# Patient Record
Sex: MALE | Race: AMERICAN INDIAN OR ALASKA NATIVE | Employment: OTHER | URBAN - METROPOLITAN AREA
[De-identification: names, ages, dates, MRNs, and addresses within clinical notes are randomized per-mention and may not be internally consistent; named-entity substitution may affect disease eponyms.]

---

## 2017-02-07 ENCOUNTER — OFFICE VISIT (OUTPATIENT)
Dept: FAMILY MEDICINE CLINIC | Age: 57
End: 2017-02-07

## 2017-02-07 VITALS
SYSTOLIC BLOOD PRESSURE: 126 MMHG | DIASTOLIC BLOOD PRESSURE: 68 MMHG | RESPIRATION RATE: 20 BRPM | WEIGHT: 226.6 LBS | BODY MASS INDEX: 30.69 KG/M2 | HEART RATE: 74 BPM | OXYGEN SATURATION: 93 % | HEIGHT: 72 IN

## 2017-02-07 DIAGNOSIS — H60.02 ABSCESS OF LEFT EXTERNAL EAR: Primary | ICD-10-CM

## 2017-02-07 PROCEDURE — 99214 OFFICE O/P EST MOD 30 MIN: CPT | Performed by: NURSE PRACTITIONER

## 2017-02-07 PROCEDURE — 10060 I&D ABSCESS SIMPLE/SINGLE: CPT | Performed by: NURSE PRACTITIONER

## 2017-02-07 RX ORDER — SULFAMETHOXAZOLE AND TRIMETHOPRIM 800; 160 MG/1; MG/1
1 TABLET ORAL 2 TIMES DAILY
Qty: 20 TABLET | Refills: 0 | Status: SHIPPED | OUTPATIENT
Start: 2017-02-07 | End: 2017-02-17

## 2017-02-07 ASSESSMENT — PATIENT HEALTH QUESTIONNAIRE - PHQ9
SUM OF ALL RESPONSES TO PHQ QUESTIONS 1-9: 0
SUM OF ALL RESPONSES TO PHQ9 QUESTIONS 1 & 2: 0
1. LITTLE INTEREST OR PLEASURE IN DOING THINGS: 0
2. FEELING DOWN, DEPRESSED OR HOPELESS: 0

## 2017-02-07 ASSESSMENT — ENCOUNTER SYMPTOMS
NAUSEA: 0
SHORTNESS OF BREATH: 0

## 2017-06-05 DIAGNOSIS — J30.2 SEASONAL AND PERENNIAL ALLERGIC RHINITIS: ICD-10-CM

## 2017-06-05 DIAGNOSIS — R07.81 RIB PAIN: Primary | ICD-10-CM

## 2017-06-05 DIAGNOSIS — J30.89 SEASONAL AND PERENNIAL ALLERGIC RHINITIS: ICD-10-CM

## 2017-06-05 RX ORDER — LORATADINE 10 MG/1
TABLET ORAL
Qty: 90 TABLET | Refills: 1 | Status: SHIPPED | OUTPATIENT
Start: 2017-06-05 | End: 2017-10-16 | Stop reason: SDUPTHER

## 2017-06-05 RX ORDER — IBUPROFEN 600 MG/1
600 TABLET ORAL EVERY 8 HOURS PRN
Qty: 90 TABLET | Refills: 1 | Status: SHIPPED | OUTPATIENT
Start: 2017-06-05 | End: 2019-02-14

## 2017-06-05 RX ORDER — METHYLPREDNISOLONE 4 MG/1
TABLET ORAL
Qty: 1 KIT | Refills: 0 | Status: SHIPPED | OUTPATIENT
Start: 2017-06-05 | End: 2017-06-13

## 2017-06-08 ENCOUNTER — TELEPHONE (OUTPATIENT)
Dept: FAMILY MEDICINE CLINIC | Age: 57
End: 2017-06-08

## 2017-06-13 ENCOUNTER — PROCEDURE VISIT (OUTPATIENT)
Dept: FAMILY MEDICINE CLINIC | Age: 57
End: 2017-06-13

## 2017-06-13 VITALS
BODY MASS INDEX: 29.96 KG/M2 | DIASTOLIC BLOOD PRESSURE: 80 MMHG | HEART RATE: 70 BPM | RESPIRATION RATE: 16 BRPM | SYSTOLIC BLOOD PRESSURE: 130 MMHG | HEIGHT: 71 IN | OXYGEN SATURATION: 92 % | WEIGHT: 214 LBS

## 2017-06-13 DIAGNOSIS — S81.811D LEG LACERATION, RIGHT, SUBSEQUENT ENCOUNTER: ICD-10-CM

## 2017-06-13 DIAGNOSIS — Z48.02 VISIT FOR SUTURE REMOVAL: Primary | ICD-10-CM

## 2017-06-13 DIAGNOSIS — S81.812D LEG LACERATION, LEFT, SUBSEQUENT ENCOUNTER: ICD-10-CM

## 2017-06-13 DIAGNOSIS — L03.115 CELLULITIS OF RIGHT LEG: ICD-10-CM

## 2017-06-13 PROCEDURE — 99213 OFFICE O/P EST LOW 20 MIN: CPT | Performed by: FAMILY MEDICINE

## 2017-06-13 RX ORDER — CEPHALEXIN 500 MG/1
500 CAPSULE ORAL 3 TIMES DAILY
Qty: 7 CAPSULE | Refills: 0 | Status: SHIPPED | OUTPATIENT
Start: 2017-06-13 | End: 2017-06-20

## 2017-06-27 ENCOUNTER — OFFICE VISIT (OUTPATIENT)
Dept: FAMILY MEDICINE CLINIC | Age: 57
End: 2017-06-27

## 2017-06-27 VITALS
WEIGHT: 213.6 LBS | HEIGHT: 71 IN | OXYGEN SATURATION: 93 % | HEART RATE: 64 BPM | DIASTOLIC BLOOD PRESSURE: 90 MMHG | SYSTOLIC BLOOD PRESSURE: 188 MMHG | BODY MASS INDEX: 29.9 KG/M2

## 2017-06-27 DIAGNOSIS — S81.812D LEG LACERATION, LEFT, SUBSEQUENT ENCOUNTER: ICD-10-CM

## 2017-06-27 DIAGNOSIS — I10 ESSENTIAL HYPERTENSION: Primary | ICD-10-CM

## 2017-06-27 DIAGNOSIS — L03.116 CELLULITIS OF LEFT ANTERIOR LOWER LEG: ICD-10-CM

## 2017-06-27 DIAGNOSIS — S81.811D LEG LACERATION, RIGHT, SUBSEQUENT ENCOUNTER: ICD-10-CM

## 2017-06-27 PROCEDURE — 99213 OFFICE O/P EST LOW 20 MIN: CPT | Performed by: FAMILY MEDICINE

## 2017-06-27 RX ORDER — LISINOPRIL AND HYDROCHLOROTHIAZIDE 12.5; 1 MG/1; MG/1
2 TABLET ORAL EVERY MORNING
Qty: 180 TABLET | Refills: 1 | Status: SHIPPED | OUTPATIENT
Start: 2017-06-27 | End: 2017-10-16 | Stop reason: SDUPTHER

## 2017-10-10 DIAGNOSIS — J44.9 CHRONIC OBSTRUCTIVE PULMONARY DISEASE, UNSPECIFIED COPD TYPE (HCC): ICD-10-CM

## 2017-10-10 NOTE — TELEPHONE ENCOUNTER
From: Ji Fletcher  Sent: 10/10/2017 1:47 PM EDT  Subject: Medication Renewal Request    Ji Fletcher would like a refill of the following medications:  albuterol sulfate HFA (PROVENTIL HFA) 108 (90 BASE) MCG/ACT inhaler Roseline Iraheta MD]    [phone] - F [phone]    Comment:

## 2017-10-11 RX ORDER — ALBUTEROL SULFATE 90 UG/1
2 AEROSOL, METERED RESPIRATORY (INHALATION) EVERY 4 HOURS PRN
Qty: 1 INHALER | Refills: 0 | Status: SHIPPED | OUTPATIENT
Start: 2017-10-11 | End: 2018-01-08 | Stop reason: SDUPTHER

## 2017-10-16 ENCOUNTER — OFFICE VISIT (OUTPATIENT)
Dept: FAMILY MEDICINE CLINIC | Age: 57
End: 2017-10-16

## 2017-10-16 VITALS
HEIGHT: 71 IN | WEIGHT: 202.8 LBS | HEART RATE: 58 BPM | BODY MASS INDEX: 28.39 KG/M2 | RESPIRATION RATE: 18 BRPM | OXYGEN SATURATION: 98 % | DIASTOLIC BLOOD PRESSURE: 72 MMHG | SYSTOLIC BLOOD PRESSURE: 130 MMHG

## 2017-10-16 DIAGNOSIS — Z23 NEEDS FLU SHOT: ICD-10-CM

## 2017-10-16 DIAGNOSIS — J44.9 COPD, MILD (HCC): Primary | ICD-10-CM

## 2017-10-16 DIAGNOSIS — E78.00 PURE HYPERCHOLESTEROLEMIA: ICD-10-CM

## 2017-10-16 DIAGNOSIS — J30.2 SEASONAL AND PERENNIAL ALLERGIC RHINITIS: ICD-10-CM

## 2017-10-16 DIAGNOSIS — J30.89 SEASONAL AND PERENNIAL ALLERGIC RHINITIS: ICD-10-CM

## 2017-10-16 DIAGNOSIS — I10 ESSENTIAL HYPERTENSION: ICD-10-CM

## 2017-10-16 PROCEDURE — 90686 IIV4 VACC NO PRSV 0.5 ML IM: CPT | Performed by: FAMILY MEDICINE

## 2017-10-16 PROCEDURE — 90471 IMMUNIZATION ADMIN: CPT | Performed by: FAMILY MEDICINE

## 2017-10-16 PROCEDURE — 99214 OFFICE O/P EST MOD 30 MIN: CPT | Performed by: FAMILY MEDICINE

## 2017-10-16 RX ORDER — LORATADINE 10 MG/1
TABLET ORAL
Qty: 90 TABLET | Refills: 2 | Status: SHIPPED | OUTPATIENT
Start: 2017-10-16 | End: 2019-02-14

## 2017-10-16 RX ORDER — LISINOPRIL AND HYDROCHLOROTHIAZIDE 12.5; 1 MG/1; MG/1
2 TABLET ORAL EVERY MORNING
Qty: 180 TABLET | Refills: 2 | Status: SHIPPED | OUTPATIENT
Start: 2017-10-16 | End: 2019-02-14

## 2017-10-16 RX ORDER — MONTELUKAST SODIUM 10 MG/1
TABLET ORAL
Qty: 90 TABLET | Refills: 2 | Status: SHIPPED | OUTPATIENT
Start: 2017-10-16 | End: 2019-02-14

## 2017-10-16 RX ORDER — FLUTICASONE PROPIONATE 44 UG/1
2 AEROSOL, METERED RESPIRATORY (INHALATION) 2 TIMES DAILY
Qty: 1 INHALER | Refills: 2 | Status: SHIPPED | OUTPATIENT
Start: 2017-10-16 | End: 2019-02-14

## 2017-10-16 RX ORDER — SIMVASTATIN 20 MG
TABLET ORAL
Qty: 90 TABLET | Refills: 2 | Status: SHIPPED | OUTPATIENT
Start: 2017-10-16 | End: 2019-02-14

## 2017-10-16 NOTE — PROGRESS NOTES
Vaccine Information Sheet, \"Influenza - Inactivated\"  given to Nidia Shea, or parent/legal guardian of  Nidia Shea and verbalized understanding. Patient responses:    Have you ever had a reaction to a flu vaccine? No  Are you able to eat eggs without adverse effects? Yes  Do you have any current illness? No  Have you ever had Guillian [place] Syndrome? No    Flu vaccine given per order. Please see immunization tab.

## 2017-10-16 NOTE — PROGRESS NOTES
Chief Complaint   Patient presents with    3 Month Follow-Up    Hypertension     COPD- doing well on albuterol-using the inhaler a few times a day. He has not been using his Flovent regularly. Due to production, wheezing, fevers, chills. HTN. Pt reports symptoms are stable. Request refills for medications . Denies any side effects on current regimen. Patient denies any exertional chest pain, dyspnea, palpitations, syncope, orthopnea, edema or paroxysmal nocturnal dyspnea. Denies any fever, chills, unintentional weight loss or night sweats. The patient denies abdominal or flank pain, anorexia, nausea or vomiting, dysphagia, change in bowel habits or black or bloody stools or weight loss. Otherwise. Feeling very well overall.y  Moods are good and stress level is better since he has taken some time off work for the last few months. ROS: per HPI. All other ROS negative. reports that he has never smoked. He has never used smokeless tobacco.   reports that he drinks about 3.6 oz of alcohol per week . Treatment Adherence:   Medication compliance:  compliant all of the time  Diet compliance:  compliant most of the time    Tobacco history: He  reports that he has never smoked. He has never used smokeless tobacco.   Daily Aspirin? Yes    Hypertension:  He is adherent to a low sodium diet. Patient denies chest pain, shortness of breath, headache, lightheadedness, blurred vision, peripheral edema, palpitations, dry cough and fatigue. Antihypertensive medication side effects: no medication side effects noted. Use of agents associated with hypertension: none. Hyperlipidemia:  No new myalgias or GI upset on simvastatin (Zocor). The patient does not use medications that may worsen dyslipidemias (corticosteroids, progestins, anabolic steroids, diuretics, beta-blockers, amiodarone, cyclosporine, olanzapine). The patient is not known to have coexisting coronary artery disease.     The patient is Last 3 Encounters:   10/16/17 202 lb 12.8 oz (92 kg)   06/27/17 213 lb 9.6 oz (96.9 kg)   06/13/17 214 lb (97.1 kg)       Constitutional: He is oriented to person, place, and time. He appears well-developed and well-nourished. No distress. HENT: Head: Normocephalic and atraumatic. Right Ear: External ear normal. Left Ear: External ear normal.   + clear effusion behind both TM Nose: Nose mild turbinate swelling./ . Mouth/Throat: Oropharynx is clear and moist.   Eyes: Conjunctivae, EOM and lids are normal. Pupils are equal, round, and reactive to light. Neck: Trachea normal. Neck supple. Carotid bruit is not present. No mass and no thyromegaly present. Cardiovascular: Normal rate, regular rhythm, S1 normal, S2 normal and normal heart sounds. No murmur heard. Pulmonary/Chest: Breath sounds slightly hyper resonnant at bases but. No accessory muscle usage. No respiratory distress. He  has no decreased breath sounds. He  has no wheezes. Abdominal: Soft. Normal appearance and bowel sounds are normal. . No tenderness. He has no CVA tenderness. Neurological: He  is alert and oriented to person, place, and time. Skin: He  is not diaphoretic.  well healed large ant shin bilateral scars. Psychiatric: He  has a normal mood and affect. Musculoskeletal:  Walks without antalgic gait. Diagnosis Assessment and Associated Orders:    1. COPD, mild ([address] - encouraged regular use of flovent as maintenance and albuterol as rescue fluticasone (FLOVENT HFA) 44 MCG/ACT inhaler    loratadine (CLARITIN) 10 MG tablet   2. Essential hypertension -Stable on current regimen. Refill given on current medication   Basic Metabolic Panel    lisinopril-hydrochlorothiazide (PRINZIDE;ZESTORETIC) 10-12.5 MG per tablet    metoprolol tartrate (LOPRESSOR) 25 MG tablet   3. Pure hypercholesterolemia -.bdtsa   Lipid Panel    simvastatin (ZOCOR) 20 MG tablet   4. Seasonal and perennial allergic rhinitis -Stable on current regimen.   Refill given on current medication   montelukast (SINGULAIR) 10 MG tablet   5. Needs flu shot  INFLUENZA, QUADV, 3 YRS AND OLDER, IM, PF, PREFILL SYR OR SDV, 0.5ML (FLUZONE QUADV, PF)         All patient questions answered. Pt voiced understanding. Return in about 6 months (around 4/16/2018) for HTN, HLD,.

## 2017-10-17 ENCOUNTER — NURSE ONLY (OUTPATIENT)
Dept: FAMILY MEDICINE CLINIC | Age: 57
End: 2017-10-17

## 2017-10-17 DIAGNOSIS — I10 ESSENTIAL HYPERTENSION: ICD-10-CM

## 2017-10-17 DIAGNOSIS — E78.00 PURE HYPERCHOLESTEROLEMIA: ICD-10-CM

## 2017-10-17 LAB
ANION GAP SERPL CALCULATED.3IONS-SCNC: 8 MMOL/L (ref 3–16)
BUN BLDV-MCNC: 15 MG/DL (ref 7–20)
CALCIUM SERPL-MCNC: 9.6 MG/DL (ref 8.3–10.6)
CHLORIDE BLD-SCNC: 100 MMOL/L (ref 99–110)
CHOLESTEROL, TOTAL: 223 MG/DL (ref 0–199)
CO2: 30 MMOL/L (ref 21–32)
CREAT SERPL-MCNC: 0.5 MG/DL (ref 0.9–1.3)
GFR AFRICAN AMERICAN: >60
GFR NON-AFRICAN AMERICAN: >60
GLUCOSE BLD-MCNC: 97 MG/DL (ref 70–99)
HDLC SERPL-MCNC: 32 MG/DL (ref 40–60)
LDL CHOLESTEROL CALCULATED: 163 MG/DL
POTASSIUM SERPL-SCNC: 4.4 MMOL/L (ref 3.5–5.1)
SODIUM BLD-SCNC: 138 MMOL/L (ref 136–145)
TRIGL SERPL-MCNC: 141 MG/DL (ref 0–150)
VLDLC SERPL CALC-MCNC: 28 MG/DL

## 2017-10-17 PROCEDURE — 36415 COLL VENOUS BLD VENIPUNCTURE: CPT | Performed by: FAMILY MEDICINE

## 2018-01-08 DIAGNOSIS — J44.9 CHRONIC OBSTRUCTIVE PULMONARY DISEASE, UNSPECIFIED COPD TYPE (HCC): ICD-10-CM

## 2018-01-08 RX ORDER — ALBUTEROL SULFATE 90 UG/1
2 AEROSOL, METERED RESPIRATORY (INHALATION) EVERY 4 HOURS PRN
Qty: 1 INHALER | Refills: 0 | Status: SHIPPED | OUTPATIENT
Start: 2018-01-08 | End: 2018-03-24 | Stop reason: SDUPTHER

## 2018-03-24 DIAGNOSIS — J44.9 CHRONIC OBSTRUCTIVE PULMONARY DISEASE, UNSPECIFIED COPD TYPE (HCC): ICD-10-CM

## 2018-04-30 ENCOUNTER — OFFICE VISIT (OUTPATIENT)
Dept: FAMILY MEDICINE CLINIC | Age: 58
End: 2018-04-30

## 2018-04-30 VITALS
DIASTOLIC BLOOD PRESSURE: 82 MMHG | RESPIRATION RATE: 16 BRPM | BODY MASS INDEX: 28.25 KG/M2 | HEART RATE: 71 BPM | WEIGHT: 201.8 LBS | SYSTOLIC BLOOD PRESSURE: 160 MMHG | HEIGHT: 71 IN | OXYGEN SATURATION: 94 %

## 2018-04-30 DIAGNOSIS — E78.00 PURE HYPERCHOLESTEROLEMIA: ICD-10-CM

## 2018-04-30 DIAGNOSIS — I10 ESSENTIAL HYPERTENSION: Primary | ICD-10-CM

## 2018-04-30 DIAGNOSIS — J44.9 COPD, MILD (HCC): ICD-10-CM

## 2018-04-30 PROCEDURE — 99213 OFFICE O/P EST LOW 20 MIN: CPT | Performed by: FAMILY MEDICINE

## 2018-04-30 RX ORDER — ASPIRIN 81 MG/1
81 TABLET ORAL DAILY
COMMUNITY
Start: 2018-04-30 | End: 2020-06-09 | Stop reason: SDUPTHER

## 2018-04-30 RX ORDER — AMLODIPINE BESYLATE 2.5 MG/1
2.5 TABLET ORAL DAILY
Qty: 90 TABLET | Refills: 3 | Status: SHIPPED | OUTPATIENT
Start: 2018-04-30 | End: 2019-02-14

## 2018-04-30 ASSESSMENT — PATIENT HEALTH QUESTIONNAIRE - PHQ9
1. LITTLE INTEREST OR PLEASURE IN DOING THINGS: 0
2. FEELING DOWN, DEPRESSED OR HOPELESS: 0
SUM OF ALL RESPONSES TO PHQ9 QUESTIONS 1 & 2: 0
SUM OF ALL RESPONSES TO PHQ QUESTIONS 1-9: 0

## 2019-02-14 ENCOUNTER — OFFICE VISIT (OUTPATIENT)
Dept: FAMILY MEDICINE CLINIC | Age: 59
End: 2019-02-14
Payer: COMMERCIAL

## 2019-02-14 VITALS
OXYGEN SATURATION: 98 % | WEIGHT: 219.4 LBS | HEART RATE: 81 BPM | BODY MASS INDEX: 30.72 KG/M2 | DIASTOLIC BLOOD PRESSURE: 86 MMHG | HEIGHT: 71 IN | RESPIRATION RATE: 16 BRPM | SYSTOLIC BLOOD PRESSURE: 138 MMHG

## 2019-02-14 DIAGNOSIS — I10 ESSENTIAL HYPERTENSION: ICD-10-CM

## 2019-02-14 DIAGNOSIS — Z23 NEED FOR INFLUENZA VACCINATION: ICD-10-CM

## 2019-02-14 DIAGNOSIS — E78.00 PURE HYPERCHOLESTEROLEMIA: ICD-10-CM

## 2019-02-14 DIAGNOSIS — J44.9 COPD, MILD (HCC): Primary | ICD-10-CM

## 2019-02-14 PROCEDURE — 90471 IMMUNIZATION ADMIN: CPT | Performed by: FAMILY MEDICINE

## 2019-02-14 PROCEDURE — 90686 IIV4 VACC NO PRSV 0.5 ML IM: CPT | Performed by: FAMILY MEDICINE

## 2019-02-14 PROCEDURE — 99214 OFFICE O/P EST MOD 30 MIN: CPT | Performed by: FAMILY MEDICINE

## 2019-02-14 RX ORDER — FLUTICASONE PROPIONATE 44 UG/1
2 AEROSOL, METERED RESPIRATORY (INHALATION) 2 TIMES DAILY
Qty: 1 INHALER | Refills: 2 | Status: SHIPPED | OUTPATIENT
Start: 2019-02-14 | End: 2019-12-30

## 2019-02-14 RX ORDER — ALBUTEROL SULFATE 90 UG/1
2 AEROSOL, METERED RESPIRATORY (INHALATION) EVERY 4 HOURS PRN
Qty: 18 G | Refills: 5 | Status: SHIPPED | OUTPATIENT
Start: 2019-02-14 | End: 2019-04-23 | Stop reason: SDUPTHER

## 2019-02-14 RX ORDER — LISINOPRIL 10 MG/1
10 TABLET ORAL DAILY
Qty: 90 TABLET | Refills: 1 | Status: SHIPPED | OUTPATIENT
Start: 2019-02-14 | End: 2019-09-10 | Stop reason: SDUPTHER

## 2019-02-14 ASSESSMENT — PATIENT HEALTH QUESTIONNAIRE - PHQ9
2. FEELING DOWN, DEPRESSED OR HOPELESS: 0
SUM OF ALL RESPONSES TO PHQ QUESTIONS 1-9: 0
1. LITTLE INTEREST OR PLEASURE IN DOING THINGS: 0
SUM OF ALL RESPONSES TO PHQ QUESTIONS 1-9: 0
SUM OF ALL RESPONSES TO PHQ9 QUESTIONS 1 & 2: 0

## 2019-02-25 ENCOUNTER — NURSE ONLY (OUTPATIENT)
Dept: FAMILY MEDICINE CLINIC | Age: 59
End: 2019-02-25
Payer: COMMERCIAL

## 2019-02-25 DIAGNOSIS — I10 ESSENTIAL HYPERTENSION: ICD-10-CM

## 2019-02-25 DIAGNOSIS — E78.00 PURE HYPERCHOLESTEROLEMIA: ICD-10-CM

## 2019-02-25 LAB
ANION GAP SERPL CALCULATED.3IONS-SCNC: 13 MMOL/L (ref 3–16)
BUN BLDV-MCNC: 12 MG/DL (ref 7–20)
CALCIUM SERPL-MCNC: 9.5 MG/DL (ref 8.3–10.6)
CHLORIDE BLD-SCNC: 105 MMOL/L (ref 99–110)
CHOLESTEROL, TOTAL: 200 MG/DL (ref 0–199)
CO2: 28 MMOL/L (ref 21–32)
CREAT SERPL-MCNC: 0.6 MG/DL (ref 0.9–1.3)
GFR AFRICAN AMERICAN: >60
GFR NON-AFRICAN AMERICAN: >60
GLUCOSE BLD-MCNC: 102 MG/DL (ref 70–99)
HDLC SERPL-MCNC: 39 MG/DL (ref 40–60)
LDL CHOLESTEROL CALCULATED: 144 MG/DL
POTASSIUM SERPL-SCNC: 4.9 MMOL/L (ref 3.5–5.1)
SODIUM BLD-SCNC: 146 MMOL/L (ref 136–145)
TRIGL SERPL-MCNC: 87 MG/DL (ref 0–150)
VLDLC SERPL CALC-MCNC: 17 MG/DL

## 2019-02-25 PROCEDURE — 36415 COLL VENOUS BLD VENIPUNCTURE: CPT | Performed by: FAMILY MEDICINE

## 2019-04-23 DIAGNOSIS — J44.9 COPD, MILD (HCC): ICD-10-CM

## 2019-04-24 RX ORDER — ALBUTEROL SULFATE 90 UG/1
2 AEROSOL, METERED RESPIRATORY (INHALATION) EVERY 4 HOURS PRN
Qty: 18 G | Refills: 5 | Status: SHIPPED | OUTPATIENT
Start: 2019-04-24 | End: 2020-05-08

## 2019-09-09 DIAGNOSIS — I10 ESSENTIAL HYPERTENSION: ICD-10-CM

## 2019-09-09 RX ORDER — LISINOPRIL 10 MG/1
TABLET ORAL
Qty: 90 TABLET | Refills: 1 | OUTPATIENT
Start: 2019-09-09

## 2019-09-11 RX ORDER — LISINOPRIL 10 MG/1
10 TABLET ORAL DAILY
Qty: 90 TABLET | Refills: 0 | Status: SHIPPED | OUTPATIENT
Start: 2019-09-11 | End: 2020-06-09

## 2019-10-07 ENCOUNTER — OFFICE VISIT (OUTPATIENT)
Dept: FAMILY MEDICINE CLINIC | Age: 59
End: 2019-10-07
Payer: COMMERCIAL

## 2019-10-07 VITALS
WEIGHT: 212 LBS | HEIGHT: 71 IN | DIASTOLIC BLOOD PRESSURE: 76 MMHG | SYSTOLIC BLOOD PRESSURE: 136 MMHG | BODY MASS INDEX: 29.68 KG/M2 | HEART RATE: 73 BPM | OXYGEN SATURATION: 87 %

## 2019-10-07 DIAGNOSIS — J44.9 COPD, MILD (HCC): ICD-10-CM

## 2019-10-07 DIAGNOSIS — E78.00 PURE HYPERCHOLESTEROLEMIA: ICD-10-CM

## 2019-10-07 DIAGNOSIS — Z23 NEED FOR INFLUENZA VACCINATION: ICD-10-CM

## 2019-10-07 DIAGNOSIS — I10 ESSENTIAL HYPERTENSION: Primary | ICD-10-CM

## 2019-10-07 PROCEDURE — 90471 IMMUNIZATION ADMIN: CPT | Performed by: FAMILY MEDICINE

## 2019-10-07 PROCEDURE — 90686 IIV4 VACC NO PRSV 0.5 ML IM: CPT | Performed by: FAMILY MEDICINE

## 2019-10-07 PROCEDURE — 99214 OFFICE O/P EST MOD 30 MIN: CPT | Performed by: FAMILY MEDICINE

## 2019-10-07 RX ORDER — LOSARTAN POTASSIUM 50 MG/1
50 TABLET ORAL DAILY
Qty: 90 TABLET | Refills: 3 | Status: SHIPPED | OUTPATIENT
Start: 2019-10-07 | End: 2020-06-09 | Stop reason: SDUPTHER

## 2019-10-07 RX ORDER — LISINOPRIL 10 MG/1
10 TABLET ORAL DAILY
Qty: 90 TABLET | Refills: 3 | Status: CANCELLED | OUTPATIENT
Start: 2019-10-07

## 2019-10-07 RX ORDER — LORATADINE 10 MG/1
10 TABLET ORAL DAILY
COMMUNITY
End: 2020-06-09 | Stop reason: SDUPTHER

## 2019-10-07 ASSESSMENT — COPD QUESTIONNAIRES
QUESTION_9 IN PAST WEEK AVERAGE AMOUNT YOUR SOCIAL ACTIVITIES (SUCH AS TALKING, BEING WITH CHILDREN, VISITING FRIENDS/RELATIVES) WERE LIMITED DUE TO BREATHING PROBLEMS: 0
QUESTION_10 IN PAST WEEK AVERAGE AMOUNT YOUR ABILITY TO DO SOCIAL ACTIVITIES (SUCH AS TALKING, BEING WITH CHILDREN, VISITING FRIENDS/RELATIVES) WERE LIMITED BECAUSE OF BREATHING PROBLEMS: 0
QUESTION_1 IN PAST WEEK HOW OFTEN DID YOU FEEL SHORT OF BREATH WHILE AT REST: 2
QUESTION_8 IN PAST WEEK AVERAGE AMOUNT YOUR ABILITY TO DO MODERATE PHYSICAL ACTIVITIES (SUCH AS WALKING, HOUSEWORK, CARRYING THINGS) WERE LIMITED BECAUSE OF BREATHING PROBLEMS: 2
QUESTION_6 IN PAST WEEK HOW MUCH OF THE TIME DID YOU PRODUCT SPUTUM OR PHLEGM: 1
QUESTION_4 IN PAST WEEK HOW OFTEN DID YOU FEEL DEPRESSED (DOWN) BECAUSE OF YOUR BREATHING PROBLEMS: 2
QUESTION_3 IN PAST WEEK HOW OFTEN DID YOU FEEL CONCERNED ABOUT GETTING A COLD OR YOUR BREATHING GETTING WORSE: 3
QUESTION_7 IN PAST WEEK AVERAGE AMOUNT YOUR ABILITY TO DO STRENUOUS PHYSICAL ACTIVITIES (SUCH AS CLIMBING STAIRS, HURRYING, PARTICIPATING IN SPORTS) WERE LIMITED BECAUSE OF BREATHING PROBLEMS: 3
QUESTION_5 IN PAST WEEK HOW MUCH OF THE TIME DID YOU COUGH: 3
QUESTION_2 IN PAST WEEK HOW OFTEN DID YOU FEEL SHORT OF BREATH WHILE DOING PHYSICAL ACTIVITIES: 2

## 2019-12-30 DIAGNOSIS — J44.9 COPD, MILD (HCC): ICD-10-CM

## 2019-12-30 RX ORDER — FLUTICASONE PROPIONATE 44 MCG
AEROSOL WITH ADAPTER (GRAM) INHALATION
Qty: 10.6 INHALER | Refills: 2 | Status: SHIPPED | OUTPATIENT
Start: 2019-12-30 | End: 2020-09-22

## 2020-05-08 RX ORDER — ALBUTEROL SULFATE 90 UG/1
AEROSOL, METERED RESPIRATORY (INHALATION)
Qty: 18 INHALER | Refills: 5 | Status: SHIPPED | OUTPATIENT
Start: 2020-05-08 | End: 2020-06-09 | Stop reason: SDUPTHER

## 2020-06-09 ENCOUNTER — OFFICE VISIT (OUTPATIENT)
Dept: FAMILY MEDICINE CLINIC | Age: 60
End: 2020-06-09
Payer: COMMERCIAL

## 2020-06-09 VITALS
BODY MASS INDEX: 30.04 KG/M2 | OXYGEN SATURATION: 83 % | SYSTOLIC BLOOD PRESSURE: 138 MMHG | WEIGHT: 214.6 LBS | HEIGHT: 71 IN | TEMPERATURE: 96.7 F | DIASTOLIC BLOOD PRESSURE: 80 MMHG | HEART RATE: 80 BPM

## 2020-06-09 LAB
ANION GAP SERPL CALCULATED.3IONS-SCNC: 8 MMOL/L (ref 3–16)
BUN BLDV-MCNC: 16 MG/DL (ref 7–20)
CALCIUM SERPL-MCNC: 9.6 MG/DL (ref 8.3–10.6)
CHLORIDE BLD-SCNC: 101 MMOL/L (ref 99–110)
CHOLESTEROL, FASTING: 212 MG/DL (ref 0–199)
CO2: 30 MMOL/L (ref 21–32)
CREAT SERPL-MCNC: 0.6 MG/DL (ref 0.8–1.3)
GFR AFRICAN AMERICAN: >60
GFR NON-AFRICAN AMERICAN: >60
GLUCOSE BLD-MCNC: 102 MG/DL (ref 70–99)
HDLC SERPL-MCNC: 40 MG/DL (ref 40–60)
LDL CHOLESTEROL CALCULATED: 155 MG/DL
POTASSIUM SERPL-SCNC: 5.2 MMOL/L (ref 3.5–5.1)
SODIUM BLD-SCNC: 139 MMOL/L (ref 136–145)
TRIGLYCERIDE, FASTING: 83 MG/DL (ref 0–150)
VLDLC SERPL CALC-MCNC: 17 MG/DL

## 2020-06-09 PROCEDURE — 99213 OFFICE O/P EST LOW 20 MIN: CPT | Performed by: NURSE PRACTITIONER

## 2020-06-09 RX ORDER — ALBUTEROL SULFATE 90 UG/1
AEROSOL, METERED RESPIRATORY (INHALATION)
Qty: 18 INHALER | Refills: 5 | Status: SHIPPED | OUTPATIENT
Start: 2020-06-09 | End: 2021-11-18 | Stop reason: SDUPTHER

## 2020-06-09 RX ORDER — LISINOPRIL 10 MG/1
10 TABLET ORAL DAILY
Qty: 90 TABLET | Refills: 0 | Status: CANCELLED | OUTPATIENT
Start: 2020-06-09

## 2020-06-09 RX ORDER — LORATADINE 10 MG/1
10 TABLET ORAL DAILY
Qty: 30 TABLET | Refills: 2 | Status: SHIPPED | OUTPATIENT
Start: 2020-06-09 | End: 2020-10-23

## 2020-06-09 RX ORDER — ASPIRIN 81 MG/1
81 TABLET ORAL DAILY
Qty: 90 TABLET | Refills: 0 | Status: SHIPPED | OUTPATIENT
Start: 2020-06-09 | End: 2020-10-27

## 2020-06-09 RX ORDER — LOSARTAN POTASSIUM 50 MG/1
50 TABLET ORAL DAILY
Qty: 90 TABLET | Refills: 3 | Status: SHIPPED | OUTPATIENT
Start: 2020-06-09 | End: 2021-03-21 | Stop reason: SDUPTHER

## 2020-06-09 SDOH — HEALTH STABILITY: MENTAL HEALTH: HOW OFTEN DO YOU HAVE A DRINK CONTAINING ALCOHOL?: 2-4 TIMES A MONTH

## 2020-06-09 ASSESSMENT — ENCOUNTER SYMPTOMS
CONSTIPATION: 0
ABDOMINAL PAIN: 0
DIARRHEA: 0
BACK PAIN: 0
SINUS PRESSURE: 0
NAUSEA: 0
TROUBLE SWALLOWING: 0
SINUS PAIN: 0
SHORTNESS OF BREATH: 1
WHEEZING: 0
COUGH: 0
CHEST TIGHTNESS: 0
VOMITING: 0
BLOOD IN STOOL: 0
SORE THROAT: 0

## 2020-06-09 ASSESSMENT — PATIENT HEALTH QUESTIONNAIRE - PHQ9
1. LITTLE INTEREST OR PLEASURE IN DOING THINGS: 0
SUM OF ALL RESPONSES TO PHQ QUESTIONS 1-9: 0
SUM OF ALL RESPONSES TO PHQ9 QUESTIONS 1 & 2: 0
2. FEELING DOWN, DEPRESSED OR HOPELESS: 0
SUM OF ALL RESPONSES TO PHQ QUESTIONS 1-9: 0

## 2020-06-09 NOTE — PROGRESS NOTES
Psychiatric/Behavioral: Negative for dysphoric mood, sleep disturbance and suicidal ideas. The patient is not nervous/anxious. Prior to Visit Medications    Medication Sig Taking? Authorizing Provider   losartan (COZAAR) 50 MG tablet Take 1 tablet by mouth daily To replace lisinopril Yes Adrianna Duty, APRN - NP   loratadine (CLARITIN) 10 MG tablet Take 1 tablet by mouth daily Yes Adrianna Duty APRN - NP   albuterol sulfate  (90 Base) MCG/ACT inhaler INHALE 2 PUFFS BY MOUTH EVERY 4 HOURS AS NEEDED FOR WHEEZE OR FOR SHORTNESS OF BREATH Yes Adrianna Duty, APRN - NP   aspirin EC 81 MG EC tablet Take 1 tablet by mouth daily Yes Adrianna Duty APRN - NP   FLOVENT HFA 44 MCG/ACT inhaler TAKE 2 PUFFS BY MOUTH TWICE A DAY Yes Christelle Bourne MD   Multiple Vitamin (DAILY VITAMINS PO) Take by mouth Yes Historical Provider, MD        Social History     Tobacco Use    Smoking status: Never Smoker    Smokeless tobacco: Never Used   Substance Use Topics    Alcohol use: Yes     Alcohol/week: 2.0 standard drinks     Types: 2 Cans of beer per week     Frequency: 2-4 times a month        Vitals:    06/09/20 0828   BP: 138/80   Site: Left Upper Arm   Position: Sitting   Pulse: 80   Temp: 96.7 °F (35.9 °C)   SpO2: (!) 83%   Weight: 214 lb 9.6 oz (97.3 kg)   Height: 5' 11\" (1.803 m)     Estimated body mass index is 29.93 kg/m² as calculated from the following:    Height as of this encounter: 5' 11\" (1.803 m). Weight as of this encounter: 214 lb 9.6 oz (97.3 kg). Physical Exam  Vitals signs reviewed. Constitutional:       General: He is not in acute distress. Appearance: Normal appearance. He is normal weight. He is not ill-appearing, toxic-appearing or diaphoretic. HENT:      Head: Normocephalic and atraumatic. Nose: Nose normal.   Eyes:      Extraocular Movements: Extraocular movements intact. Pupils: Pupils are equal, round, and reactive to light.    Neck: Musculoskeletal: Normal range of motion and neck supple. Cardiovascular:      Rate and Rhythm: Normal rate and regular rhythm. Heart sounds: Murmur present. Pulmonary:      Effort: Pulmonary effort is normal. No respiratory distress. Breath sounds: Normal breath sounds. Abdominal:      General: Bowel sounds are normal. There is no distension. Palpations: Abdomen is soft. There is no mass. Tenderness: There is no abdominal tenderness. There is no guarding or rebound. Hernia: No hernia is present. Musculoskeletal: Normal range of motion. Skin:     General: Skin is warm and dry. Neurological:      General: No focal deficit present. Mental Status: He is alert and oriented to person, place, and time. Mental status is at baseline. Motor: No weakness. Gait: Gait normal.   Psychiatric:         Mood and Affect: Mood normal.         Behavior: Behavior normal.         Thought Content: Thought content normal.         Judgment: Judgment normal.         ASSESSMENT/PLAN:  1. COPD, mild (HCC)  Continue Flovent twice daily and albuterol as needed. Follow-up with pulmonology for PFT. Eval and treat. - albuterol sulfate  (90 Base) MCG/ACT inhaler; INHALE 2 PUFFS BY MOUTH EVERY 4 HOURS AS NEEDED FOR WHEEZE OR FOR SHORTNESS OF BREATH  Dispense: 18 Inhaler; Refill: 5  - [facility]    2. Essential hypertension  Continue losartan. - aspirin EC 81 MG EC tablet; Take 1 tablet by mouth daily  Dispense: 90 tablet; Refill: 0  - Basic Metabolic Panel    3. Seasonal and perennial allergic rhinitis  Claritin as needed    4. Pure hypercholesterolemia  - Lipid, Fasting    5. Abnormal heart sounds  I did auscultate a murmur. Denies any history of this. I would like him to follow-up with cardiology. - Ailyn Carmona MD, Cardiology, [place]    6. Chest pain, unspecified type  This is stable and intermittent.   Follow-up with cardiology  - [facility],

## 2020-06-15 ENCOUNTER — OFFICE VISIT (OUTPATIENT)
Dept: CARDIOLOGY CLINIC | Age: 60
End: 2020-06-15
Payer: COMMERCIAL

## 2020-06-15 VITALS
DIASTOLIC BLOOD PRESSURE: 78 MMHG | WEIGHT: 215.4 LBS | HEIGHT: 71 IN | BODY MASS INDEX: 30.15 KG/M2 | SYSTOLIC BLOOD PRESSURE: 136 MMHG

## 2020-06-15 PROCEDURE — 99204 OFFICE O/P NEW MOD 45 MIN: CPT | Performed by: INTERNAL MEDICINE

## 2020-06-15 PROCEDURE — 93000 ELECTROCARDIOGRAM COMPLETE: CPT | Performed by: INTERNAL MEDICINE

## 2020-06-24 ENCOUNTER — PROCEDURE VISIT (OUTPATIENT)
Dept: CARDIOLOGY CLINIC | Age: 60
End: 2020-06-24
Payer: COMMERCIAL

## 2020-06-24 LAB
LV EF: 36 %
LVEF MODALITY: NORMAL

## 2020-06-24 PROCEDURE — 93016 CV STRESS TEST SUPVJ ONLY: CPT | Performed by: INTERNAL MEDICINE

## 2020-06-24 PROCEDURE — 93018 CV STRESS TEST I&R ONLY: CPT | Performed by: INTERNAL MEDICINE

## 2020-06-24 PROCEDURE — 93017 CV STRESS TEST TRACING ONLY: CPT | Performed by: INTERNAL MEDICINE

## 2020-06-24 PROCEDURE — 78452 HT MUSCLE IMAGE SPECT MULT: CPT | Performed by: INTERNAL MEDICINE

## 2020-06-24 PROCEDURE — A9500 TC99M SESTAMIBI: HCPCS | Performed by: INTERNAL MEDICINE

## 2020-06-25 ENCOUNTER — INITIAL CONSULT (OUTPATIENT)
Dept: PULMONOLOGY | Age: 60
End: 2020-06-25
Payer: COMMERCIAL

## 2020-06-25 ENCOUNTER — TELEPHONE (OUTPATIENT)
Dept: CARDIOLOGY CLINIC | Age: 60
End: 2020-06-25

## 2020-06-25 VITALS
SYSTOLIC BLOOD PRESSURE: 126 MMHG | HEART RATE: 81 BPM | HEIGHT: 71 IN | OXYGEN SATURATION: 86 % | DIASTOLIC BLOOD PRESSURE: 84 MMHG | WEIGHT: 215 LBS | BODY MASS INDEX: 30.1 KG/M2

## 2020-06-25 LAB
BASOPHILS ABSOLUTE: 0.1 K/UL (ref 0–0.2)
BASOPHILS RELATIVE PERCENT: 0.7 %
EOSINOPHILS ABSOLUTE: 0.1 K/UL (ref 0–0.6)
EOSINOPHILS RELATIVE PERCENT: 1.3 %
HCT VFR BLD CALC: 60.2 % (ref 40.5–52.5)
HEMOGLOBIN: 19.9 G/DL (ref 13.5–17.5)
LYMPHOCYTES ABSOLUTE: 3.1 K/UL (ref 1–5.1)
LYMPHOCYTES RELATIVE PERCENT: 36.5 %
MCH RBC QN AUTO: 29.2 PG (ref 26–34)
MCHC RBC AUTO-ENTMCNC: 33.1 G/DL (ref 31–36)
MCV RBC AUTO: 88.3 FL (ref 80–100)
MONOCYTES ABSOLUTE: 0.8 K/UL (ref 0–1.3)
MONOCYTES RELATIVE PERCENT: 9.1 %
NEUTROPHILS ABSOLUTE: 4.5 K/UL (ref 1.7–7.7)
NEUTROPHILS RELATIVE PERCENT: 52.4 %
PDW BLD-RTO: 14.2 % (ref 12.4–15.4)
PLATELET # BLD: 184 K/UL (ref 135–450)
PMV BLD AUTO: 8.8 FL (ref 5–10.5)
RBC # BLD: 6.82 M/UL (ref 4.2–5.9)
WBC # BLD: 8.6 K/UL (ref 4–11)

## 2020-06-25 PROCEDURE — 99243 OFF/OP CNSLTJ NEW/EST LOW 30: CPT | Performed by: NURSE PRACTITIONER

## 2020-06-25 RX ORDER — BUDESONIDE AND FORMOTEROL FUMARATE DIHYDRATE 160; 4.5 UG/1; UG/1
2 AEROSOL RESPIRATORY (INHALATION) 2 TIMES DAILY
Qty: 1 INHALER | Refills: 5 | Status: SHIPPED | OUTPATIENT
Start: 2020-06-25 | End: 2020-09-22

## 2020-06-25 ASSESSMENT — SLEEP AND FATIGUE QUESTIONNAIRES
HOW LIKELY ARE YOU TO NOD OFF OR FALL ASLEEP IN A CAR, WHILE STOPPED FOR A FEW MINUTES IN TRAFFIC: 0
HOW LIKELY ARE YOU TO NOD OFF OR FALL ASLEEP WHILE SITTING AND READING: 0
HOW LIKELY ARE YOU TO NOD OFF OR FALL ASLEEP WHILE LYING DOWN TO REST IN THE AFTERNOON WHEN CIRCUMSTANCES PERMIT: 1
HOW LIKELY ARE YOU TO NOD OFF OR FALL ASLEEP WHILE WATCHING TV: 0
NECK CIRCUMFERENCE (INCHES): 15.5
ESS TOTAL SCORE: 3
HOW LIKELY ARE YOU TO NOD OFF OR FALL ASLEEP WHILE SITTING AND TALKING TO SOMEONE: 0
HOW LIKELY ARE YOU TO NOD OFF OR FALL ASLEEP WHILE SITTING QUIETLY AFTER LUNCH WITHOUT ALCOHOL: 1
HOW LIKELY ARE YOU TO NOD OFF OR FALL ASLEEP WHILE SITTING INACTIVE IN A PUBLIC PLACE: 0
HOW LIKELY ARE YOU TO NOD OFF OR FALL ASLEEP WHEN YOU ARE A PASSENGER IN A CAR FOR AN HOUR WITHOUT A BREAK: 1

## 2020-06-25 NOTE — PROGRESS NOTES
History:    Family History   Problem Relation Age of Onset    Cancer Mother         colon    Diabetes Mother     High Blood Pressure Mother     Migraines Mother     High Blood Pressure Father     COPD Father     Cancer Father         lung    Heart Disease Maternal Grandmother     Heart Disease Maternal Grandfather          REVIEW OF SYSTEMS:    CONSTITUTIONAL:  negative for fevers, chills, diaphoresis, activity change, appetite change, night sweats and unexpected weight change. HEENT:  negative for hearing loss,  sinus pressure, nasal congestion, epistaxis and snoring  RESPIRATORY: Positive SOB, negative cough, negative wheeze  CARDIOVASCULAR:  Negative for chest pain, palpitations, exertional chest pressure/discomfort, edema, syncope  GASTROINTESTINAL: negative for nausea, vomiting, diarrhea, constipation, blood in stool and abdominal pain  GENITOURINARY:  negative for frequency, dysuria and hematuria  HEMATOLOGIC/LYMPHATIC:  negative for easy bruising, bleeding and lymphadenopathy  ALLERGIC/IMMUNOLOGIC:  negative for recurrent infections, angioedema, anaphylaxis and drug reaction  MUSCULOSKELETAL:  negative for  pain, joint swelling, decreased range of motion and muscle weakness  NEURO: negative for headache, AMS, decrease sensations  SKIN: Negative for rashes or lesions      Objective:   VITALS:   Vitals:    06/25/20 1538   BP: 126/84   Pulse: 81   SpO2: (!) 86%   Weight: 215 lb (97.5 kg)   Height: 5' 11\" (1.803 m)     Neck circumference: 15.5  Inches  [place] - Total score: 3  MALLAMPATI: 2  Body mass index is 29.99 kg/m².     PHYSICAL EXAM:    CONSTITUTIONAL:  awake, alert, cooperative, no apparent distress, and appears stated age  HEENT:  Supple and nontender,  trachea midline, no adenopathy, thyroid normal, no JVD, no wheezing or stridor over neck  CHEST: Chest expansion equal and symmetrical, no intercostal retraction, no increased work of breathing  LUNGS: Bilateral breath sounds clear and equal medication. We have talked about action plan for COPD exacerbations, should he develop a upper respiratory bronchial type infection he is to call the office for further evaluation for the need of antibiotics and steroids to prevent COPD exacerbation and hospitalization. He is also been instructed in use of albuterol rescue inhaler, I have given him a spacer and instruction on how to use a spacer with the Symbicort and albuterol rescue inhaler. 2. Hypoxia  O2 sat 84% at rest in room air, placed on 2 L O2 nasal cannula O2 sat 92% at rest, ambulated in gomez and O2 sat 94% with 2LPM. I will order home oxygen at 2 LPM to be worn 24/7.    3. SOB (shortness of breath)  We will check a CBC, for anemia as a possible cause of his shortness of breath. We will get a CT of his chest with high resolution to assess for nodules and other possible pulmonary causes of his shortness of breath. Tightness of breath is probably multifactorial given his recent nuclear med stress test, history of exposure to silicone dust and exposure background 0 following 9/11.    4. Snoring  5. Fatigue, unspecified type  He is reluctant to do a sleep study at this time. So we will get an overnight oximetry apnea link he is aware should his apnea link come back positive AHI greater than 5 we will need to do a sleep study at that time. He is aware of the consequences of untreated sleep apnea leading to worsening of cardiovascular disease, pulmonary disease, kidney disease, overall wellbeing    6. Preop testing  We will get a COVID-19 prior to PFT testing    7. Healthcare maintenance  We have discussed the need to maintain yearly flu immunization, pneumococcal vaccination. We have discussed Coronavirus precaution including: social distancing when needing to be in public, handwashing practice, wiping items touched in public such as gas pumps, door handles, shopping carts, etc. Self monitoring for infection - fever, chills, cough, SOB.  Should they

## 2020-06-29 ENCOUNTER — TELEPHONE (OUTPATIENT)
Dept: PULMONOLOGY | Age: 60
End: 2020-06-29

## 2020-06-29 NOTE — TELEPHONE ENCOUNTER
Dionna from [facility] states that Hypoxia cannot be a dx code for patient to get home Oxygen. She noticed in your note that patient also has COPD. Could you please change DX code on order? Thank you.

## 2020-07-02 ENCOUNTER — HOSPITAL ENCOUNTER (OUTPATIENT)
Dept: CT IMAGING | Age: 60
Discharge: HOME | End: 2020-07-02
Payer: COMMERCIAL

## 2020-07-02 PROCEDURE — 71250 CT THORAX DX C-: CPT

## 2020-07-06 ENCOUNTER — TELEPHONE (OUTPATIENT)
Dept: PULMONOLOGY | Age: 60
End: 2020-07-06

## 2020-07-06 NOTE — TELEPHONE ENCOUNTER
Attempted to called patient at home and cell phone to inform need for echocardiogram to check for pulmonary hypertension and will need cardiac cath. No answer.

## 2020-07-07 ENCOUNTER — TELEPHONE (OUTPATIENT)
Dept: PULMONOLOGY | Age: 60
End: 2020-07-07

## 2020-07-07 ENCOUNTER — HOSPITAL ENCOUNTER (OUTPATIENT)
Age: 60
Discharge: HOME | End: 2020-07-07
Payer: COMMERCIAL

## 2020-07-07 ENCOUNTER — HOSPITAL ENCOUNTER (OUTPATIENT)
Dept: GENERAL RADIOLOGY | Age: 60
Discharge: HOME | End: 2020-07-07
Payer: COMMERCIAL

## 2020-07-07 PROCEDURE — 71046 X-RAY EXAM CHEST 2 VIEWS: CPT

## 2020-07-07 NOTE — TELEPHONE ENCOUNTER
Attempted to call Velia Echevarria at all 3 numbers in his chart. Voice messages have been left for him to contact our office in regards to his recent CT findings.

## 2020-07-07 NOTE — TELEPHONE ENCOUNTER
Spoke with [facility] states over the weekend he tripped over the cat fell against a coffee table and landed full weight on his chest with his fist against his sternum. States he has pain with deep inspiration and is unable to lift even a gallon of milk without having severe chest wall pain. Have recommended that he get a chest x-ray and may need a CT of his chest.  He states he is scheduled to have an echocardiogram on Saturday and follow-up with cardiologist the following Monday. I did discuss with him the findings of his CT that was recently completed for lung screening indicating possible pulmonary hypertension and that it is my recommendation that he have a left heart cath.

## 2020-07-09 RX ORDER — BUDESONIDE AND FORMOTEROL FUMARATE DIHYDRATE 160; 4.5 UG/1; UG/1
2 AEROSOL RESPIRATORY (INHALATION) 2 TIMES DAILY
Qty: 1 INHALER | Refills: 0 | COMMUNITY
Start: 2020-07-09 | End: 2020-09-23

## 2020-07-11 ENCOUNTER — PROCEDURE VISIT (OUTPATIENT)
Dept: CARDIOLOGY CLINIC | Age: 60
End: 2020-07-11
Payer: COMMERCIAL

## 2020-07-11 LAB
LV EF: 53 %
LVEF MODALITY: NORMAL

## 2020-07-11 PROCEDURE — 93306 TTE W/DOPPLER COMPLETE: CPT | Performed by: INTERNAL MEDICINE

## 2020-07-13 ENCOUNTER — OFFICE VISIT (OUTPATIENT)
Dept: CARDIOLOGY CLINIC | Age: 60
End: 2020-07-13
Payer: COMMERCIAL

## 2020-07-13 VITALS
HEART RATE: 64 BPM | SYSTOLIC BLOOD PRESSURE: 120 MMHG | WEIGHT: 216.6 LBS | DIASTOLIC BLOOD PRESSURE: 78 MMHG | BODY MASS INDEX: 30.32 KG/M2 | HEIGHT: 71 IN

## 2020-07-13 PROCEDURE — 99213 OFFICE O/P EST LOW 20 MIN: CPT | Performed by: INTERNAL MEDICINE

## 2020-07-13 NOTE — LETTER
Romaine Hawley  1960  [id number]    Have you had any Chest Pain - No     Have you had any Shortness of Breath - Yes  If Yes - When on exertion    Have you had any dizziness - Yes, ongoing, history of Vertigo  If Yes DO ORTHOSTATIC BP - when do you feel dizzy with position change   How long does it last seconds     Have you had any palpitations - Yes  If Yes DO EKG - Do you feel your heart skipping  How long does it last - seconds     Is the patient on any of the following medications - NONE  If Yes DO EKG    Do you have any edema - swelling in NONE    If Yes - CHECK TO SEE IF THE EDEMA IS PITTING    Check Venous \"LEG PROBLEM Questionnaire\"    Do you have a surgery or procedure scheduled in the near future - No  If Yes- DO EKG    Ask patient if they want to [facility] if they are not already signed up    Check to see if we have an E-MAIL on file for the patient    Check medication list thoroughly!!!  BE SURE TO ASK PATIENT IF THEY NEED MEDICATION REFILLS

## 2020-07-13 NOTE — PROGRESS NOTES
CARDIOLOGY NOTE      7/13/2020    RE: Bella Estrada  (1960)                               TO:  Dr. Chip Orellana MD            Preet Hagan is a 60 y.o. male who was seen today for management of  HTN                                  HERE FOR ABN STRESS  HPI:   Patient is here for    - Hypertension,is  well controlled, pt is  compliant with medicines  - Hyperlipidimea, lipids are in acceptable range. Pt  is  compliant with medicines                  The patient does not have cardiac complaints    Bella Estrada has the following history recorded in care path:  Patient Active Problem List    Diagnosis Date Noted    COPD, mild ([facility] [address] 09/04/2012     Priority: High    Essential hypertension 09/04/2012     Priority: High    Pure hypercholesterolemia      Priority: Medium    Chronic neck pain 09/04/2012     Priority: Low    Seasonal and perennial allergic rhinitis 09/04/2012     Priority: Low     Current Outpatient Medications   Medication Sig Dispense Refill    budesonide-formoterol (SYMBICORT) 160-4.5 MCG/ACT AERO Inhale 2 puffs into the lungs 2 times daily Lot: [id number]  Exp: 04/2021  x1 1 Inhaler 0    SYMBICORT 160-4.5 MCG/ACT AERO Inhale 2 puffs into the lungs 2 times daily 1 Inhaler 5    losartan (COZAAR) 50 MG tablet Take 1 tablet by mouth daily To replace lisinopril 90 tablet 3    loratadine (CLARITIN) 10 MG tablet Take 1 tablet by mouth daily 30 tablet 2    albuterol sulfate  (90 Base) MCG/ACT inhaler INHALE 2 PUFFS BY MOUTH EVERY 4 HOURS AS NEEDED FOR WHEEZE OR FOR SHORTNESS OF BREATH 18 Inhaler 5    aspirin EC 81 MG EC tablet Take 1 tablet by mouth daily 90 tablet 0    Multiple Vitamin (DAILY VITAMINS PO) Take by mouth      FLOVENT HFA 44 MCG/ACT inhaler TAKE 2 PUFFS BY MOUTH TWICE A DAY (Patient not taking: Reported on 7/13/2020) 10.6 Inhaler 2     No current facility-administered medications for this visit.       Allergies: Patient has no known allergies. Past Medical History:   Diagnosis Date    Abnormal echocardiogram 3/29/13    EF=45-50%;mod. AR,mild aortic valve calcif.  Anesthesia complication 4/0/6727    Ankle fracture, left 1985    Arm fracture, right 1966    AVM (arteriovenous malformation) 6-    transverse colon    COPD (chronic obstructive pulmonary disease) ([facility] [address]     Has worked around [address] [facility] and worked at [employer] x 6 weeks    Epididymal cyst Oct 2011    U/S    H/O cardiac catheterization 4/12/2013 4/12 Dr Real Ham signif CAD. EF 55%. Renals patent.     H/O cardiovascular stress test 04/02/2013    EF 49%, there is no scintigraphic evidence of inducible myocardial ischemia, the observed defect is consistent with diaphragmatic attenuation, clinical correlation recommenced, no previous study to compare with, no wall motionn abnormality seen, normal perfusion in all myocardial regions    H/O chest pain     Hemorrhoids 6-    HTN (hypertension)     Hydrocele Oct 2011    U/S    Hyperlipemia     Hypertension     Irregular heart rhythm     after induction for surgery 9/5/2013 - pt developed irreg rhythm and surgery cancelled\"had another echo and stress test and everything checked out ok, they do not know the reason it happened\"- cardiac clearance obtained for surgery 9/19/2013    Palpitations     Pneumonia 2004    not since    Right inguinal hernia 8/22/2013    SOB (shortness of breath) 3/27/2013    Nuclear Stress Normal, EF 49% (Dr. Maxime Castillo- 4/2/2013)     SOB (shortness of breath) on exertion      Past Surgical History:   Procedure Laterality Date   [facility]    COLONOSCOPY  6-    INGUINAL HERNIA REPAIR Bilateral 1992    [address] Right 09/19/13    with mesh    OTHER SURGICAL HISTORY  09/05/13    hernia repair aborted    TYMPANOSTOMY TUBE PLACEMENT  2009    right ear   [address]      As reviewed   Family History   Problem Relation Age of Onset    Cancer Mother         colon    Diabetes Mother     High Blood Pressure Mother     Migraines Mother     High Blood Pressure Father     COPD Father     Cancer Father         lung    Heart Disease Maternal Grandmother     Heart Disease Maternal Grandfather      Social History     Tobacco Use    Smoking status: Never Smoker    Smokeless tobacco: Never Used   Substance Use Topics    Alcohol use: Yes     Alcohol/week: 2.0 standard drinks     Types: 2 Cans of beer per week     Frequency: 2-4 times a month     Comment: 6 beers a month/crystal light powder enegy drink      Review of Systems:    Constitutional: Negative for diaphoresis and fatigue  Psychological:Negative for anxiety or depression  HENT: Negative for headaches, nasal congestion, sinus pain or vertigo  Eyes: Negative for visual disturbance. Endocrine: Negative for polydipsia/polyuria  Respiratory: Negative for shortness of breath  Cardiovascular: Negative for chest pain, dyspnea on exertion, claudication, edema, irregular heartbeat, murmur, palpitations or shortness of breath  Gastrointestinal: Negative for abdominal pain or heartburn  Genito-Urinary: Negative for urinary frequency/urgency  Musculoskeletal: Negative for muscle pain, muscular weakness, negative for pain in arm and leg or swelling in foot and leg  Neurological: Negative for dizziness, headaches, memory loss, numbness/tingling, visual changes, syncope  Dermatological: Negative for rash    Objective:    Vitals:    07/13/20 1302   BP: 120/78   Site: Left Upper Arm   Position: Sitting   Cuff Size: Medium Adult   Pulse: 64   Weight: 216 lb 9.6 oz (98.2 kg)   Height: 5' 11\" (1.803 m)     /78 (Site: Left Upper Arm, Position: Sitting, Cuff Size: Medium Adult)   Pulse 64   Ht 5' 11\" (1.803 m)   Wt 216 lb 9.6 oz (98.2 kg)   BMI 30.21 kg/m²     No flowsheet data found.      Wt Readings from Last 3 Encounters:   07/13/20 216 lb 9.6 oz (98.2 kg)   06/25/20 215 lb (97.5 kg)   06/15/20 215 lb 6.4 oz (97.7 kg)     Body mass index is 30.21 kg/m². GENERAL - Alert, oriented, pleasant, in no apparent distress. EYES: No jaundice, no conjunctival pallor. SKIN: It is warm & dry. No rashes. No Echhymosis    HEENT - No clinically significant abnormalities seen. Neck - Supple. No jugular venous distention noted. No carotid bruits. Cardiovascular - Normal S1 and S2 without obvious murmur or gallop. Extremities - No cyanosis, clubbing, or significant edema. Pulmonary - No respiratory distress. No wheezes or rales. Abdomen - No masses, tenderness, or organomegaly. Musculoskeletal - No significant edema. No joint deformities. No muscle wasting. Neurologic - Cranial nerves II through XII are grossly intact. There were no gross focal neurologic abnormalities. Lab Review   Lab Results   Component Value Date    CKTOTAL 67 09/05/2013     BNP:  No results found for: BNP  PT/INR:    Lab Results   Component Value Date    INR 1.01 09/05/2013     No results found for: LABA1C  Lab Results   Component Value Date    WBC 8.6 06/25/2020    HCT 60.2 (H) 06/25/2020    MCV 88.3 06/25/2020     06/25/2020     Lab Results   Component Value Date    CHOL 200 (H) 02/25/2019    TRIG 87 02/25/2019    HDL 40 06/09/2020    LDLCALC 155 (H) 06/09/2020     Lab Results   Component Value Date    ALT 18 02/26/2014    AST 20 02/26/2014     BMP:    Lab Results   Component Value Date     06/09/2020    K 5.2 06/09/2020     06/09/2020    CO2 30 06/09/2020    BUN 16 06/09/2020    CREATININE 0.6 06/09/2020     CMP:   Lab Results   Component Value Date     06/09/2020    K 5.2 06/09/2020     06/09/2020    CO2 30 06/09/2020    BUN 16 06/09/2020    PROT 7.1 02/26/2014    PROT 7.0 03/01/2011     TSH:    Lab Results   Component Value Date    TSHHS 1.511 04/01/2013         Impression:    No diagnosis found.    Patient Active Problem List   Diagnosis Code    Chronic neck pain M54.2, G89.29    Seasonal and perennial allergic rhinitis J30.89, J30.2    COPD, mild (HCC) J44.9    Essential hypertension I10    Pure hypercholesterolemia E78.00       Assessment & Plan:  - cardiolite with low EF but echo normal    - normal EF on echo    -  Hypertension: Patients blood pressure is normal. Patient is advised about low sodium diet. Present medical regimen will not be changed. -  LIPID MANAGEMENT:  Available lipid  lab data reviewed  and patient was given dietary advice. NCEP- ATP III guidelines reviewed with patient. -   Changes  in medicines made:  No                                        Day Tomas MD    [facility]

## 2020-07-28 ENCOUNTER — TELEPHONE (OUTPATIENT)
Dept: PULMONOLOGY | Age: 60
End: 2020-07-28

## 2020-08-04 ENCOUNTER — APPOINTMENT (RX ONLY)
Dept: URBAN - METROPOLITAN AREA CLINIC 377 | Facility: CLINIC | Age: 60
Setting detail: DERMATOLOGY
End: 2020-08-04

## 2020-08-04 DIAGNOSIS — L73.8 OTHER SPECIFIED FOLLICULAR DISORDERS: ICD-10-CM

## 2020-08-04 DIAGNOSIS — L57.0 ACTINIC KERATOSIS: ICD-10-CM

## 2020-08-04 PROCEDURE — ? COUNSELING

## 2020-08-04 PROCEDURE — 17000 DESTRUCT PREMALG LESION: CPT

## 2020-08-04 PROCEDURE — ? LIQUID NITROGEN

## 2020-08-04 PROCEDURE — 99201: CPT | Mod: 25

## 2020-08-04 ASSESSMENT — LOCATION DETAILED DESCRIPTION DERM
LOCATION DETAILED: NASAL ROOT
LOCATION DETAILED: RIGHT CENTRAL MALAR CHEEK

## 2020-08-04 ASSESSMENT — LOCATION ZONE DERM
LOCATION ZONE: NOSE
LOCATION ZONE: FACE

## 2020-08-04 ASSESSMENT — LOCATION SIMPLE DESCRIPTION DERM
LOCATION SIMPLE: NOSE
LOCATION SIMPLE: RIGHT CHEEK

## 2020-08-04 NOTE — PROCEDURE: LIQUID NITROGEN
Consent: The patient's consent was obtained including but not limited to risks of crusting, scabbing, blistering, scarring, darker or lighter pigmentary change, recurrence, incomplete removal and infection.
Render Post-Care Instructions In Note?: yes
Duration Of Freeze Thaw-Cycle (Seconds): 5
Detail Level: Simple
Number Of Freeze-Thaw Cycles: 1 freeze-thaw cycle
Post-Care Instructions: I reviewed with the patient in detail post-care instructions. Patient is to wear sunprotection, and avoid picking at any of the treated lesions. Pt may apply Vaseline to crusted or scabbing areas.
Render Note In Bullet Format When Appropriate: No

## 2020-08-17 ENCOUNTER — HOSPITAL ENCOUNTER (OUTPATIENT)
Dept: LAB | Age: 60
Setting detail: SPECIMEN
Discharge: HOME | End: 2020-08-17
Payer: COMMERCIAL

## 2020-08-17 PROCEDURE — U0002 COVID-19 LAB TEST NON-CDC: HCPCS

## 2020-08-17 PROCEDURE — C9803 HOPD COVID-19 SPEC COLLECT: HCPCS

## 2020-08-18 LAB
SARS-COV-2: NOT DETECTED
SOURCE: NORMAL

## 2020-08-20 ENCOUNTER — HOSPITAL ENCOUNTER (OUTPATIENT)
Dept: PULMONOLOGY | Age: 60
Discharge: HOME | End: 2020-08-20
Payer: COMMERCIAL

## 2020-08-20 LAB
DLCO %PRED: 70 %
DLCO PRED: NORMAL
DLCO/VA %PRED: NORMAL
DLCO/VA PRED: NORMAL
DLCO/VA: NORMAL
DLCO: NORMAL
EXPIRATORY TIME-POST: NORMAL
EXPIRATORY TIME: NORMAL
FEF 25-75% %CHNG: NORMAL
FEF 25-75% %PRED-POST: NORMAL
FEF 25-75% %PRED-PRE: NORMAL
FEF 25-75% PRED: NORMAL
FEF 25-75%-POST: NORMAL
FEF 25-75%-PRE: NORMAL
FEV1 %PRED-POST: 49 %
FEV1 %PRED-PRE: 41 %
FEV1 PRED: NORMAL
FEV1-POST: NORMAL
FEV1-PRE: NORMAL
FEV1/FVC %PRED-POST: NORMAL
FEV1/FVC %PRED-PRE: NORMAL
FEV1/FVC PRED: NORMAL
FEV1/FVC-POST: 60 %
FEV1/FVC-PRE: 59 %
FVC %PRED-POST: NORMAL
FVC %PRED-PRE: NORMAL
FVC PRED: NORMAL
FVC-POST: NORMAL
FVC-PRE: NORMAL
GAW %PRED: NORMAL
GAW PRED: NORMAL
GAW: NORMAL
IC %PRED: NORMAL
IC PRED: NORMAL
IC: NORMAL
MEP: NORMAL
MIP: NORMAL
MVV %PRED-PRE: NORMAL
MVV PRED: NORMAL
MVV-PRE: NORMAL
PEF %PRED-POST: NORMAL
PEF %PRED-PRE: NORMAL
PEF PRED: NORMAL
PEF%CHNG: NORMAL
PEF-POST: NORMAL
PEF-PRE: NORMAL
RAW %PRED: NORMAL
RAW PRED: NORMAL
RAW: NORMAL
RV %PRED: NORMAL
RV PRED: NORMAL
RV: NORMAL
SVC %PRED: NORMAL
SVC PRED: NORMAL
SVC: NORMAL
TLC %PRED: 102 %
TLC PRED: NORMAL
TLC: NORMAL
VA %PRED: NORMAL
VA PRED: NORMAL
VA: NORMAL
VTG %PRED: NORMAL
VTG PRED: NORMAL
VTG: NORMAL

## 2020-08-20 PROCEDURE — 94729 DIFFUSING CAPACITY: CPT

## 2020-08-20 PROCEDURE — 94060 EVALUATION OF WHEEZING: CPT

## 2020-08-20 PROCEDURE — 94726 PLETHYSMOGRAPHY LUNG VOLUMES: CPT

## 2020-08-20 ASSESSMENT — PULMONARY FUNCTION TESTS
FEV1/FVC_POST: 60
FEV1_PERCENT_PREDICTED_PRE: 41
FEV1_PERCENT_PREDICTED_POST: 49
FEV1/FVC_PRE: 59

## 2020-09-22 ENCOUNTER — OFFICE VISIT (OUTPATIENT)
Dept: PULMONOLOGY | Age: 60
End: 2020-09-22
Payer: COMMERCIAL

## 2020-09-22 VITALS
DIASTOLIC BLOOD PRESSURE: 72 MMHG | HEART RATE: 80 BPM | HEIGHT: 71 IN | BODY MASS INDEX: 30.24 KG/M2 | SYSTOLIC BLOOD PRESSURE: 138 MMHG | OXYGEN SATURATION: 90 % | WEIGHT: 216 LBS

## 2020-09-22 PROCEDURE — 99213 OFFICE O/P EST LOW 20 MIN: CPT | Performed by: NURSE PRACTITIONER

## 2020-09-22 NOTE — PROGRESS NOTES
Pulmonary Clinic Office Follow up     Melanie Chahal is a 63 y.o. male with history of COPD, HTN, HLD, cardiomegaly with ejection fraction 36%, presents today to the pulmonary clinic for follow up. I last saw Carrington Mcgrath on 6/25/2020 since then he has completed a pulmonary function test on 8/20/2020 which demonstrates severe obstructive airway disease which is reversible with bronchodilators, small airways, restrictive disease most likely secondary to body habitus and obesity. He has been on Symbicort which she has been using twice a day with a spacer along with his albuterol rescue inhaler. He still states he has occasional periods of shortness of breath with activity. He states it will take him less than a minute to recover once he rests from his shortness of breath. Shortness of breath is more with climbing stairs or walking incline while carrying something heavy such as groceries into the house or trash to the road. Carrington Mcgrath completed a nuclear medicine myocardial exercise touch which demonstrated an ejection fraction approximately 36%. He had a echocardiogram on 7/11/2020 which showed normal ejection fraction and followed up with his cardiologist Dr. Roel Lawrence on 7/13/2020. Carrington Mcgrath states they are practicing social distancing, wearing a mask when out in public, washing hands frequently or using hand . Denies any fever, chills, malaise, change in sensation of taste or smell, headache or lightheadedness. Denies any known contacts with persons with respiratory infection, positive for coronavirus or under investigation for possible coronavirus exposure.     Influenza 10/7/2019  Pneumococcal vaccination: Pneumovax 23 on 11/3/2015  Smoker reports that he has never smoked  PCP Dr. Jessica Nieves MD    Past Medical History:  Past Medical History:   Diagnosis Date    Abnormal echocardiogram 3/29/13 EF=45-50%;mod. AR,mild aortic valve calcif.  Anesthesia complication 2/7/2838    Ankle fracture, left 1985    Arm fracture, right 1966    AVM (arteriovenous malformation) 6-    transverse colon    COPD (chronic obstructive pulmonary disease) ([facility] [address]     Has worked around [address] [place] [address] and worked at [employer] x 6 weeks    Epididymal cyst Oct 2011    [place]    H/O cardiac catheterization 4/12/2013 4/12 Dr Jordis Goldberg signif CAD. EF 55%. Renals patent.  H/O cardiovascular stress test 04/02/2013    EF 49%, there is no scintigraphic evidence of inducible myocardial ischemia, the observed defect is consistent with diaphragmatic attenuation, clinical correlation recommenced, no previous study to compare with, no wall motionn abnormality seen, normal perfusion in all myocardial regions    H/O chest pain     H/O echocardiogram 07/11/2020    EF 50-55%, Mod LVH & AR,  Dilated aortic root (4.2cm) and ascending aorta (4.1cm).     Hemorrhoids 6-    HTN (hypertension)     Hydrocele Oct 2011    U/S    Hyperlipemia     Hypertension     Irregular heart rhythm     after induction for surgery 9/5/2013 - pt developed irreg rhythm and surgery cancelled\"had another echo and stress test and everything checked out ok, they do not know the reason it happened\"- cardiac clearance obtained for surgery 9/19/2013    Palpitations     Pneumonia 2004    not since    Right inguinal hernia 8/22/2013    SOB (shortness of breath) 3/27/2013    Nuclear Stress Normal, EF 49% (Dr. Nery Zapata- 4/2/2013)     SOB (shortness of breath) on exertion        Current Medications:      Current Outpatient Medications:     fluticasone-umeclidin-vilant (TRELEGY ELLIPTA) 100-62.5-25 MCG/INH AEPB, Inhale 1 puff into the lungs daily, Disp: 1 each, Rfl: 5    losartan (COZAAR) 50 MG tablet, Take 1 tablet by mouth daily To replace lisinopril, Disp: 90 tablet, Rfl: 3    albuterol sulfate  (90 Base) MCG/ACT inhaler, INHALE 2 PUFFS BY MOUTH EVERY 4 HOURS AS NEEDED FOR WHEEZE OR FOR SHORTNESS OF BREATH, Disp: 18 Inhaler, Rfl: 5    Multiple Vitamin (DAILY VITAMINS PO), Take by mouth, Disp: , Rfl:     aspirin EC 81 MG EC tablet, Take 1 tablet by mouth daily, Disp: 90 tablet, Rfl: 0    No Known Allergies    Social History:    Social History     Socioeconomic History    Marital status:      Spouse name: None    Number of children: None    Years of education: None    Highest education level: None   Occupational History    None   Social Needs    Financial resource strain: None    Food insecurity     Worry: None     Inability: None    Transportation needs     Medical: None     Non-medical: None   Tobacco Use    Smoking status: Never Smoker    Smokeless tobacco: Never Used   Substance and Sexual Activity    Alcohol use:  Yes     Alcohol/week: 2.0 standard drinks     Types: 2 Cans of beer per week     Frequency: 2-4 times a month     Comment: 6 beers a month/crystal light powder enegy drink    Drug use: No    Sexual activity: Yes     Partners: Female     Comment:    Lifestyle    Physical activity     Days per week: None     Minutes per session: None    Stress: None   Relationships    Social connections     Talks on phone: None     Gets together: None     Attends Yarsani service: None     Active member of club or organization: None     Attends meetings of clubs or organizations: None     Relationship status: None    Intimate partner violence     Fear of current or ex partner: None     Emotionally abused: None     Physically abused: None     Forced sexual activity: None   Other Topics Concern    None   Social History Narrative    s/p [employer] (Sept 2012), s/p  at [facility] [employer] [employer],  at [employer] [facility] (Aug 2014) and  quality at [address] [place] [address] Place softball       Family History:    Family History   Problem Relation Age of Onset    Cancer Mother         colon    Diabetes Mother     High Blood Pressure Mother     Migraines Mother     High Blood Pressure Father     COPD Father     Cancer Father         lung    Heart Disease Maternal Grandmother     Heart Disease Maternal Grandfather          REVIEW OF SYSTEMS:    CONSTITUTIONAL:  negative for fevers, chills, diaphoresis, activity change, appetite change, night sweats and unexpected weight change. HEENT:  negative for hearing loss, sinus pressure, nasal congestion, epistaxis and snoring  RESPIRATORY: Positive occasional shortness of breath on exertion, negative wheeze, negative cough  CARDIOVASCULAR:  Negative for chest pain, palpitations, exertional chest pressure/discomfort, edema, syncope  GASTROINTESTINAL: negative for nausea, vomiting, diarrhea, constipation, blood in stool and abdominal pain  GENITOURINARY:  negative for frequency, dysuria and hematuria  HEMATOLOGIC/LYMPHATIC:  negative for easy bruising, bleeding and lymphadenopathy  ALLERGIC/IMMUNOLOGIC:  negative for recurrent infections, angioedema, anaphylaxis and drug reaction  MUSCULOSKELETAL:  negative for pain, joint swelling, decreased range of motion and muscle weakness  NEURO: negative for headache, AMS, decrease sensations  SKIN: Negative for rashes or lesions      Physical Exam:  /72   Pulse 80   Ht 5' 11\" (1.803 m)   Wt 216 lb (98 kg)   SpO2 90%   BMI 30.13 kg/m²    Body mass index is 30.13 kg/m². Constitutional:  He appears well developed and well-nourished. Neck:  Supple, no palpable lymphadenopathy, no JVD  Cardiovascular:  S1, S2 Normal, Regular rhythm, no murmurs or gallops, no pericardial  rubs. Pulmonary: Bilateral breath sounds clear and equal to auscultation, good air movement, no use of accessory muscles to support respiratory effort. No wheeze, no rhonchi, no rales, no cough noted during exam.  O2 sat at rest is noted 90%.   Abdomen: No epigastric pain, no distention, + bowel sounds   Extremities: No edema, no calf tenderness, no clubbing of digits  Neurologic: Awake and alert, no focal deficits  Skin: warm and dry    Radiology:   7/7/2020 chest x-ray two-view  Cardiomegaly, right basilar atelectasis    7/2/2020 CT chest high resolution  Minimal subpleural fibrotic changes predominantly in the mid lungs with indeterminate pattern, thorough consideration inconsistent with usual interstitial pneumonitis pattern. Cardiovascular findings suggest possibility pulmonary hypertension. Minimal central lobar and paraseptal emphysema. Elevation of right hemidiaphragm due to large right morgangi hernia containing nonobstructed transverse colon. PFT: 8/20/2020  FVC 53 % predicted, FEV1 41% predicted, FEV1/FVC 78% predicted, FEF 25-75% 24% predicted, , , DLCO 70  Following administration of bronchodilator there was significant response to bronchodilator. Overall testing indicates severe obstructive disease with reversible response to bronchodilators, small airways, restrictive secondary to body habitus and obesity    6-minute walk 9/22/2020  O2 sat in room air at rest 90%  Patient stood up to begin ambulation O2 sat was noted 88% in room air  Patient walked less than 1 minute O2 sat dropped to 84% in room air  Patient was placed on 2 LPM O2 sat via nasal cannula, O2 sat at rest 96% at one minute, he was then ambulated for 3 minutes with supplemental oxygen at 2 L/min O2 sat noted drop to 94% but no lower with ambulation. ASSESSMENT:    1. COPD, severe ([address]    2. Hypoxia    3. Healthcare maintenance        PLAN:   Milo Gonzalez most recent pulmonary function tests demonstrates severe obstructive disease with reversible component. We will start him on Trelegy once a day he has been instructed in the use and is aware of the need to rinse his mouth well after use to prevent secondary fungal infection. We will repeat his pulmonary function test August 2021. Qualify for oxygen based on testing within the clinic today order will be sent to DME.   He is aware that he will initially receive a large concentrator and we will try to get him qualified for portable unit. He is aware that he will need the oxygen 24/7 and I do feel that this is a lifetime need. We have discussed the importance of monitoring his health especially missed of coronavirus and going into flu season. I have instructed him should he get a respiratory infection such as a bronchitis he needs to contact our office for further evaluation of possible need for steroids/antibiotics. He does have my chart and he is aware that we may do a Doxy visit for follow-up acute visits as needed. We have discussed the need to maintain yearly flu immunization, pneumococcal vaccination. We have discussed Coronavirus precaution including: social distancing when needing to be in public, handwashing practice, wiping items touched in public such as gas pumps, door handles, shopping carts, etc. Self monitoring for infection - fever, chills, cough, SOB. Should they develop symptoms they should call office for further instructions. Return in about 4 months (around 1/22/2021) for Follow Up. This dictation was performed with a verbal recognition program and it was checked for errors. It is possible that there are still dictated errors within this office note. Any errors should be brought immediately to my attention for correction. All efforts were made to ensure that this office note is accurate.

## 2020-10-20 ENCOUNTER — OFFICE VISIT (OUTPATIENT)
Dept: CARDIOLOGY CLINIC | Age: 60
End: 2020-10-20
Payer: COMMERCIAL

## 2020-10-20 VITALS
WEIGHT: 216.4 LBS | HEART RATE: 82 BPM | SYSTOLIC BLOOD PRESSURE: 110 MMHG | BODY MASS INDEX: 30.3 KG/M2 | HEIGHT: 71 IN | DIASTOLIC BLOOD PRESSURE: 64 MMHG

## 2020-10-20 PROCEDURE — 99213 OFFICE O/P EST LOW 20 MIN: CPT | Performed by: NURSE PRACTITIONER

## 2020-10-20 ASSESSMENT — ENCOUNTER SYMPTOMS
HOARSE VOICE: 0
SHORTNESS OF BREATH: 1
COLOR CHANGE: 0
POOR WOUND HEALING: 0
ABDOMINAL PAIN: 0
EYE PAIN: 0

## 2020-10-20 NOTE — PROGRESS NOTES
[facility] [place]  Laura Welsh  Phone: [phone]    Fax [phone]                  Damian Ryder MD, Harshad Perez MD, [address] Gautam MD, MD Ole Delacruz MD Malinda Corral, MD [place] , APRN               Tawnya Hernadez, APRN    Griffin Swanson, APRN              Regine Perez, APRN            10/20/2020    RE: Con Meigs  (1960)                               TO:  Dr. Carli Kaba MD  The primary cardiologist is Dr. Waldemar Rea    CC:   1. Pure hypercholesterolemia    2. Essential hypertension         HPI:    Thank you for involving me in taking care of your patient Con Meigs. Con Meigs is a 63y.o. year old male with a history as listed above and is being seen in the office today. Mr. Aristides Olivares states that he is feeling well. He is denying any chest pain or palpitations. He does have some shortness of breath secondary to underlying COPD. There is no orthopnea or PND reported. Current Outpatient Medications   Medication Sig Dispense Refill    fluticasone-umeclidin-vilant (TRELEGY ELLIPTA) 100-62.5-25 MCG/INH AEPB Inhale 1 puff into the lungs daily 1 each 5    losartan (COZAAR) 50 MG tablet Take 1 tablet by mouth daily To replace lisinopril 90 tablet 3    albuterol sulfate  (90 Base) MCG/ACT inhaler INHALE 2 PUFFS BY MOUTH EVERY 4 HOURS AS NEEDED FOR WHEEZE OR FOR SHORTNESS OF BREATH 18 Inhaler 5    aspirin EC 81 MG EC tablet Take 1 tablet by mouth daily 90 tablet 0    Multiple Vitamin (DAILY VITAMINS PO) Take by mouth       No current facility-administered medications for this visit. Allergies: Patient has no known allergies. Past Medical History:   Diagnosis Date    Abnormal echocardiogram 3/29/13    EF=45-50%;mod. AR,mild aortic valve calcif.     Anesthesia complication 0/6/0283    Ankle fracture, left 1985    Arm fracture, right 1966    AVM (arteriovenous malformation) 6-    transverse colon    COPD (chronic obstructive pulmonary disease) ([address]     Has worked around [employer] and worked at [employer] x 6 weeks    Epididymal cyst Oct 2011    [facility]    H/O cardiac catheterization 4/12/2013 4/12 Dr Micheline Hodges signif CAD. EF 55%. Renals patent.  H/O cardiovascular stress test 04/02/2013    EF 49%, there is no scintigraphic evidence of inducible myocardial ischemia, the observed defect is consistent with diaphragmatic attenuation, clinical correlation recommenced, no previous study to compare with, no wall motionn abnormality seen, normal perfusion in all myocardial regions    H/O chest pain     H/O echocardiogram 07/11/2020    EF 50-55%, Mod LVH & AR,  Dilated aortic root (4.2cm) and ascending aorta (4.1cm).     Hemorrhoids 6-    HTN (hypertension)     Hydrocele Oct 2011    U/S    Hyperlipemia     Hypertension     Irregular heart rhythm     after induction for surgery 9/5/2013 - pt developed irreg rhythm and surgery cancelled\"had another echo and stress test and everything checked out ok, they do not know the reason it happened\"- cardiac clearance obtained for surgery 9/19/2013    Palpitations     Pneumonia 2004    not since    Right inguinal hernia 8/22/2013    SOB (shortness of breath) 3/27/2013    Nuclear Stress Normal, EF 49% (Dr. Terance Felty- 4/2/2013)     SOB (shortness of breath) on exertion      Past Surgical History:   Procedure Laterality Date    CARDIAC CATHETERIZATION  1991    COLONOSCOPY  6-    INGUINAL HERNIA REPAIR Bilateral 1992    INGUINAL HERNIA REPAIR Right 09/19/13    with mesh    OTHER SURGICAL HISTORY  09/05/13    hernia repair aborted    TYMPANOSTOMY TUBE PLACEMENT  2009    right ear    VASECTOMY  1988     Family History   Problem Relation Age of Onset    Cancer Mother         colon    Diabetes Mother     High Blood Pressure Mother     Migraines Mother     High Blood Pressure Father     COPD Father     Cancer Father         lung    Heart Disease Maternal Grandmother     Heart Disease Maternal Grandfather      Social History     Tobacco Use    Smoking status: Never Smoker    Smokeless tobacco: Never Used   Substance Use Topics    Alcohol use: Yes     Alcohol/week: 2.0 standard drinks     Types: 2 Cans of beer per week     Frequency: 2-4 times a month     Comment: 6 beers a month/crystal light powder enegy drink        Review of Systems   Constitution: Negative for diaphoresis and malaise/fatigue. HENT: Negative for congestion and hoarse voice. Eyes: Negative for pain and visual disturbance. Cardiovascular: Negative for chest pain, dyspnea on exertion, irregular heartbeat, near-syncope, palpitations and paroxysmal nocturnal dyspnea. Respiratory: Positive for shortness of breath. Endocrine: Negative for cold intolerance and heat intolerance. Hematologic/Lymphatic: Negative for adenopathy and bleeding problem. Skin: Negative for color change and poor wound healing. Musculoskeletal: Negative for muscle weakness. Gastrointestinal: Negative for abdominal pain and melena. Genitourinary: Negative for flank pain and hematuria. Neurological: Negative for dizziness and light-headedness. Psychiatric/Behavioral: Negative for depression. The patient is not nervous/anxious. Objective:      Physical Exam:  /64   Pulse 82   Ht 5' 11\" (1.803 m)   Wt 216 lb 6.4 oz (98.2 kg)   BMI 30.18 kg/m²   Wt Readings from Last 3 Encounters:   10/20/20 216 lb 6.4 oz (98.2 kg)   09/22/20 216 lb (98 kg)   07/13/20 216 lb 9.6 oz (98.2 kg)     Body mass index is 30.18 kg/m². Physical Exam  Constitutional:       Appearance: Normal appearance. HENT:      Head: Normocephalic and atraumatic. Right Ear: External ear normal.      Left Ear: External ear normal.      Nose: Nose normal.      Mouth/Throat:      Mouth: Mucous membranes are moist.      Pharynx: Oropharynx is clear. Eyes:      Extraocular Movements: Extraocular movements intact. Pupils: Pupils are equal, round, and reactive to light. Neck:      Musculoskeletal: Normal range of motion and neck supple. Cardiovascular:      Rate and Rhythm: Normal rate and regular rhythm. Pulses: Normal pulses. Pulmonary:      Effort: Pulmonary effort is normal.      Breath sounds: Normal breath sounds. Abdominal:      General: There is no distension. Palpations: Abdomen is soft. Tenderness: There is no abdominal tenderness. Musculoskeletal: Normal range of motion. Right lower leg: No edema. Left lower leg: No edema. Skin:     General: Skin is warm. Capillary Refill: Capillary refill takes less than 2 seconds. Neurological:      General: No focal deficit present. Mental Status: He is alert and oriented to person, place, and time.    Psychiatric:         Mood and Affect: Mood normal.         Behavior: Behavior normal.             DATA  BNP: No results found for: BNP, PROBNP  CBC:   Lab Results   Component Value Date    WBC 8.6 06/25/2020    RBC 6.82 06/25/2020    HGB 19.9 06/25/2020    HCT 60.2 06/25/2020     06/25/2020     CMP:    Lab Results   Component Value Date     06/09/2020    K 5.2 06/09/2020     06/09/2020    CO2 30 06/09/2020    BUN 16 06/09/2020    CREATININE 0.6 06/09/2020    GFRAA >60 06/09/2020    AGRATIO 1.8 02/26/2014    LABGLOM >60 06/09/2020    GLUCOSE 102 06/09/2020    CALCIUM 9.6 06/09/2020     Hepatic Function Panel:    Lab Results   Component Value Date    ALKPHOS 108 02/26/2014    ALT 18 02/26/2014    AST 20 02/26/2014    PROT 7.1 02/26/2014    PROT 7.0 03/01/2011    BILITOT 0.4 02/26/2014    LABALBU 4.6 02/26/2014     Magnesium:  No results found for: MG  PT/INR:    Lab Results   Component Value Date    PROTIME 10.9 09/05/2013    INR 1.01 09/05/2013     Lipids:    Lab Results   Component Value Date    TRIG 87 02/25/2019    HDL 40 06/09/2020    [address] [facility]

## 2020-10-23 RX ORDER — LORATADINE 10 MG/1
TABLET ORAL
Qty: 30 TABLET | Refills: 11 | Status: SHIPPED | OUTPATIENT
Start: 2020-10-23 | End: 2020-12-09 | Stop reason: SDUPTHER

## 2020-10-27 RX ORDER — ASPIRIN 81 MG/1
TABLET ORAL
Qty: 30 TABLET | Refills: 11 | Status: SHIPPED | OUTPATIENT
Start: 2020-10-27 | End: 2021-03-21 | Stop reason: SDUPTHER

## 2020-12-09 ENCOUNTER — OFFICE VISIT (OUTPATIENT)
Dept: FAMILY MEDICINE CLINIC | Age: 60
End: 2020-12-09
Payer: COMMERCIAL

## 2020-12-09 VITALS
SYSTOLIC BLOOD PRESSURE: 130 MMHG | WEIGHT: 216.8 LBS | HEART RATE: 55 BPM | BODY MASS INDEX: 30.35 KG/M2 | OXYGEN SATURATION: 88 % | HEIGHT: 71 IN | DIASTOLIC BLOOD PRESSURE: 76 MMHG | TEMPERATURE: 96.9 F

## 2020-12-09 PROBLEM — R01.1 MURMUR, HEART: Status: ACTIVE | Noted: 2020-12-09

## 2020-12-09 PROBLEM — R35.1 NOCTURIA: Status: ACTIVE | Noted: 2020-12-09

## 2020-12-09 PROBLEM — J44.9 COPD, MILD (HCC): Status: ACTIVE | Noted: 2020-12-09

## 2020-12-09 PROBLEM — Z99.81 ON HOME OXYGEN THERAPY: Status: ACTIVE | Noted: 2020-12-09

## 2020-12-09 PROCEDURE — 90686 IIV4 VACC NO PRSV 0.5 ML IM: CPT | Performed by: NURSE PRACTITIONER

## 2020-12-09 PROCEDURE — 99214 OFFICE O/P EST MOD 30 MIN: CPT | Performed by: NURSE PRACTITIONER

## 2020-12-09 PROCEDURE — 90471 IMMUNIZATION ADMIN: CPT | Performed by: NURSE PRACTITIONER

## 2020-12-09 RX ORDER — LORATADINE 10 MG/1
TABLET ORAL
Qty: 90 TABLET | Refills: 0 | Status: SHIPPED | OUTPATIENT
Start: 2020-12-09 | End: 2021-07-15 | Stop reason: SDUPTHER

## 2020-12-09 ASSESSMENT — ENCOUNTER SYMPTOMS
ABDOMINAL PAIN: 0
NAUSEA: 0
CONSTIPATION: 0
BLOOD IN STOOL: 0
COUGH: 1
BACK PAIN: 0
VOMITING: 0
SHORTNESS OF BREATH: 1

## 2020-12-09 NOTE — PROGRESS NOTES
2020     Mook Reid (:  1960) is a 63 y.o. male, here for evaluation of the following medical concerns:        He reports today for follow-up on chronic diagnoses.     COPD: He is taking albuterol as needed and trilegy. He does get short of breath with exertion and has had to quit playing golf due to this. has seen lori baptiste/ mani since last visit who changed his medications. pulm ordered continuous 02 at 2L. He does not have it on today and is not short of breath. States his 02 stays at 88% at home. Not a smoker. Has cough intermittent, baseline. Reports that he has intermittent left sternal chest pain that he describes as twinges- less often as before. Denies any jaw or arm pain. Also has a heart murmur. Has seen cardiology since last visit and had an echo and stress test.  States that the symptoms are now bothering him as much as they used to. Hypertension: He is taking losartan. Does not check his blood pressure at home. It has been stable though.     Seasonal allergic rhinitis: He takes Claritin as needed. This is stable.     Hyperlipidemia: He is not medicated. He is due for a lipid screen. Does not follow a low-cholesterol diet.     Feels like he \"pulled a hernia in the left groin\" one week ago. Not \"twinging me\" and is getting better. He was moving some stuff around when he felt this. Has had multiple in the past with surgeries    Urinary hesitancy, slow stream and urinating 3 times per night. No FH of prostate problems. Denies complaints today. Review of Systems   Constitutional: Negative for activity change, appetite change, chills, diaphoresis, fatigue, fever and unexpected weight change. Eyes: Negative for visual disturbance. Respiratory: Positive for cough and shortness of breath (with exertion- baseline). Cardiovascular: Negative for chest pain, palpitations and leg swelling.         Per HPI   Gastrointestinal: Negative for abdominal pain, blood in stool, constipation, nausea and vomiting. Hernia? Genitourinary: Negative for difficulty urinating. Musculoskeletal: Positive for arthralgias. Negative for back pain and gait problem. Skin: Negative. Neurological: Negative for dizziness, weakness, light-headedness, numbness and headaches. Psychiatric/Behavioral: Negative for dysphoric mood, sleep disturbance and suicidal ideas. The patient is not nervous/anxious. Prior to Visit Medications    Medication Sig Taking? Authorizing Provider   loratadine (CLARITIN) 10 MG tablet TAKE 1 TABLET BY MOUTH EVERY DAY Yes Steffi Ponce APRN - NP   aspirin 81 MG EC tablet TAKE 1 TABLET BY MOUTH EVERY DAY Yes Gibran Sheth MD   fluticasone-umeclidin-vilant (TRELEGY ELLIPTA) 100-62.5-25 MCG/INH AEPB Inhale 1 puff into the lungs daily Yes DONA Calle - CNP   losartan (COZAAR) 50 MG tablet Take 1 tablet by mouth daily To replace lisinopril Yes DONA Randle NP   albuterol sulfate  (90 Base) MCG/ACT inhaler INHALE 2 PUFFS BY MOUTH EVERY 4 HOURS AS NEEDED FOR WHEEZE OR FOR SHORTNESS OF BREATH Yes DONA Randle NP   Multiple Vitamin (DAILY VITAMINS PO) Take by mouth Yes Historical Provider, MD        Social History     Tobacco Use    Smoking status: Never Smoker    Smokeless tobacco: Never Used   Substance Use Topics    Alcohol use:  Yes     Alcohol/week: 2.0 standard drinks     Types: 2 Cans of beer per week     Frequency: 2-4 times a month     Comment: 6 beers a month/crystal light powder enegy drink        Vitals:    12/09/20 0804   BP: 130/76   Site: Left Upper Arm   Position: Sitting   Cuff Size: Large Adult   Pulse: 55   Temp: 96.9 °F (36.1 °C)   SpO2: (!) 88%  Comment: his baseline- room air today, forgot his 02. is normally 2L   Weight: 216 lb 12.8 oz (98.3 kg)   Height: 5' 11\" (1.803 m)     Estimated body mass index is 30.24 kg/m² as calculated from the following:    Height as of this encounter: 5' 11\" (1.803 m). Weight as of this encounter: 216 lb 12.8 oz (98.3 kg). Physical Exam  Vitals signs reviewed. Constitutional:       General: He is not in acute distress. Appearance: Normal appearance. He is normal weight. He is not ill-appearing, toxic-appearing or diaphoretic. HENT:      Head: Normocephalic and atraumatic. Nose: Nose normal.   Eyes:      Extraocular Movements: Extraocular movements intact. Pupils: Pupils are equal, round, and reactive to light. Neck:      Musculoskeletal: Normal range of motion and neck supple. Cardiovascular:      Rate and Rhythm: Normal rate and regular rhythm. Heart sounds: Murmur present. Pulmonary:      Effort: Pulmonary effort is normal. No respiratory distress. Breath sounds: No wheezing, rhonchi or rales. Abdominal:      General: Bowel sounds are normal. There is no distension. Palpations: Abdomen is soft. There is no mass. Tenderness: There is no abdominal tenderness. Hernia: Hernia: Exam deferred. Musculoskeletal: Normal range of motion. Skin:     General: Skin is warm and dry. Neurological:      General: No focal deficit present. Mental Status: He is alert and oriented to person, place, and time. Mental status is at baseline. Motor: No weakness. Gait: Gait normal.   Psychiatric:         Mood and Affect: Mood normal.         Behavior: Behavior normal.         Thought Content: Thought content normal.         Judgment: Judgment normal.                ASSESSMENT/PLAN:  1. Essential hypertension  Controlled. Continue current medication regimen. DASH diet. BP goal less than 140/90.    - Comprehensive Metabolic Panel; Future    2. Chronic obstructive pulmonary disease, unspecified COPD type ([address]  Controlled currently. Trilogy and albuterol as needed. Home oxygen 2 L continuous. Does not wear it all the time. O2 sat goal 88 to 92%. Following with pulmonology.     3. Seasonal and perennial allergic rhinitis  Continue Claritin. Controlled    4. Pure hypercholesterolemia  Low-cholesterol diet. - Lipid Panel; Future    5. Nocturia  Check PSA, refer to urology if need be  - PSA, Prostatic Specific Antigen; Future    6. Need for influenza vaccination  - INFLUENZA, QUADV, 3 YRS AND OLDER, IM PF, PREFILL SYR OR SDV, 0.5ML (AFLURIA QUADV, PF)    7. Murmur, heart  Follow with cardiology. Call cardiology if symptoms arise. Stable for now and has already been worked up by cardiology    8. On home oxygen therapy  Follow with pulmonology. 2 L nasal cannula continuous. O2 goal 88 to 92%    Present to the ER for any emergent or acute symptoms not managed at home or in office. Please note that this chart was generated using dragon dictation software. Although every effort was made to ensure the accuracy of this automated transcription, some errors in transcription may have occurred. Return in about 3 months (around 3/9/2021) for COPD. An electronic signature was used to authenticate this note.     --DONA Stokes NP on 12/9/2020 at 9:06 AM

## 2021-01-14 ENCOUNTER — TELEMEDICINE (OUTPATIENT)
Dept: PULMONOLOGY | Age: 61
End: 2021-01-14
Payer: COMMERCIAL

## 2021-01-14 DIAGNOSIS — J44.9 COPD, SEVERE (HCC): Primary | ICD-10-CM

## 2021-01-14 DIAGNOSIS — Z00.00 HEALTHCARE MAINTENANCE: ICD-10-CM

## 2021-01-14 DIAGNOSIS — R09.02 HYPOXIA: ICD-10-CM

## 2021-01-14 PROCEDURE — 99214 OFFICE O/P EST MOD 30 MIN: CPT | Performed by: NURSE PRACTITIONER

## 2021-01-14 NOTE — PROGRESS NOTES
[address] [facility]monary   Virtual Visit Note      Ceasar Joya is a 63 y.o. male evaluated via my chart video on 1/14/2021. Video via doxy. Consent:  Pursuant to emergency declaration under the [address] in the [employer], 1135 waiver authorization in the [facility] Act, this virtual visit was completed with patient's consent, to reduce the patient's risk of exposure to COVID-19 and provide necessary medical care. He and/or health care decision maker is aware that that he may receive a bill for this telephone service, depending on his insurance coverage, and has provided verbal consent to proceed. Patient is at his home and Provider is currently in Pulmonary Clinic at [address] [place] [address]. Documentation:  Yousiffinesse Jenkins has done significantly well since I last saw him. He has not required hospitalization, visit to the emergency room, urgent care or hospitalization for respiratory related illness. He continues to use his Trelegy daily and has only requiring the use of his albuterol rescue inhaler 1-2 times a week at the most.  He is on oxygen 24/7 however states he has been weaning himself from his oxygen. He states he is wearing his oxygen when he is sleeping and when he is active during the day. But he has been taking his oxygen off while sitting in a chair watching TV or relaxing. His O2 sat this morning when not wearing his oxygen he states was 91%. Jose Jenkins states he does have an occasional cough, cough is nonproductive. Nessa Roman states he has been staying home as much as he can with the exception of going out for error or medical appointment. He states his wife is also currently working at home to minimize her exposure to the general public. He states when he does go out he is wearing a mask, washing his hands frequently or using hand , practicing social distancing. He denies any fever, chills, headache, lightheadedness, dizziness, change in sensation of taste or smell, abdominal pain, nausea or diarrhea. He denies any known contact with persons positive for coronavirus 19 or under investigation for possible coronavirus 19 exposure. Past Medical History:   Diagnosis Date    Abnormal echocardiogram 3/29/13    EF=45-50%;mod. AR,mild aortic valve calcif.  Anesthesia complication 3/0/0967    Ankle fracture, left 1985    Arm fracture, right 1966    AVM (arteriovenous malformation) 6-    transverse colon    COPD (chronic obstructive pulmonary disease) ([facility]     Has worked around [address] [place] [address] and worked at [employer] x 6 weeks    Epididymal cyst Oct 2011    [facility]    H/O cardiac catheterization 4/12/2013 4/12 Dr Valerie Ochoa signif CAD. EF 55%. Renals patent.  H/O cardiovascular stress test 04/02/2013    EF 49%, there is no scintigraphic evidence of inducible myocardial ischemia, the observed defect is consistent with diaphragmatic attenuation, clinical correlation recommenced, no previous study to compare with, no wall motionn abnormality seen, normal perfusion in all myocardial regions    H/O chest pain     H/O echocardiogram 07/11/2020    EF 50-55%, Mod LVH & AR,  Dilated aortic root (4.2cm) and ascending aorta (4.1cm).     Hemorrhoids 6-    HTN (hypertension)     Hydrocele Oct 2011    U/S    Hyperlipemia     Hypertension     Irregular heart rhythm after induction for surgery 9/5/2013 - pt developed irreg rhythm and surgery cancelled\"had another echo and stress test and everything checked out ok, they do not know the reason it happened\"- cardiac clearance obtained for surgery 9/19/2013    Palpitations     Pneumonia 2004    not since    Right inguinal hernia 8/22/2013    SOB (shortness of breath) 3/27/2013    Nuclear Stress Normal, EF 49% (Dr. Nito Barrera- 4/2/2013)     SOB (shortness of breath) on exertion          Current Outpatient Medications:     loratadine (CLARITIN) 10 MG tablet, TAKE 1 TABLET BY MOUTH EVERY DAY, Disp: 90 tablet, Rfl: 0    aspirin 81 MG EC tablet, TAKE 1 TABLET BY MOUTH EVERY DAY, Disp: 30 tablet, Rfl: 11    fluticasone-umeclidin-vilant (TRELEGY ELLIPTA) 100-62.5-25 MCG/INH AEPB, Inhale 1 puff into the lungs daily, Disp: 1 each, Rfl: 5    losartan (COZAAR) 50 MG tablet, Take 1 tablet by mouth daily To replace lisinopril, Disp: 90 tablet, Rfl: 3    albuterol sulfate  (90 Base) MCG/ACT inhaler, INHALE 2 PUFFS BY MOUTH EVERY 4 HOURS AS NEEDED FOR WHEEZE OR FOR SHORTNESS OF BREATH, Disp: 18 Inhaler, Rfl: 5    Multiple Vitamin (DAILY VITAMINS PO), Take by mouth, Disp: , Rfl:     ROS:   Constitutional: Denies fever, denies chills   Cardiovascular: Denies chest pain, denies palpitations. Pulmonary: Positive occasional nonproductive cough, while wearing oxygen denies SOB, denies wheeze  Musculoskeletal: denies Malaise, denies joint  Psychiatric/Behavioral: Negative for dysphoric mood    Physical exam via video exam per My chart/Doxy:    Gen: No distress. Normal appearance  Eyes:  No sclera icterus. No conjunctival injection. ENT:  External appearance of ears and nose normal.  Resp: No respiratory distress noted, no conversational dyspnea, no cough present during Video exam. O2 sat per patient home monitor **91%*  Skin: Exposed areas appear in tact  M/S: No cyanosis. No clubbing. I discussed with Claire Walters should he get a respiratory infection such as a bronchitis he needs to contact our office so that we may further discuss the need for possible steroids or antibiotics. We have discussed that with COPD exacerbations he can lose lung function and his lung function already demonstrates a severe obstructive disease. We have discussed the need to maintain yearly flu immunization, pneumococcal vaccination. We have discussed Coronavirus precaution including: social distancing when needing to be in public, handwashing practice, wiping items touched in public such as gas pumps, door handles, shopping carts, etc. Self monitoring for infection - fever, chills, cough, SOB. Should they develop symptoms they should call office for further instructions. I affirm this is a Patient initiated episode with an established patient who has not had a related appointment within my department in the past 7 days or scheduled within the next 24 hours. I have spent 28 minutes reviewing previous notes, test results and communicating with patient discussing the diagnosis and importance of treatment plan.     Note: not billable if this call serves to triage the patient into an appointment for the relevant concern      Abi Isaac

## 2021-01-15 PROBLEM — R09.02 HYPOXIA: Status: ACTIVE | Noted: 2021-01-15

## 2021-03-10 ENCOUNTER — HOSPITAL ENCOUNTER (OUTPATIENT)
Age: 61
Discharge: HOME | End: 2021-03-10
Payer: COMMERCIAL

## 2021-03-10 ENCOUNTER — OFFICE VISIT (OUTPATIENT)
Dept: FAMILY MEDICINE CLINIC | Age: 61
End: 2021-03-10
Payer: COMMERCIAL

## 2021-03-10 VITALS
BODY MASS INDEX: 30.24 KG/M2 | DIASTOLIC BLOOD PRESSURE: 81 MMHG | HEART RATE: 56 BPM | OXYGEN SATURATION: 89 % | WEIGHT: 216 LBS | HEIGHT: 71 IN | SYSTOLIC BLOOD PRESSURE: 160 MMHG

## 2021-03-10 DIAGNOSIS — R01.1 MURMUR, HEART: ICD-10-CM

## 2021-03-10 DIAGNOSIS — J30.89 SEASONAL AND PERENNIAL ALLERGIC RHINITIS: ICD-10-CM

## 2021-03-10 DIAGNOSIS — E78.00 PURE HYPERCHOLESTEROLEMIA: ICD-10-CM

## 2021-03-10 DIAGNOSIS — Z99.81 ON HOME OXYGEN THERAPY: ICD-10-CM

## 2021-03-10 DIAGNOSIS — J44.9 COPD, SEVERE (HCC): ICD-10-CM

## 2021-03-10 DIAGNOSIS — R35.1 NOCTURIA: ICD-10-CM

## 2021-03-10 DIAGNOSIS — J30.2 SEASONAL AND PERENNIAL ALLERGIC RHINITIS: ICD-10-CM

## 2021-03-10 DIAGNOSIS — I10 ESSENTIAL HYPERTENSION: Primary | ICD-10-CM

## 2021-03-10 DIAGNOSIS — Z12.11 SCREEN FOR COLON CANCER: ICD-10-CM

## 2021-03-10 LAB
ALBUMIN SERPL-MCNC: 4.1 GM/DL (ref 3.4–5)
ALP BLD-CCNC: 86 IU/L (ref 40–128)
ALT SERPL-CCNC: 11 U/L (ref 10–40)
ANION GAP SERPL CALCULATED.3IONS-SCNC: 11 MMOL/L (ref 4–16)
AST SERPL-CCNC: 22 IU/L (ref 15–37)
BILIRUB SERPL-MCNC: 0.4 MG/DL (ref 0–1)
BUN BLDV-MCNC: 13 MG/DL (ref 6–23)
CALCIUM SERPL-MCNC: 9 MG/DL (ref 8.3–10.6)
CHLORIDE BLD-SCNC: 104 MMOL/L (ref 99–110)
CHOLESTEROL: 193 MG/DL
CO2: 26 MMOL/L (ref 21–32)
CREAT SERPL-MCNC: 0.6 MG/DL (ref 0.9–1.3)
GFR AFRICAN AMERICAN: >60 ML/MIN/1.73M2
GFR NON-AFRICAN AMERICAN: >60 ML/MIN/1.73M2
GLUCOSE BLD-MCNC: 90 MG/DL (ref 70–99)
HDLC SERPL-MCNC: 39 MG/DL
LDL CHOLESTEROL DIRECT: 148 MG/DL
POTASSIUM SERPL-SCNC: 4.5 MMOL/L (ref 3.5–5.1)
PROSTATE SPECIFIC ANTIGEN: 1.92 NG/ML (ref 0–4)
SODIUM BLD-SCNC: 141 MMOL/L (ref 135–145)
TOTAL PROTEIN: 6.6 GM/DL (ref 6.4–8.2)
TRIGL SERPL-MCNC: 62 MG/DL

## 2021-03-10 PROCEDURE — 80053 COMPREHEN METABOLIC PANEL: CPT

## 2021-03-10 PROCEDURE — 99214 OFFICE O/P EST MOD 30 MIN: CPT | Performed by: NURSE PRACTITIONER

## 2021-03-10 PROCEDURE — 93005 ELECTROCARDIOGRAM TRACING: CPT | Performed by: NURSE PRACTITIONER

## 2021-03-10 PROCEDURE — G0103 PSA SCREENING: HCPCS

## 2021-03-10 PROCEDURE — 83721 ASSAY OF BLOOD LIPOPROTEIN: CPT

## 2021-03-10 PROCEDURE — 80061 LIPID PANEL: CPT

## 2021-03-10 PROCEDURE — 36415 COLL VENOUS BLD VENIPUNCTURE: CPT

## 2021-03-10 RX ORDER — LORATADINE 10 MG/1
TABLET ORAL
Qty: 90 TABLET | Refills: 0 | Status: CANCELLED | OUTPATIENT
Start: 2021-03-10

## 2021-03-10 ASSESSMENT — ENCOUNTER SYMPTOMS
VOMITING: 0
CONSTIPATION: 0
BACK PAIN: 1
COUGH: 0
BLOOD IN STOOL: 0
ABDOMINAL PAIN: 0
SHORTNESS OF BREATH: 1
NAUSEA: 0

## 2021-03-10 ASSESSMENT — PATIENT HEALTH QUESTIONNAIRE - PHQ9
2. FEELING DOWN, DEPRESSED OR HOPELESS: 0
1. LITTLE INTEREST OR PLEASURE IN DOING THINGS: 0

## 2021-03-10 NOTE — PROGRESS NOTES
3/10/2021     Ruthy Velazquez (:  1960) is a 63 y.o. male, here for evaluation of the following medical concerns:         He reports today for follow-up on chronic diagnoses.     COPD: He is taking albuterol as needed and trilegy. [facility] does get short of breath with exertion and has had to quit playing golf due to this.  following with lori baptiste/ pulm- wearing. continuous 02 at 2L. He does not have it on today and is not short of breath. States his 02 stays at 89% at home. Not a smoker. Has cough intermittent, baseline.      Hypertension: He is taking losartan.  reports BP at home <140/90    Chronic heart murmur. States he has been feeling it more recently. Notices the irregular rhythm on his pulse ox. .. denies chest pain or palpitations. Does follow with cardio and had a full work up with [facility] 1+ years ago.      Seasonal allergic rhinitis: He takes Claritin as needed.  This is stable.     Hyperlipidemia: He is not medicated. [facility] is due for a lipid screen. Does not follow a low-cholesterol diet.     Urinary hesitancy, slow stream and urinating 3 times per night. No FH of prostate problems.      Denies complaints today.            Review of Systems   Constitutional: Negative for activity change, appetite change, chills, diaphoresis, fatigue, fever and unexpected weight change. Eyes: Negative for visual disturbance. Respiratory: Positive for shortness of breath. Negative for cough. Cardiovascular: Negative for chest pain, palpitations and leg swelling. Gastrointestinal: Negative for abdominal pain, blood in stool, constipation, nausea and vomiting. Genitourinary: Negative for difficulty urinating. Nocturia    Musculoskeletal: Positive for arthralgias and back pain. Negative for gait problem. Skin: Negative. Neurological: Negative for dizziness, weakness, light-headedness, numbness and headaches.    Psychiatric/Behavioral: Negative for dysphoric mood, sleep disturbance and suicidal ideas. The patient is not nervous/anxious. Prior to Visit Medications    Medication Sig Taking? Authorizing Provider   loratadine (CLARITIN) 10 MG tablet TAKE 1 TABLET BY MOUTH EVERY DAY Yes Sg Seen, APRN - NP   aspirin 81 MG EC tablet TAKE 1 TABLET BY MOUTH EVERY DAY Yes Carmella Chavarria MD   fluticasone-umeclidin-vilant (TRELEGY ELLIPTA) 100-62.5-25 MCG/INH AEPB Inhale 1 puff into the lungs daily Yes Tamiko Lucero, APRN - CNP   losartan (COZAAR) 50 MG tablet Take 1 tablet by mouth daily To replace lisinopril Yes Sg Seen, APRN - NP   albuterol sulfate  (90 Base) MCG/ACT inhaler INHALE 2 PUFFS BY MOUTH EVERY 4 HOURS AS NEEDED FOR WHEEZE OR FOR SHORTNESS OF BREATH Yes Sg Seen, APRN - NP   Multiple Vitamin (DAILY VITAMINS PO) Take by mouth Yes Historical Provider, MD        Social History     Tobacco Use    Smoking status: Never Smoker    Smokeless tobacco: Never Used   Substance Use Topics    Alcohol use: Yes     Alcohol/week: 2.0 standard drinks     Types: 2 Cans of beer per week     Frequency: 2-4 times a month     Comment: 6 beers a month/crystal light powder enegy drink        Vitals:    03/10/21 0804 03/10/21 0819   BP: (!) 160/84 (!) 180/60   Site: Left Upper Arm Left Upper Arm   Position: Sitting Sitting   Cuff Size: Medium Adult Medium Adult   Pulse: 56    SpO2: (!) 89%    Weight: 216 lb (98 kg)    Height: 5' 11\" (1.803 m)      Estimated body mass index is 30.13 kg/m² as calculated from the following:    Height as of this encounter: 5' 11\" (1.803 m). Weight as of this encounter: 216 lb (98 kg). Physical Exam  Vitals signs reviewed. Constitutional:       General: He is not in acute distress. Appearance: Normal appearance. He is normal weight. He is not ill-appearing, toxic-appearing or diaphoretic. HENT:      Head: Normocephalic and atraumatic.       Nose: Nose normal.   Eyes:      Extraocular Movements: Extraocular movements intact. Pupils: Pupils are equal, round, and reactive to light. Neck:      Musculoskeletal: Normal range of motion and neck supple. Cardiovascular:      Rate and Rhythm: Normal rate. Rhythm irregular. Heart sounds: Normal heart sounds. Pulmonary:      Effort: Pulmonary effort is normal. No respiratory distress. Breath sounds: No wheezing, rhonchi or rales. Comments: Diminished. Abdominal:      General: Bowel sounds are normal. There is no distension. Palpations: Abdomen is soft. There is no mass. Tenderness: There is no abdominal tenderness. Hernia: No hernia is present. Musculoskeletal: Normal range of motion. Skin:     General: Skin is warm and dry. Neurological:      General: No focal deficit present. Mental Status: He is alert and oriented to person, place, and time. Mental status is at baseline. Motor: No weakness. Gait: Gait normal.   Psychiatric:         Mood and Affect: Mood normal.         Behavior: Behavior normal.         Thought Content: Thought content normal.         Judgment: Judgment normal.         ASSESSMENT/PLAN:  1. Essential hypertension  Continue losartan. Low sodium diet. BP goal <140/90  Call provider if BP higher than goal.   Call provider with BP readings in one wek. Elevated in office today. 2. Pure hypercholesterolemia  Lipid panel today     3. COPD, severe ([address]  Follow with pulm- lori baptiste. Cont. 02 2L NC - not wearing today. Doesn't seem to wear out of the house. Stable on trilegy and albuterol PRN. 4. On home oxygen therapy  2L NC cont. 5. Nocturia  Check PSA     6. Seasonal and perennial allergic rhinitis  Claritin daily, controlled    7. Screen for colon cancer  GI referral. Last in 2015 and he is every 5 yr follow up   - [facility] [facility]) - Molly Deleon MD, Gastroenterology, [place]    8. Murmur, heart  Follows with cardio. EKG today. Send results to cardio with message.    F/u with cardio  - EKG 12 Lead      Return in about 3 months (around 6/10/2021) for htn, COPD. An electronic signature was used to authenticate this note.     --Sg Abdalla, APRN - NP on 3/10/2021 at 8:36 AM

## 2021-03-11 ENCOUNTER — TELEPHONE (OUTPATIENT)
Dept: CARDIOLOGY CLINIC | Age: 61
End: 2021-03-11

## 2021-03-11 DIAGNOSIS — R93.1 DECREASED CARDIAC EJECTION FRACTION: Primary | ICD-10-CM

## 2021-03-11 NOTE — TELEPHONE ENCOUNTER
Per Dr Aneesh Zuñiga, abn  ekg at PCP office, will do 72 hr holter. Also need muga scan to reevaluate EF. Patient informed and scheduled.  PCP udpated

## 2021-03-12 ENCOUNTER — NURSE ONLY (OUTPATIENT)
Dept: CARDIOLOGY CLINIC | Age: 61
End: 2021-03-12
Payer: COMMERCIAL

## 2021-03-12 DIAGNOSIS — R93.1 DECREASED CARDIAC EJECTION FRACTION: Primary | ICD-10-CM

## 2021-03-12 PROCEDURE — 93010 ELECTROCARDIOGRAM REPORT: CPT | Performed by: INTERNAL MEDICINE

## 2021-03-12 PROCEDURE — 93242 EXT ECG>48HR<7D RECORDING: CPT | Performed by: INTERNAL MEDICINE

## 2021-03-12 NOTE — PROGRESS NOTES
Applied @ at office, 72hr holter w/monitor# [id number]. Educated pt on proper holter usage; how to keep sx diary; & when to mail in monitor back to [employer]. Pt voiced understanding.

## 2021-03-16 ENCOUNTER — TELEPHONE (OUTPATIENT)
Dept: FAMILY MEDICINE CLINIC | Age: 61
End: 2021-03-16

## 2021-03-18 LAB
EKG ATRIAL RATE: 65 BPM
EKG DIAGNOSIS: NORMAL
EKG P AXIS: 54 DEGREES
EKG P-R INTERVAL: 240 MS
EKG Q-T INTERVAL: 428 MS
EKG QRS DURATION: 104 MS
EKG QTC CALCULATION (BAZETT): 445 MS
EKG R AXIS: -29 DEGREES
EKG T AXIS: 69 DEGREES
EKG VENTRICULAR RATE: 65 BPM

## 2021-03-21 DIAGNOSIS — I10 ESSENTIAL HYPERTENSION: ICD-10-CM

## 2021-03-22 RX ORDER — ASPIRIN 81 MG/1
81 TABLET ORAL DAILY
Qty: 90 TABLET | Refills: 3 | Status: SHIPPED | OUTPATIENT
Start: 2021-03-22 | End: 2022-03-08

## 2021-03-22 RX ORDER — LOSARTAN POTASSIUM 50 MG/1
50 TABLET ORAL DAILY
Qty: 90 TABLET | Refills: 3 | Status: SHIPPED | OUTPATIENT
Start: 2021-03-22 | End: 2022-02-23

## 2021-03-23 ENCOUNTER — PROCEDURE VISIT (OUTPATIENT)
Dept: CARDIOLOGY CLINIC | Age: 61
End: 2021-03-23
Payer: COMMERCIAL

## 2021-03-23 DIAGNOSIS — R06.02 SOB (SHORTNESS OF BREATH): Primary | ICD-10-CM

## 2021-03-23 DIAGNOSIS — R93.1 DECREASED CARDIAC EJECTION FRACTION: ICD-10-CM

## 2021-03-23 LAB
LV EF: 46 %
LVEF MODALITY: NORMAL

## 2021-03-23 PROCEDURE — A9560 TC99M LABELED RBC: HCPCS | Performed by: INTERNAL MEDICINE

## 2021-03-23 PROCEDURE — 78472 GATED HEART PLANAR SINGLE: CPT | Performed by: INTERNAL MEDICINE

## 2021-03-24 ENCOUNTER — TELEPHONE (OUTPATIENT)
Dept: CARDIOLOGY CLINIC | Age: 61
End: 2021-03-24

## 2021-03-24 ENCOUNTER — OFFICE VISIT (OUTPATIENT)
Dept: CARDIOLOGY CLINIC | Age: 61
End: 2021-03-24
Payer: COMMERCIAL

## 2021-03-24 VITALS
HEART RATE: 62 BPM | WEIGHT: 218.8 LBS | DIASTOLIC BLOOD PRESSURE: 68 MMHG | HEIGHT: 71 IN | BODY MASS INDEX: 30.63 KG/M2 | SYSTOLIC BLOOD PRESSURE: 122 MMHG

## 2021-03-24 DIAGNOSIS — I10 ESSENTIAL HYPERTENSION: ICD-10-CM

## 2021-03-24 DIAGNOSIS — Z01.810 PRE-OPERATIVE CARDIOVASCULAR EXAMINATION: Primary | ICD-10-CM

## 2021-03-24 DIAGNOSIS — R93.1 DECREASED CARDIAC EJECTION FRACTION: ICD-10-CM

## 2021-03-24 PROCEDURE — 99214 OFFICE O/P EST MOD 30 MIN: CPT | Performed by: INTERNAL MEDICINE

## 2021-03-24 NOTE — PROGRESS NOTES
Coty Elam  is a  Established patient  ,63 y.o.   male here for evaluation of the following chief complaint(s):    Here for abn MUGA        SUBJECTIVE/OBJECTIVE:  HPI : h/o  Htn, hyperlipidimea now here  Has been having palpitations, has mild mid sternal cp as well, has low ef    Review of Systems    cp    Vitals:    03/24/21 1557   BP: 122/68   Pulse: 62   Weight: 218 lb 12.8 oz (99.2 kg)   Height: 5' 11\" (1.803 m)     /68   Pulse 62   Ht 5' 11\" (1.803 m)   Wt 218 lb 12.8 oz (99.2 kg)   BMI 30.52 kg/m²   Patient-Reported Vitals 1/14/2021   Patient-Reported Weight 215   Patient-Reported Height 5'11   Patient-Reported Pulse 64   Patient-Reported SpO2 91     Wt Readings from Last 3 Encounters:   03/24/21 218 lb 12.8 oz (99.2 kg)   03/10/21 216 lb (98 kg)   12/09/20 216 lb 12.8 oz (98.3 kg)     Body mass index is 30.52 kg/m². Physical Exam     Neck: JVD      Lungs : clear    Cardio : Si and S2 audilble      Ext: edema      All pertinent data reviewed      Meds : reviewed         Tests ordered        ASSESSMENT/PLAN:    -  Hypertension: Patients blood pressure is normal. Patient is advised about low sodium diet. Present medical regimen will not be changed. - Low EF    LHC if neg then for PVC ablation                -  LIPID MANAGEMENT:  Importance of lipid levels discussed with patient   and patient was given dietary advice. NCEP- ATP III guidelines reviewed with patient. -   Changes  in medicines made: No                                    An electronic signature was used to authenticate this note.     --Jennifer Olivas MD

## 2021-03-24 NOTE — TELEPHONE ENCOUNTER
Advised patient Hernan Jeffries would like him to come in and discuss test results pt voiced understanding.        Left ventricular ejection fraction of 46 %.      PVC's during imaging.     LVEF IS MILDLY REDUCED

## 2021-03-24 NOTE — PROGRESS NOTES
GAVE PT DATE AND TIMES FOR [facility] HE WILL COME IN 3/29/21 @ 1 PM FOR COVID TEST AND SIGN PAPERS

## 2021-03-24 NOTE — LETTER
[facility]      [address] [facility] WITH POSSIBLE PERCUTANEOUS CORONARY INTERVENTION     Patient Name: Amor Dorsey   : 1960   MRN# [id number]    Date of Procedure: 21  Time: 8:00 Arrival Time: 6:00    The catheterization and angiogram are usually outpatient procedures, however if stenting is needed you will stay overnight. You will need to be at the hospital two hours before the procedure. You will need to arrange for someone to drive you home. You will go to registration in the main lobby. HOSPITAL:  [facility])  Call to Pre-[place] at: [phone] 1-2 days before your procedure. Please have blood work and chest-x-ray done 1 to 2 days before procedure at    [facility]. X Please do not have anything by mouth after midnight prior to or 8 hours before the procedure. X You may take your medications with a sip of water in the morning before your procedure or  take them with you. X Please have COVID-19 Test done on 3/29/21. After labs and Xray are completed you will need to DUVED yourself until procedure. Patient Signature:  _________________________ Staff Signature: Jason Zambrano       Staff Given Instructions:_______________________________                                                          [facility]    \"Dr. Renata Olmos     Patient Name: Amor Dorsey   : 1960   MRN# [id number]    Date of Procedure: 21  Time: 8:00     DIAGNOSIS:   Z01.810    LEFT HEART CATHETERIZATION WITH POSSIBLE PERCUTANEOUS CORONARY INTERVENTION       X Chest x-Ray PA & Lateral View        X Type & Screen     X CBC  X BMP              ? PLEASE CALL ABNORMAL RESULTS TO THE  PHYSICIAN? ATTENTION PATIENT: Pretesting is to be done before the cath. You do not have to fast for the lab work.  You must go to the [facility] behind [facility] at [address]. to have this lab work done. Phone: [phone] Hours: 7:00 am to 5:00 pm       PHYSICIAN SIGNATURE:      DATE:_________________Time:______________                                                              Caterina Isbell  Pre Cath Lab Orders   Patient Name: Trisha Ram   : 1960   MRN# [id number]  Pre Cath Lab orders   1. IV of 0.9 NS@ 75ml/hr started 2 hours prior to procedure. (#20 Gauge Insyte or larger)  2. Diazepam (Valium) 5 mg po ONCE in Cath Lab. 3. Diphenhydramine (Benadryl) 25 mg ONCE. PHYSICIAN SIGNATURE:      DATE:   TIME:______________                                                                                                           [facility] ([facility] Informed Consent for Anesthesia/Sedation, Surgery, Invasive Procedures, and other High-risk Interventions and Medication use      *This consent is applicable for 30 days following patient signature*    Procedure(s)   I, Trisha Ram authorize, Dr. Adriana Alvarez  and the associate(s) or assistant(s) of his/her choice, to perform the following procedure(s): [address]       I know that unexpected conditions may require additional or different procedures than those above. I authorize the above named practitioner(s) perform these as necessary and desirable. This is based on the practitioners professional judgment. The above named practitioner has discussed the above procedure(s) with me, including:   Potential benefits, including likelihood of success of the procedure(s) goals   Risks   Side effects, risk of death, and risk of infection   Any potential problems that might occur during recuperation or healing post-procedure   Reasonable alternatives   Risks of NOT performing the procedure(s)    I acknowledge that no warranty or guarantee has been made to the results the procedure(s).      I consent to the above named practitioner(s) providing additional services to me as deemed reasonable and necessary, including but not limited to:     Use of medications for anesthesia or sedation.  All anesthesia and sedation carry risks. My practitioner has discussed my anticipated anesthesia and/or sedation and the risks of using, risk of not using, benefits, side effects, and alternatives.  Use of pathology  - I authorize [facility] to dispose of tissues, specimens or organs when pathology is complete.  Use of radiology  - A contrast agent may be required for radiology procedures. My practitioner has advised me of the risks of using, risks of not using, benefits, side effects, and alternatives.  Observers or use of photography, video/audio recording, or televising of the procedure(s). This is for medical, scientific, or educational purposes. This includes appropriate portions of my body. My identity will not be revealed.  I consent to release of my social security number and other identifying information to [place] (FDA), and the supplier/, if I receive tissue, a device, or implant. This is to track the tissue, device, or implant for defect, recall, infection, etc.      Use of blood and/or blood products, if needed, through my hospital stay. My practitioner has advised me of the risks of using, risks of not using, benefits, side effects, and alternatives. ___ I do NOT want Blood or Blood products given. (Complete separate  refusal form)                Code Status (vaibhav one):  ___ I do NOT HAVE a DNR order. I am a Full-code.   I will receive CPR, intubation,  chest compressions, medications, and/or other life saving measures if I have a  cardiac or respiratory arrest.    ___ I have a Do Not Resuscitate (DNR)order.   (vaibhav one below)  ___  I rescind my DNR for surgery and immediate post-operative period through Phase 2 recovery.    This means, for that time period, I will be a Full-code and receive CPR, intubation, chest compressions, medications, and/or other life saving measures, if I have a cardiac or respiratory arrest.    ___ I WANT to keep my DNR in effect during my procedure(s) and immediate post-operative recovery period through Phase 2 recovery. (Complete separate refusal form)     This form has been fully explained to me. I understand its contents. Patients Signature: ___________________________Date: ________  Time: ________    If patient unable to sign, has engaged the [address] [place] [address], is a minor, or has a court-appointed Guardian:  [address] [place] [address] Representative Name (Print):  ____________________________________      Relationship ([place] one):    Guardian   Parent    Spouse    HCPOA   Child   Sibling  Next-of-Kin Friend    Patients Representative Signature: _______________________________________              Date: ______________  Time: __________    An  was used.    name/ID: _________________________________      [facility] ([facility] Witness________________________  Date: ________   Time: _________    Physician/Practitioner _______________________  Date: ________   Time: _________           Revision 2017                                                    Shaylee Liu    PROCEDURE TO SCHEDULE:    LEFT HEART CATHETERIZATION WITH POSSIBLE PERCUTANEOUS CORONARY INTERVENTION     Patient Name: Katina Jo   : 1960   MRN# [id number]    Home Phone Number: [phone]   Weight:    Wt Readings from Last 3 Encounters:   21 218 lb 12.8 oz (99.2 kg)   03/10/21 216 lb (98 kg)   20 216 lb 12.8 oz (98.3 kg)        Insurance: Payor: Boone Chappell / Plan: Boone Chappell - [place] PPO / Product Type: *No Product type* /     Date of Procedure: 21  Time: 8:00 Arrival Time: 6:00    Diagnosis:  Abnormal NM  Allergies: No Known Allergies     1) Call [address] [place] [address] scheduling ([phone]) or Instant Message CONFIRMED WITH     PHONE OR   INSTANT MESSAGE  2) PREAUTHORIZATION NUMBER:    Spoke to:      From date:     expiration date:        Jr Cervantes: Prior Authorization  We will contact your insurance for prior authorization for the CPT code related to the procedure it is the patient's responsibility to contact their insurance to ensure they are following their medical plans provisions or requirements!

## 2021-03-29 ENCOUNTER — HOSPITAL ENCOUNTER (OUTPATIENT)
Dept: GENERAL RADIOLOGY | Age: 61
Discharge: HOME | End: 2021-03-29
Payer: COMMERCIAL

## 2021-03-29 ENCOUNTER — HOSPITAL ENCOUNTER (OUTPATIENT)
Age: 61
Discharge: HOME | End: 2021-03-29
Payer: COMMERCIAL

## 2021-03-29 ENCOUNTER — HOSPITAL ENCOUNTER (OUTPATIENT)
Age: 61
Setting detail: SPECIMEN
Discharge: HOME | End: 2021-03-29
Payer: COMMERCIAL

## 2021-03-29 ENCOUNTER — NURSE ONLY (OUTPATIENT)
Dept: CARDIOLOGY CLINIC | Age: 61
End: 2021-03-29
Payer: COMMERCIAL

## 2021-03-29 VITALS — DIASTOLIC BLOOD PRESSURE: 60 MMHG | HEART RATE: 64 BPM | SYSTOLIC BLOOD PRESSURE: 136 MMHG | TEMPERATURE: 96.8 F

## 2021-03-29 DIAGNOSIS — Z01.810 PRE-OPERATIVE CARDIOVASCULAR EXAMINATION: ICD-10-CM

## 2021-03-29 DIAGNOSIS — Z01.818 PRE-PROCEDURAL EXAMINATION: ICD-10-CM

## 2021-03-29 LAB
ABO/RH: NORMAL
ANION GAP SERPL CALCULATED.3IONS-SCNC: 10 MMOL/L (ref 4–16)
ANTIBODY SCREEN: NEGATIVE
BASOPHILS ABSOLUTE: 0.1 K/CU MM
BASOPHILS RELATIVE PERCENT: 0.6 % (ref 0–1)
BUN BLDV-MCNC: 13 MG/DL (ref 6–23)
CALCIUM SERPL-MCNC: 9.4 MG/DL (ref 8.3–10.6)
CHLORIDE BLD-SCNC: 100 MMOL/L (ref 99–110)
CO2: 30 MMOL/L (ref 21–32)
COMMENT: NORMAL
CREAT SERPL-MCNC: 0.6 MG/DL (ref 0.9–1.3)
DIFFERENTIAL TYPE: ABNORMAL
EOSINOPHILS ABSOLUTE: 0.2 K/CU MM
EOSINOPHILS RELATIVE PERCENT: 1.8 % (ref 0–3)
GFR AFRICAN AMERICAN: >60 ML/MIN/1.73M2
GFR NON-AFRICAN AMERICAN: >60 ML/MIN/1.73M2
GLUCOSE BLD-MCNC: 82 MG/DL (ref 70–99)
HCT VFR BLD CALC: 51.4 % (ref 42–52)
HEMOGLOBIN: 16.5 GM/DL (ref 13.5–18)
IMMATURE NEUTROPHIL %: 0.6 % (ref 0–0.43)
LYMPHOCYTES ABSOLUTE: 2.8 K/CU MM
LYMPHOCYTES RELATIVE PERCENT: 32.4 % (ref 24–44)
MCH RBC QN AUTO: 29.7 PG (ref 27–31)
MCHC RBC AUTO-ENTMCNC: 32.1 % (ref 32–36)
MCV RBC AUTO: 92.6 FL (ref 78–100)
MONOCYTES ABSOLUTE: 0.7 K/CU MM
MONOCYTES RELATIVE PERCENT: 7.6 % (ref 0–4)
NUCLEATED RBC %: 0 %
PDW BLD-RTO: 12.2 % (ref 11.7–14.9)
PLATELET # BLD: 188 K/CU MM (ref 140–440)
PMV BLD AUTO: 11.1 FL (ref 7.5–11.1)
POTASSIUM SERPL-SCNC: 4.5 MMOL/L (ref 3.5–5.1)
RBC # BLD: 5.55 M/CU MM (ref 4.6–6.2)
SEGMENTED NEUTROPHILS ABSOLUTE COUNT: 4.9 K/CU MM
SEGMENTED NEUTROPHILS RELATIVE PERCENT: 57 % (ref 36–66)
SODIUM BLD-SCNC: 140 MMOL/L (ref 135–145)
TOTAL IMMATURE NEUTOROPHIL: 0.05 K/CU MM
TOTAL NUCLEATED RBC: 0 K/CU MM
WBC # BLD: 8.6 K/CU MM (ref 4–10.5)

## 2021-03-29 PROCEDURE — 86900 BLOOD TYPING SEROLOGIC ABO: CPT

## 2021-03-29 PROCEDURE — 85025 COMPLETE CBC W/AUTO DIFF WBC: CPT

## 2021-03-29 PROCEDURE — U0003 INFECTIOUS AGENT DETECTION BY NUCLEIC ACID (DNA OR RNA); SEVERE ACUTE RESPIRATORY SYNDROME CORONAVIRUS 2 (SARS-COV-2) (CORONAVIRUS DISEASE [COVID-19]), AMPLIFIED PROBE TECHNIQUE, MAKING USE OF HIGH THROUGHPUT TECHNOLOGIES AS DESCRIBED BY CMS-2020-01-R: HCPCS

## 2021-03-29 PROCEDURE — 71046 X-RAY EXAM CHEST 2 VIEWS: CPT

## 2021-03-29 PROCEDURE — U0005 INFEC AGEN DETEC AMPLI PROBE: HCPCS

## 2021-03-29 PROCEDURE — 80048 BASIC METABOLIC PNL TOTAL CA: CPT

## 2021-03-29 PROCEDURE — 99211 OFF/OP EST MAY X REQ PHY/QHP: CPT | Performed by: INTERNAL MEDICINE

## 2021-03-29 PROCEDURE — 86901 BLOOD TYPING SEROLOGIC RH(D): CPT

## 2021-03-29 PROCEDURE — 86850 RBC ANTIBODY SCREEN: CPT

## 2021-03-29 PROCEDURE — 36415 COLL VENOUS BLD VENIPUNCTURE: CPT

## 2021-03-29 NOTE — PROGRESS NOTES
Patient performed COVID nasal swab for 10 seconds in each nostril. Patient was wearing a mask and provided gloves and tissues. This CMA, LPN, RN present in the room, 6 feet away. Patient dropped swab in  labeled collection tube. Lab order, facesheet and copy of insurance card placed in collection bag. Bag placed in biohazard bag and placed in fridge.  called for . Patient here in office & educated on [facility] for Dx: Cici Livingston, scheduled for 4-1-21 @ 8, w/arrival @ 6, @ [facility]. Risks explained; & consents signed. Pre-admission orders given to pt for labs & CXR, which are due TODAY @ [facility]. Instructions given to pt to: remian NPO after midnight the night before procedure. Patient to call hospital @ [phone] to pre-register. May take morning meds the morning of procedure. Patient was notified that procedure could be delayed due to an emergency. Patient voiced understanding. Copies of consent, pre-testing orders, & info. sheet scanned into media.

## 2021-03-30 LAB
SARS-COV-2: NOT DETECTED
SOURCE: NORMAL

## 2021-03-31 ENCOUNTER — TELEPHONE (OUTPATIENT)
Dept: CARDIOLOGY CLINIC | Age: 61
End: 2021-03-31

## 2021-04-01 ENCOUNTER — HOSPITAL ENCOUNTER (OUTPATIENT)
Dept: CARDIAC CATH/INVASIVE PROCEDURES | Age: 61
Discharge: HOME | End: 2021-04-01
Attending: INTERNAL MEDICINE | Admitting: INTERNAL MEDICINE
Payer: COMMERCIAL

## 2021-04-01 VITALS
TEMPERATURE: 96.8 F | RESPIRATION RATE: 20 BRPM | HEIGHT: 71 IN | HEART RATE: 69 BPM | WEIGHT: 218 LBS | OXYGEN SATURATION: 92 % | SYSTOLIC BLOOD PRESSURE: 170 MMHG | BODY MASS INDEX: 30.52 KG/M2 | DIASTOLIC BLOOD PRESSURE: 71 MMHG

## 2021-04-01 PROCEDURE — C1892 INTRO/SHEATH,FIXED,PEEL-AWAY: HCPCS

## 2021-04-01 PROCEDURE — 93458 L HRT ARTERY/VENTRICLE ANGIO: CPT

## 2021-04-01 PROCEDURE — 2500000003 HC RX 250 WO HCPCS

## 2021-04-01 PROCEDURE — 2709999900 HC NON-CHARGEABLE SUPPLY

## 2021-04-01 PROCEDURE — C1769 GUIDE WIRE: HCPCS

## 2021-04-01 PROCEDURE — C1894 INTRO/SHEATH, NON-LASER: HCPCS

## 2021-04-01 PROCEDURE — 6370000000 HC RX 637 (ALT 250 FOR IP): Performed by: INTERNAL MEDICINE

## 2021-04-01 PROCEDURE — 93458 L HRT ARTERY/VENTRICLE ANGIO: CPT | Performed by: INTERNAL MEDICINE

## 2021-04-01 PROCEDURE — 6360000002 HC RX W HCPCS

## 2021-04-01 PROCEDURE — 2580000003 HC RX 258: Performed by: INTERNAL MEDICINE

## 2021-04-01 PROCEDURE — 6360000004 HC RX CONTRAST MEDICATION

## 2021-04-01 PROCEDURE — C1887 CATHETER, GUIDING: HCPCS

## 2021-04-01 RX ORDER — SODIUM CHLORIDE 0.9 % (FLUSH) 0.9 %
10 SYRINGE (ML) INJECTION EVERY 12 HOURS SCHEDULED
Status: DISCONTINUED | OUTPATIENT
Start: 2021-04-01 | End: 2021-04-01 | Stop reason: HOSPADM

## 2021-04-01 RX ORDER — SODIUM CHLORIDE 0.9 % (FLUSH) 0.9 %
10 SYRINGE (ML) INJECTION PRN
Status: DISCONTINUED | OUTPATIENT
Start: 2021-04-01 | End: 2021-04-01 | Stop reason: HOSPADM

## 2021-04-01 RX ORDER — SODIUM CHLORIDE 9 MG/ML
INJECTION, SOLUTION INTRAVENOUS CONTINUOUS
Status: DISCONTINUED | OUTPATIENT
Start: 2021-04-01 | End: 2021-04-01 | Stop reason: HOSPADM

## 2021-04-01 RX ORDER — DIPHENHYDRAMINE HCL 25 MG
25 TABLET ORAL ONCE
Status: COMPLETED | OUTPATIENT
Start: 2021-04-01 | End: 2021-04-01

## 2021-04-01 RX ORDER — ACETAMINOPHEN 325 MG/1
650 TABLET ORAL EVERY 4 HOURS PRN
Status: DISCONTINUED | OUTPATIENT
Start: 2021-04-01 | End: 2021-04-01 | Stop reason: HOSPADM

## 2021-04-01 RX ORDER — DIAZEPAM 5 MG/1
5 TABLET ORAL ONCE
Status: COMPLETED | OUTPATIENT
Start: 2021-04-01 | End: 2021-04-01

## 2021-04-01 RX ADMIN — SODIUM CHLORIDE: 9 INJECTION, SOLUTION INTRAVENOUS at 06:37

## 2021-04-01 RX ADMIN — DIAZEPAM 5 MG: 5 TABLET ORAL at 06:41

## 2021-04-01 RX ADMIN — DIPHENHYDRAMINE HYDROCHLORIDE 25 MG: 25 TABLET ORAL at 06:40

## 2021-04-01 NOTE — PROGRESS NOTES
To car per wheelchair and home with wife,  Procedure site remains free from hematoma or active bleeding.

## 2021-04-01 NOTE — H&P
Wood Yuan  is a  Established patient  ,63 y.o.   male here for evaluation of the following chief complaint(s):    Here for abn MUGA           SUBJECTIVE/OBJECTIVE:  HPI : h/o  Htn, hyperlipidimea now here  Has been having palpitations, has mild mid sternal cp as well, has low ef     Review of Systems    cp     Vitals       Vitals:     03/24/21 1557   BP: 122/68   Pulse: 62   Weight: 218 lb 12.8 oz (99.2 kg)   Height: 5' 11\" (1.803 m)         /68   Pulse 62   Ht 5' 11\" (1.803 m)   Wt 218 lb 12.8 oz (99.2 kg)   BMI 30.52 kg/m²   Patient-Reported Vitals 1/14/2021   Patient-Reported Weight 215   Patient-Reported Height 5'11   Patient-Reported Pulse 64   Patient-Reported SpO2 91          Wt Readings from Last 3 Encounters:   03/24/21 218 lb 12.8 oz (99.2 kg)   03/10/21 216 lb (98 kg)   12/09/20 216 lb 12.8 oz (98.3 kg)      Body mass index is 30.52 kg/m².     Physical Exam     Neck: JVD      Lungs : clear    Cardio : Si and S2 audilble      Ext: edema       All pertinent data reviewed       Meds : reviewed           Tests ordered         ASSESSMENT/PLAN:     -  Hypertension: Patients blood pressure is normal. Patient is advised about low sodium diet. Present medical regimen will not be changed.          - Low EF    LHC if neg then for PVC ablation                    -  LIPID MANAGEMENT:  Importance of lipid levels discussed with patient   and patient was given dietary advice. NCEP- ATP III guidelines reviewed with patient. -   Changes  in medicines made:  No

## 2021-04-05 ENCOUNTER — TELEPHONE (OUTPATIENT)
Dept: CARDIOLOGY CLINIC | Age: 61
End: 2021-04-05

## 2021-04-05 NOTE — TELEPHONE ENCOUNTER
Received message from Payam Both to arrange for consult to discuss PVCs. Attempted to reach patient to schedule consult. LM asking patient to call back to schedule.

## 2021-04-05 NOTE — TELEPHONE ENCOUNTER
Left VM that I was checking on him after [facility]. He does have F/U 4/21/21 @ 3:20 with Nicole.

## 2021-04-16 ENCOUNTER — INITIAL CONSULT (OUTPATIENT)
Dept: CARDIOLOGY CLINIC | Age: 61
End: 2021-04-16
Payer: COMMERCIAL

## 2021-04-16 VITALS
DIASTOLIC BLOOD PRESSURE: 82 MMHG | BODY MASS INDEX: 30.94 KG/M2 | HEART RATE: 64 BPM | HEIGHT: 71 IN | WEIGHT: 221 LBS | RESPIRATION RATE: 12 BRPM | SYSTOLIC BLOOD PRESSURE: 124 MMHG | OXYGEN SATURATION: 91 %

## 2021-04-16 DIAGNOSIS — I49.3 PVC (PREMATURE VENTRICULAR CONTRACTION): Primary | ICD-10-CM

## 2021-04-16 DIAGNOSIS — R07.9 CHEST PAIN, UNSPECIFIED TYPE: ICD-10-CM

## 2021-04-16 PROCEDURE — 93000 ELECTROCARDIOGRAM COMPLETE: CPT | Performed by: INTERNAL MEDICINE

## 2021-04-16 PROCEDURE — 99203 OFFICE O/P NEW LOW 30 MIN: CPT | Performed by: INTERNAL MEDICINE

## 2021-04-16 RX ORDER — MULTIVITAMIN/IRON/FOLIC ACID 18MG-0.4MG
250 TABLET ORAL DAILY
Qty: 30 TABLET | Refills: 2 | Status: SHIPPED | OUTPATIENT
Start: 2021-04-16 | End: 2021-06-10

## 2021-04-16 NOTE — PROGRESS NOTES
Electrophysiology Consult Note      Reason for consultation:   PVC    Chief complaint : Shortness of breath, Chest pressure    Referring physician:       Primary care physician: Luis Ambrocio MD      History of Present Illness:     She is a 57-year-old male with history of hypertension, COPD on home oxygen referred by Dr. Gordillo For PVCs. Patient reports he has chest pressure lasting about 10 seconds and relieves immediately. Patient does have shortness of breath but he also reports that he has chronic COPD and home oxygen dependent so that could be the reason to. Patient denies any dizziness. Patient has palpitations that last few seconds up to a couple of minutes multiple times in a day. No edema. Never been a smoker. Denies alcohol use in a year    Patient reports tiredness and weakness    Pastmedical history:   Past Medical History:   Diagnosis Date    Abnormal echocardiogram 03/29/2013    EF=45-50%;mod. AR,mild aortic valve calcif. 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 EF 55%. Renals patent.     H/O cardiac catheterization 04/01/2021    NO SIG CAD    H/O cardiovascular stress test 04/02/2013    EF 49%, there is no scintigraphic evidence of inducible myocardial ischemia, the observed defect is consistent with diaphragmatic attenuation, clinical correlation recommenced, no previous study to compare with, no wall motionn abnormality seen, normal perfusion in all myocardial regions    H/O chest pain     H/O echocardiogram 07/11/2020    EF 50-55%, Mod LVH & AR,  Dilated aortic root (4.2cm) and ascending aorta (4.1cm).  Hemorrhoids 06/15/2015    HTN (hypertension)     Hydrocele 10/2011    U/S    Hyperlipemia     Irregular heart rhythm     after induction for surgery 9/5/2013 - pt developed irreg rhythm and surgery cancelled\"had another echo and stress test and everything checked out ok, they do not know the reason it happened\"- cardiac clearance obtained for surgery 9/19/2013    Palpitations     Pneumonia 2004    not since    Right inguinal hernia 08/22/2013    SOB (shortness of breath) 03/27/2013    Nuclear Stress Normal, EF 49% (Dr. Karen Millard- 4/2/2013)     SOB (shortness of breath) on exertion        Surgical history :   Past Surgical History:   Pro[facility] Laterality Date   [facility]    COLONOSCOPY  6-    INGUINAL HERNIA REPAIR Bilateral 1992    INGUINAL HERNIA REPAIR Right 09/19/13    with mesh    OTHER SURGICAL HISTORY  09/05/13    hernia repair aborted    TYMPANOSTOMY TUBE PLACEMENT  2009    right ear    VASECTOMY  1988       Family history:   Family History   Problem Relation Age of Onset    Cancer Mother         colon    Diabetes Mother     High Blood Pressure Mother     Migraines Mother     High Blood Pressure Father     COPD Father     Cancer Father         lung    Heart Disease Maternal Grandmother     Heart Disease Maternal Grandfather        Social history :  reports that he has never smoked. He has never used smokeless tobacco. He reports current alcohol use of about 2.0 standard drinks of alcohol per week. He reports that he does not use drugs.     No Known Allergies    Current Outpatient Medications on File Prior to Visit   Medication Sig Dispense Refill    losartan (COZAAR) 50 MG tablet Take 1 tablet by mouth daily To replace lisinopril 90 tablet 3    aspirin 81 MG EC tablet Take 1 tablet by mouth daily 90 tablet 3    loratadine (CLARITIN) 10 MG tablet TAKE 1 TABLET BY MOUTH EVERY DAY 90 tablet 0    fluticasone-umeclidin-vilant (TRELEGY ELLIPTA) 100-62.5-25 MCG/INH AEPB Inhale 1 puff into the lungs daily 1 each 5    albuterol sulfate  (90 Base) MCG/ACT inhaler INHALE 2 PUFFS BY MOUTH EVERY 4 HOURS AS NEEDED FOR WHEEZE OR FOR SHORTNESS OF BREATH 18 Inhaler 5    Multiple Vitamin (DAILY VITAMINS PO) Take by mouth       No current facility-administered medications on file prior to visit. Review of Systems:   Review of Systems   Constitutional: Positive for fatigue. Negative for activity change, chills and fever. HENT: Negative for congestion, ear pain and tinnitus. Eyes: Negative for photophobia, pain and visual disturbance. Respiratory: Positive for shortness of breath. Negative for cough, chest tightness and wheezing. Cardiovascular: Positive for chest pain and palpitations. Negative for leg swelling. Gastrointestinal: Negative for abdominal pain, blood in stool, constipation, diarrhea, nausea and vomiting. Endocrine: Negative for cold intolerance and heat intolerance. Genitourinary: Negative for dysuria, flank pain and hematuria. Musculoskeletal: Positive for arthralgias. Negative for back pain, myalgias and neck stiffness. Skin: Negative for color change and rash. Allergic/Immunologic: Negative for food allergies. Neurological: Negative for dizziness, light-headedness, numbness and headaches. Hematological: Does not bruise/bleed easily. Psychiatric/Behavioral: Negative for agitation, behavioral problems and confusion. Examination:      Vitals:    04/16/21 1001   BP: 124/82   Pulse: 64   Resp: 12   SpO2: 91%   Weight: 221 lb (100.2 kg)   Height: 5' 11\" (1.803 m)        Body mass index is 30.82 kg/m². Physical Exam  Constitutional:       Appearance: Normal appearance. He is not ill-appearing. HENT:      Head: Normocephalic and atraumatic.       Mouth/Throat:      Mouth: Mucous membranes are moist.   Eyes:      Conjunctiva/sclera: Conjunctivae normal.   Neck:      Musculoskeletal: Normal range of motion. Cardiovascular:      Rate and Rhythm: Normal rate. Heart sounds: Murmur (grade 2/6 diastolic murmur) present. Comments: Frequent ectopic beats  Pulmonary:      Effort: Pulmonary effort is normal.      Breath sounds: No rales. Abdominal:      General: Abdomen is flat. Palpations: Abdomen is soft. Musculoskeletal: Normal range of motion. General: No tenderness. Right lower leg: No edema. Left lower leg: No edema. Skin:     General: Skin is warm and dry. Neurological:      General: No focal deficit present. Mental Status: He is alert and oriented to person, place, and time. CBC:   Lab Results   Component Value Date    WBC 8.6 03/29/2021    HGB 16.5 03/29/2021    HCT 51.4 03/29/2021     03/29/2021     Lipids:  Lab Results   Component Value Date    CHOL 193 03/10/2021    TRIG 62 03/10/2021    HDL 39 (L) 03/10/2021    LDLCALC 155 (H) 06/09/2020    LDLDIRECT 148 (H) 03/10/2021     PT/INR:   Lab Results   Component Value Date    INR 1.01 09/05/2013        BMP:    Lab Results   Component Value Date     03/29/2021    K 4.5 03/29/2021     03/29/2021    CO2 30 03/29/2021    BUN 13 03/29/2021     CMP:   Lab Results   Component Value Date    AST 22 03/10/2021    PROT 6.6 03/10/2021    BILITOT 0.4 03/10/2021    ALKPHOS 86 03/10/2021     TSH:  No results found for: TSH    EKGINTERPRETATION - EKG Interpretation:  Sinus rhythm, first degree AV block, PVC      IMPRESSION / RECOMMENDATIONS:     1. PVC  2. COPD  3. HLD  4. Obesity BMI 30  5. Moderate aortic regurgitation    Patient is having PVCs close to 10%. Patient does have symptoms appears more LV endocardial PVCs with negative cath EF is normal patient has moderate eccentric aortic regurgitation so I do not know if that is causing his PVCs.   Will start him on metoprolol and magnesium for now if patient continues to have PVCs and symptoms we will consider ablation    Patient agreeable with plan    Thanks again for allowing me to participate in care of this patient. Please call me if you have any questions. With best regards. Jackie Henson MD, 4/16/2021 10:16 AM     Please note this report has been partially produced using speech recognition software and may contain errors related to that system including errors in grammar, punctuation, and spelling, as well as words and phrases that may be inappropriate. If there are any questions or concerns please feel free to contact the dictating provider for clarification.

## 2021-04-16 NOTE — PATIENT INSTRUCTIONS
Please be informed that if you contact our office outside of normal business hours the physician on call cannot help with any scheduling or rescheduling issues, procedure instruction questions or any type of medication issue. We advise you for any urgent/emergency that you go to the nearest emergency room!     PLEASE CALL OUR OFFICE DURING NORMAL BUSINESS HOURS    Monday - Friday   8 am to 5 pm    [address] [address]: [phone]    [place]:  [phone]

## 2021-04-21 ENCOUNTER — OFFICE VISIT (OUTPATIENT)
Dept: CARDIOLOGY CLINIC | Age: 61
End: 2021-04-21
Payer: COMMERCIAL

## 2021-04-21 VITALS
BODY MASS INDEX: 30.52 KG/M2 | SYSTOLIC BLOOD PRESSURE: 114 MMHG | DIASTOLIC BLOOD PRESSURE: 60 MMHG | HEIGHT: 71 IN | WEIGHT: 218 LBS | HEART RATE: 51 BPM | OXYGEN SATURATION: 93 %

## 2021-04-21 DIAGNOSIS — I49.3 PVC (PREMATURE VENTRICULAR CONTRACTION): ICD-10-CM

## 2021-04-21 DIAGNOSIS — I10 ESSENTIAL HYPERTENSION: Primary | ICD-10-CM

## 2021-04-21 PROCEDURE — 99214 OFFICE O/P EST MOD 30 MIN: CPT | Performed by: NURSE PRACTITIONER

## 2021-04-21 ASSESSMENT — ENCOUNTER SYMPTOMS
SHORTNESS OF BREATH: 0
ORTHOPNEA: 0

## 2021-04-21 NOTE — PROGRESS NOTES
4/21/2021  Primary cardiologist: Dr. Mary Chappell  is an established 63 y.o.  male here for follow-up from [address]:  HPI :   Peyton Oliver reports he is feeling well. The right radial cath site is free of hematoma or ecchymosis. He denies any chest pain or shortness of breath. He denies any palpitations. He did follow with Dr. Bob Guevara for evaluation of PVCs    Review of Systems   Constitution: Negative for diaphoresis and malaise/fatigue. Cardiovascular: Negative for chest pain, claudication, dyspnea on exertion, irregular heartbeat, leg swelling, near-syncope, orthopnea, palpitations and paroxysmal nocturnal dyspnea. Respiratory: Negative for shortness of breath. Neurological: Negative for dizziness and light-headedness. Vitals:    04/21/21 1518   BP: 114/60   Pulse: 51   SpO2: 93%   Weight: 218 lb (98.9 kg)   Height: 5' 11\" (1.803 m)     /60   Pulse 51   Ht 5' 11\" (1.803 m)   Wt 218 lb (98.9 kg)   SpO2 93%   BMI 30.40 kg/m²   Patient-Reported Vitals 1/14/2021   Patient-Reported Weight 215   Patient-Reported Height 5'11   Patient-Reported Pulse 64   Patient-Reported SpO2 91     Wt Readings from Last 3 Encounters:   04/21/21 218 lb (98.9 kg)   04/16/21 221 lb (100.2 kg)   04/01/21 218 lb (98.9 kg)     Body mass index is 30.4 kg/m². Physical Exam :     Neck: supple,  no carotid bruit appreciated, JVD not appreciated    Respiratory:  Normal breath sounds, No respiratory distress, No wheezing    Cardiovascular:  S1 S2 appreciated, Regular rate, regular rhythm,  + diastolic murmur appreciated, No rubs appreciated, No gallops appreciated, No chest tenderness. Extremities:  no edema to the lower extremities    All pertinent data reviewed and discussed with patient including:    Transthoracic echocardiogram : 07/11/2020  Left ventricular systolic function is normal.   Ejection fraction is visually estimated at 50-55%. Moderate left ventricular hypertrophy.    Moderate eccentric aortic regurgitation; PHT: 312msec. No evidence of any pericardial effusion. Dilated aortic root (4.2cm) and ascending aorta (4.1cm). LHC: 04/21/2021   NO SIG CAD   NORMAL EF      Angiographic Findings      Diagnostic Findings      Cardiac Arteries and Lesion Findings     LMCA: Normal.     LAD: Mild Lumen Irregularity.     LCx: Mild Lumen Irregularity.     RCA: Mild Lumen Irregularity.        ASSESSMENT/PLAN:    PVC  Abnormal holter monitor noted to have 10% PVC burden  Referred to electrophysiology for evaluation-reviewed note from Dr. Elizabeth Nagy from 04/16/2021-patient metoprolol and magnesium -has not stared magnesium yet  recommend to continue- metoprolol and start magnesium     VHD  Moderate eccentric AR  Stable  Will follow       Hypertension   Yrn's  blood pressure is Controlled  He is to continue with losartan  Encouraged a low sodium diet      Medications reviewed and confirmed with patient     Tests ordered:  none    OV in 6 months     Signed:  Anamaria Singh, 4/21/2021, 3:42 PM    An electronic signature was used to authenticate this note.

## 2021-04-23 PROCEDURE — 93244 EXT ECG>48HR<7D REV&INTERPJ: CPT | Performed by: INTERNAL MEDICINE

## 2021-05-03 ASSESSMENT — ENCOUNTER SYMPTOMS
NAUSEA: 0
BLOOD IN STOOL: 0
WHEEZING: 0
CONSTIPATION: 0
SHORTNESS OF BREATH: 1
CHEST TIGHTNESS: 0
PHOTOPHOBIA: 0
COUGH: 0
VOMITING: 0
BACK PAIN: 0
EYE PAIN: 0
COLOR CHANGE: 0
DIARRHEA: 0
ABDOMINAL PAIN: 0

## 2021-05-16 DIAGNOSIS — J44.9 CHRONIC OBSTRUCTIVE PULMONARY DISEASE, UNSPECIFIED (HCC): ICD-10-CM

## 2021-05-18 RX ORDER — FLUTICASONE FUROATE, UMECLIDINIUM BROMIDE AND VILANTEROL TRIFENATATE 100; 62.5; 25 UG/1; UG/1; UG/1
POWDER RESPIRATORY (INHALATION)
Qty: 1 EACH | Refills: 5 | Status: SHIPPED | OUTPATIENT
Start: 2021-05-18 | End: 2021-07-15 | Stop reason: SDUPTHER

## 2021-05-21 ENCOUNTER — OFFICE VISIT (OUTPATIENT)
Dept: CARDIOLOGY CLINIC | Age: 61
End: 2021-05-21
Payer: COMMERCIAL

## 2021-05-21 VITALS
OXYGEN SATURATION: 94 % | BODY MASS INDEX: 30.38 KG/M2 | HEART RATE: 51 BPM | HEIGHT: 71 IN | WEIGHT: 217 LBS | SYSTOLIC BLOOD PRESSURE: 128 MMHG | DIASTOLIC BLOOD PRESSURE: 60 MMHG

## 2021-05-21 DIAGNOSIS — I49.3 PVC (PREMATURE VENTRICULAR CONTRACTION): Primary | ICD-10-CM

## 2021-05-21 PROCEDURE — 93000 ELECTROCARDIOGRAM COMPLETE: CPT | Performed by: INTERNAL MEDICINE

## 2021-05-21 PROCEDURE — 99213 OFFICE O/P EST LOW 20 MIN: CPT | Performed by: INTERNAL MEDICINE

## 2021-05-21 ASSESSMENT — ENCOUNTER SYMPTOMS
NAUSEA: 0
SHORTNESS OF BREATH: 1
ABDOMINAL PAIN: 0
PHOTOPHOBIA: 0
DIARRHEA: 0
COLOR CHANGE: 0
VOMITING: 0
CHEST TIGHTNESS: 0
COUGH: 0
EYE PAIN: 0
CONSTIPATION: 0
BACK PAIN: 0
WHEEZING: 0
BLOOD IN STOOL: 0

## 2021-05-21 NOTE — PROGRESS NOTES
Electrophysiology fu Note      Reason for consultation:   PVC    Chief complaint : Shortness of breath    Referring physician: Lacey Chan      Primary care physician: Renu Johnson MD      History of Present Illness: This visit (5/21/2021)      Chief Complaint   Patient presents with    1 Month Follow-Up     to discuss PVC ablation Tania gavin. patient feels lot better on the medication. Patient does not feel palpitation as much occasionally is feeling palpitations no he does have shortness of breath but that is also improved from before. He has occasional chest pain which lasts only seconds. Patient overall is feeling better and wants to continue with medical treatment          Previous visit:    She is a 44-year-old male with history of hypertension, COPD on home oxygen referred by Dr. Gordillo For PVCs. Patient reports he has chest pressure lasting about 10 seconds and relieves immediately. Patient does have shortness of breath but he also reports that he has chronic COPD and home oxygen dependent so that could be the reason to. Patient denies any dizziness. Patient has palpitations that last few seconds up to a couple of minutes multiple times in a day. No edema. Never been a smoker. Denies alcohol use in a year    Patient reports tiredness and weakness    Pastmedical history:   Past Medical History:   Diagnosis Date    Abnormal echocardiogram 03/29/2013    EF=45-50%;mod. AR,mild aortic valve calcif. 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 EF 55%. Renals patent.     H/O cardiac catheterization 04/01/2021    NO SIG CAD    H/O cardiovascular stress test 04/02/2013    EF 49%, there is no scintigraphic evidence of inducible myocardial ischemia, the observed defect is consistent with diaphragmatic attenuation, clinical correlation recommenced, no previous study to compare with, no wall motionn abnormality seen, normal perfusion in all myocardial regions    H/O chest pain     H/O echocardiogram 07/11/2020    EF 50-55%, Mod LVH & AR,  Dilated aortic root (4.2cm) and ascending aorta (4.1cm).  Hemorrhoids 06/15/2015    HTN (hypertension)     Hydrocele 10/2011    U/S    Hyperlipemia     Irregular heart rhythm     after induction for surgery 9/5/2013 - pt developed irreg rhythm and surgery cancelled\"had another echo and stress test and everything checked out ok, they do not know the reason it happened\"- cardiac clearance obtained for surgery 9/19/2013    Palpitations     Pneumonia 2004    not since    Right inguinal hernia 08/22/2013    SOB (shortness of breath) 03/27/2013    Nuclear Stress Normal, EF 49% (Dr. Taya Coughlin- 4/2/2013)     SOB (shortness of breath) on exertion        Surgical history :   Past Surgical History:   Procedure Laterality Date   [facility]    COLONOSCOPY  6-    INGUINAL HERNIA REPAIR Bilateral 1992    INGUINAL HERNIA REPAIR Right 09/19/13    with mesh    OTHER SURGICAL HISTORY  09/05/13    hernia repair aborted    TYMPANOSTOMY TUBE PLACEMENT  2009    right ear    VASECTOMY  1988       Family history:   Family History   Problem Relation Age of Onset    Cancer Mother         colon    Diabetes Mother     High Blood Pressure Mother     Migraines Mother     High Blood Pressure Father     COPD Father     Cancer Father         lung    Heart Disease Maternal Grandmother     Heart Disease Maternal Grandfather        Social history :  reports that he has never smoked.  He has never used smokeless tobacco. He reports current alcohol use of about 2.0 standard drinks of alcohol per week. He reports that he does not use drugs. No Known Allergies    Current Outpatient Medications on File Prior to Visit   Medication Sig Dispense Refill    TRELEGY ELLIPTA 100-62.5-25 MCG/INH AEPB TAKE 1 PUFF BY MOUTH EVERY DAY 1 each 5    metoprolol tartrate (LOPRESSOR) 25 MG tablet TAKE 1 TABLET BY MOUTH 2 TIMES DAILY 60 tablet 5    Magnesium Oxide (MAGNESIUM-OXIDE) 250 MG TABS tablet Take 1 tablet by mouth daily 30 tablet 2    losartan (COZAAR) 50 MG tablet Take 1 tablet by mouth daily To replace lisinopril 90 tablet 3    aspirin 81 MG EC tablet Take 1 tablet by mouth daily 90 tablet 3    loratadine (CLARITIN) 10 MG tablet TAKE 1 TABLET BY MOUTH EVERY DAY 90 tablet 0    albuterol sulfate  (90 Base) MCG/ACT inhaler INHALE 2 PUFFS BY MOUTH EVERY 4 HOURS AS NEEDED FOR WHEEZE OR FOR SHORTNESS OF BREATH 18 Inhaler 5    Multiple Vitamin (DAILY VITAMINS PO) Take by mouth       No current facility-administered medications on file prior to visit. Review of Systems:   Review of Systems   Constitutional: Positive for fatigue. Negative for activity change, chills and fever. HENT: Negative for congestion, ear pain and tinnitus. Eyes: Negative for photophobia, pain and visual disturbance. Respiratory: Positive for shortness of breath. Negative for cough, chest tightness and wheezing. Cardiovascular: Positive for chest pain and palpitations. Negative for leg swelling. Gastrointestinal: Negative for abdominal pain, blood in stool, constipation, diarrhea, nausea and vomiting. Endocrine: Negative for cold intolerance and heat intolerance. Genitourinary: Negative for dysuria, flank pain and hematuria. Musculoskeletal: Positive for arthralgias. Negative for back pain, myalgias and neck stiffness. Skin: Negative for color change and rash. Allergic/Immunologic: Negative for food allergies.    Neurological: Negative for dizziness, light-headedness, numbness and headaches. Hematological: Does not bruise/bleed easily. Psychiatric/Behavioral: Negative for agitation, behavioral problems and confusion. Examination:      Vitals:    05/21/21 0838   BP: 128/60   Pulse: 51   SpO2: 94%   Weight: 217 lb (98.4 kg)   Height: 5' 11\" (1.803 m)        Body mass index is 30.27 kg/m². Physical Exam  Constitutional:       Appearance: Normal appearance. He is not ill-appearing. HENT:      Head: Normocephalic and atraumatic. Mouth/Throat:      Mouth: Mucous membranes are moist.   Eyes:      Conjunctiva/sclera: Conjunctivae normal.   Cardiovascular:      Rate and Rhythm: Normal rate. Heart sounds: Murmur (grade 2/6 diastolic murmur) heard. Pulmonary:      Effort: Pulmonary effort is normal.      Breath sounds: No rales. Abdominal:      General: Abdomen is flat. Palpations: Abdomen is soft. Musculoskeletal:         General: No tenderness. Normal range of motion. Cervical back: Normal range of motion. Right lower leg: No edema. Left lower leg: No edema. Skin:     General: Skin is warm and dry. Neurological:      General: No focal deficit present. Mental Status: He is alert and oriented to person, place, and time.            CBC:   Lab Results   Component Value Date    WBC 8.6 03/29/2021    HGB 16.5 03/29/2021    HCT 51.4 03/29/2021     03/29/2021     Lipids:  Lab Results   Component Value Date    CHOL 193 03/10/2021    TRIG 62 03/10/2021    HDL 39 (L) 03/10/2021    LDLCALC 155 (H) 06/09/2020    LDLDIRECT 148 (H) 03/10/2021     PT/INR:   Lab Results   Component Value Date    INR 1.01 09/05/2013        BMP:    Lab Results   Component Value Date     03/29/2021    K 4.5 03/29/2021     03/29/2021    CO2 30 03/29/2021    BUN 13 03/29/2021     CMP:   Lab Results   Component Value Date    AST 22 03/10/2021    PROT 6.6 03/10/2021    BILITOT 0.4 03/10/2021    ALKPHOS 86 03/10/2021     TSH:  No results found for: TSH    EKGINTERPRETATION - EKG Interpretation:  Sinus bradycardia, First degree AV block      IMPRESSION / RECOMMENDATIONS:     1. PVC  2. COPD  3. HLD  4. Obesity BMI 30  5. Moderate aortic regurgitation    Patient has no PVCs today and is in sinus bradycardia. Patient heart rate is 51. He has does not have any dizziness and overall is feeling better since the medications have started. Discussed with the patient that ablation is possible only if he is having PVCs and patient feels that he is not having many and he is happy usually happy with the progress. He wants to wait on any procedures which is reasonable. Patient will let us know if he continues to have symptoms we will follow up in 2 to 3 months and probably perform an Holter monitor at that time to assess the PVC burden. For now continue the medication. No ablation is needed at this time as PVCs appeared to be improved and symptoms have appeared to be improved    Discussed with the patient to watch out for bradycardia with dizziness or syncope. He is on low-dose metoprolol but we cannot uptitrate anymore given his bradycardia    Patient agreeable with plan     Thanks again for allowing me to participate in care of this patient. Please call me if you have any questions. With best regards. Fe Molina MD, 5/21/2021 8:47 AM     Please note this report has been partially produced using speech recognition software and may contain errors related to that system including errors in grammar, punctuation, and spelling, as well as words and phrases that may be inappropriate. If there are any questions or concerns please feel free to contact the dictating provider for clarification.

## 2021-06-10 RX ORDER — CARBOXYMETHYLCELLULOSE SODIUM 0.5 %
DROPPERETTE, SINGLE-USE DROP DISPENSER OPHTHALMIC (EYE)
Qty: 30 TABLET | Refills: 6 | Status: SHIPPED | OUTPATIENT
Start: 2021-06-10

## 2021-06-11 ENCOUNTER — OFFICE VISIT (OUTPATIENT)
Dept: FAMILY MEDICINE CLINIC | Age: 61
End: 2021-06-11
Payer: COMMERCIAL

## 2021-06-11 VITALS
WEIGHT: 214 LBS | HEART RATE: 57 BPM | BODY MASS INDEX: 29.96 KG/M2 | SYSTOLIC BLOOD PRESSURE: 132 MMHG | HEIGHT: 71 IN | OXYGEN SATURATION: 91 % | DIASTOLIC BLOOD PRESSURE: 72 MMHG

## 2021-06-11 DIAGNOSIS — I25.10 ASCVD (ARTERIOSCLEROTIC CARDIOVASCULAR DISEASE): ICD-10-CM

## 2021-06-11 DIAGNOSIS — E78.00 PURE HYPERCHOLESTEROLEMIA: ICD-10-CM

## 2021-06-11 DIAGNOSIS — J30.2 SEASONAL AND PERENNIAL ALLERGIC RHINITIS: ICD-10-CM

## 2021-06-11 DIAGNOSIS — Z99.81 ON HOME OXYGEN THERAPY: ICD-10-CM

## 2021-06-11 DIAGNOSIS — I10 ESSENTIAL HYPERTENSION: ICD-10-CM

## 2021-06-11 DIAGNOSIS — J30.89 SEASONAL AND PERENNIAL ALLERGIC RHINITIS: ICD-10-CM

## 2021-06-11 DIAGNOSIS — J44.9 COPD, SEVERE (HCC): Primary | ICD-10-CM

## 2021-06-11 PROCEDURE — 99214 OFFICE O/P EST MOD 30 MIN: CPT | Performed by: NURSE PRACTITIONER

## 2021-06-11 ASSESSMENT — ENCOUNTER SYMPTOMS
BACK PAIN: 0
COUGH: 1
NAUSEA: 0
SHORTNESS OF BREATH: 1
VOMITING: 0
CONSTIPATION: 0
ABDOMINAL PAIN: 0
BLOOD IN STOOL: 0

## 2021-06-11 NOTE — PROGRESS NOTES
2021     Lauren Chen (:  1960) is a 63 y.o. male, here for evaluation of the following medical concerns:       He reports today for follow-up on chronic diagnoses.     COPD:   He is taking albuterol as needed and trilegy.    He does get short of breath with exertion   following with lori oliva/ pulm- wearing. continuous 02 at 2L. States his 02 stays at 89% at home. Does not have is 02 on today. States he usually does wear it. States breathing and 02 is doing better, he has slowed down activoty. Not a smoker. Has cough intermittent, baseline.      Hypertension: He is taking losartan.  reports BP at home <140/90     Chronic heart murmur. Notices the irregular rhythm on his pulse ox. .. Recent work-up for palpitations and PVCs by cardiology and electrophysiology. Shows medication management. Heart cath in April     Seasonal allergic rhinitis: He takes Claritin as needed.  This is stable.     Hyperlipidemia: , otherwise normal.  Cardiology does not have him on cholesterol medication.     Urinary hesitancy, slow stream and urinating 3 times per night. No FH of prostate problems.      Denies complaints today. No more alcohol and is not a smoker.           Review of Systems   Constitutional: Negative for activity change, appetite change, chills, diaphoresis, fatigue, fever and unexpected weight change. Eyes: Negative for visual disturbance. Respiratory: Positive for cough (chronic baseline ) and shortness of breath (baseline ). Cardiovascular: Positive for palpitations (improved). Negative for chest pain and leg swelling. Gastrointestinal: Negative for abdominal pain, blood in stool, constipation, nausea and vomiting. Genitourinary: Negative for difficulty urinating. Musculoskeletal: Negative for arthralgias, back pain and gait problem. Skin: Negative. Neurological: Negative for dizziness, weakness, light-headedness, numbness and headaches. diaphoretic. HENT:      Head: Normocephalic and atraumatic. Nose: Nose normal.   Eyes:      Extraocular Movements: Extraocular movements intact. Pupils: Pupils are equal, round, and reactive to light. Cardiovascular:      Rate and Rhythm: Normal rate and regular rhythm. Heart sounds: Normal heart sounds. Pulmonary:      Effort: Pulmonary effort is normal. No respiratory distress. Breath sounds: No wheezing, rhonchi or rales. Abdominal:      General: Bowel sounds are normal. There is no distension. Palpations: Abdomen is soft. There is no mass. Tenderness: There is no abdominal tenderness. Hernia: No hernia is present. Musculoskeletal:         General: Normal range of motion. Cervical back: Normal range of motion and neck supple. Right lower leg: No edema. Left lower leg: No edema. Skin:     General: Skin is warm and dry. Neurological:      General: No focal deficit present. Mental Status: He is alert and oriented to person, place, and time. Mental status is at baseline. Motor: No weakness. Gait: Gait normal.   Psychiatric:         Mood and Affect: Mood normal.         Behavior: Behavior normal.         Thought Content: Thought content normal.         Judgment: Judgment normal.         ASSESSMENT/PLAN:  1. COPD, severe ([address]  Continue to follow with pulmonology. Continuous O2 oxygendoes not wear all the time. 2. Essential hypertension  Controlled. Continue current medication regimen. DASH diet. BP goal less than 140/90.      3. Pure hypercholesterolemia  Low cholesterol diet. Avoid fast food, fried food, processed foods. Exercise as tolerated. Start with walking 20 minutes three times per week. Increase fiber and omega 3 fatty acids in diet. - elevated cholesterol increases risk for heart attack and stroke   Not on cholesterol medicineshould be.   Will recheck lipid and if it is not within normal limitswe will be starting Lipitor. Discussed with patient and he agrees to plan        5. Seasonal and perennial allergic rhinitis  Controlled    6. ASCVD (arteriosclerotic cardiovascular disease)  Following with cardiology    7. On home oxygen therapy      No concerns or complaints from patient today. All care gaps addressed     All questions answered    Discussed use, benefit, and side effects of prescribed medications. Barriers to compliance discussed. All patient questions answered. Pt voiced understanding. Present to the ER for any emergent or acute symptoms not managed at home or in office. Please note that this chart was generated using dragon dictation software. Although every effort was made to ensure the accuracy of this automated transcription, some errors in transcription may have occurred. No follow-ups on file. An electronic signature was used to authenticate this note.     --DONA Singer NP on 6/11/2021 at 12:20 PM

## 2021-07-15 ENCOUNTER — OFFICE VISIT (OUTPATIENT)
Dept: PULMONOLOGY | Age: 61
End: 2021-07-15
Payer: COMMERCIAL

## 2021-07-15 VITALS
DIASTOLIC BLOOD PRESSURE: 60 MMHG | SYSTOLIC BLOOD PRESSURE: 122 MMHG | HEART RATE: 66 BPM | OXYGEN SATURATION: 89 % | HEIGHT: 71 IN | BODY MASS INDEX: 30.24 KG/M2 | WEIGHT: 216 LBS

## 2021-07-15 DIAGNOSIS — T78.40XD ALLERGY, SUBSEQUENT ENCOUNTER: ICD-10-CM

## 2021-07-15 DIAGNOSIS — Z00.00 HEALTHCARE MAINTENANCE: ICD-10-CM

## 2021-07-15 DIAGNOSIS — Z01.818 PREOP TESTING: ICD-10-CM

## 2021-07-15 DIAGNOSIS — J96.11 CHRONIC RESPIRATORY FAILURE WITH HYPOXIA (HCC): ICD-10-CM

## 2021-07-15 DIAGNOSIS — J44.9 COPD, SEVERE (HCC): Primary | ICD-10-CM

## 2021-07-15 PROCEDURE — 99214 OFFICE O/P EST MOD 30 MIN: CPT | Performed by: NURSE PRACTITIONER

## 2021-07-15 RX ORDER — LORATADINE 10 MG/1
TABLET ORAL
Qty: 90 TABLET | Refills: 0 | Status: SHIPPED | OUTPATIENT
Start: 2021-07-15 | End: 2021-11-17

## 2021-07-15 RX ORDER — FLUTICASONE FUROATE, UMECLIDINIUM BROMIDE AND VILANTEROL TRIFENATATE 100; 62.5; 25 UG/1; UG/1; UG/1
POWDER RESPIRATORY (INHALATION)
Qty: 1 EACH | Refills: 5 | Status: SHIPPED | OUTPATIENT
Start: 2021-07-15

## 2021-07-15 NOTE — PROGRESS NOTES
Pulmonary Clinic Office Follow up     River Galeano is a 57 y.o. male with history of severe COPD, chronic hypoxic respiratory failure, PVCs, NSVT, HTN, ASCVD, presents today to the pulmonary clinic for 6-month follow up. Pulmonary function test completed in 8/2020 demonstrated severe obstructive disease. He was started on Trelegy inhaler daily at that time. He has continued to use his Trelegy daily and is rinsing his mouth out well after use. He denies any signs or complaints of oral candidal infections. He does have an albuterol rescue inhaler that he states he uses approximately 1 to 2 times a day. Has been prescribed supplemental oxygen of 2 L. He states he is wearing 24/7. His O2 sat is noted at 89% on intake at rest in room air as he did not wear his oxygen into his appointment today. Pr[place] [place] states they are practicing social distancing, wearing a mask when out in public, washing hands frequently or using hand . Denies any fever, chills, malaise, change in sensation of taste or smell, headache or lightheadedness. Denies any known contacts with persons with respiratory infection, positive for coronavirus or under investigation for possible coronavirus exposure. COVID-19 immunization:  3/13/2021, 2/13/2021 - Moderna  Influenza 10/7/2019  Pneumococcal vaccination: Pneumovax 23 on 11/3/2015  Smoker reports that he has never smoked  PCP Dr. Guerrero Palma MD    Past Medical History:  Past Medical History:   Diagnosis Date    Abnormal echocardiogram 03/29/2013    EF=45-50%;mod. AR,mild aortic valve calcif.    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 EF 55%. Renals patent.  H/O cardiac catheterization 04/01/2021    NO SIG CAD    H/O cardiovascular stress test 04/02/2013    EF 49%, there is no scintigraphic evidence of inducible myocardial ischemia, the observed defect is consistent with diaphragmatic attenuation, clinical correlation recommenced, no previous study to compare with, no wall motionn abnormality seen, normal perfusion in all myocardial regions    H/O chest pain     H/O echocardiogram 07/11/2020    EF 50-55%, Mod LVH & AR,  Dilated aortic root (4.2cm) and ascending aorta (4.1cm).  Hemorrhoids 06/15/2015    HTN (hypertension)     Hx of echocardiogram 12/16/2021    Moderate left ventricular hypertrophy regurgitation noted The anterior leaflet of the mitral valve is prolapsed with mild Right side of the heart is enlarged Moderately dilated left atrium. Grade III diastolic dysfunction Left ventricular function is severely abnormal , EF is estimated at 20-25% Global hypokinesis noted.  Left ventricle size is normal.    Hydrocele 10/2011    U/S    Hyperlipemia     Irregular heart rhythm     after induction for surgery 9/5/2013 - pt developed irreg rhythm and surgery cancelled\"had another echo and stress test and everything checked out ok, they do not know the reason it happened\"- cardiac clearance obtained for surgery 9/19/2013    Palpitations     Pneumonia 2004    not since    Right inguinal hernia 08/22/2013    SOB (shortness of breath) 03/27/2013    Nuclear Stress Normal, EF 49% (Dr. Mallory Amado- 4/2/2013)     SOB (shortness of breath) on exertion        Current medications and allergies have been reviewed    Social and family history unchanged from initial consult      REVIEW OF SYSTEMS:    CONSTITUTIONAL:  negative for fevers, chills, diaphoresis, activity change, appetite change, night sweats and unexpected weight change. HEENT:  Negative for hearing loss, sinus pressure, nasal congestion, epistaxis, snoring  RESPIRATORY: Positive shortness of breath on exertion, negative wheeze, positive intermittent productive cough  CARDIOVASCULAR:  Negative for chest pain, palpitations, exertional chest pressure/discomfort, edema, syncope  GASTROINTESTINAL: Negative for nausea, vomiting, diarrhea, constipation, blood in stool and abdominal pain  GENITOURINARY:  Negative for frequency, dysuria and hematuria  HEMATOLOGIC/LYMPHATIC:  Negative for easy bruising, bleeding and lymphadenopathy  ALLERGIC/IMMUNOLOGIC:  Negative for recurrent infections, angioedema, anaphylaxis and drug reaction  MUSCULOSKELETAL:  Negative for pain, joint swelling, decreased range of motion and muscle weakness  NEURO: Negative for headache, AMS, decrease sensations  SKIN: Negative for rashes or lesions      Physical Exam:  /60   Pulse 66   Ht 5' 11\" (1.803 m)   Wt 216 lb (98 kg)   SpO2 (!) 89%   BMI 30.13 kg/m²    Body mass index is 30.13 kg/m². Constitutional:  He appears well developed and well-nourished. Neck:  Supple, no palpable lymphadenopathy, no JVD  Cardiovascular:  S1, S2 Normal, Regular rhythm, no murmurs or gallops, no pericardial  rubs. Pulmonary:  Bilateral breath sounds clear and equal to auscultation, good air movement, no use of accessory muscles to support respiratory effort. No forced expiratory wheeze, no rales, no rhonchi, no cough noted during assessment. O2 sat at rest on room air is 89%, placed on supplemental oxygen in office at 2 LPM, O2 sat 94%  Abdomen: No epigastric pain, no distention, + bowel sounds   Extremities: No edema, no calf tenderness, no clubbing of digits  Neurologic:  Awake and alert, no focal deficits  Skin: warm and dry    Radiology:   7/2/2020 CT chest high-resolution  1.  Minimal subpleural fibrotic changes predominantly in the mid lungs with   indeterminate pattern, though considered inconsistent with usual interstitial   pneumonitis pattern. 2. Cardiovascular findings suggesting possible pulmonary hypertension. 3. Minimal centrilobular and paraseptal emphysema. 4. Elevation of the right hemidiaphragm due to a large right Morgagni hernia   containing nonobstructed transverse colon. PFT:   08/20/2020  FVC 53%/54% predicted, FEV1 41%/49% predicted, FEF 25-75% 24%/27% predicted , % predicted, % predicted, DLCO 70% predicted  Following administration of bronchodilator there was no significant response to bronchodilator. Overall testing indicates obstructive disease severe obstructive disease with reversible response to bronchodilators, small airways, restrictive possible second to body habitus and obesity    ASSESSMENT/PLAN:  1. COPD, severe ([address]    2. Chronic respiratory failure with hypoxia (HCC)    3. Allergy, subsequent encounter    4. Preop testing    5. Healthcare maintenance      --continue Trelegy, refill sent to pharmacs  --continue albuterol rescue inhaler as needed, denier need for refill at this time  --discussed need to wear oxygen 24/7, monitor O2 sats at home  --to contact DME concerning POC  --PFT 8/2021  --Covid pre-testing prior to PFT  --pulmonary rehab referral  --While he has been vaccinated at this time for COVID-19, I strongly recommend that he continue Coronavirus precaution including: Wearing mask when in public and when in contact with persons who have not been immunized, social distancing when needing to be in public, handwashing practice, wiping items touched in public such as gas pumps, door handles, shopping carts, etc.  --Recommend yearly flu vaccination  --Recommend Covid 19 booster when available  --Recommend Pneumovax vaccination  --Have discussed signs and symptoms of COPD exacerbation and why it is important to monitor for bronchial infections.   Instructed to call office should he develop signs of COPD exacerbation/bronchial infection  --I will continue to follow in pulmonary clinic  Return in about 4 months (around 11/15/2021) for PFT. This dictation was performed with a verbal recognition program and it was checked for errors. It is possible that there are still dictated errors within this office note. Any errors should be brought immediately to my attention for correction. All efforts were made to ensure that this office note is accurate.

## 2021-07-23 ENCOUNTER — OFFICE VISIT (OUTPATIENT)
Dept: CARDIOLOGY CLINIC | Age: 61
End: 2021-07-23
Payer: COMMERCIAL

## 2021-07-23 ENCOUNTER — TELEPHONE (OUTPATIENT)
Dept: PULMONOLOGY | Age: 61
End: 2021-07-23

## 2021-07-23 VITALS
DIASTOLIC BLOOD PRESSURE: 78 MMHG | RESPIRATION RATE: 14 BRPM | OXYGEN SATURATION: 92 % | HEIGHT: 71 IN | WEIGHT: 213.8 LBS | BODY MASS INDEX: 29.93 KG/M2 | SYSTOLIC BLOOD PRESSURE: 138 MMHG | HEART RATE: 54 BPM

## 2021-07-23 DIAGNOSIS — I49.3 PVC (PREMATURE VENTRICULAR CONTRACTION): Primary | ICD-10-CM

## 2021-07-23 PROCEDURE — 93000 ELECTROCARDIOGRAM COMPLETE: CPT | Performed by: INTERNAL MEDICINE

## 2021-07-23 PROCEDURE — 99213 OFFICE O/P EST LOW 20 MIN: CPT | Performed by: INTERNAL MEDICINE

## 2021-07-23 ASSESSMENT — ENCOUNTER SYMPTOMS
BACK PAIN: 0
ABDOMINAL PAIN: 0
CONSTIPATION: 0
CHEST TIGHTNESS: 0
COLOR CHANGE: 0
NAUSEA: 0
COUGH: 0
VOMITING: 0
DIARRHEA: 0
BLOOD IN STOOL: 0
WHEEZING: 0
PHOTOPHOBIA: 0
SHORTNESS OF BREATH: 1
EYE PAIN: 0

## 2021-07-23 NOTE — PROGRESS NOTES
Electrophysiology fu Note      Reason for consultation:   PVC    Chief complaint : 2 month follow up    Referring physician: Yudelka Alonzo      Primary care physician: Mehdi Michelle MD      History of Present Illness: This visit (7/23/2021)      Chief Complaint   Patient presents with    Hypertension     Patient here for 2 month follow up. Patient states his palpitations are better due to being on metoprolo. Patient has SOB due to COPD nothing new. Patient denies Chest Pain, Dizziness, Edema. Patient does not drink alcohol or smoke. Patient drinks caffiene. Patient does not exercise. Previous visit: (5/21/2021)      Chief Complaint   Patient presents with    1 Month Follow-Up     to discuss PVC ablation Tania pt. patient feels lot better on the medication. Patient does not feel palpitation as much occasionally is feeling palpitations no he does have shortness of breath but that is also improved from before. He has occasional chest pain which lasts only seconds. Patient overall is feeling better and wants to continue with medical treatment          Previous visit:    She is a 70-year-old male with history of hypertension, COPD on home oxygen referred by Dr. Gordillo For PVCs. Patient reports he has chest pressure lasting about 10 seconds and relieves immediately. Patient does have shortness of breath but he also reports that he has chronic COPD and home oxygen dependent so that could be the reason to. Patient denies any dizziness. Patient has palpitations that last few seconds up to a couple of minutes multiple times in a day. No edema. Never been a smoker. Denies alcohol use in a year    Patient reports tiredness and weakness    Pastmedical history:   Past Medical History:   Diagnosis Date    Abnormal echocardiogram 03/29/2013    EF=45-50%;mod. AR,mild aortic valve calcif.    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 EF 55%. Renals patent.  H/O cardiac catheterization 04/01/2021    NO SIG CAD    H/O cardiovascular stress test 04/02/2013    EF 49%, there is no scintigraphic evidence of inducible myocardial ischemia, the observed defect is consistent with diaphragmatic attenuation, clinical correlation recommenced, no previous study to compare with, no wall motionn abnormality seen, normal perfusion in all myocardial regions    H/O chest pain     H/O echocardiogram 07/11/2020    EF 50-55%, Mod LVH & AR,  Dilated aortic root (4.2cm) and ascending aorta (4.1cm).     Hemorrhoids 06/15/2015    HTN (hypertension)     Hydrocele 10/2011    [facility]/S    Hyperlipemia     Irregular heart rhythm     after induction for surgery 9/5/2013 - pt developed irreg rhythm and surgery cancelled\"had another echo and stress test and everything checked out ok, they do not know the reason it happened\"- cardiac clearance obtained for surgery 9/19/2013    Palpitations     Pneumonia 2004    not since    Right inguinal hernia 08/22/2013    SOB (shortness of breath) 03/27/2013    Nuclear Stress Normal, EF 49% (Dr. Velazquez Im- 4/2/2013)     SOB (shortness of breath) on exertion        Surgical history :   Past Surgical History:   Procedure Laterality Date   [facility]    COLONOSCOPY  6-    INGUINAL HERNIA REPAIR Bilateral 1992    INGUINAL HERNIA REPAIR Right 09/19/13    with mesh    OTHER SURGICAL HISTORY  09/05/13    hernia repair aborted    TYMPANOSTOMY TUBE PLACEMENT  2009    right ear    VASECTOMY  1988       Family history:   Family History   Problem Relation Age of Onset    Cancer Mother colon    Diabetes Mother     High Blood Pressure Mother     Migraines Mother     High Blood Pressure Father     COPD Father     Cancer Father         lung    Heart Disease Maternal Grandmother     Heart Disease Maternal Grandfather        Social history :  reports that he has never smoked. He has never used smokeless tobacco. He reports current alcohol use of about 2.0 standard drinks of alcohol per week. He reports that he does not use drugs. No Known Allergies    Current Outpatient Medications on File Prior to Visit   Medication Sig Dispense Refill    fluticasone-umeclidin-vilant (TRELEGY ELLIPTA) 100-62.5-25 MCG/INH AEPB TAKE 1 PUFF BY MOUTH EVERY DAY 1 each 5    loratadine (CLARITIN) 10 MG tablet TAKE 1 TABLET BY MOUTH EVERY DAY 90 tablet 0    [employer] MAGNESIUM OXIDE 250 MG TABS tablet TAKE 1 TABLET BY MOUTH EVERY DAY 30 tablet 6    metoprolol tartrate (LOPRESSOR) 25 MG tablet TAKE 1 TABLET BY MOUTH 2 TIMES DAILY 60 tablet 5    losartan (COZAAR) 50 MG tablet Take 1 tablet by mouth daily To replace lisinopril 90 tablet 3    aspirin 81 MG EC tablet Take 1 tablet by mouth daily 90 tablet 3    albuterol sulfate  (90 Base) MCG/ACT inhaler INHALE 2 PUFFS BY MOUTH EVERY 4 HOURS AS NEEDED FOR WHEEZE OR FOR SHORTNESS OF BREATH 18 Inhaler 5    Multiple Vitamin (DAILY VITAMINS PO) Take by mouth       No current facility-administered medications on file prior to visit. Review of Systems:   Review of Systems   Constitutional: Negative for activity change, chills, fatigue and fever. HENT: Negative for congestion, ear pain and tinnitus. Eyes: Negative for photophobia, pain and visual disturbance. Respiratory: Positive for shortness of breath. Negative for cough, chest tightness and wheezing. Cardiovascular: Negative for chest pain, palpitations and leg swelling. Gastrointestinal: Negative for abdominal pain, blood in stool, constipation, diarrhea, nausea and vomiting.    Endocrine: PT/INR:   Lab Results   Component Value Date    INR 1.01 09/05/2013        BMP:    Lab Results   Component Value Date     03/29/2021    K 4.5 03/29/2021     03/29/2021    CO2 30 03/29/2021    BUN 13 03/29/2021     CMP:   Lab Results   Component Value Date    AST 22 03/10/2021    PROT 6.6 03/10/2021    BILITOT 0.4 03/10/2021    ALKPHOS 86 03/10/2021     TSH:  No results found for: TSH    EKGINTERPRETATION - EKG Interpretation:  Sinus bradycardia, First degree AV block, occasional PVC      IMPRESSION / RECOMMENDATIONS:     1. PVC  2. COPD  3. HLD  4. Obesity BMI 30  5. Moderate aortic regurgitation    Patient feels much better and does not have any complaints currently. Patient is not having any PVCs he reports. On EKG occasional PVC was noted. Discussed with the patient about the Holter monitor to assess the PVC burden but patient reported he was feeling good and wants to continue the medication we will hold off on the Holter monitor at this time. We will follow the patient in 6 months and perform a Holter monitor if patient has symptoms or if he has any worsening PVCs on the EKG. Patient agrees with the plan. Patient reports that he has been doing the best he has done in a long time. So no further investigation is recommended at this time    Discussed with the patient to watch out for bradycardia with dizziness or syncope. He is on low-dose metoprolol but we cannot uptitrate anymore given his bradycardia    Patient agreeable with plan     Thanks again for allowing me to participate in care of this patient. Please call me if you have any questions. With best regards. Jordon Vora MD, 7/23/2021 10:12 AM     Please note this report has been partially produced using speech recognition software and may contain errors related to that system including errors in grammar, punctuation, and spelling, as well as words and phrases that may be inappropriate.  If there are any questions or concerns please feel free to contact the dictating provider for clarification.

## 2021-08-03 ENCOUNTER — TELEPHONE (OUTPATIENT)
Dept: PULMONOLOGY | Age: 61
End: 2021-08-03

## 2021-08-05 ENCOUNTER — OFFICE VISIT (OUTPATIENT)
Dept: FAMILY MEDICINE CLINIC | Age: 61
End: 2021-08-05
Payer: COMMERCIAL

## 2021-08-05 VITALS
HEART RATE: 77 BPM | OXYGEN SATURATION: 90 % | BODY MASS INDEX: 30.07 KG/M2 | WEIGHT: 214.8 LBS | HEIGHT: 71 IN | DIASTOLIC BLOOD PRESSURE: 78 MMHG | SYSTOLIC BLOOD PRESSURE: 132 MMHG

## 2021-08-05 DIAGNOSIS — I49.9 IRREGULAR HEART RATE: ICD-10-CM

## 2021-08-05 DIAGNOSIS — I49.3 PVC (PREMATURE VENTRICULAR CONTRACTION): ICD-10-CM

## 2021-08-05 DIAGNOSIS — L30.9 DERMATITIS: Primary | ICD-10-CM

## 2021-08-05 PROCEDURE — 93000 ELECTROCARDIOGRAM COMPLETE: CPT | Performed by: NURSE PRACTITIONER

## 2021-08-05 PROCEDURE — 99214 OFFICE O/P EST MOD 30 MIN: CPT | Performed by: NURSE PRACTITIONER

## 2021-08-05 NOTE — PROGRESS NOTES
2021     Sharee Mcadams (:  1960) is a 63 y.o. male, here for evaluation of the following medical concerns:    Complaint of  Rash bilateral upper eyelids. Present for \"a few days\". Resolving after hydrating with Vaseline and \"rubbing a potato\" on it. Itchy. \"doing better and is almost gone\"  No swelling of the face or mouth or tongue. Reports \"sandpaper grittiness\" to eyes that has resolved. No change in vision  Does take antihistamine for seasonal allergies   No new medications. Review of Systems    Prior to Visit Medications    Medication Sig Taking? Authorizing Provider   fluticasone-umeclidin-vilant (TRELEGY ELLIPTA) 100-62.5-25 MCG/INH AEPB TAKE 1 PUFF BY MOUTH EVERY DAY Yes DONA Oquendo - CNP   loratadine (CLARITIN) 10 MG tablet TAKE 1 TABLET BY MOUTH EVERY DAY Yes DONA Oquendo - CNP   CVS MAGNESIUM OXIDE 250 MG TABS tablet TAKE 1 TABLET BY MOUTH EVERY DAY Yes Bevin Councilman, MD   metoprolol tartrate (LOPRESSOR) 25 MG tablet TAKE 1 TABLET BY MOUTH 2 TIMES DAILY Yes DONA You CNP   losartan (COZAAR) 50 MG tablet Take 1 tablet by mouth daily To replace lisinopril Yes Prasanna Prasad MD   aspirin 81 MG EC tablet Take 1 tablet by mouth daily Yes Prasanna Prasad MD   albuterol sulfate  (90 Base) MCG/ACT inhaler INHALE 2 PUFFS BY MOUTH EVERY 4 HOURS AS NEEDED FOR WHEEZE OR FOR SHORTNESS OF BREATH Yes DONA Thomas NP   Multiple Vitamin (DAILY VITAMINS PO) Take by mouth Yes Historical Provider, MD   Handicap Placard MISC by Does not apply route  DONA Hernandez - KEY        Social History     Tobacco Use    Smoking status: Never Smoker    Smokeless tobacco: Never Used   Substance Use Topics    Alcohol use:  Yes     Alcohol/week: 2.0 standard drinks     Types: 2 Cans of beer per week     Comment: 6 beers a month/crystal light powder enegy drink        Vitals:    21 1305 21 1342 BP: 132/78    Site: Left Upper Arm    Position: Sitting    Cuff Size: Medium Adult    Pulse: (!) 43 77   SpO2: 90%    Weight: 214 lb 12.8 oz (97.4 kg)    Height: 5' 11\" (1.803 m)      Estimated body mass index is 29.96 kg/m² as calculated from the following:    Height as of this encounter: 5' 11\" (1.803 m). Weight as of this encounter: 214 lb 12.8 oz (97.4 kg). Physical Exam  Vitals reviewed. Constitutional:       General: He is not in acute distress. Appearance: Normal appearance. He is obese. He is not ill-appearing, toxic-appearing or diaphoretic. HENT:      Head: Normocephalic and atraumatic. Nose: Nose normal.   Eyes:      Extraocular Movements: Extraocular movements intact. Pupils: Pupils are equal, round, and reactive to light. Cardiovascular:      Rate and Rhythm: Bradycardia present. Rhythm irregular. Pulmonary:      Effort: Pulmonary effort is normal. No respiratory distress. Musculoskeletal:         General: Normal range of motion. Cervical back: Normal range of motion. Right lower leg: No edema. Left lower leg: No edema. Skin:     General: Skin is warm and dry. Coloration: Skin is not pale. Comments: Dryness to bilateral upper eyelidsno erythema, wound, or obvious rash, swelling. Sclera and conjunctive are within normal limits   Neurological:      General: No focal deficit present. Mental Status: He is alert and oriented to person, place, and time. Mental status is at baseline. Psychiatric:         Mood and Affect: Mood normal.         Behavior: Behavior normal.         Thought Content: Thought content normal.         Judgment: Judgment normal.         ASSESSMENT/PLAN:  1. Dermatitis  Nearly resolved  Advised lubricating eyedrops, continue antihistamine daily, Vaseline and/or cold cucumber or cool compress to eyelids      2. Irregular heart rate  - EKG 12 lead    3.  PVC (premature ventricular contraction)        Patient bradycardic on initial exam and for the first 20 minutes he was in the office. Heart rate ranging from 33-50 on pulse ox  Heart rhythm irregular on exam by auscultation  EKG machine3 EKGs printed  Sinus rhythm with first-degree AV block and PVCs  Similar EKGs to recent with Dr. Tresa Danielson    His PCP spoke with Jennyfer Gamboa NP with electrophysiology  She advised to have patient call the office to follow-up if symptomatic to do Holter as previously discussed in their office    PCP advised patient of electrophysiology advice. He states his understanding and will call. Patient completely asymptomatic in office today. Heart rate came up once hooked up to EKG machine and blood pressure continuously within normal limits during visit. All care gaps addressed     All questions answered    Discussed use, benefit, and side effects of prescribed medications. Barriers to compliance discussed. All patient questions answered. Pt voiced understanding. Present to the ER for any emergent or acute symptoms not managed at home or in office. Please note that this chart was generated using dragon dictation software. Although every effort was made to ensure the accuracy of this automated transcription, some errors in transcription may have occurred. No follow-ups on file. An electronic signature was used to authenticate this note.     --DONA Armendariz NP on 8/5/2021 at 5:16 PM

## 2021-08-13 ENCOUNTER — HOSPITAL ENCOUNTER (OUTPATIENT)
Dept: PULMONOLOGY | Age: 61
Discharge: HOME | End: 2021-08-13
Payer: COMMERCIAL

## 2021-08-13 DIAGNOSIS — J96.11 CHRONIC RESPIRATORY FAILURE WITH HYPOXIA (HCC): ICD-10-CM

## 2021-08-13 DIAGNOSIS — J44.9 COPD, SEVERE (HCC): ICD-10-CM

## 2021-08-13 LAB
DLCO %PRED: 56 %
DLCO PRED: NORMAL
DLCO/VA %PRED: NORMAL
DLCO/VA PRED: NORMAL
DLCO/VA: NORMAL
DLCO: NORMAL
EXPIRATORY TIME-POST: NORMAL
EXPIRATORY TIME: NORMAL
FEF 25-75% %CHNG: NORMAL
FEF 25-75% %PRED-POST: NORMAL
FEF 25-75% %PRED-PRE: NORMAL
FEF 25-75% PRED: NORMAL
FEF 25-75%-POST: NORMAL
FEF 25-75%-PRE: NORMAL
FEV1 %PRED-POST: 44 %
FEV1 %PRED-PRE: 40 %
FEV1 PRED: NORMAL
FEV1-POST: NORMAL
FEV1-PRE: NORMAL
FEV1/FVC %PRED-POST: NORMAL
FEV1/FVC %PRED-PRE: NORMAL
FEV1/FVC PRED: NORMAL
FEV1/FVC-POST: 61 %
FEV1/FVC-PRE: 62 %
FVC %PRED-POST: NORMAL
FVC %PRED-PRE: NORMAL
FVC PRED: NORMAL
FVC-POST: NORMAL
FVC-PRE: NORMAL
GAW %PRED: NORMAL
GAW PRED: NORMAL
GAW: NORMAL
IC %PRED: NORMAL
IC PRED: NORMAL
IC: NORMAL
MEP: NORMAL
MIP: NORMAL
MVV %PRED-PRE: NORMAL
MVV PRED: NORMAL
MVV-PRE: NORMAL
PEF %PRED-POST: NORMAL
PEF %PRED-PRE: NORMAL
PEF PRED: NORMAL
PEF%CHNG: NORMAL
PEF-POST: NORMAL
PEF-PRE: NORMAL
RAW %PRED: NORMAL
RAW PRED: NORMAL
RAW: NORMAL
RV %PRED: NORMAL
RV PRED: NORMAL
RV: NORMAL
SVC %PRED: NORMAL
SVC PRED: NORMAL
SVC: NORMAL
TLC %PRED: 93 %
TLC PRED: NORMAL
TLC: NORMAL
VA %PRED: NORMAL
VA PRED: NORMAL
VA: NORMAL
VTG %PRED: NORMAL
VTG PRED: NORMAL
VTG: NORMAL

## 2021-08-13 PROCEDURE — 94060 EVALUATION OF WHEEZING: CPT

## 2021-08-13 PROCEDURE — 94729 DIFFUSING CAPACITY: CPT

## 2021-08-13 PROCEDURE — 94726 PLETHYSMOGRAPHY LUNG VOLUMES: CPT

## 2021-08-13 ASSESSMENT — PULMONARY FUNCTION TESTS
FEV1_PERCENT_PREDICTED_POST: 44
FEV1/FVC_PRE: 62
FEV1_PERCENT_PREDICTED_PRE: 40
FEV1/FVC_POST: 61

## 2021-08-26 ENCOUNTER — TELEPHONE (OUTPATIENT)
Dept: CARDIOLOGY CLINIC | Age: 61
End: 2021-08-26

## 2021-08-26 DIAGNOSIS — I49.3 PVC (PREMATURE VENTRICULAR CONTRACTION): Primary | ICD-10-CM

## 2021-08-26 NOTE — TELEPHONE ENCOUNTER
Pt called and states Dr Rachna Zurita wants pt to have a 2 wk event monitor pt wanted to think about it. Calling back and now wants to go ahead and do this. Need order placed and call pt to schedule.

## 2021-08-26 NOTE — TELEPHONE ENCOUNTER
Per progress note from 7/23/21 from Avis Watson, patient can have event monitor if he started having more symptoms. Patient states he is having more and more palpitations. Order placed for 2 week event monitor.  Scheduled for monitor application tomorrow morning at patients request.

## 2021-08-27 ENCOUNTER — NURSE ONLY (OUTPATIENT)
Dept: CARDIOLOGY CLINIC | Age: 61
End: 2021-08-27

## 2021-08-27 DIAGNOSIS — I49.3 PVC (PREMATURE VENTRICULAR CONTRACTION): ICD-10-CM

## 2021-08-27 DIAGNOSIS — I49.3 VENTRICULAR PREMATURE CONTRACTIONS: Primary | ICD-10-CM

## 2021-08-27 NOTE — PROGRESS NOTES
14 days e-cardio monitor placed.  # [id number]. Instructed patient on how to record the event and to call monitoring center at [phone] if any problems arise. Instructed patient to disconnect the lead wires from the electrodes before bathing or showering and reattach them afterwards. Instructed patient that the electrodes should be changed every 3 days or if they no longer adhere to the skin. Patient to mail package after the monitor has ended. Patient verbalized understanding.

## 2021-10-01 ENCOUNTER — TELEPHONE (OUTPATIENT)
Dept: CARDIOLOGY CLINIC | Age: 61
End: 2021-10-01

## 2021-10-08 ENCOUNTER — OFFICE VISIT (OUTPATIENT)
Dept: CARDIOLOGY CLINIC | Age: 61
End: 2021-10-08
Payer: COMMERCIAL

## 2021-10-08 VITALS
BODY MASS INDEX: 31.27 KG/M2 | SYSTOLIC BLOOD PRESSURE: 132 MMHG | HEIGHT: 71 IN | DIASTOLIC BLOOD PRESSURE: 64 MMHG | HEART RATE: 64 BPM | WEIGHT: 223.4 LBS

## 2021-10-08 DIAGNOSIS — I49.3 PVC (PREMATURE VENTRICULAR CONTRACTION): Primary | ICD-10-CM

## 2021-10-08 DIAGNOSIS — I10 ESSENTIAL HYPERTENSION: ICD-10-CM

## 2021-10-08 PROCEDURE — 99214 OFFICE O/P EST MOD 30 MIN: CPT | Performed by: NURSE PRACTITIONER

## 2021-10-08 RX ORDER — METOPROLOL TARTRATE 50 MG/1
50 TABLET, FILM COATED ORAL 2 TIMES DAILY
Qty: 60 TABLET | Refills: 3 | Status: SHIPPED | OUTPATIENT
Start: 2021-10-08 | End: 2022-02-11

## 2021-10-08 ASSESSMENT — ENCOUNTER SYMPTOMS
ORTHOPNEA: 0
SHORTNESS OF BREATH: 1

## 2021-10-08 NOTE — LETTER
Ramonita Mi  1960  [id number]    Have you had any Chest Pain that is not new? - Yes  If Yes DO EKG - How does it feel - Heaviness, sharpness   How long does the pain last - a few seconds to 1 minute   How long have you been having the pain - Years   Does not take any medications to alleviate discomfort. Patient's chest is sore due to palpitations and SOB. Have you had any Shortness of Breath - Yes  If Yes - When at rest    Have you had any dizziness - Sometimes  When do you feel dizzy with position change   How long does it last - a few seconds (5-10 at most)     Have you had any palpitations that are not new? - Yes  If Yes DO EKG - Do you feel your heart racing, skipping  How long does it last - a few seconds to about 1 minute    Patient says palpitations have increased over the past month, particularly at night.      Do you have any edema - swelling in No      Do you have a surgery or procedure scheduled in the near future - No

## 2021-10-08 NOTE — PROGRESS NOTES
10/8/2021  Primary cardiologist: Dr. Yecenia Miller  is an established 57 y.o.  male here for follow-up on HTN- HLD- PVC      SUBJECTIVE/OBJECTIVE:    HPI : Vladimir Antonio is a 57year old gentleman with a history of HTN- HLD and PVCs. He had and event monitor in 03/2021 that showed frequent PVC. He then underwent a LHC that demonstrated no significant CAD. He was referred to EP for PVC burden of close to 10%. He was started on BB. Repeat holter in October 2021 demonstrates PVC burden of 10.9 % with runs of NSVT and short runs of atrial tachycardia     Vladimir Antonio reports he is having episodes of palpitations. Describes it as a racing/ skipping sensation that can last close to a minute. He notes dizziness with position change that can last about 5-10 seconds. He has been referred to cardiopulmomary rehab. Review of Systems   Constitutional: Negative for diaphoresis and malaise/fatigue. Cardiovascular: Positive for palpitations. Negative for chest pain, claudication, dyspnea on exertion, irregular heartbeat, leg swelling, near-syncope, orthopnea and paroxysmal nocturnal dyspnea. Respiratory: Positive for shortness of breath. Neurological: Positive for dizziness. Negative for light-headedness. Vitals:    10/08/21 0844 10/08/21 0849 10/08/21 0850   BP: (!) 154/84 138/70 132/64   Site: Left Upper Arm Left Upper Arm Left Upper Arm   Position: Supine Sitting Standing   Cuff Size: Medium Adult Medium Adult Medium Adult   Pulse: 64 62 64   Weight: 223 lb 6.4 oz (101.3 kg)     Height: 5' 11\" (1.803 m)       Patient-Reported Vitals 1/14/2021   Patient-Reported Weight 215   Patient-Reported Height 5'11   Patient-Reported Pulse 64   Patient-Reported SpO2 91     Wt Readings from Last 3 Encounters:   10/08/21 223 lb 6.4 oz (101.3 kg)   08/05/21 214 lb 12.8 oz (97.4 kg)   07/23/21 213 lb 12.8 oz (97 kg)     Body mass index is 31.16 kg/m². Physical Exam  Constitutional:       Appearance: Normal appearance.    HENT: Head: Normocephalic and atraumatic. Eyes:      Extraocular Movements: Extraocular movements intact. Pupils: Pupils are equal, round, and reactive to light. Cardiovascular:      Rate and Rhythm: Normal rate and regular rhythm. Pulses: Normal pulses. Pulmonary:      Effort: Pulmonary effort is normal.      Breath sounds: Normal breath sounds. Abdominal:      General: There is no distension. Palpations: Abdomen is soft. Tenderness: There is no abdominal tenderness. Musculoskeletal:         General: Normal range of motion. Cervical back: Normal range of motion and neck supple. Right lower leg: No edema. Left lower leg: No edema. Skin:     General: Skin is warm. Capillary Refill: Capillary refill takes less than 2 seconds. Neurological:      Mental Status: He is alert and oriented to person, place, and time. Psychiatric:         Mood and Affect: Mood normal.         Behavior: Behavior normal.                Current Outpatient Medications   Medication Sig Dispense Refill    metoprolol tartrate (LOPRESSOR) 50 MG tablet Take 1 tablet by mouth 2 times daily 60 tablet 3    fluticasone-umeclidin-vilant (TRELEGY ELLIPTA) 100-62.5-25 MCG/INH AEPB TAKE 1 PUFF BY MOUTH EVERY DAY 1 each 5    loratadine (CLARITIN) 10 MG tablet TAKE 1 TABLET BY MOUTH EVERY DAY 90 tablet 0    CVS MAGNESIUM OXIDE 250 MG TABS tablet TAKE 1 TABLET BY MOUTH EVERY DAY 30 tablet 6    losartan (COZAAR) 50 MG tablet Take 1 tablet by mouth daily To replace lisinopril 90 tablet 3    aspirin 81 MG EC tablet Take 1 tablet by mouth daily 90 tablet 3    albuterol sulfate  (90 Base) MCG/ACT inhaler INHALE 2 PUFFS BY MOUTH EVERY 4 HOURS AS NEEDED FOR WHEEZE OR FOR SHORTNESS OF BREATH 18 Inhaler 5    Multiple Vitamin (DAILY VITAMINS PO) Take by mouth       No current facility-administered medications for this visit.           All pertinent data reviewed and discussed with patient    Left heart cath - 04/2021   NO SIG CAD   NORMAL EF     ASSESSMENT/PLAN:    PVCs  Noted to have >10 % PVC burden on event monitor  Increase metoprolol to 50 mg bid   He has follow up apt with EP    HTN  Stable   Continue losartan     Medications reviewed and confirmed with patient     Tests ordered:  none      Follow-up  6 months      Signed:  DONA Flores CNP, 10/8/2021, 9:02 AM    An electronic signature was used to authenticate this note.

## 2021-10-08 NOTE — PATIENT INSTRUCTIONS
**It is YOUR responsibilty to bring medication bottles and/or updated medication list to [address] [place] [address]. This will allow us to better serve you and all your healthcare needs**    Please be informed that if you contact our office outside of normal business hours the physician on call cannot help with any scheduling or rescheduling issues, procedure instruction questions or any type of medication issue. We advise you for any urgent/emergency that you go to the nearest emergency room!     PLEASE CALL OUR OFFICE DURING NORMAL BUSINESS HOURS    Monday - Friday   8 am to 5 pm    [place]: [address]: [phone]    [place]:  [phone]

## 2021-10-15 ENCOUNTER — OFFICE VISIT (OUTPATIENT)
Dept: CARDIOLOGY CLINIC | Age: 61
End: 2021-10-15
Payer: COMMERCIAL

## 2021-10-15 VITALS
BODY MASS INDEX: 31.47 KG/M2 | WEIGHT: 224.8 LBS | OXYGEN SATURATION: 89 % | DIASTOLIC BLOOD PRESSURE: 58 MMHG | HEIGHT: 71 IN | SYSTOLIC BLOOD PRESSURE: 116 MMHG | HEART RATE: 54 BPM

## 2021-10-15 DIAGNOSIS — I10 ESSENTIAL HYPERTENSION: ICD-10-CM

## 2021-10-15 DIAGNOSIS — I47.29 NSVT (NONSUSTAINED VENTRICULAR TACHYCARDIA): ICD-10-CM

## 2021-10-15 DIAGNOSIS — I49.3 PVC (PREMATURE VENTRICULAR CONTRACTION): Primary | ICD-10-CM

## 2021-10-15 PROCEDURE — 99214 OFFICE O/P EST MOD 30 MIN: CPT | Performed by: NURSE PRACTITIONER

## 2021-10-15 ASSESSMENT — ENCOUNTER SYMPTOMS
BLOOD IN STOOL: 0
SINUS PRESSURE: 0
WHEEZING: 0
EYE REDNESS: 0
SINUS PAIN: 0
COLOR CHANGE: 0
DIARRHEA: 0
ABDOMINAL DISTENTION: 0
EYE ITCHING: 0
COUGH: 0
ABDOMINAL PAIN: 0
CHEST TIGHTNESS: 0
CONSTIPATION: 0
BACK PAIN: 0
NAUSEA: 0
SHORTNESS OF BREATH: 1
VOMITING: 0

## 2021-10-15 NOTE — PATIENT INSTRUCTIONS
Please be informed that if you contact our office outside of normal business hours the physician on call cannot help with any scheduling or rescheduling issues, procedure instruction questions or any type of medication issue. We advise you for any urgent/emergency that you go to the nearest emergency room! PLEASE CALL OUR OFFICE DURING NORMAL BUSINESS HOURS    Monday - Friday   8 am to 5 pm    [place]: [address]: [phone]    [place]:  [phone]    **It is YOUR responsibilty to bring medication bottles and/or updated medication list to [address] [place] [address].  This will allow us to better serve you and all your healthcare needs**

## 2021-10-15 NOTE — PROGRESS NOTES
Electrophysiology fu Note      Reason for consultation:   PVC    Chief complaint: follow up on event monitor results    Referring physician: Radha Stern      Primary care physician: Carolyne Ramos MD      History of Present Illness: This visit 10/15/2021  Patient is here today for follow up on event monitor. Patient reports that he is having palpitations with associated chest pressure and shortness of breath. He denies aggravating or alleviating factors. He states that his quality of life is not a maximal level. He drinks very little caffeine. He denies alcohol. He is taking a beta blocker and magnesium. Previous visit (7/23/2021)  Chief Complaint   Patient presents with    Hypertension     Patient here for 2 month follow up. Patient states his palpitations are better due to being on metoprolo. Patient has SOB due to COPD nothing new. Patient denies Chest Pain, Dizziness, Edema. Patient does not drink alcohol or smoke. Patient drinks caffiene. Patient does not exercise. Previous visit: (5/21/2021)      Chief Complaint   Patient presents with    1 Month Follow-Up     to discuss PVC ablation Tania pt. patient feels lot better on the medication. Patient does not feel palpitation as much occasionally is feeling palpitations no he does have shortness of breath but that is also improved from before. He has occasional chest pain which lasts only seconds. Patient overall is feeling better and wants to continue with medical treatment          Previous visit:    She is a 71-year-old male with history of hypertension, COPD on home oxygen referred by Dr. Gordillo For PVCs. Patient reports he has chest pressure lasting about 10 seconds and relieves immediately. Patient does have shortness of breath but he also reports that he has chronic COPD and home oxygen dependent so that could be the reason to. Patient denies any dizziness.   Patient has palpitations that last few seconds up to a couple of minutes multiple times in a day. No edema. Never been a smoker. Denies alcohol use in a year    Patient reports tiredness and weakness    Pastmedical history:   Past Medical History:   Diagnosis Date    Abnormal echocardiogram 03/29/2013    EF=45-50%;mod. AR,mild aortic valve calcif. 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 EF 55%. Renals patent.  H/O cardiac catheterization 04/01/2021    NO SIG CAD    H/O cardiovascular stress test 04/02/2013    EF 49%, there is no scintigraphic evidence of inducible myocardial ischemia, the observed defect is consistent with diaphragmatic attenuation, clinical correlation recommenced, no previous study to compare with, no wall motionn abnormality seen, normal perfusion in all myocardial regions    H/O chest pain     H/O echocardiogram 07/11/2020    EF 50-55%, Mod LVH & AR,  Dilated aortic root (4.2cm) and ascending aorta (4.1cm).     Hemorrhoids 06/15/2015    HTN (hypertension)     Hydrocele 10/2011    U/S    Hyperlipemia     Irregular heart rhythm     after induction for surgery 9/5/2013 - pt developed irreg rhythm and surgery cancelled\"had another echo and stress test and everything checked out ok, they do not know the reason it happened\"- cardiac clearance obtained for surgery 9/19/2013    Palpitations     Pneumonia 2004    not since    Right inguinal hernia 08/22/2013    SOB (shortness of breath) 03/27/2013    Nuclear Stress Normal, EF 49% (Dr. Niesha Singh- 4/2/2013)     SOB (shortness of breath) on exertion        Surgical history :   Past Surgical History: Procedure Laterality Date    CARDIAC CATHETERIZATION  1991    COLONOSCOPY  6-    INGUINAL HERNIA REPAIR Bilateral 1992    INGUINAL HERNIA REPAIR Right 09/19/13    with mesh    OTHER SURGICAL HISTORY  09/05/13    hernia repair aborted    TYMPANOSTOMY TUBE PLACEMENT  2009    right ear    VASECTOMY  1988       Family history:   Family History   Problem Relation Age of Onset    Cancer Mother         colon    Diabetes Mother     High Blood Pressure Mother     Migraines Mother     High Blood Pressure Father     COPD Father     Cancer Father         lung    Heart Disease Maternal Grandmother     Heart Disease Maternal Grandfather        Social history :  reports that he has never smoked. He has never used smokeless tobacco. He reports previous alcohol use. He reports that he does not use drugs. No Known Allergies    Current Outpatient Medications on File Prior to Visit   Medication Sig Dispense Refill    metoprolol tartrate (LOPRESSOR) 50 MG tablet Take 1 tablet by mouth 2 times daily 60 tablet 3    fluticasone-umeclidin-vilant (TRELEGY ELLIPTA) 100-62.5-25 MCG/INH AEPB TAKE 1 PUFF BY MOUTH EVERY DAY 1 each 5    loratadine (CLARITIN) 10 MG tablet TAKE 1 TABLET BY MOUTH EVERY DAY 90 tablet 0    [employer]S MAGNESIUM OXIDE 250 MG TABS tablet TAKE 1 TABLET BY MOUTH EVERY DAY 30 tablet 6    losartan (COZAAR) 50 MG tablet Take 1 tablet by mouth daily To replace lisinopril 90 tablet 3    aspirin 81 MG EC tablet Take 1 tablet by mouth daily 90 tablet 3    albuterol sulfate  (90 Base) MCG/ACT inhaler INHALE 2 PUFFS BY MOUTH EVERY 4 HOURS AS NEEDED FOR WHEEZE OR FOR SHORTNESS OF BREATH 18 Inhaler 5    Multiple Vitamin (DAILY VITAMINS PO) Take by mouth       No current facility-administered medications on file prior to visit. Review of Systems:   Review of Systems   Constitutional: Negative for activity change, chills, fatigue and fever.    HENT: Negative for congestion, ear pain, sinus pressure and sinus pain. Eyes: Negative for redness, itching and visual disturbance. Respiratory: Positive for shortness of breath. Negative for cough, chest tightness and wheezing. Cardiovascular: Positive for chest pain and palpitations. Negative for leg swelling. Gastrointestinal: Negative for abdominal distention, abdominal pain, blood in stool, constipation, diarrhea, nausea and vomiting. Endocrine: Negative for cold intolerance and heat intolerance. Genitourinary: Negative for difficulty urinating, dysuria, flank pain and hematuria. Musculoskeletal: Positive for arthralgias. Negative for back pain, myalgias and neck stiffness. Skin: Negative for color change and rash. Allergic/Immunologic: Negative for food allergies. Neurological: Negative for dizziness, light-headedness, numbness and headaches. Hematological: Does not bruise/bleed easily. Psychiatric/Behavioral: Negative for agitation, behavioral problems and confusion. The patient is not nervous/anxious. Examination:      Vitals:    10/15/21 1212   BP: (!) 116/58   Pulse: 54   SpO2: (!) 89%   Weight: 224 lb 12.8 oz (102 kg)   Height: 5' 11\" (1.803 m)        Body mass index is 31.35 kg/m². Physical Exam  Constitutional:       Appearance: Normal appearance. He is not ill-appearing or diaphoretic. HENT:      Head: Normocephalic and atraumatic. Right Ear: External ear normal.      Left Ear: External ear normal.      Nose: No congestion or rhinorrhea. Mouth/Throat:      Mouth: Mucous membranes are moist.      Pharynx: No oropharyngeal exudate or posterior oropharyngeal erythema. Eyes:      General:         Right eye: No discharge. Left eye: No discharge. Conjunctiva/sclera: Conjunctivae normal.   Cardiovascular:      Rate and Rhythm: Normal rate. Heart sounds: Murmur (grade 2/6 diastolic murmur) heard. Pulmonary:      Effort: Pulmonary effort is normal.      Breath sounds: No rales. Abdominal:      General: Abdomen is flat. Palpations: Abdomen is soft. Musculoskeletal:         General: No tenderness. Normal range of motion. Cervical back: Normal range of motion. Right lower leg: No edema. Left lower leg: No edema. Skin:     General: Skin is warm and dry. Neurological:      General: No focal deficit present. Mental Status: He is alert and oriented to person, place, and time. CBC:   Lab Results   Component Value Date    WBC 8.6 03/29/2021    HGB 16.5 03/29/2021    HCT 51.4 03/29/2021     03/29/2021     Lipids:  Lab Results   Component Value Date    CHOL 193 03/10/2021    TRIG 62 03/10/2021    HDL 39 (L) 03/10/2021    LDLCALC 155 (H) 06/09/2020    LDLDIRECT 148 (H) 03/10/2021     PT/INR:   Lab Results   Component Value Date    INR 1.01 09/05/2013        BMP:    Lab Results   Component Value Date     03/29/2021    K 4.5 03/29/2021     03/29/2021    CO2 30 03/29/2021    BUN 13 03/29/2021     CMP:   Lab Results   Component Value Date    AST 22 03/10/2021    PROT 6.6 03/10/2021    BILITOT 0.4 03/10/2021    ALKPHOS 86 03/10/2021     TSH:       EKG Interpretation:      IMPRESSION / RECOMMENDATIONS:     PVC  HTN  COPD  HLD  Obesity BMI 31.35  Moderate Aortic Regurgitation    Patient recently seen by Dr. Mike Davila with complaints of palpitations with associated chest pressure and shortness of breath  Patient wore a 14-day event monitor and noted to have 10.9% PVC burden and patient had short runs of nonsustained ventricular tachycardia  He was referred back to EP to discuss management of PVC burden  Patient had previously been doing well on metoprolol and magnesium  Patient now very symptomatic and states his quality of life is suffering  Per review of Dr Saint Siemens note from 4/16/2021 patient noted to have LV endocardial PVCs with a negative heart cath.   EF is normal patient does have moderate eccentric aortic regurgitation  Discussed with Dr. Isabella Arteaga and patient about EP study with possible PVC ablation  Patient reports his quality of life is suffering and if these PVCs can be taken care of he would like to proceed with procedure  Dr Jo-Ann Fairchild agrees with plan    The risks including, but not limited to, vascular injury, bleeding, infection, radiation exposure, injury to cardiac and surrounding structures (including esophageal and pulmonary vein injury), injury to the normal conduction system of the heart (possibly requiring a pacemaker), stroke, myocardial infarction and death were all discussed. The patient considered the risks, benefits and alternatives; decided to proceed with the procedure. Continue metoprolol tartrate 50 mg twice daily and magnesium 250 mg daily    Vitals:    10/15/21 1212   BP: (!) 116/58   Pulse: 54   SpO2: (!) 89%     Blood pressure stable continue losartan 50 mg daily    Thanks again for allowing me to participate in care of this patient. Please call me if you have any questions. With best regards. Darius Helton, DONA - CNP, 10/15/2021 12:36 PM     Please note this report has been partially produced using speech recognition software and may contain errors related to that system including errors in grammar, punctuation, and spelling, as well as words and phrases that may be inappropriate. If there are any questions or concerns please feel free to contact the dictating provider for clarification.

## 2021-10-18 ENCOUNTER — TELEPHONE (OUTPATIENT)
Dept: CARDIOLOGY CLINIC | Age: 61
End: 2021-10-18

## 2021-10-18 NOTE — TELEPHONE ENCOUNTER
Spoke with patient. Scheduled for PVC ablation with Brianna Toledo, anil Arenas CNP. Patient scheduled for 11/2/21. Patient to have COVID test, sign procedure paperwork and have pre-testing done on 10/27. Patient voiced understanding.

## 2021-10-26 DIAGNOSIS — I49.3 PVC (PREMATURE VENTRICULAR CONTRACTION): Primary | ICD-10-CM

## 2021-10-27 ENCOUNTER — HOSPITAL ENCOUNTER (OUTPATIENT)
Age: 61
Discharge: HOME | End: 2021-10-27
Payer: COMMERCIAL

## 2021-10-27 ENCOUNTER — NURSE ONLY (OUTPATIENT)
Dept: CARDIOLOGY CLINIC | Age: 61
End: 2021-10-27
Payer: COMMERCIAL

## 2021-10-27 ENCOUNTER — HOSPITAL ENCOUNTER (OUTPATIENT)
Dept: GENERAL RADIOLOGY | Age: 61
Discharge: HOME | End: 2021-10-27
Payer: COMMERCIAL

## 2021-10-27 ENCOUNTER — HOSPITAL ENCOUNTER (OUTPATIENT)
Age: 61
Setting detail: SPECIMEN
Discharge: HOME | End: 2021-10-27
Payer: COMMERCIAL

## 2021-10-27 VITALS — DIASTOLIC BLOOD PRESSURE: 60 MMHG | SYSTOLIC BLOOD PRESSURE: 118 MMHG

## 2021-10-27 DIAGNOSIS — I49.3 PVC (PREMATURE VENTRICULAR CONTRACTION): ICD-10-CM

## 2021-10-27 DIAGNOSIS — Z01.810 PRE-OPERATIVE CARDIOVASCULAR EXAMINATION: ICD-10-CM

## 2021-10-27 LAB
ANION GAP SERPL CALCULATED.3IONS-SCNC: 9 MMOL/L (ref 4–16)
APTT: 29.4 SECONDS (ref 25.1–37.1)
BASOPHILS ABSOLUTE: 0.1 K/CU MM
BASOPHILS RELATIVE PERCENT: 0.6 % (ref 0–1)
BUN BLDV-MCNC: 11 MG/DL (ref 6–23)
CALCIUM SERPL-MCNC: 9.1 MG/DL (ref 8.3–10.6)
CHLORIDE BLD-SCNC: 101 MMOL/L (ref 99–110)
CO2: 29 MMOL/L (ref 21–32)
CREAT SERPL-MCNC: 0.7 MG/DL (ref 0.9–1.3)
DIFFERENTIAL TYPE: ABNORMAL
EOSINOPHILS ABSOLUTE: 0.1 K/CU MM
EOSINOPHILS RELATIVE PERCENT: 1.8 % (ref 0–3)
GFR AFRICAN AMERICAN: >60 ML/MIN/1.73M2
GFR NON-AFRICAN AMERICAN: >60 ML/MIN/1.73M2
GLUCOSE BLD-MCNC: 100 MG/DL (ref 70–99)
HCT VFR BLD CALC: 55.8 % (ref 42–52)
HEMOGLOBIN: 17 GM/DL (ref 13.5–18)
IMMATURE NEUTROPHIL %: 0.3 % (ref 0–0.43)
INR BLD: 1.01 INDEX
LYMPHOCYTES ABSOLUTE: 2.4 K/CU MM
LYMPHOCYTES RELATIVE PERCENT: 31 % (ref 24–44)
MAGNESIUM: 1.9 MG/DL (ref 1.8–2.4)
MCH RBC QN AUTO: 28.4 PG (ref 27–31)
MCHC RBC AUTO-ENTMCNC: 30.5 % (ref 32–36)
MCV RBC AUTO: 93.2 FL (ref 78–100)
MONOCYTES ABSOLUTE: 0.6 K/CU MM
MONOCYTES RELATIVE PERCENT: 7.7 % (ref 0–4)
NUCLEATED RBC %: 0 %
PDW BLD-RTO: 14.4 % (ref 11.7–14.9)
PHOSPHORUS: 3.5 MG/DL (ref 2.5–4.9)
PLATELET # BLD: 193 K/CU MM (ref 140–440)
PMV BLD AUTO: 10.7 FL (ref 7.5–11.1)
POTASSIUM SERPL-SCNC: 5 MMOL/L (ref 3.5–5.1)
PROTHROMBIN TIME: 13 SECONDS (ref 11.7–14.5)
RBC # BLD: 5.99 M/CU MM (ref 4.6–6.2)
SEGMENTED NEUTROPHILS ABSOLUTE COUNT: 4.6 K/CU MM
SEGMENTED NEUTROPHILS RELATIVE PERCENT: 58.6 % (ref 36–66)
SODIUM BLD-SCNC: 139 MMOL/L (ref 135–145)
TOTAL IMMATURE NEUTOROPHIL: 0.02 K/CU MM
TOTAL NUCLEATED RBC: 0 K/CU MM
WBC # BLD: 7.8 K/CU MM (ref 4–10.5)

## 2021-10-27 PROCEDURE — 80048 BASIC METABOLIC PNL TOTAL CA: CPT

## 2021-10-27 PROCEDURE — 99211 OFF/OP EST MAY X REQ PHY/QHP: CPT | Performed by: INTERNAL MEDICINE

## 2021-10-27 PROCEDURE — 83735 ASSAY OF MAGNESIUM: CPT

## 2021-10-27 PROCEDURE — U0005 INFEC AGEN DETEC AMPLI PROBE: HCPCS

## 2021-10-27 PROCEDURE — 85730 THROMBOPLASTIN TIME PARTIAL: CPT

## 2021-10-27 PROCEDURE — 85610 PROTHROMBIN TIME: CPT

## 2021-10-27 PROCEDURE — 36415 COLL VENOUS BLD VENIPUNCTURE: CPT

## 2021-10-27 PROCEDURE — 84100 ASSAY OF PHOSPHORUS: CPT

## 2021-10-27 PROCEDURE — 85025 COMPLETE CBC W/AUTO DIFF WBC: CPT

## 2021-10-27 PROCEDURE — 71046 X-RAY EXAM CHEST 2 VIEWS: CPT

## 2021-10-27 PROCEDURE — U0003 INFECTIOUS AGENT DETECTION BY NUCLEIC ACID (DNA OR RNA); SEVERE ACUTE RESPIRATORY SYNDROME CORONAVIRUS 2 (SARS-COV-2) (CORONAVIRUS DISEASE [COVID-19]), AMPLIFIED PROBE TECHNIQUE, MAKING USE OF HIGH THROUGHPUT TECHNOLOGIES AS DESCRIBED BY CMS-2020-01-R: HCPCS

## 2021-10-27 NOTE — PROGRESS NOTES
Patient informed of instructions and guidance for performing test.  Throat swab performed. Swab placed into labeled collection tube. Collection tube and lab order placed in plastic bag. Bag placed in biohazard bag and placed in fridge.  called for . Patient here in office and educated on PVC ablation, schedule for 11/2/21 @ 1100, with arrival @ 0900, @ [facility]; risk explained; and consents signed. Also copy of orders given for labs and CXR due 10/27 at [facility]. Instruction given to patient to :  NPO after midnight the night before procedure; call hospital at [phone] to pre-register. May take rest of morning meds of procedure except Metoprolol. Hold Metoprolol 2 days prior to procedure. Patient voiced understanding. Copies of consent & info scanned in chart.

## 2021-10-28 LAB
SARS-COV-2: NOT DETECTED
SOURCE: NORMAL

## 2021-11-02 ENCOUNTER — HOSPITAL ENCOUNTER (OUTPATIENT)
Dept: CARDIAC CATH/INVASIVE PROCEDURES | Age: 61
Discharge: HOME | End: 2021-11-02
Attending: INTERNAL MEDICINE | Admitting: INTERNAL MEDICINE
Payer: COMMERCIAL

## 2021-11-02 VITALS
RESPIRATION RATE: 16 BRPM | HEART RATE: 76 BPM | OXYGEN SATURATION: 89 % | HEIGHT: 71 IN | SYSTOLIC BLOOD PRESSURE: 177 MMHG | TEMPERATURE: 97 F | WEIGHT: 224 LBS | DIASTOLIC BLOOD PRESSURE: 74 MMHG | BODY MASS INDEX: 31.36 KG/M2

## 2021-11-02 LAB
ABO/RH: NORMAL
ANTIBODY SCREEN: NEGATIVE

## 2021-11-02 PROCEDURE — 86900 BLOOD TYPING SEROLOGIC ABO: CPT

## 2021-11-02 PROCEDURE — 86850 RBC ANTIBODY SCREEN: CPT

## 2021-11-02 PROCEDURE — 86901 BLOOD TYPING SEROLOGIC RH(D): CPT

## 2021-11-02 ASSESSMENT — ENCOUNTER SYMPTOMS
DIARRHEA: 0
ABDOMINAL DISTENTION: 0
NAUSEA: 0
SHORTNESS OF BREATH: 1
SINUS PRESSURE: 0
COLOR CHANGE: 0
CHEST TIGHTNESS: 0
EYE ITCHING: 0
WHEEZING: 0
BLOOD IN STOOL: 0
EYE REDNESS: 0
CONSTIPATION: 0
ABDOMINAL PAIN: 0
BACK PAIN: 0
SINUS PAIN: 0
COUGH: 0
VOMITING: 0

## 2021-11-02 NOTE — H&P
Electrophysiology fu Note      Reason for consultation:   PVC    Chief complaint: Here for PVC ablation    Referring physician: Riya Moreno      Primary care physician: Remberto Haddad MD      History of Present Illness: Today visit (11/02/21)    Patient here for PVC ablation. Patient reports he stopped caffeine for last 2 weeks and he feels better. Patient has not had any skipped beats he feels off. . No change in H&P noted from previous clinic visit. This visit 10/15/2021  Patient is here today for follow up on event monitor. Patient reports that he is having palpitations with associated chest pressure and shortness of breath. He denies aggravating or alleviating factors. He states that his quality of life is not a maximal level. He drinks very little caffeine. He denies alcohol. He is taking a beta blocker and magnesium. Previous visit (7/23/2021)  Chief Complaint   Patient presents with    Hypertension     Patient here for 2 month follow up. Patient states his palpitations are better due to being on metoprolo. Patient has SOB due to COPD nothing new. Patient denies Chest Pain, Dizziness, Edema. Patient does not drink alcohol or smoke. Patient drinks caffiene. Patient does not exercise. Previous visit: (5/21/2021)      Chief Complaint   Patient presents with    1 Month Follow-Up     to discuss PVC ablation Tania gavin. patient feels lot better on the medication. Patient does not feel palpitation as much occasionally is feeling palpitations no he does have shortness of breath but that is also improved from before. He has occasional chest pain which lasts only seconds. Patient overall is feeling better and wants to continue with medical treatment          Previous visit:    She is a 56-year-old male with history of hypertension, COPD on home oxygen referred by Dr. Gordillo For PVCs.   Patient reports he has chest pressure lasting about 10 seconds and relieves immediately. Patient does have shortness of breath but he also reports that he has chronic COPD and home oxygen dependent so that could be the reason to. Patient denies any dizziness. Patient has palpitations that last few seconds up to a couple of minutes multiple times in a day. No edema. Never been a smoker. Denies alcohol use in a year    Patient reports tiredness and weakness    Pastmedical history:   Past Medical History:   Diagnosis Date    Abnormal echocardiogram 03/29/2013    EF=45-50%;mod. AR,mild aortic valve calcif. 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 EF 55%. Renals patent.  H/O cardiac catheterization 04/01/2021    NO SIG CAD    H/O cardiovascular stress test 04/02/2013    EF 49%, there is no scintigraphic evidence of inducible myocardial ischemia, the observed defect is consistent with diaphragmatic attenuation, clinical correlation recommenced, no previous study to compare with, no wall motionn abnormality seen, normal perfusion in all myocardial regions    H/O chest pain     H/O echocardiogram 07/11/2020    EF 50-55%, Mod LVH & AR,  Dilated aortic root (4.2cm) and ascending aorta (4.1cm).     Hemorrhoids 06/15/2015    HTN (hypertension)     Hydrocele 10/2011    U/S    Hyperlipemia     Irregular heart rhythm     after induction for surgery 9/5/2013 - pt developed irreg rhythm and surgery cancelled\"had another echo and stress test and everything checked out ok, they do not know the reason it happened\"- cardiac clearance obtained for surgery 9/19/2013    Palpitations     Pneumonia 2004    not since    Right inguinal hernia 08/22/2013    SOB (shortness of breath) 03/27/2013    Nuclear Stress Normal, EF 49% (Dr. Malissa Nelson- 4/2/2013)     SOB (shortness of breath) on exertion        Surgical history :   Past Surgical History:   Procedure Laterality Date    CARDIAC CATHETERIZATION  1991    COLONOSCOPY  6-    INGUINAL HERNIA REPAIR Bilateral 1992    INGUINAL HERNIA REPAIR Right 09/19/13    with mesh    OTHER SURGICAL HISTORY  09/05/13    hernia repair aborted    TYMPANOSTOMY TUBE PLACEMENT  2009    right ear    VASECTOMY  1988       Family history:   Family History   Problem Relation Age of Onset    Cancer Mother         colon    Diabetes Mother     High Blood Pressure Mother     Migraines Mother     High Blood Pressure Father     COPD Father     Cancer Father         lung    Heart Disease Maternal Grandmother     Heart Disease Maternal Grandfather        Social history :  reports that he has never smoked. He has never used smokeless tobacco. He reports previous alcohol use. He reports that he does not use drugs. No Known Allergies    No current facility-administered medications on file prior to encounter.      Current Outpatient Medications on File Prior to Encounter   Medication Sig Dispense Refill    fluticasone-umeclidin-vilant (TRELEGY ELLIPTA) 100-62.5-25 MCG/INH AEPB TAKE 1 PUFF BY MOUTH EVERY DAY 1 each 5    loratadine (CLARITIN) 10 MG tablet TAKE 1 TABLET BY MOUTH EVERY DAY 90 tablet 0    CVS MAGNESIUM OXIDE 250 MG TABS tablet TAKE 1 TABLET BY MOUTH EVERY DAY 30 tablet 6    aspirin 81 MG EC tablet Take 1 tablet by mouth daily 90 tablet 3    albuterol sulfate  (90 Base) MCG/ACT inhaler INHALE 2 PUFFS BY MOUTH EVERY 4 HOURS AS NEEDED FOR WHEEZE OR FOR SHORTNESS OF BREATH 18 Inhaler 5    Multiple Vitamin (DAILY VITAMINS PO) Take by mouth      metoprolol tartrate (LOPRESSOR) 50 MG tablet Take 1 tablet by mouth 2 times daily 60 tablet 3    losartan (COZAAR) 50 MG tablet Take 1 tablet by mouth daily To replace lisinopril 90 tablet 3       Review of Systems:   Review of Systems   Constitutional: Negative for activity change, chills, fatigue and fever. HENT: Negative for congestion, ear pain, sinus pressure and sinus pain. Eyes: Negative for redness, itching and visual disturbance. Respiratory: Positive for shortness of breath. Negative for cough, chest tightness and wheezing. Cardiovascular: Negative for chest pain, palpitations and leg swelling. Gastrointestinal: Negative for abdominal distention, abdominal pain, blood in stool, constipation, diarrhea, nausea and vomiting. Endocrine: Negative for cold intolerance and heat intolerance. Genitourinary: Negative for difficulty urinating, dysuria, flank pain and hematuria. Musculoskeletal: Positive for arthralgias. Negative for back pain, myalgias and neck stiffness. Skin: Negative for color change and rash. Allergic/Immunologic: Negative for food allergies. Neurological: Negative for dizziness, light-headedness, numbness and headaches. Hematological: Does not bruise/bleed easily. Psychiatric/Behavioral: Negative for agitation, behavioral problems and confusion. The patient is not nervous/anxious. Examination:      Vitals:    11/02/21 1037   BP: (!) 177/74   Pulse: 76   Resp: 16   Temp: 97 °F (36.1 °C)   TempSrc: Temporal   SpO2: (!) 89%   Weight: 224 lb (101.6 kg)   Height: 5' 11\" (1.803 m)        Body mass index is 31.24 kg/m². Physical Exam  Constitutional:       Appearance: Normal appearance. He is not ill-appearing or diaphoretic. HENT:      Head: Normocephalic and atraumatic. Right Ear: External ear normal.      Left Ear: External ear normal.      Nose: No congestion or rhinorrhea. Mouth/Throat:      Mouth: Mucous membranes are moist.      Pharynx: No oropharyngeal exudate or posterior oropharyngeal erythema. Eyes:      General:         Right eye: No discharge.          Left eye: No discharge. Conjunctiva/sclera: Conjunctivae normal.   Cardiovascular:      Rate and Rhythm: Normal rate. Heart sounds: Murmur (grade 2/6 diastolic murmur) heard. Pulmonary:      Effort: Pulmonary effort is normal.      Breath sounds: No rales. Abdominal:      General: Abdomen is flat. Palpations: Abdomen is soft. Musculoskeletal:         General: No tenderness. Normal range of motion. Cervical back: Normal range of motion. Right lower leg: No edema. Left lower leg: No edema. Skin:     General: Skin is warm and dry. Neurological:      General: No focal deficit present. Mental Status: He is alert and oriented to person, place, and time. CBC:   Lab Results   Component Value Date    WBC 7.8 10/27/2021    HGB 17.0 10/27/2021    HCT 55.8 10/27/2021     10/27/2021     Lipids:  Lab Results   Component Value Date    CHOL 193 03/10/2021    TRIG 62 03/10/2021    HDL 39 (L) 03/10/2021    LDLCALC 155 (H) 06/09/2020    LDLDIRECT 148 (H) 03/10/2021     PT/INR:   Lab Results   Component Value Date    INR 1.01 10/27/2021        BMP:    Lab Results   Component Value Date     10/27/2021    K 5.0 10/27/2021     10/27/2021    CO2 29 10/27/2021    BUN 11 10/27/2021     CMP:   Lab Results   Component Value Date    AST 22 03/10/2021    PROT 6.6 03/10/2021    BILITOT 0.4 03/10/2021    ALKPHOS 86 03/10/2021     TSH:       EKG Interpretation:      IMPRESSION / RECOMMENDATIONS:     PVC  HTN  COPD  HLD  Obesity BMI 31.35  Moderate Aortic Regurgitation    Patient here for PVC ablation but I have not seen a single PVC in 2 hours of monitoring on the telemetry in the holding area    Discussed with the patient that probably has diet habits are the reason contributing for PVC along with probably some AR.     Patient has made lifestyle changes with stopping alcohol and caffeine 2 weeks ago and now he is in sinus rhythm with no PVCs    Given that patient is not having PVCs we had to cancelled the procedure. Thanks again for allowing me to participate in care of this patient. Please call me if you have any questions. With best regards. Jonh Lebron MD, 11/2/2021 11:37 AM     Please note this report has been partially produced using speech recognition software and may contain errors related to that system including errors in grammar, punctuation, and spelling, as well as words and phrases that may be inappropriate. If there are any questions or concerns please feel free to contact the dictating provider for clarification.

## 2021-11-02 NOTE — PROGRESS NOTES
Patient not having PVCs on monitor. Dr. Dora Quinn aware and order received to cancel procedure. Patient given instructions and discharged home.

## 2021-11-17 DIAGNOSIS — T78.40XD ALLERGY, SUBSEQUENT ENCOUNTER: ICD-10-CM

## 2021-11-17 RX ORDER — LORATADINE 10 MG/1
TABLET ORAL
Qty: 30 TABLET | Refills: 5 | Status: SHIPPED | OUTPATIENT
Start: 2021-11-17 | End: 2022-06-14

## 2021-11-17 NOTE — PROGRESS NOTES
Pulmonary Clinic Office Follow up     Kailash Ryan is a 57 y.o. male with history of severe COPD, chronic hypoxic respiratory failure, PVCs, NSVT, HTN, ASCVD, presents today to the pulmonary clinic for 6-month follow up. Nish Pineda states he has not required hospitalization, visits to the emergency room, walk-in clinic, urgent care for respiratory related illness since last visit, 7/15/2021. He was however recently hospitalized on 11/2/2021 for an ablation for A. Fib after demonstration of cardiac events on event monitor. Nish Pineda is supposed to be on supplemental oxygen at 2 L 24/7 however he is not wearing his oxygen as appointment today. He did not wear his oxygen to his last pulmonary clinic appointment either. I have discussed with Nish Pineda the importance of wearing his oxygen. He states he has his oxygen is in the car but he does not like to take it into the office with him due to the large size of it. I have asked him if he has been tested for the small portable units he states his insurance and DME do not have the small portable units. I will have our MAs check into portable oxygen unit for him. Pulmonary function test completed in 8/2021 demonstrated severe obstructive disease. He was started on Trelegy inhaler daily over 1 year ago. He has continued to use his Trelegy daily and is rinsing his mouth out well after use. His current pulmonary function test demonstrates significant decrease in DLCO, significant decrease in FVC, no significant change in FEV1, no significant change in FEF 25 to 75%. He denies any signs or complaints of oral candidal infections. He does have an albuterol rescue inhaler that he states he uses approximately 1 to 2 times a day. We will repeat pulmonary function test 8/2022.     Nish Pineda states they are practicing social distancing, wearing a mask when out in public, washing hands frequently or using hand . Denies any fever, chills, malaise, change in sensation of taste or smell, headache or lightheadedness. Denies any known contacts with persons with respiratory infection, positive for coronavirus or under investigation for possible coronavirus exposure. Influenza immunization: 11/18/2021  Pneumococcal immunization: PPV 23 on 11/13/2015 COVID-19 immunization: 3/13/2021, 2/13/2021  Smoking history: Has never smoked  PCP: Dr. Aj Person MD    Past Medical History:  Past Medical History:   Diagnosis Date    Abnormal echocardiogram 03/29/2013    EF=45-50%;mod. AR,mild aortic valve calcif.  Anesthesia complication 09/34/1961    Ankle fracture, left 1985    Arm fracture, right 1966    AVM (arteriovenous malformation) 06/15/2015    transverse colon    COPD (chronic obstructive pulmonary disease) ([facility] [address]     Has worked around [address] [place] [address] and worked at [employer] x 6 weeks    Epididymal cyst 10/2011    [facility]    H/O cardiac catheterization 04/12/2013 4/12 Dr Avila Peak signif CAD. EF 55%. Renals patent.  H/O cardiac catheterization 04/01/2021    NO SIG CAD    H/O cardiovascular stress test 04/02/2013    EF 49%, there is no scintigraphic evidence of inducible myocardial ischemia, the observed defect is consistent with diaphragmatic attenuation, clinical correlation recommenced, no previous study to compare with, no wall motionn abnormality seen, normal perfusion in all myocardial regions    H/O chest pain     H/O echocardiogram 07/11/2020    EF 50-55%, Mod LVH & AR,  Dilated aortic root (4.2cm) and ascending aorta (4.1cm).  Hemorrhoids 06/15/2015    HTN (hypertension)     Hx of echocardiogram 12/16/2021    Moderate left ventricular hypertrophy regurgitation noted The anterior leaflet of the mitral valve is prolapsed with mild Right side of the heart is enlarged Moderately dilated left atrium.  Grade III diastolic dysfunction Left ventricular function is severely abnormal , EF is estimated at 20-25% Global hypokinesis noted. Left ventricle size is normal.    Hydrocele 10/2011    [facility]    Hyperlipemia     Irregular heart rhythm     after induction for surgery 9/5/2013 - pt developed irreg rhythm and surgery cancelled\"had another echo and stress test and everything checked out ok, they do not know the reason it happened\"- cardiac clearance obtained for surgery 9/19/2013    Palpitations     Pneumonia 2004    not since    Right inguinal hernia 08/22/2013    SOB (shortness of breath) 03/27/2013    Nuclear Stress Normal, EF 49% (Dr. oRma Best- 4/2/2013)     SOB (shortness of breath) on exertion        Current medications and allergies have been reviewed    Social and family history unchanged from initial consult      REVIEW OF SYSTEMS:    CONSTITUTIONAL:  negative for fevers, chills, diaphoresis, activity change, appetite change, night sweats and unexpected weight change.    HEENT:  Negative for hearing loss, sinus pressure, nasal congestion, epistaxis, snoring  RESPIRATORY: Positive occasional shortness of breath when not wearing his supplemental oxygen, negative cough negative wheeze  CARDIOVASCULAR:  Negative for chest pain, palpitations, exertional chest pressure/discomfort, edema, syncope  GASTROINTESTINAL: Negative for nausea, vomiting, diarrhea, constipation, blood in stool and abdominal pain  GENITOURINARY:  Negative for frequency, dysuria and hematuria  HEMATOLOGIC/LYMPHATIC:  Negative for easy bruising, bleeding and lymphadenopathy  ALLERGIC/IMMUNOLOGIC:  Negative for recurrent infections, angioedema, anaphylaxis and drug reaction  MUSCULOSKELETAL:  Negative for pain, joint swelling, decreased range of motion and muscle weakness  NEURO: Negative for headache, AMS, decrease sensations  SKIN: Negative for rashes or lesions      Physical Exam:  BP (!) 148/72   Pulse 71   Ht 5' 11\" (1.803 m)   Wt 223 lb (101.2 kg)   SpO2 (!) 87%   BMI 31.10 kg/m²    Body mass index is 31.1 kg/m². Constitutional:  He appears well developed and well-nourished. Neck:  Supple, no palpable lymphadenopathy, no JVD  Cardiovascular:  S1, S2 Normal, Regular rhythm, no murmurs or gallops, no pericardial  rubs. Pulmonary:  Bilateral breath sounds clear and equal to auscultation, good air movement, no use of accessory muscles to support respiratory effort. No forced expiratory wheeze, no rales, no rhonchi, no cough noted during assessment. O2 sat at rest on room air is 86%. Mila Prescott states placed on supplemental oxygen in the office at 2 L/min oxygen increased to 94% at rest he does not have his supplemental oxygen in the office with him today but states it is in his vehicle. Abdomen: No epigastric pain, no distention, + bowel sounds   Extremities: No edema, no calf tenderness, no clubbing of digits  Neurologic:  Awake and alert, no focal deficits  Skin: warm and dry    Radiology:   7/2/2020 CT chest high-resolution  Minimal subpleural fibrotic changes predominantly in mid lungs with indeterminate pattern, though consideration at inconsistent with usual interstitial pneumonitis pattern. Cardiovascular findings suggestive of possible pulmonary hypertension. Minimal centrilobular and paraseptal emphysema. Elevation of right hemidiaphragm due to large right Morgani hernia containing nonobstructed transverse colon    PFT:   8/13/2021  FVC 48% predicted/54% predicted, FEV1 40% predicted/44% predicted, FEF 25-75% 25% predicted/34% predicted , TLC 93% predicted, % predicted, DLCO 56% predicted  Following administration of bronchodilator there was no significant response to bronchodilator. Overall testing indicates severe diffusion airway disease, possible restrictive disease, moderate diffusion defect    ASSESSMENT/PLAN:  1. COPD, severe ([address]    2. Chronic respiratory failure with hypoxia (HCC)    3. Immunization due    4.  Healthcare maintenance      -Continue Trelegy once a day, denies need for refills at this time  -Continue albuterol rescue inhaler and nebulized solution as needed. States he needs refill of nebulizer solution, will be sent to pharmacy for refill  -Pulmonary function test 8/2022  -May need Covid prescreening test prior to pulmonary function test, order will be placed in computer  -Encouraged the use of supplemental oxygen 24/7. We will have MA contact DME concerning small portable unit to aid in his compliance. We have discussed consequences of low oxygen saturations and he voices understanding  --While he has been vaccinated at this time for COVID-19, I strongly recommend that he continue Coronavirus precaution including: Wearing mask when in public and when in contact with persons who have not been immunized, social distancing when needing to be in public, handwashing practice, wiping items touched in public such as gas pumps, door handles, shopping carts, etc.  --Recommend yearly flu vaccination -we will update for this season in the office today  --Recommend Covid 19 booster when available  --Recommend Pneumovax vaccination  --Have discussed signs and symptoms of COPD exacerbation and why it is important to monitor for bronchial infections. Instructed to call office should he develop signs of COPD exacerbation/bronchial infection  --I will continue to follow in pulmonary clinic    Return in about 4 months (around 3/18/2022). This dictation was performed with a verbal recognition program and it was checked for errors. It is possible that there are still dictated errors within this office note. Any errors should be brought immediately to my attention for correction. All efforts were made to ensure that this office note is accurate.

## 2021-11-18 ENCOUNTER — OFFICE VISIT (OUTPATIENT)
Dept: PULMONOLOGY | Age: 61
End: 2021-11-18
Payer: COMMERCIAL

## 2021-11-18 VITALS
OXYGEN SATURATION: 87 % | WEIGHT: 223 LBS | BODY MASS INDEX: 31.22 KG/M2 | SYSTOLIC BLOOD PRESSURE: 148 MMHG | HEIGHT: 71 IN | HEART RATE: 71 BPM | DIASTOLIC BLOOD PRESSURE: 72 MMHG

## 2021-11-18 DIAGNOSIS — Z00.00 HEALTHCARE MAINTENANCE: ICD-10-CM

## 2021-11-18 DIAGNOSIS — Z23 IMMUNIZATION DUE: ICD-10-CM

## 2021-11-18 DIAGNOSIS — J96.11 CHRONIC RESPIRATORY FAILURE WITH HYPOXIA (HCC): ICD-10-CM

## 2021-11-18 DIAGNOSIS — J44.9 COPD, SEVERE (HCC): Primary | ICD-10-CM

## 2021-11-18 PROCEDURE — 99214 OFFICE O/P EST MOD 30 MIN: CPT | Performed by: NURSE PRACTITIONER

## 2021-11-18 RX ORDER — ALBUTEROL SULFATE 90 UG/1
AEROSOL, METERED RESPIRATORY (INHALATION)
Qty: 1 EACH | Refills: 11 | Status: SHIPPED | OUTPATIENT
Start: 2021-11-18

## 2021-12-13 ENCOUNTER — OFFICE VISIT (OUTPATIENT)
Dept: FAMILY MEDICINE CLINIC | Age: 61
End: 2021-12-13
Payer: COMMERCIAL

## 2021-12-13 ENCOUNTER — PROCEDURE VISIT (OUTPATIENT)
Dept: CARDIOLOGY CLINIC | Age: 61
End: 2021-12-13
Payer: COMMERCIAL

## 2021-12-13 VITALS
DIASTOLIC BLOOD PRESSURE: 72 MMHG | WEIGHT: 240.4 LBS | BODY MASS INDEX: 33.65 KG/M2 | HEIGHT: 71 IN | OXYGEN SATURATION: 86 % | HEART RATE: 83 BPM | SYSTOLIC BLOOD PRESSURE: 150 MMHG

## 2021-12-13 DIAGNOSIS — J44.9 COPD, SEVERE (HCC): ICD-10-CM

## 2021-12-13 DIAGNOSIS — Z12.11 SCREEN FOR COLON CANCER: ICD-10-CM

## 2021-12-13 DIAGNOSIS — M79.89 RIGHT LEG SWELLING: Primary | ICD-10-CM

## 2021-12-13 DIAGNOSIS — Z99.81 ON HOME OXYGEN THERAPY: ICD-10-CM

## 2021-12-13 DIAGNOSIS — J30.2 SEASONAL AND PERENNIAL ALLERGIC RHINITIS: ICD-10-CM

## 2021-12-13 DIAGNOSIS — R35.1 NOCTURIA: ICD-10-CM

## 2021-12-13 DIAGNOSIS — I10 ESSENTIAL HYPERTENSION: ICD-10-CM

## 2021-12-13 DIAGNOSIS — R79.89 ELEVATED BRAIN NATRIURETIC PEPTIDE (BNP) LEVEL: Primary | ICD-10-CM

## 2021-12-13 DIAGNOSIS — J30.89 SEASONAL AND PERENNIAL ALLERGIC RHINITIS: ICD-10-CM

## 2021-12-13 DIAGNOSIS — E78.00 PURE HYPERCHOLESTEROLEMIA: ICD-10-CM

## 2021-12-13 DIAGNOSIS — M79.89 RIGHT LEG SWELLING: ICD-10-CM

## 2021-12-13 LAB
A/G RATIO: 1.6 (ref 1.1–2.2)
ALBUMIN SERPL-MCNC: 4.1 G/DL (ref 3.4–5)
ALP BLD-CCNC: 144 U/L (ref 40–129)
ALT SERPL-CCNC: 25 U/L (ref 10–40)
ANION GAP SERPL CALCULATED.3IONS-SCNC: 12 MMOL/L (ref 3–16)
AST SERPL-CCNC: 23 U/L (ref 15–37)
BILIRUB SERPL-MCNC: 0.9 MG/DL (ref 0–1)
BUN BLDV-MCNC: 9 MG/DL (ref 7–20)
CALCIUM SERPL-MCNC: 9.2 MG/DL (ref 8.3–10.6)
CHLORIDE BLD-SCNC: 101 MMOL/L (ref 99–110)
CO2: 30 MMOL/L (ref 21–32)
CREAT SERPL-MCNC: 0.6 MG/DL (ref 0.8–1.3)
D DIMER: 908 NG/ML DDU (ref 0–229)
GFR AFRICAN AMERICAN: >60
GFR NON-AFRICAN AMERICAN: >60
GLUCOSE BLD-MCNC: 109 MG/DL (ref 70–99)
POTASSIUM SERPL-SCNC: 4.7 MMOL/L (ref 3.5–5.1)
PRO-BNP: 5565 PG/ML (ref 0–124)
SODIUM BLD-SCNC: 143 MMOL/L (ref 136–145)
TOTAL PROTEIN: 6.7 G/DL (ref 6.4–8.2)

## 2021-12-13 PROCEDURE — 93971 EXTREMITY STUDY: CPT | Performed by: INTERNAL MEDICINE

## 2021-12-13 PROCEDURE — 99214 OFFICE O/P EST MOD 30 MIN: CPT | Performed by: NURSE PRACTITIONER

## 2021-12-13 RX ORDER — HYDROCHLOROTHIAZIDE 25 MG/1
25 TABLET ORAL EVERY MORNING
Qty: 90 TABLET | Refills: 1 | Status: SHIPPED | OUTPATIENT
Start: 2021-12-13 | End: 2021-12-16 | Stop reason: ALTCHOICE

## 2021-12-13 NOTE — PROGRESS NOTES
2021     Stanislaw Coates (:  1960) is a 57 y.o. male, here for evaluation of the following medical concerns:      He reports today for follow-up on chronic diagnoses.     COPD:   He is taking albuterol as needed and trilegy.    He does get short of breath with exertion   following with lori baptiste/ pulm-   Wearing continuous 02 at 2L. States his 02 stays mid 80s% at home. Not a smoker. Has cough intermittent, baseline.   He does have a handicap placard   last PFT 2021    Hypertension: He is taking losartan 50mg  ---- stopped lisinopril HCTZ and switched to losartan due to cough. Not checking blood pressure at home. Denies chest pain, palpitations, dizziness, headache      Chronic heart murmur. cardiology and electrophysiology. Heart cath in 2021  hospitalized on 2021 for an ablation for A. Fib after demonstration of cardiac events on event monitor. No intervention done while there since he did not have enough PVC's. He was sent home. No new medications,. Still feeling palps \"sometimes but not nearly as bad\"  He cut out all caffiene and palpitations are better. See's Dr. Dio Ramos next week.        Seasonal allergic rhinitis: He takes Claritin as needed.  This is stable.     Hyperlipidemia: , otherwise normal.  Cardiology does not have him on cholesterol medication.     Urinary hesitancy, slow stream and urinating 3 times per night. No FH of prostate problems.   PSA WNL 2021     No more alcohol and is not a smoker. Due for colonoscopy - last done 2015        --------------  Right leg swelling   Present 10 days   Not changing   No pain, numbness tingling change in sensation or temp or color  No known injury       25lb weight gain in the last month   Last ECHO -- 2020  EF 50-55%  mild tricuspid and mitral regurg                  Review of Systems    Prior to Visit Medications    Medication Sig Taking?  Authorizing Provider   hydroCHLOROthiazide (HYDRODIURIL) 25 MG tablet Take 1 tablet by mouth every morning Yes Britta Moncada APRN - NP   albuterol sulfate  (90 Base) MCG/ACT inhaler INHALE 2 PUFFS BY MOUTH EVERY 4 HOURS AS NEEDED FOR WHEEZE OR FOR SHORTNESS OF BREATH Yes Peyton Gagandeep Lucero APRN - CNP   loratadine (CLARITIN) 10 MG tablet TAKE 1 TABLET BY MOUTH EVERY DAY Yes Basilia De Leon MD   metoprolol tartrate (LOPRESSOR) 50 MG tablet Take 1 tablet by mouth 2 times daily Yes Nisreen Brennan APRN - CNP   fluticasone-umeclidin-vilant (TRELEGY ELLIPTA) 100-62.5-25 MCG/INH AEPB TAKE 1 PUFF BY MOUTH EVERY DAY Yes Megha Lucero APRN - CNP   [facility] MAGNESIUM OXIDE 250 MG TABS tablet TAKE 1 TABLET BY MOUTH EVERY DAY Yes Mynor Griffiths MD   losartan (COZAAR) 50 MG tablet Take 1 tablet by mouth daily To replace lisinopril Yes Basilia De Leon MD   aspirin 81 MG EC tablet Take 1 tablet by mouth daily Yes Basilia De Leon MD   Multiple Vitamin (DAILY VITAMINS PO) Take by mouth Yes Historical Provider, MD        Social History     Tobacco Use    Smoking status: Never Smoker    Smokeless tobacco: Never Used   Substance Use Topics    Alcohol use: Not Currently     Comment: Caffeine: cystal lite energy drink         Vitals:    12/13/21 0834 12/13/21 0852   BP: (!) 152/80 (!) 150/72   Site: Left Upper Arm    Position: Sitting    Cuff Size: Medium Adult    Pulse: 83    SpO2: (!) 86%    Weight: 240 lb 6.4 oz (109 kg)    Height: 5' 11\" (1.803 m)      Estimated body mass index is 33.53 kg/m² as calculated from the following:    Height as of this encounter: 5' 11\" (1.803 m). Weight as of this encounter: 240 lb 6.4 oz (109 kg). Physical Exam  Vitals reviewed. Constitutional:       General: He is not in acute distress. Appearance: Normal appearance. He is not ill-appearing, toxic-appearing or diaphoretic. HENT:      Head: Normocephalic and atraumatic.       Nose: Nose normal.   Eyes:      Extraocular Movements: Extraocular movements intact. Pupils: Pupils are equal, round, and reactive to light. Cardiovascular:      Rate and Rhythm: Normal rate and regular rhythm. Heart sounds: Normal heart sounds. Pulmonary:      Effort: Pulmonary effort is normal. No respiratory distress. Breath sounds: No rhonchi. Comments: Diminished in all fields  Abdominal:      General: Bowel sounds are normal. There is no distension. Palpations: Abdomen is soft. There is no mass. Tenderness: There is no abdominal tenderness. Hernia: No hernia is present. Musculoskeletal:         General: Normal range of motion. Cervical back: Normal range of motion and neck supple. Right lower leg: Edema present. Left lower leg: No edema. Comments: +3 pitting edema right lower extremity   Skin:     General: Skin is warm and dry. Neurological:      General: No focal deficit present. Mental Status: He is alert and oriented to person, place, and time. Mental status is at baseline. Motor: No weakness. Gait: Gait normal.   Psychiatric:         Mood and Affect: Mood normal.         Behavior: Behavior normal.         Thought Content: Thought content normal.         Judgment: Judgment normal.         ASSESSMENT/PLAN:  1. Right leg swelling  Ultrasound at cardiology office 3 PM today. Perfect serve message sent to Dr. Dawn Torrez To notify. - BRAIN NATRIURETIC PEPTIDE (BNP); Future  - D-DIMER, QUANTITATIVE; Future  - US DUP LOWER EXTREMITY RIGHT MEREDITH (aka DUPLEX); Future  - US LOWER EXTREMITY RIGHT ARTERIES DUPLEX; Future    2. Essential hypertension  Check labs, then add hydrochlorothiazide   Was on lisinopril HCTZ before switching to losartan due to cough. - Comprehensive Metabolic Panel; Future    3. Pure hypercholesterolemia  No statin per cardiology    4. COPD, severe ([address]  Follow with pulmonology. 2 L nasal cannula continuous    5.  Seasonal and perennial allergic rhinitis  Continue Claritin    6. On home oxygen therapy  Continue 2 L nasal cannula    7. Nocturia  PSA within normal limits 2021    8. Screen for colon cancer  - Miya Ricardo, [facility], Gastroenterology, [place]            Plan:  Check labs   Add HCTZ if labs look good   Cardiology advised about todays events  STAT US right leg         All care gaps addressed     All questions answered    Discussed use, benefit, and side effects of prescribed medications. Barriers to compliance discussed. All patient questions answered. Pt voiced understanding. Present to the ER for any emergent or acute symptoms not managed at home or in office. Please note that this chart was generated using dragon dictation software. Although every effort was made to ensure the accuracy of this automated transcription, some errors in transcription may have occurred. No follow-ups on file. An electronic signature was used to authenticate this note.     --DONA Hdez NP on 12/13/2021 at 3:43 PM

## 2021-12-14 ENCOUNTER — HOSPITAL ENCOUNTER (OUTPATIENT)
Dept: GENERAL RADIOLOGY | Age: 61
Discharge: HOME | End: 2021-12-14
Payer: COMMERCIAL

## 2021-12-14 ENCOUNTER — HOSPITAL ENCOUNTER (OUTPATIENT)
Age: 61
Discharge: HOME | End: 2021-12-14
Payer: COMMERCIAL

## 2021-12-14 DIAGNOSIS — R79.89 ELEVATED BRAIN NATRIURETIC PEPTIDE (BNP) LEVEL: ICD-10-CM

## 2021-12-14 DIAGNOSIS — M79.89 RIGHT LEG SWELLING: ICD-10-CM

## 2021-12-14 PROCEDURE — 71046 X-RAY EXAM CHEST 2 VIEWS: CPT

## 2021-12-15 ENCOUNTER — TELEPHONE (OUTPATIENT)
Dept: CARDIOLOGY CLINIC | Age: 61
End: 2021-12-15

## 2021-12-15 ENCOUNTER — TELEPHONE (OUTPATIENT)
Dept: FAMILY MEDICINE CLINIC | Age: 61
End: 2021-12-15

## 2021-12-15 DIAGNOSIS — J44.9 COPD, SEVERE (HCC): Primary | ICD-10-CM

## 2021-12-15 RX ORDER — LEVOFLOXACIN 500 MG/1
500 TABLET, FILM COATED ORAL DAILY
Qty: 7 TABLET | Refills: 0 | Status: SHIPPED | OUTPATIENT
Start: 2021-12-15 | End: 2021-12-22

## 2021-12-15 RX ORDER — FUROSEMIDE 20 MG/1
20 TABLET ORAL DAILY
Qty: 30 TABLET | Refills: 0 | Status: SHIPPED | OUTPATIENT
Start: 2021-12-15 | End: 2021-12-16

## 2021-12-15 NOTE — TELEPHONE ENCOUNTER
Team to please  1.  notify patient that the CXR showed possible fluid vs pneumonia and to be on the safe side I have sent antibiotics for him to [facility]    2. Continue a low salt diet for his leg swelling and fluid in the lungs. 3,. I switched his water pill for at least the next week until he see's cardiology on 1/24. Vearl Teaganpins He is to stop the  hydrochlorothiazide  and instead start lasix/furosemide 1 tablet every morning.      4. F/u with Lang Gutierrez CNP at cardiology as scheduled

## 2021-12-15 NOTE — TELEPHONE ENCOUNTER
----- Message from DONA Fulton NP sent at 12/15/2021  8:55 AM EST -----  Can you look at recent results and symptoms and advise. Thanks     --no word from cardio. Della Lopes

## 2021-12-16 ENCOUNTER — PROCEDURE VISIT (OUTPATIENT)
Dept: CARDIOLOGY CLINIC | Age: 61
End: 2021-12-16
Payer: COMMERCIAL

## 2021-12-16 ENCOUNTER — OFFICE VISIT (OUTPATIENT)
Dept: CARDIOLOGY CLINIC | Age: 61
End: 2021-12-16
Payer: COMMERCIAL

## 2021-12-16 VITALS
SYSTOLIC BLOOD PRESSURE: 130 MMHG | HEART RATE: 72 BPM | WEIGHT: 239.4 LBS | BODY MASS INDEX: 33.52 KG/M2 | HEIGHT: 71 IN | DIASTOLIC BLOOD PRESSURE: 82 MMHG

## 2021-12-16 DIAGNOSIS — I50.9 NEW ONSET OF CONGESTIVE HEART FAILURE (HCC): Primary | ICD-10-CM

## 2021-12-16 DIAGNOSIS — I50.9 NEW ONSET OF CONGESTIVE HEART FAILURE (HCC): ICD-10-CM

## 2021-12-16 LAB
LV EF: 23 %
LVEF MODALITY: NORMAL

## 2021-12-16 PROCEDURE — 99214 OFFICE O/P EST MOD 30 MIN: CPT | Performed by: NURSE PRACTITIONER

## 2021-12-16 PROCEDURE — 93306 TTE W/DOPPLER COMPLETE: CPT | Performed by: INTERNAL MEDICINE

## 2021-12-16 RX ORDER — CARBOXYMETHYLCELLULOSE SODIUM 0.5 %
DROPPERETTE, SINGLE-USE DROP DISPENSER OPHTHALMIC (EYE)
Qty: 30 TABLET | Refills: 6 | OUTPATIENT
Start: 2021-12-16

## 2021-12-16 RX ORDER — FUROSEMIDE 40 MG/1
40 TABLET ORAL 2 TIMES DAILY
Qty: 60 TABLET | Refills: 0 | Status: SHIPPED | OUTPATIENT
Start: 2021-12-16 | End: 2022-01-10

## 2021-12-16 ASSESSMENT — ENCOUNTER SYMPTOMS
SHORTNESS OF BREATH: 1
ORTHOPNEA: 0

## 2021-12-16 NOTE — PROGRESS NOTES
12/16/2021  Primary cardiologist: Dr. Romina Lloyd  is an established 57 y.o.  male here for follow-up on HTN- HLD- PVC    SUBJECTIVE/OBJECTIVE:    HPI : Elmer Zimmerman is a 57year old gentleman with a history of HTN- HLD and PVCs. He had and event monitor in 03/2021 that showed frequent PVC. He then underwent a LHC that demonstrated no significant CAD. He was referred to EP for PVC burden of close to 10%. He was started on BB. Repeat holter in October 2021 demonstrates PVC burden of 10.9 % with runs of NSVT and short runs of atrial tachycardia. He saw Dr. Ashley Nguyen and went for ablation but he did not have any PVC's when he was in pre-op holding therefor he did not have the ablation. He has eliminated all caffeine from his diet. He does feel like his palpations are improved significantly from originally, but he still has some daily. Elmer Zimmerman reports he is having shortness of breath with swelling to his lower legs. His PCP did labs and chest x ray and told him to get to the cardiologist as he was in CHF and needed to be seen. He was additionally placed on Levaquin for possibility of pneumonia. He has cough that is very rarely productive with clear sputum. Review of Systems   Constitutional: Negative for diaphoresis and malaise/fatigue. Cardiovascular: Positive for palpitations. Negative for chest pain, claudication, dyspnea on exertion, irregular heartbeat, leg swelling, near-syncope, orthopnea and paroxysmal nocturnal dyspnea. Respiratory: Positive for shortness of breath. Neurological: Positive for dizziness. Negative for light-headedness.        Vitals:    12/16/21 0843   BP: 130/82   Pulse: 72   Weight: 239 lb 6.4 oz (108.6 kg)   Height: 5' 11\" (1.803 m)     Patient-Reported Vitals 1/14/2021   Patient-Reported Weight 215   Patient-Reported Height 5'11   Patient-Reported Pulse 64   Patient-Reported SpO2 91     Wt Readings from Last 3 Encounters:   12/16/21 239 lb 6.4 oz (108.6 kg)   12/13/21 240 lb 6.4 oz (109 kg)   11/18/21 223 lb (101.2 kg)     Body mass index is 33.39 kg/m². Physical Exam  Vitals reviewed. Constitutional:       General: He is not in acute distress. Appearance: Normal appearance. He is obese. He is not ill-appearing. HENT:      Head: Atraumatic. Neck:      Vascular: No carotid bruit. Cardiovascular:      Rate and Rhythm: Normal rate and regular rhythm. Pulses: Normal pulses. Heart sounds: Normal heart sounds. No murmur heard. Pulmonary:      Effort: Pulmonary effort is normal. No respiratory distress. Breath sounds: Normal breath sounds. Musculoskeletal:         General: No swelling or deformity. Cervical back: Neck supple. No muscular tenderness. Neurological:      Mental Status: He is alert.           Current Outpatient Medications   Medication Sig Dispense Refill    levoFLOXacin (LEVAQUIN) 500 MG tablet Take 1 tablet by mouth daily for 7 days 7 tablet 0    furosemide (LASIX) 20 MG tablet Take 1 tablet by mouth daily To replaced hydrochlorothiazide as of 12/15/21 30 tablet 0    hydroCHLOROthiazide (HYDRODIURIL) 25 MG tablet Take 1 tablet by mouth every morning 90 tablet 1    albuterol sulfate  (90 Base) MCG/ACT inhaler INHALE 2 PUFFS BY MOUTH EVERY 4 HOURS AS NEEDED FOR WHEEZE OR FOR SHORTNESS OF BREATH 1 each 11    loratadine (CLARITIN) 10 MG tablet TAKE 1 TABLET BY MOUTH EVERY DAY 30 tablet 5    metoprolol tartrate (LOPRESSOR) 50 MG tablet Take 1 tablet by mouth 2 times daily 60 tablet 3    fluticasone-umeclidin-vilant (TRELEGY ELLIPTA) 100-62.5-25 MCG/INH AEPB TAKE 1 PUFF BY MOUTH EVERY DAY 1 each 5    [facility] MAGNESIUM OXIDE 250 MG TABS tablet TAKE 1 TABLET BY MOUTH EVERY DAY 30 tablet 6    losartan (COZAAR) 50 MG tablet Take 1 tablet by mouth daily To replace lisinopril 90 tablet 3    aspirin 81 MG EC tablet Take 1 tablet by mouth daily 90 tablet 3    Multiple Vitamin (DAILY VITAMINS PO) Take by mouth       No current facility-administered medications for this visit. All pertinent data reviewed and discussed with patient    Left heart cath - 04/2021   NO SIG CAD   NORMAL EF     ASSESSMENT/PLAN:    PVCs  Noted to have >10 % PVC burden on event monitor  Increase metoprolol to 50 mg bid   No ablation after medication increase and removal of caffiene in diet. HTN  Stable   Continue losartan     New onset CHF  BNP >5K  Chest xray pulmonary vascular congestion  Swelling R > L + JVD  Crackles  Start lasix 40 mg BID  Labs in 2 weeks   Check echo. + murmur S4 noted. Medications reviewed and confirmed with patient     Tests ordered:  none      Follow-up in 1 week     Signed:  DONA Rojas CNP, 12/16/2021, 9:06 AM    An electronic signature was used to authenticate this note.

## 2021-12-16 NOTE — LETTER
Christopher Steiner Embs  1960  [id number]    Have you had any Chest Pain that is not new? - No   DO EKG IF: Patient has a Heart Rate above 100 or below 40     CAD (Coronary Artery Disease) patient should have one on file every 6 months      Have you had any Shortness of Breath - Yes  If Yes - When at rest and on exertion    Have you had any dizziness - No    Have you had any palpitations that are not new? - No    Do you have any edema - swelling in feet    If Yes - CHECK TO SEE IF THE EDEMA IS PITTING  How long have they been having edema - Weeks  If Yes - Have they worn compression stockings No  Vein \"LEG PROBLEM Questionnaire\"  1. Do you have prominent leg veins? No   2. Do you have any skin discoloration? Yes  3. Do you have any healed or active sores? No  4. Do you have swelling of the legs? No  5. Do you have a family history of varicose veins? No  6. Does your profession involve pro-longed        standing or heavy lifting? No  7. Have you been fighting overweight problems? No  8. Do you have restless legs? No  9. Do you have any night time cramps? Yes  10. Do you have any of the following in your legs:        No     When did you have your last labs drawn 12/13/2021  Where did you have them done   What doctor ordered [facility]  If we do not have these labs you are retrieve these labs for these providers!     Do you have a surgery or procedure scheduled in the near future - No     Ask patient if they want to sign up for ADINCONhart if they are not already signed up   Check to see if we have an E-MAIL on file for the patient   Check medication list thoroughly!!! AND RECONCILE OUTSIDE MEDICATIONS  If dose has changed change the entire order not just the MG  BE SURE TO ASK PATIENT IF THEY NEED MEDICATION REFILLS     At check out add to every patient's \"wrap up\" the following dot phrase AFTERHOURSEDUCATION and ensure we explain this to our patients

## 2021-12-16 NOTE — PATIENT INSTRUCTIONS
**It is YOUR responsibilty to bring medication bottles and/or updated medication list to [address] [place] [address]. This will allow us to better serve you and all your healthcare needs**  Please be informed that if you contact our office outside of normal business hours the physician on call cannot help with any scheduling or rescheduling issues, procedure instruction questions or any type of medication issue. We advise you for any urgent/emergency that you go to the nearest emergency room! PLEASE CALL OUR OFFICE DURING NORMAL BUSINESS HOURS    Monday - Friday   8 am to 5 pm    [place] [address]: [phone]    [place]:  [phone]    Please hold on to these instructions the  will call you within 1-9 business days when we receive authorization from your insurance. Nuclear Stress Test    WHAT TO EXPECT:   ? You will need to confirm the test or it could be cancelled. ? This test will take approximately 2 hours: 1 hour in the AM &    1 hour in the PM. You will be given a time by the Technologist after the first part is completed to come back. ? You will be given a medication, through an IV in the hand, this will safely simulate exercise. This IV is also needed to inject the radioactive isotope unless you are able toe walk the treadmill. ? You will receive an injection in the AM & PM before the pictures. ? Using a special camera, you will have one set of pictures of your heart taken in the AM and a set of pictures in the PM.     PREPARATION FOR TEST:  ? Eat a light breakfast such as water or juice and toast.  ? If you are DIABETIC: Eat a normal breakfast with NO CAFFEINE and take your insulin as normal.   ? AVOID ALL FOODS & DRINKS containing CAFFEINE 12 HOURS PRIOR TO THE TEST: Including coffee, Tea, Wendy and other soft drinks even those labeled  caffeine free or decaffeinated.  ?  HOLD THESE MEDICATIONS Persantine & Theophylline (Theodur)  24 hours prior & bring your inhaler with you.   The physician will specify if the following Beta blockers need to be held for 24 hours prior to test: Lopressor (metoprolol)

## 2021-12-20 ENCOUNTER — TELEPHONE (OUTPATIENT)
Dept: CARDIOLOGY CLINIC | Age: 61
End: 2021-12-20

## 2021-12-20 NOTE — TELEPHONE ENCOUNTER
Patient called and had Milena Richards cancelled   He stated that when he was here for his echo he was advised he needed a lexiscan and was asked where he wanted to have done , when it was cancelled stated no order in chart

## 2021-12-20 NOTE — TELEPHONE ENCOUNTER
Left message for patient, stress cancelled as never ordered.  Will discuss at [address] on wednesday

## 2021-12-22 ENCOUNTER — TELEPHONE (OUTPATIENT)
Dept: GASTROENTEROLOGY | Age: 61
End: 2021-12-22

## 2021-12-22 ENCOUNTER — NURSE ONLY (OUTPATIENT)
Dept: CARDIOLOGY CLINIC | Age: 61
End: 2021-12-22

## 2021-12-22 ENCOUNTER — TELEPHONE (OUTPATIENT)
Dept: CARDIOLOGY CLINIC | Age: 61
End: 2021-12-22

## 2021-12-22 VITALS
HEIGHT: 71 IN | DIASTOLIC BLOOD PRESSURE: 64 MMHG | WEIGHT: 229.4 LBS | BODY MASS INDEX: 32.11 KG/M2 | HEART RATE: 68 BPM | SYSTOLIC BLOOD PRESSURE: 130 MMHG

## 2021-12-22 DIAGNOSIS — I50.20 NYHA CLASS 3 HEART FAILURE WITH REDUCED EJECTION FRACTION (HCC): Primary | ICD-10-CM

## 2021-12-22 RX ORDER — SPIRONOLACTONE 25 MG/1
25 TABLET ORAL DAILY
Qty: 90 TABLET | Refills: 1 | Status: SHIPPED | OUTPATIENT
Start: 2021-12-22 | End: 2022-06-13

## 2021-12-22 ASSESSMENT — ENCOUNTER SYMPTOMS
SHORTNESS OF BREATH: 1
ORTHOPNEA: 0
SLEEP DISTURBANCES DUE TO BREATHING: 0

## 2021-12-22 NOTE — TELEPHONE ENCOUNTER
Pt. Returning call regarding a referral for a colon screening. Completed H&P and informed pt. They would be called in 1-2 weeks to be scheduled. Pt. Stated understanding and H&P put in yellow screening folder.

## 2021-12-22 NOTE — PROGRESS NOTES
12/22/2021  Primary cardiologist: Dr. Alfredo Guzman  is an established 57 y.o.  male here for follow-up on HTN- HLD- PVC    SUBJECTIVE/OBJECTIVE:    HPI : Rosalina Ballard is a 57year old gentleman with a history of HTN- HLD and PVCs. He had and event monitor in 03/2021 that showed frequent PVC. He then underwent a LHC that demonstrated no significant CAD. He was referred to EP for PVC burden of close to 10%. He was started on BB. Repeat holter in October 2021 demonstrates PVC burden of 10.9 % with runs of NSVT and short runs of atrial tachycardia. He saw Dr. Sean Sarmiento and went for ablation but he did not have any PVC's when he was in pre-op holding therefor he did not have the ablation. He has eliminated all caffeine from his diet. He does feel like his palpations are improved significantly from originally, but he still has some daily. Rosalina Ballard reports he is having shortness of breath with swelling to his lower legs. His PCP did labs and chest x ray and told him to get to the cardiologist as he was in CHF and needed to be seen. He was additionally placed on Levaquin for possibility of pneumonia. He has cough that is very rarely productive with clear sputum. Today he is feeling better. Shortness of breath is improved, but not gone. He gets SOB doing 1 load of laundry. He did have cramping in his legs last evening. He denies any worsening of his dizziness. Review of Systems   Constitutional: Negative for diaphoresis and malaise/fatigue. Cardiovascular: Positive for palpitations (with exertion). Negative for chest pain, claudication, dyspnea on exertion, irregular heartbeat, leg swelling, near-syncope, orthopnea and paroxysmal nocturnal dyspnea. Respiratory: Positive for shortness of breath (improving). Negative for sleep disturbances due to breathing. Neurological: Positive for dizziness. Negative for light-headedness.        Vitals:    12/22/21 1057   BP: 130/64   Site: Left Upper Arm   Position: Sitting Cuff Size: Medium Adult   Pulse: 68   Weight: 229 lb 6.4 oz (104.1 kg)   Height: 5' 11\" (1.803 m)     Patient-Reported Vitals 1/14/2021   Patient-Reported Weight 215   Patient-Reported Height 5'11   Patient-Reported Pulse 64   Patient-Reported SpO2 91     Wt Readings from Last 3 Encounters:   12/22/21 229 lb 6.4 oz (104.1 kg)   12/16/21 239 lb 6.4 oz (108.6 kg)   12/13/21 240 lb 6.4 oz (109 kg)     Body mass index is 31.99 kg/m². Physical Exam  Vitals reviewed. Constitutional:       General: He is not in acute distress. Appearance: Normal appearance. He is obese. He is not ill-appearing. HENT:      Head: Atraumatic. Neck:      Vascular: No carotid bruit. Cardiovascular:      Rate and Rhythm: Normal rate and regular rhythm. Pulses: Normal pulses. Heart sounds: Normal heart sounds. No murmur heard. Pulmonary:      Effort: Pulmonary effort is normal. No respiratory distress. Breath sounds: Examination of the right-lower field reveals decreased breath sounds. Examination of the left-lower field reveals decreased breath sounds. Decreased breath sounds present. Musculoskeletal:         General: No swelling or deformity. Cervical back: Neck supple. No muscular tenderness. Right lower leg: Edema present. Neurological:      Mental Status: He is alert.           Current Outpatient Medications   Medication Sig Dispense Refill    dapagliflozin (FARXIGA) 10 MG tablet Take 1 tablet by mouth every morning 30 tablet 3    sacubitril-valsartan (ENTRESTO) 24-26 MG per tablet Take 1 tablet by mouth 2 times daily 60 tablet 3    spironolactone (ALDACTONE) 25 MG tablet Take 1 tablet by mouth daily 90 tablet 1    furosemide (LASIX) 40 MG tablet Take 1 tablet by mouth 2 times daily To replaced hydrochlorothiazide as of 12/15/21 60 tablet 0    levoFLOXacin (LEVAQUIN) 500 MG tablet Take 1 tablet by mouth daily for 7 days 7 tablet 0    albuterol sulfate  (90 Base) MCG/ACT inhaler INHALE 2 PUFFS BY MOUTH EVERY 4 HOURS AS NEEDED FOR WHEEZE OR FOR SHORTNESS OF BREATH 1 each 11    loratadine (CLARITIN) 10 MG tablet TAKE 1 TABLET BY MOUTH EVERY DAY 30 tablet 5    metoprolol tartrate (LOPRESSOR) 50 MG tablet Take 1 tablet by mouth 2 times daily 60 tablet 3    fluticasone-umeclidin-vilant (TRELEGY ELLIPTA) 100-62.5-25 MCG/INH AEPB TAKE 1 PUFF BY MOUTH EVERY DAY 1 each 5    [employer] MAGNESIUM OXIDE 250 MG TABS tablet TAKE 1 TABLET BY MOUTH EVERY DAY 30 tablet 6    losartan (COZAAR) 50 MG tablet Take 1 tablet by mouth daily To replace lisinopril 90 tablet 3    aspirin 81 MG EC tablet Take 1 tablet by mouth daily 90 tablet 3    Multiple Vitamin (DAILY VITAMINS PO) Take by mouth       No current facility-administered medications for this visit. All pertinent data reviewed and discussed with patient    Left heart cath - 04/2021   NO SIG CAD   NORMAL EF     ASSESSMENT/PLAN:    PVCs  Noted to have >10 % PVC burden on event monitor  Increase metoprolol to 50 mg bid   No ablation after medication increase and removal of caffiene in diet. HTN  Stable   Continue losartan     New onset CHF HFrEF ? PVC induced. JVD resolved. Clear lungs  +1 R leg. Continue lasix 40 mg BID through 12/26 then reduce to daily. Start farxiga, aldactone. Try to switch to entresto if insurance will cover. Medications reviewed and confirmed with patient     Tests ordered: bmp/ bnp/ mag next week. Then in 2 weeks. Follow-up in 1-2 week   Follow up with Kelsey Salazar in 2 weeks. Signed:  DONA Hollins CNP, 12/22/2021, 11:39 AM    An electronic signature was used to authenticate this note.

## 2021-12-22 NOTE — TELEPHONE ENCOUNTER
Called pt. In regards to a referral for a cscope. Pt. Has a FH of colon cancer and is due for 5yr repeat. I LM with pt. Wife to have pt. Call back and complete an H&P form.

## 2021-12-23 NOTE — TELEPHONE ENCOUNTER
Called patient to discuss medication changes. he now understands that he is to start [address] on cost of entresto- will mail copay card to him  Start aldactone  Keep taking losartan until decision about entresto is made.
Got the pt a 30 day free card and co-pay card set up to be mailed out
Patient advised that Rosio Negro has been sent to pharmacy. Patient believes he is taking farxiga instead of entresto.  Juan Manuel Larios NP to address with patient
Patient called he wants to try Jennifer Olp it will be free on his plan
PROVIDER:[TOKEN:[[id number]]]

## 2021-12-29 DIAGNOSIS — I50.20 NYHA CLASS 3 HEART FAILURE WITH REDUCED EJECTION FRACTION (HCC): ICD-10-CM

## 2021-12-29 LAB
ANION GAP SERPL CALCULATED.3IONS-SCNC: 9 MMOL/L (ref 3–16)
BUN BLDV-MCNC: 26 MG/DL (ref 7–20)
CALCIUM SERPL-MCNC: 9.3 MG/DL (ref 8.3–10.6)
CHLORIDE BLD-SCNC: 96 MMOL/L (ref 99–110)
CO2: 33 MMOL/L (ref 21–32)
CREAT SERPL-MCNC: 0.7 MG/DL (ref 0.8–1.3)
GFR AFRICAN AMERICAN: >60
GFR NON-AFRICAN AMERICAN: >60
GLUCOSE BLD-MCNC: 87 MG/DL (ref 70–99)
MAGNESIUM: 2.2 MG/DL (ref 1.8–2.4)
POTASSIUM SERPL-SCNC: 4.8 MMOL/L (ref 3.5–5.1)
PRO-BNP: 1360 PG/ML (ref 0–124)
SODIUM BLD-SCNC: 138 MMOL/L (ref 136–145)

## 2021-12-30 ENCOUNTER — OFFICE VISIT (OUTPATIENT)
Dept: CARDIOLOGY CLINIC | Age: 61
End: 2021-12-30
Payer: COMMERCIAL

## 2021-12-30 VITALS
SYSTOLIC BLOOD PRESSURE: 126 MMHG | BODY MASS INDEX: 32.06 KG/M2 | HEART RATE: 64 BPM | WEIGHT: 229 LBS | HEIGHT: 71 IN | DIASTOLIC BLOOD PRESSURE: 78 MMHG

## 2021-12-30 DIAGNOSIS — I10 ESSENTIAL HYPERTENSION: Primary | ICD-10-CM

## 2021-12-30 PROCEDURE — 99212 OFFICE O/P EST SF 10 MIN: CPT | Performed by: NURSE PRACTITIONER

## 2021-12-30 NOTE — LETTER
Horald Ahumada Dr. Celestina First  1960  [id number]    Have you had any Chest Pain that is not new? - No  If Yes DO EKG - How does it feel -    How long does the pain last -      How long have you been having the pain -    Did you take a    And did it relieve the pain -      DO EKG IF: Patient has a Heart Rate above 100 or below 40     CAD (Coronary Artery Disease) patient should have one on file every 6 months        Have you had any Shortness of Breath - No  If Yes - When     Have you had any dizziness - No  If Yes DO ORTHOSTATIC BP - when do you feel dizzy    How long does it last .        Sitting wait 5 minutes do supine (laying down) wait 5 minutes then do standing - log each in \"vitals\" area in Epic   Be sure to ask what symptoms they are having if they get dizzy while completing ortho stats such as: room spinning, nausea, etc.    Have you had any palpitations that are not new? - No  If Yes DO EKG - Do you feel your heart   How long does it last - . Is the patient on any of the following medications -   If Yes DO EKG - Needs done every 6 months    Do you have any edema - swelling in No    If Yes - CHECK TO SEE IF THE EDEMA IS PITTING  How long have they been having edema -   If Yes - Have they worn compression stockings   If they have worn compression stockings      Vein \"LEG PROBLEM Questionnaire\"  1. Do you have prominent leg veins? No   2. Do you have any skin discoloration? No  3. Do you have any healed or active sores? No  4. Do you have swelling of the legs? No  5. Do you have a family history of varicose veins? No  6. Does your profession involve pro-longed        standing or heavy lifting? No  7. Have you been fighting overweight problems? No  8. Do you have restless legs? No  9. Do you have any night time cramps? No  10.  Do you have any of the following in your legs:             When did you have your last labs drawn 12/29/2021  Where did you have them done   What doctor ordered     If we do not have these labs you are retrieve these labs for these providers! Do you have a surgery or procedure scheduled in the near future -   If Yes- DO EKG  If Yes - Who is the surgery with?    Phone number of physician   Fax number of physician   Type of surgery   GIVE THIS INFORMATION TO PAIGE CAMARILLO     Ask patient if they want to sign up for MyChart if they are not already signed up     Check to see if we have an E-MAIL on file for the patient     Check medication list thoroughly!!! AND RECONCILE OUTSIDE MEDICATIONS  If dose has changed change the entire order not just the [place] At check out add to every patient's \"wrap up\" the following dot phrase AFTERHOURSEDUCATION and ensure we explain this to our patients

## 2021-12-30 NOTE — PROGRESS NOTES
[place] ([facility] PH[facility]ICAL [facility]  [address]aura [address]  Phone: [phone]    Fax [phone]                  Robert Panchal MD, Vangie De Leon MD, Hakan Whyte MD, MD Valerie Dallas MD Geronimo Reef, MD Manuella Chyle, MD Sirisha Weinstein, DONA Gilbert, DONA Powell    BP Check Visit    Pt is here for a 1 week BP check. At the last office visit patient was found to have a blood pressure of 130/78. At that time the patient was instructed to start cardioprotective medications. BP Readings from Last 3 Encounters:   12/30/21 126/78   12/22/21 130/64   12/16/21 130/82     Wt Readings from Last 3 Encounters:   12/30/21 229 lb (103.9 kg)   12/22/21 229 lb 6.4 oz (104.1 kg)   12/16/21 239 lb 6.4 oz (108.6 kg)   breath sounds are clear, swelling decreased. He feels better. Plan for pt is to continue current medications  Would like to switch to entresto if insurance will allow. Waiting on response.      Follow up in 2 weeks     Pt is to report any dizziness or syncope to the office    Electronically signed by DONA Stevenson CNP on 12/30/2021 at 8:07 AM

## 2022-01-01 PROBLEM — J96.11 CHRONIC RESPIRATORY FAILURE WITH HYPOXIA (HCC): Status: ACTIVE | Noted: 2022-01-01

## 2022-01-03 ENCOUNTER — TELEPHONE (OUTPATIENT)
Dept: CARDIOLOGY CLINIC | Age: 62
End: 2022-01-03

## 2022-01-03 NOTE — TELEPHONE ENCOUNTER
Cardiologist: Dr. Leola Andres  Surgeon: Dr.[facility] Soumya Singleton   Surgery: Colonoscopy  Anesthesia: Propofol  Date: Pending  FAX# [phone]  # [phone]    Last OV 12/16/2021 w/Staci    PVCs  Noted to have >10 % PVC burden on event monitor  Increase metoprolol to 50 mg bid   No ablation after medication increase and removal of caffiene in diet.      HTN  Stable   Continue losartan      New onset CHF  BNP >5K  Chest xray pulmonary vascular congestion  Swelling R > L + JVD  Crackles  Start lasix 40 mg BID  Labs in 2 weeks   Check echo. + murmur S4 noted.           NM- 6/24/2020   Supervising physician Dr. Radha Hinds . Abnormal Stress nuclear scintigraphic study    suggestive of normal myocardial perfusion. Gated images demonstrate abnormal    left ventricular systolic function with EF of 36 %. Left ventricular global    function is moderately reduced suggestive of nonischemic cardiomyopathy       Echo- 12/16/2021   Left ventricular function is severely abnormal , EF is estimated at 20-25%. Left ventricle size is normal.   Global hypokinesis noted. Moderate left ventricular hypertrophy. Grade III diastolic dysfunction. Moderately dilated left atrium. Right side of the heart is enlarged. The anterior leaflet of the mitral valve is prolapsed with mild   regurgitation noted. Moderate pulmonic, moderate to severe tricuspid regurgitation present. Severe aortic regurgitation; PHT: 194msec. Severe Pulmonary hypertension noted with RVSP of 69mmHg. Dilation of the aortic root (4.2cm)and the ascending aorta(4.0cm). No evidence of pericardial effusion.       Cath- 4/1/2021   NO SIG CAD   NORMAL EF      Aspirin

## 2022-01-03 NOTE — TELEPHONE ENCOUNTER
Per Modesta Edward NP: He is currently being treated for decompensated HF -please hold on colonoscopy for now unless he is having acute issues

## 2022-01-04 PROCEDURE — 90471 IMMUNIZATION ADMIN: CPT | Performed by: NURSE PRACTITIONER

## 2022-01-04 PROCEDURE — 90674 CCIIV4 VAC NO PRSV 0.5 ML IM: CPT | Performed by: NURSE PRACTITIONER

## 2022-01-10 RX ORDER — FUROSEMIDE 40 MG/1
40 TABLET ORAL 2 TIMES DAILY
Qty: 60 TABLET | Refills: 0 | Status: SHIPPED | OUTPATIENT
Start: 2022-01-10 | End: 2022-02-07

## 2022-01-20 PROBLEM — I27.20 PULMONARY HTN (HCC): Status: ACTIVE | Noted: 2022-01-20

## 2022-01-20 PROBLEM — I50.40 COMBINED SYSTOLIC AND DIASTOLIC CONGESTIVE HEART FAILURE (HCC): Status: ACTIVE | Noted: 2022-01-20

## 2022-01-20 PROBLEM — I38 VHD (VALVULAR HEART DISEASE): Status: ACTIVE | Noted: 2022-01-20

## 2022-02-07 RX ORDER — FUROSEMIDE 40 MG/1
40 TABLET ORAL 2 TIMES DAILY
Qty: 60 TABLET | Refills: 3 | Status: SHIPPED | OUTPATIENT
Start: 2022-02-07 | End: 2022-02-08

## 2022-02-08 ENCOUNTER — NURSE ONLY (OUTPATIENT)
Dept: CARDIOLOGY CLINIC | Age: 62
End: 2022-02-08
Payer: COMMERCIAL

## 2022-02-08 DIAGNOSIS — I50.20 NYHA CLASS 3 HEART FAILURE WITH REDUCED EJECTION FRACTION (HCC): ICD-10-CM

## 2022-02-08 PROCEDURE — 99214 OFFICE O/P EST MOD 30 MIN: CPT | Performed by: NURSE PRACTITIONER

## 2022-02-08 RX ORDER — FUROSEMIDE 40 MG/1
40 TABLET ORAL DAILY
Qty: 60 TABLET | Refills: 3
Start: 2022-02-08 | End: 2022-06-13

## 2022-02-08 NOTE — PROGRESS NOTES
[place] ([facility] PHYSICAL [facility]  [address]  Phone: [phone]    Fax [phone]                  Anette Thompson MD, Kinjal Craft MD, Severa Grippe, MD, MD Partha Rutherford MD Sharlyn Boone, MD Arelia Leavell, MD Greg Laird, DONA Solano, DONA Hernandez, DONA Rao    BP Check Visit    Pt is here for medication adjustment/ BP check. At the last office visit patient was found to have a blood pressure of 126/78. At that time the patient was instructed to start cardioprotective medications. BP Readings from Last 3 Encounters:   12/30/21 126/78   12/22/21 130/64   12/16/21 130/82     Wt Readings from Last 3 Encounters:   12/30/21 229 lb (103.9 kg)   12/22/21 229 lb 6.4 oz (104.1 kg)   12/16/21 239 lb 6.4 oz (108.6 kg)     Physical Exam  Vitals reviewed. Constitutional:       General: He is not in acute distress. Appearance: Normal appearance. He is obese. He is not ill-appearing. HENT:      Head: Atraumatic. Neck:      Vascular: No carotid bruit. Cardiovascular:      Rate and Rhythm: Normal rate and regular rhythm. Pulses: Normal pulses. Heart sounds: Normal heart sounds. No murmur heard. Pulmonary:      Effort: Pulmonary effort is normal. No respiratory distress. Breath sounds: Normal breath sounds. Musculoskeletal:         General: No swelling or deformity. Cervical back: Neck supple. No muscular tenderness. Neurological:      Mental Status: He is alert. Plan for pt is to continue current medications  Would like to switch to entresto. Insurance and pharmacy had issues. Will re try script sent. He is feeling dry. Will decrease lasix to daily.      Follow up in 2-4 weeks     Pt is to report any dizziness or syncope to the office    Electronically signed by DONA Borges CNP on 2/8/2022 at 9:25 AM

## 2022-02-11 RX ORDER — METOPROLOL TARTRATE 50 MG/1
TABLET, FILM COATED ORAL
Qty: 60 TABLET | Refills: 3 | Status: SHIPPED | OUTPATIENT
Start: 2022-02-11 | End: 2022-06-13

## 2022-02-22 DIAGNOSIS — I50.20 NYHA CLASS 3 HEART FAILURE WITH REDUCED EJECTION FRACTION (HCC): ICD-10-CM

## 2022-02-22 LAB
ANION GAP SERPL CALCULATED.3IONS-SCNC: 12 MMOL/L (ref 3–16)
BUN BLDV-MCNC: 28 MG/DL (ref 7–20)
CALCIUM SERPL-MCNC: 9.3 MG/DL (ref 8.3–10.6)
CHLORIDE BLD-SCNC: 97 MMOL/L (ref 99–110)
CO2: 28 MMOL/L (ref 21–32)
CREAT SERPL-MCNC: 0.7 MG/DL (ref 0.8–1.3)
GFR AFRICAN AMERICAN: >60
GFR NON-AFRICAN AMERICAN: >60
GLUCOSE BLD-MCNC: 95 MG/DL (ref 70–99)
POTASSIUM SERPL-SCNC: 5.1 MMOL/L (ref 3.5–5.1)
PRO-BNP: 207 PG/ML (ref 0–124)
SODIUM BLD-SCNC: 137 MMOL/L (ref 136–145)

## 2022-02-23 ENCOUNTER — OFFICE VISIT (OUTPATIENT)
Dept: CARDIOLOGY CLINIC | Age: 62
End: 2022-02-23
Payer: COMMERCIAL

## 2022-02-23 VITALS
BODY MASS INDEX: 31.16 KG/M2 | HEIGHT: 71 IN | DIASTOLIC BLOOD PRESSURE: 60 MMHG | SYSTOLIC BLOOD PRESSURE: 132 MMHG | HEART RATE: 58 BPM | WEIGHT: 222.6 LBS

## 2022-02-23 DIAGNOSIS — E78.00 PURE HYPERCHOLESTEROLEMIA: ICD-10-CM

## 2022-02-23 DIAGNOSIS — I50.20 NYHA CLASS 3 HEART FAILURE WITH REDUCED EJECTION FRACTION (HCC): Primary | ICD-10-CM

## 2022-02-23 DIAGNOSIS — I49.3 PVC (PREMATURE VENTRICULAR CONTRACTION): Primary | ICD-10-CM

## 2022-02-23 PROCEDURE — 93000 ELECTROCARDIOGRAM COMPLETE: CPT | Performed by: INTERNAL MEDICINE

## 2022-02-23 PROCEDURE — 99213 OFFICE O/P EST LOW 20 MIN: CPT | Performed by: INTERNAL MEDICINE

## 2022-02-23 RX ORDER — MULTIVITAMIN/IRON/FOLIC ACID 18MG-0.4MG
250 TABLET ORAL DAILY
Qty: 30 TABLET | Refills: 2 | Status: SHIPPED | OUTPATIENT
Start: 2022-02-23 | End: 2022-05-23

## 2022-02-23 ASSESSMENT — ENCOUNTER SYMPTOMS
NAUSEA: 0
EYE PAIN: 0
CHEST TIGHTNESS: 0
COLOR CHANGE: 0
VOMITING: 0
PHOTOPHOBIA: 0
DIARRHEA: 0
WHEEZING: 0
BACK PAIN: 0
CONSTIPATION: 0
BLOOD IN STOOL: 0
ABDOMINAL PAIN: 0
COUGH: 0

## 2022-02-23 NOTE — PATIENT INSTRUCTIONS
Please be informed that if you contact our office outside of normal business hours the physician on call cannot help with any scheduling or rescheduling issues, procedure instruction questions or any type of medication issue. We advise you for any urgent/emergency that you go to the nearest emergency room! PLEASE CALL OUR OFFICE DURING NORMAL BUSINESS HOURS    Monday - Friday   8 am to 5 pm    [place]: [phone]    [place]:  [phone]    **It is YOUR responsibilty to bring medication bottles and/or updated medication list to [address] [place] [address].  This will allow us to better serve you and all your healthcare needs**

## 2022-02-23 NOTE — PROGRESS NOTES
Electrophysiology fu Note      Reason for consultation:   PVC    Chief complaint :  pvc    Referring physician: Gary Chandra      Primary care physician: Ksenia Harry MD      History of Present Illness: This visit (2/23/2022)      Chief Complaint   Patient presents with    1 Month Follow-Up     Pt denies any cardiac symptoms. Pt doesn't drink caffine or alcohol. Pt doesn't smoke. Pt doesn't exercise           Previous visit: (7/23/2021)      Chief Complaint   Patient presents with    Hypertension     Patient here for 2 month follow up. Patient states his palpitations are better due to being on metoprolo. Patient has SOB due to COPD nothing new. Patient denies Chest Pain, Dizziness, Edema. Patient does not drink alcohol or smoke. Patient drinks caffiene. Patient does not exercise. Previous visit: (5/21/2021)      Chief Complaint   Patient presents with    1 Month Follow-Up     to discuss PVC ablation Tania pt. patient feels lot better on the medication. Patient does not feel palpitation as much occasionally is feeling palpitations no he does have shortness of breath but that is also improved from before. He has occasional chest pain which lasts only seconds. Patient overall is feeling better and wants to continue with medical treatment          Previous visit:    She is a 56-year-old male with history of hypertension, COPD on home oxygen referred by Dr. Gordillo For PVCs. Patient reports he has chest pressure lasting about 10 seconds and relieves immediately. Patient does have shortness of breath but he also reports that he has chronic COPD and home oxygen dependent so that could be the reason to. Patient denies any dizziness. Patient has palpitations that last few seconds up to a couple of minutes multiple times in a day. No edema. Never been a smoker.   Denies alcohol use in a year    Patient reports tiredness and weakness    Pastmedical history:   Past Medical History:   Diagnosis Date    Abnormal echocardiogram 03/29/2013    EF=45-50%;mod. AR,mild aortic valve calcif.  Anesthesia complication 72/96/7885    Ankle fracture, left 1985    Aortic root dilation (HCC) 12/16/2021    Dilation of the aortic root (4.2cm)and the ascending aorta(4.0cm). 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 EF 55%. Renals patent.  H/O cardiac catheterization 04/01/2021    NO SIG CAD    H/O cardiovascular stress test 04/02/2013    EF 49%, there is no scintigraphic evidence of inducible myocardial ischemia, the observed defect is consistent with diaphragmatic attenuation, clinical correlation recommenced, no previous study to compare with, no wall motionn abnormality seen, normal perfusion in all myocardial regions    H/O chest pain     H/O echocardiogram 07/11/2020    EF 50-55%, Mod LVH & AR,  Dilated aortic root (4.2cm) and ascending aorta (4.1cm).  Hemorrhoids 06/15/2015    HTN (hypertension)     Hx of echocardiogram 12/16/2021    Moderate left ventricular hypertrophy regurgitation noted The anterior leaflet of the mitral valve is prolapsed with mild Right side of the heart is enlarged Moderately dilated left atrium. Grade III diastolic dysfunction Left ventricular function is severely abnormal , EF is estimated at 20-25% Global hypokinesis noted.  Left ventricle size is normal.    Hydrocele 10/2011    [facility]    Hyperlipemia     Irregular heart rhythm     after induction for surgery 9/5/2013 - pt developed irreg rhythm and surgery cancelled\"had another echo and stress test and everything checked out ok, they do not know the reason it happened\"- cardiac clearance obtained for surgery 9/19/2013    Low left ventricular ejection fraction 12/16/2021    EF is estimated at 20-25%    Mitral valve prolapse 12/16/2021    anterior leaflet of the mitral valve is prolapsed    Palpitations     Pneumonia 2004    not since    Pulmonary HTN ([facility] [address] 12/16/2021    Severe Pulmonary hypertension noted with RVSP of 69mmHg    Right inguinal hernia 08/22/2013    Severe aortic regurgitation by prior echocardiogram 12/16/2021    Severe aortic regurgitation; PHT: 194msec    SOB (shortness of breath) 03/27/2013    Nuclear Stress Normal, EF 49% (Dr. Noe Coughlin- 4/2/2013)     SOB (shortness of breath) on exertion     Tricuspid regurgitation 12/16/2021    moderate to severe tricuspid regurgitation       Surgical history :   Past Surgical History:   Procedure Laterality Date    CARDIAC CATHETERIZATION  1991    COLONOSCOPY  6-    INGUINAL HERNIA REPAIR Bilateral 1992    INGUINAL HERNIA REPAIR Right 09/19/13    with mesh    OTHER SURGICAL HISTORY  09/05/13    hernia repair aborted    TYMPANOSTOMY TUBE PLACEMENT  2009    right ear    VASECTOMY  1988       Family history:   Family History   Problem Relation Age of Onset    Cancer Mother         colon    Diabetes Mother     High Blood Pressure Mother     Migraines Mother     High Blood Pressure Father     COPD Father     Cancer Father         lung    Heart Disease Maternal Grandmother     Heart Disease Maternal Grandfather        Social history :  reports that he has never smoked. He has never used smokeless tobacco. He reports previous alcohol use. He reports that he does not use drugs.     No Known Allergies    Current Outpatient Medications on File Prior to Visit   Medication Sig Dispense Refill    metoprolol tartrate (LOPRESSOR) 50 MG tablet TAKE 1 TABLET BY MOUTH TWICE A DAY 60 tablet 3    sacubitril-valsartan (ENTRESTO) 24-26 MG per tablet Take 1 tablet by mouth 2 times daily 60 tablet 3    furosemide (LASIX) 40 MG tablet Take 1 tablet by mouth daily 60 tablet 3    dapagliflozin (FARXIGA) 10 MG tablet Take 1 tablet by mouth every morning 30 tablet 3    spironolactone (ALDACTONE) 25 MG tablet Take 1 tablet by mouth daily 90 tablet 1    albuterol sulfate  (90 Base) MCG/ACT inhaler INHALE 2 PUFFS BY MOUTH EVERY 4 HOURS AS NEEDED FOR WHEEZE OR FOR SHORTNESS OF BREATH 1 each 11    loratadine (CLARITIN) 10 MG tablet TAKE 1 TABLET BY MOUTH EVERY DAY 30 tablet 5    fluticasone-umeclidin-vilant (TRELEGY ELLIPTA) 100-62.5-25 MCG/INH AEPB TAKE 1 PUFF BY MOUTH EVERY DAY 1 each 5    CVS MAGNESIUM OXIDE 250 MG TABS tablet TAKE 1 TABLET BY MOUTH EVERY DAY 30 tablet 6    aspirin 81 MG EC tablet Take 1 tablet by mouth daily 90 tablet 3    Multiple Vitamin (DAILY VITAMINS PO) Take by mouth       No current facility-administered medications on file prior to visit. Review of Systems:   Review of Systems   Constitutional: Negative for activity change, chills, fatigue and fever. HENT: Negative for congestion, ear pain and tinnitus. Eyes: Negative for photophobia, pain and visual disturbance. Respiratory: Negative for cough, chest tightness, shortness of breath and wheezing. Cardiovascular: Negative for chest pain, palpitations and leg swelling. Gastrointestinal: Negative for abdominal pain, blood in stool, constipation, diarrhea, nausea and vomiting. Endocrine: Negative for cold intolerance and heat intolerance. Genitourinary: Negative for dysuria, flank pain and hematuria. Musculoskeletal: Positive for arthralgias. Negative for back pain, myalgias and neck stiffness. Skin: Negative for color change and rash. Allergic/Immunologic: Negative for food allergies. Neurological: Negative for dizziness, light-headedness, numbness and headaches. Hematological: Does not bruise/bleed easily.    Psychiatric/Behavioral: Negative for agitation, behavioral problems and confusion. Examination:      Vitals:    02/23/22 1306   BP: 132/60   Pulse: 58   Weight: 222 lb 9.6 oz (101 kg)   Height: 5' 11\" (1.803 m)        Body mass index is 31.05 kg/m². Physical Exam  Constitutional:       Appearance: Normal appearance. He is not ill-appearing. HENT:      Head: Normocephalic and atraumatic. Mouth/Throat:      Mouth: Mucous membranes are moist.   Eyes:      Conjunctiva/sclera: Conjunctivae normal.   Cardiovascular:      Rate and Rhythm: Normal rate. Heart sounds: Murmur (grade 2/6 diastolic murmur) heard. Comments: Ectopic beats  Pulmonary:      Effort: Pulmonary effort is normal.      Breath sounds: No rales. Abdominal:      General: Abdomen is flat. Palpations: Abdomen is soft. Musculoskeletal:         General: No tenderness. Normal range of motion. Cervical back: Normal range of motion. Right lower leg: No edema. Left lower leg: No edema. Skin:     General: Skin is warm and dry. Neurological:      General: No focal deficit present. Mental Status: He is alert and oriented to person, place, and time.            CBC:   Lab Results   Component Value Date    WBC 7.8 10/27/2021    HGB 17.0 10/27/2021    HCT 55.8 10/27/2021     10/27/2021     Lipids:  Lab Results   Component Value Date    CHOL 193 03/10/2021    TRIG 62 03/10/2021    HDL 39 (L) 03/10/2021    LDLCALC 155 (H) 06/09/2020    LDLDIRECT 148 (H) 03/10/2021     PT/INR:   Lab Results   Component Value Date    INR 1.01 10/27/2021        BMP:    Lab Results   Component Value Date     02/22/2022    K 5.1 02/22/2022    CL 97 (L) 02/22/2022    CO2 28 02/22/2022    BUN 28 (H) 02/22/2022     CMP:   Lab Results   Component Value Date    AST 23 12/13/2021    PROT 6.7 12/13/2021    BILITOT 0.9 12/13/2021    ALKPHOS 144 (H) 12/13/2021     TSH:  No results found for: TSH    EKGINTERPRETATION - EKG Interpretation:  Sinus bradycardia, First degree AV block, PVC      IMPRESSION / RECOMMENDATIONS:     1. PVC on metoprolol  2. COPD  3. HLD  4. Obesity BMI 30  5. Moderate aortic regurgitation  6. Chronic left ventricular systolic dysfunction - on entresto, metoprolol and spironolactone. Patient came to the hospital and he did not have any PVCs 4 hours so we could not do the ablation. Patient today presented with PVCs but it is multifocal PVCs not like his usual ones. Patient LVEF has reduced. Patient is on metoprolol Entresto and spironolactone. Recent labs potassium was 5.1 so it may need to be watched as patient is on Entresto. Also patient has been having pulmonary hypertension with his underlying COPD probably also heart failure    We will start the patient on magnesium I would like to see how the medical therapy has worked on him if the patient does not have improvement his LV function with optimizing medical therapy then he may need a defibrillator. We will follow in a month    Will wait on antiarrhythmic at this point given his underlying COPD    Patient reports he is feeling better though and has no symptoms    Thanks again for allowing me to participate in care of this patient. Please call me if you have any questions. With best regards. Jackie Henson MD, 2/23/2022 1:29 PM     Please note this report has been partially produced using speech recognition software and may contain errors related to that system including errors in grammar, punctuation, and spelling, as well as words and phrases that may be inappropriate. If there are any questions or concerns please feel free to contact the dictating provider for clarification.

## 2022-03-01 DIAGNOSIS — I50.20 NYHA CLASS 3 HEART FAILURE WITH REDUCED EJECTION FRACTION (HCC): ICD-10-CM

## 2022-03-01 LAB
ANION GAP SERPL CALCULATED.3IONS-SCNC: 11 MMOL/L (ref 3–16)
BUN BLDV-MCNC: 22 MG/DL (ref 7–20)
CALCIUM SERPL-MCNC: 10.1 MG/DL (ref 8.3–10.6)
CHLORIDE BLD-SCNC: 101 MMOL/L (ref 99–110)
CO2: 30 MMOL/L (ref 21–32)
CREAT SERPL-MCNC: 0.8 MG/DL (ref 0.8–1.3)
GFR AFRICAN AMERICAN: >60
GFR NON-AFRICAN AMERICAN: >60
GLUCOSE BLD-MCNC: 96 MG/DL (ref 70–99)
POTASSIUM SERPL-SCNC: 5 MMOL/L (ref 3.5–5.1)
SODIUM BLD-SCNC: 142 MMOL/L (ref 136–145)

## 2022-03-02 ENCOUNTER — TELEPHONE (OUTPATIENT)
Dept: CARDIOLOGY CLINIC | Age: 62
End: 2022-03-02

## 2022-03-02 ASSESSMENT — ENCOUNTER SYMPTOMS: SHORTNESS OF BREATH: 0

## 2022-03-02 NOTE — TELEPHONE ENCOUNTER
----- Message from DONA Murguia CNP sent at 3/1/2022  3:47 PM EST -----  Potassium is improved. Reji you are seeing this week [facility]  ----- Message -----  From: Argelia Sun Incoming Lab Results From Soft (Epic Adt)  Sent: 3/1/2022   3:08 PM EST  To: DONA Murguia CNP      Spoke with pt wife, verbally understood.

## 2022-03-03 ENCOUNTER — OFFICE VISIT (OUTPATIENT)
Dept: CARDIOLOGY CLINIC | Age: 62
End: 2022-03-03
Payer: COMMERCIAL

## 2022-03-03 VITALS
HEIGHT: 71 IN | DIASTOLIC BLOOD PRESSURE: 56 MMHG | HEART RATE: 58 BPM | BODY MASS INDEX: 31 KG/M2 | SYSTOLIC BLOOD PRESSURE: 118 MMHG | OXYGEN SATURATION: 88 % | WEIGHT: 221.4 LBS

## 2022-03-03 DIAGNOSIS — I50.43 ACUTE ON CHRONIC COMBINED SYSTOLIC AND DIASTOLIC CONGESTIVE HEART FAILURE (HCC): ICD-10-CM

## 2022-03-03 PROCEDURE — 99213 OFFICE O/P EST LOW 20 MIN: CPT | Performed by: NURSE PRACTITIONER

## 2022-03-03 ASSESSMENT — ENCOUNTER SYMPTOMS: SHORTNESS OF BREATH: 0

## 2022-03-03 NOTE — PROGRESS NOTES
CHF CLINICAL STAFF DOCUMENTATION    Have you had any Chest Pain? - Yes  Once or twice a day. When having skipped beats feels hard thump.  Lasts seconds    Do you had any Shortness of Breath - No more than usual with COPD     Have you had any dizziness - Yes  When do you feel dizzy with position change   How long does it last .Seconds       Do you have any edema   No    Are you on fluid restrictions - No    Amount -   Are you on sodium restrictions - No   Amount -     Do you feel fatigued - Yes    Do you have a cough - Yes  Occasional    Lung sounds -    Normal - Yes   Abnormal -     Do you have abdominal bloating - No    How is your appetite - \"good    Do you have difficulty sleeping - No      Do you have a history of sleep apnea - No    6 min. walk  Pre heart rate 58  Time walked 6 min  Distance 1130   Post heart rate 88     Have you had Covid - No    Have you had Vaccine for Covid - Yes    Have you had Flu Vaccine - Yes    Have you had Pneumonia Vaccine - Yes

## 2022-03-03 NOTE — ASSESSMENT & PLAN NOTE
-New reduction in EF 20-25% in December 2021. Thought to be arrhythmia induced. Patient has been unable to receive ablation and is titrating medication currently. If EF does not improve after medication titration EP will place defibrillator. Recent labs reviewed, potassium and renal function has remained intact.

## 2022-03-03 NOTE — PROGRESS NOTES
[address]      Visit Date: 3/3/2022  Cardiologist:  Dr. Demetrius Juárez   Primary Care Physician: Dr. Tania Reeves MD    Stephanie Benavidez is a 57 y.o. male who presents today for:  CC:   1. Acute on chronic combined systolic and diastolic congestive heart failure (HCC)        HPI:   Stephanie Benavidez is a 57 y.o. male who presents to the office for a follow up patient visit in the heart failure clinic. Patient has story of frequent PVCs and has seen EP in the past.  He had questionable nonsustained V. tach's which were noted to be atrial tachycardia. Patient was scheduled for PVC ablation but after 4-hour duration no PVCs and seen at that time and ablation aborted. Chief Complaint   Patient presents with    Congestive Heart Failure     1 month follow up. Doing about the same. No new issues     Patient has:  Last hospital admission related to Heart Failure:  none   baseline BNP    207  baseline weight 221  Chest Pain: no  Worsening SOB/orthopnea/PND: no  Edema: no  Any extra diuretic use: no  Weight gain: no  Compliant checking home weight: yes  Fatigue: no  Abdominal bloating: no  Appetite: good  Difficulty sleeping: no  THIERRY: no  Cough: no  Compliant checking blood pressure: yes  Compliant with medication regimen: yes    Vaccinations:    flu Yes  pneumonia Yes  COVID 19 Yes      Device: No       Activity: fair  Can you walk 1-2 blocks or do a moderate amount of house/yard work? Yes    NYHA Class: II   Class I   Patients with no limitation of activities; they suffer no symptoms from ordinary activities. Class II   Patients with slight, mild limitation of activity; they are comfortable with rest or with mild exertion.    Class III   Patients with marked limitation of activity; they are comfortable only at rest.   Class IV   Patients who should be at complete rest, confined to bed or chair; any physical activity brings on discomfort and symptoms occur at rest.    Sodium Restrictions: 2g  Fluid Restrictions: 48-64 oz/day  Sodium and fluid restriction compliance: yes      Past Medical History:   Diagnosis Date    Abnormal echocardiogram 03/29/2013    EF=45-50%;mod. AR,mild aortic valve calcif.  Anesthesia complication 81/55/3510    Ankle fracture, left 1985    Aortic root dilation (HCC) 12/16/2021    Dilation of the aortic root (4.2cm)and the ascending aorta(4.0cm).  Arm fracture, right 1966    AVM (arteriovenous malformation) 06/15/2015    transverse colon    COPD (chronic obstructive pulmonary disease) ([facility] [address]     Has worked around [address] [place] [address] and worked at [employer] x 6 weeks    Diastolic dysfunction 21/89/4078    Grade III diastolic dysfunction    Epididymal cyst 10/2011    U/S    H/O cardiac catheterization 04/12/2013 4/12 Dr Shahbaz Hays signif CAD. EF 55%. Renals patent.  H/O cardiac catheterization 04/01/2021    NO SIG CAD    H/O cardiovascular stress test 04/02/2013    EF 49%, there is no scintigraphic evidence of inducible myocardial ischemia, the observed defect is consistent with diaphragmatic attenuation, clinical correlation recommenced, no previous study to compare with, no wall motionn abnormality seen, normal perfusion in all myocardial regions    H/O chest pain     H/O echocardiogram 07/11/2020    EF 50-55%, Mod LVH & AR,  Dilated aortic root (4.2cm) and ascending aorta (4.1cm).  Hemorrhoids 06/15/2015    HTN (hypertension)     Hx of echocardiogram 12/16/2021    Moderate left ventricular hypertrophy regurgitation noted The anterior leaflet of the mitral valve is prolapsed with mild Right side of the heart is enlarged Moderately dilated left atrium. Grade III diastolic dysfunction Left ventricular function is severely abnormal , EF is estimated at 20-25% Global hypokinesis noted.  Left ventricle size is normal.    Hydrocele 10/2011    U/S    Hyperlipemia     Irregular heart rhythm     after induction for surgery 9/5/2013 - pt developed irreg rhythm and surgery cancelled\"had another echo and stress test and everything checked out ok, they do not know the reason it happened\"- cardiac clearance obtained for surgery 9/19/2013    Low left ventricular ejection fraction 12/16/2021    EF is estimated at 20-25%    Mitral valve prolapse 12/16/2021    anterior leaflet of the mitral valve is prolapsed    Palpitations     Pneumonia 2004    not since    Pulmonary HTN ([address] 12/16/2021    Severe Pulmonary hypertension noted with RVSP of 69mmHg    Right inguinal hernia 08/22/2013    Severe aortic regurgitation by prior echocardiogram 12/16/2021    Severe aortic regurgitation; PHT: 194msec    SOB (shortness of breath) 03/27/2013    Nuclear Stress Normal, EF 49% (Dr. Mallory Amado- 4/2/2013)     SOB (shortness of breath) on exertion     Tricuspid regurgitation 12/16/2021    moderate to severe tricuspid regurgitation     Past Surgical History:   Procedure Laterality Date    CARDIAC CATHETERIZATION  1991    COLONOSCOPY  6-    INGUINAL HERNIA REPAIR Bilateral 1992    INGUINAL HERNIA REPAIR Right 09/19/13    with mesh    OTHER SURGICAL HISTORY  09/05/13    hernia repair aborted    TYMPANOSTOMY TUBE PLACEMENT  2009    right ear    VASECTOMY  1988     Family History   Problem Relation Age of Onset    Cancer Mother         colon    Diabetes Mother     High Blood Pressure Mother     Migraines Mother     High Blood Pressure Father     COPD Father     Cancer Father         lung    Heart Disease Maternal Grandmother     Heart Disease Maternal Grandfather      Social History     Tobacco Use    Smoking status: Never Smoker    Smokeless tobacco: Never Used   Substance Use Topics    Alcohol use: Not Currently     Comment: Caffeine: cystal lite energy drink      Current Outpatient Medications   Medication Sig Dispense Refill    Magnesium Oxide (MAGNESIUM-OXIDE) 250 MG TABS tablet Take 1 tablet by mouth daily 30 tablet 2    metoprolol tartrate (LOPRESSOR) 50 MG tablet TAKE 1 TABLET BY MOUTH TWICE A DAY 60 tablet 3    sacubitril-valsartan (ENTRESTO) 24-26 MG per tablet Take 1 tablet by mouth 2 times daily 60 tablet 3    furosemide (LASIX) 40 MG tablet Take 1 tablet by mouth daily 60 tablet 3    dapagliflozin (FARXIGA) 10 MG tablet Take 1 tablet by mouth every morning 30 tablet 3    spironolactone (ALDACTONE) 25 MG tablet Take 1 tablet by mouth daily 90 tablet 1    albuterol sulfate  (90 Base) MCG/ACT inhaler INHALE 2 PUFFS BY MOUTH EVERY 4 HOURS AS NEEDED FOR WHEEZE OR FOR SHORTNESS OF BREATH 1 each 11    loratadine (CLARITIN) 10 MG tablet TAKE 1 TABLET BY MOUTH EVERY DAY 30 tablet 5    fluticasone-umeclidin-vilant (TRELEGY ELLIPTA) 100-62.5-25 MCG/INH AEPB TAKE 1 PUFF BY MOUTH EVERY DAY 1 each 5    CVS MAGNESIUM OXIDE 250 MG TABS tablet TAKE 1 TABLET BY MOUTH EVERY DAY 30 tablet 6    aspirin 81 MG EC tablet Take 1 tablet by mouth daily 90 tablet 3    Multiple Vitamin (DAILY VITAMINS PO) Take by mouth       No current facility-administered medications for this visit. No Known Allergies    SUBJECTIVE:     Review of Systems   Respiratory: Negative for shortness of breath. Cardiovascular: Negative for chest pain, palpitations and leg swelling. Musculoskeletal: Negative. Skin: Negative. Neurological: Negative for dizziness and weakness. All other systems reviewed and are negative. OBJECTIVE:   Today's Vitals:  BP (!) 118/56   Pulse 58   Ht 5' 11\" (1.803 m)   Wt 221 lb 6.4 oz (100.4 kg)   SpO2 (!) 88%   BMI 30.88 kg/m²     Wt Readings from Last 3 Encounters:   03/03/22 221 lb 6.4 oz (100.4 kg)   02/23/22 222 lb 9.6 oz (101 kg)   12/30/21 229 lb (103.9 kg)     BP Readings from Last 3 Encounters:   03/03/22 (!) 118/56   02/23/22 132/60   12/30/21 126/78     Pulse Readings from Last 3 Encounters:   03/03/22 58   02/23/22 58   12/30/21 64     Body mass index is 30.88 kg/m². Physical Exam  Vitals and nursing note reviewed.    Cardiovascular:      Rate and Rhythm: Normal rate and regular rhythm. Pulses:           Dorsalis pedis pulses are 2+ on the right side and 2+ on the left side. Posterior tibial pulses are 2+ on the right side and 2+ on the left side. Heart sounds: Normal heart sounds, S1 normal and S2 normal.   Musculoskeletal:         General: Normal range of motion. Right lower leg: No edema. Left lower leg: No edema. Skin:     General: Skin is warm and dry. Neurological:      Mental Status: He is alert and oriented to person, place, and time. 6 minute walk test:  Time walked 00:06:00  Distance walked 1130  Required Oxygen No    Most recent ECHO: 12/16/21  Left ventricular function is severely abnormal , EF is estimated at 20-25%. Left ventricle size is normal.   Global hypokinesis noted. Moderate left ventricular hypertrophy. Grade III diastolic dysfunction. Moderately dilated left atrium. Right side of the heart is enlarged. The anterior leaflet of the mitral valve is prolapsed with mild   regurgitation noted. Moderate pulmonic, moderate to severe tricuspid regurgitation present. Severe aortic regurgitation; PHT: 194msec. Severe Pulmonary hypertension noted with RVSP of 69mmHg. Dilation of the aortic root (4.2cm)and the ascending aorta(4.0cm). No evidence of pericardial effusion.     Results reviewed:  BNP:   Lab Results   Component Value Date    PROBNP 207 (H) 02/22/2022     CBC:   Lab Results   Component Value Date    WBC 7.8 10/27/2021    RBC 5.99 10/27/2021    HGB 17.0 10/27/2021    HCT 55.8 10/27/2021     10/27/2021     CMP:    Lab Results   Component Value Date     03/01/2022    K 5.0 03/01/2022     03/01/2022    CO2 30 03/01/2022    BUN 22 03/01/2022    CREATININE 0.8 03/01/2022    GFRAA >60 03/01/2022    AGRATIO 1.6 12/13/2021    LABGLOM >60 03/01/2022    GLUCOSE 96 03/01/2022    CALCIUM 10.1 03/01/2022     Hepatic Function Panel:    Lab Results   Component Value Date ALKPHOS 144 12/13/2021    ALT 25 12/13/2021    AST 23 12/13/2021    PROT 6.7 12/13/2021    PROT 7.0 03/01/2011    BILITOT 0.9 12/13/2021    LABALBU 4.1 12/13/2021     Magnesium:    Lab Results   Component Value Date    MG 2.20 12/29/2021     PT/INR:    Lab Results   Component Value Date    PROTIME 13.0 10/27/2021    INR 1.01 10/27/2021     Lipids:    Lab Results   Component Value Date    TRIG 62 03/10/2021    HDL 39 03/10/2021    LDLCALC 155 06/09/2020    LDLDIRECT 148 03/10/2021    LABVLDL 17 06/09/2020       Iron Studies:  No components found for: FE,  TIBC,  FERRITIN    Iron Deficiency Anemia:  No IV Iron Therapy:  No  2017 ACC/AHA HF Guidelines:   intravenous iron replacement in patients with New York Heart Association (NYHA) class II and III HF and iron deficiency (ferritin <100 ng/ml or 100-300 ng/ml if transferrin saturation <20%), to improve functional status and QoL. ASSESSMENT AND PLAN:     Combined systolic and diastolic congestive heart failure (HCC)   -New reduction in EF 20-25% in December 2021. Thought to be arrhythmia induced. Patient has been unable to receive ablation due to inability to induce rhythm. Currently titrating meteprolol and starting Entresto to better therapy. If EF does not improve after medication titration EP will place defibrillator. Recent labs reviewed, potassium and renal function has remained intact. Continue with guideline recommended drugs Lopressor 50 mg twice daily, Entresto 24-26 mg twice daily, Lasix 40 mg daily, Farxiga 10 mg daily, Aldactone 25 mg daily.        Patient was instructed to call the [address] for changes in the following symptoms:    Weight gain of 3 pounds in 1 day or 5 pounds in 1 week   Increased shortness of breath   Shortness of breath while laying down   Cough   Chest pain   Swelling in feet, ankles or legs   Tenderness or bloating in the abdomen   Fatigue    Decreased appetite or nausea    Confusion      Return in about 2 months (around 5/3/2022). or sooner if needed     Patient given educational materials - see patient instructions. We discussed the importance of weighing oneself and recording daily. We also discussed the importance of a lowsodium diet, higher sodium foods to avoid and better low sodium food options. Discussed use, benefit, and side effects of prescribed medications. All patient questions answered. Patient verbalizes understanding of plan of care using teach back method, and is agreeable to the treatment plan. Copy of note to be sent to consulting provider and primary cardiologist   Electronicallysigned by Melba Frazier.  DONA Fields - CNP on 3/3/2022 at 3:42 PM

## 2022-03-06 DIAGNOSIS — I10 ESSENTIAL HYPERTENSION: ICD-10-CM

## 2022-03-08 RX ORDER — ASPIRIN 81 MG/1
TABLET ORAL
Qty: 30 TABLET | Refills: 2 | Status: SHIPPED | OUTPATIENT
Start: 2022-03-08 | End: 2022-06-14

## 2022-03-08 RX ORDER — LOSARTAN POTASSIUM 50 MG/1
50 TABLET ORAL DAILY
Qty: 30 TABLET | Refills: 2 | Status: SHIPPED | OUTPATIENT
Start: 2022-03-08 | End: 2022-03-30 | Stop reason: ALTCHOICE

## 2022-03-22 ENCOUNTER — OFFICE VISIT (OUTPATIENT)
Dept: FAMILY MEDICINE CLINIC | Age: 62
End: 2022-03-22
Payer: COMMERCIAL

## 2022-03-22 VITALS
BODY MASS INDEX: 30.63 KG/M2 | DIASTOLIC BLOOD PRESSURE: 60 MMHG | WEIGHT: 218.8 LBS | HEIGHT: 71 IN | OXYGEN SATURATION: 89 % | SYSTOLIC BLOOD PRESSURE: 126 MMHG | HEART RATE: 83 BPM

## 2022-03-22 DIAGNOSIS — I50.42 CHRONIC COMBINED SYSTOLIC AND DIASTOLIC CONGESTIVE HEART FAILURE (HCC): Primary | ICD-10-CM

## 2022-03-22 DIAGNOSIS — Z99.81 ON HOME OXYGEN THERAPY: ICD-10-CM

## 2022-03-22 PROCEDURE — 99213 OFFICE O/P EST LOW 20 MIN: CPT | Performed by: FAMILY MEDICINE

## 2022-03-22 ASSESSMENT — PATIENT HEALTH QUESTIONNAIRE - PHQ9
SUM OF ALL RESPONSES TO PHQ QUESTIONS 1-9: 0
2. FEELING DOWN, DEPRESSED OR HOPELESS: 0
SUM OF ALL RESPONSES TO PHQ QUESTIONS 1-9: 0
SUM OF ALL RESPONSES TO PHQ QUESTIONS 1-9: 0
SUM OF ALL RESPONSES TO PHQ9 QUESTIONS 1 & 2: 0
SUM OF ALL RESPONSES TO PHQ QUESTIONS 1-9: 0
1. LITTLE INTEREST OR PLEASURE IN DOING THINGS: 0

## 2022-03-22 NOTE — PROGRESS NOTES
Plan:     1. Chronic combined systolic and diastolic congestive heart failure ([facility] [address]  2. On home oxygen therapy  overall stable and will continue to work his stamina for this spring and summer. All patient questions answered. Pt voiced understanding. Return in about 1 year (around 3/22/2023) for Wellness physical.  -----------------------------------------------------------------------------------------------            Chief Complaint   Patient presents with    3 Month Follow-Up     H/o chroinc Heart Failure and reports energy level and stamina has improved overall. Stopped playing softball due to physical limitations now but still doing ADL\"s. .  No PND or orthopnea. Patient waiting for his heart issues to stabilize before getting his colon cancer screening. Compliant with current meds without side effects. Sleeping wells. Moods overall doing well but does get frustrated with his physical limitation some days. Getting good support from family and friends. COPD has been stable and is supposed to wear O2 most part of the days. ROS: Pertinent items are noted in HPI. All other ROS negative     reports that he has never smoked. He has never used smokeless tobacco.      Physical Exam   Nursing note reviewed  /60 (Site: Left Upper Arm, Position: Sitting, Cuff Size: Medium Adult)   Pulse 83   Ht 5' 11\" (1.803 m)   Wt 218 lb 12.8 oz (99.2 kg)   SpO2 (!) 89%   BMI 30.52 kg/m²   BP Readings from Last 3 Encounters:   03/22/22 126/60   03/03/22 (!) 118/56   02/23/22 132/60     Wt Readings from Last 3 Encounters:   03/22/22 218 lb 12.8 oz (99.2 kg)   03/03/22 221 lb 6.4 oz (100.4 kg)   02/23/22 222 lb 9.6 oz (101 kg)     No results found for this visit on 03/22/22.     General appearance: cooperative   Neck: supple, symmetrical, trachea midline  Lungs: clear to auscultation bilaterally  Heart: regular rate and rhythm, S1, S2 normal,  Abdomen:  bowel sounds normal and soft, non-tender  MSK trace non pitting edema both legs. Skin: Skin color, texture, turgor normal. No rashes or lesions  Neurologic: Grossly normal   Psych: Alert and oriented, appropriate affect.     Assessment and Plan: See above

## 2022-03-30 ENCOUNTER — OFFICE VISIT (OUTPATIENT)
Dept: CARDIOLOGY CLINIC | Age: 62
End: 2022-03-30
Payer: COMMERCIAL

## 2022-03-30 VITALS
DIASTOLIC BLOOD PRESSURE: 68 MMHG | WEIGHT: 220.4 LBS | HEART RATE: 60 BPM | BODY MASS INDEX: 30.85 KG/M2 | OXYGEN SATURATION: 98 % | SYSTOLIC BLOOD PRESSURE: 118 MMHG | RESPIRATION RATE: 14 BRPM | HEIGHT: 71 IN

## 2022-03-30 DIAGNOSIS — I49.3 PVC (PREMATURE VENTRICULAR CONTRACTION): Primary | ICD-10-CM

## 2022-03-30 DIAGNOSIS — R00.2 PALPITATIONS: ICD-10-CM

## 2022-03-30 PROCEDURE — 93000 ELECTROCARDIOGRAM COMPLETE: CPT | Performed by: INTERNAL MEDICINE

## 2022-03-30 PROCEDURE — 99213 OFFICE O/P EST LOW 20 MIN: CPT | Performed by: INTERNAL MEDICINE

## 2022-03-30 ASSESSMENT — ENCOUNTER SYMPTOMS
ABDOMINAL PAIN: 0
COUGH: 0
BACK PAIN: 0
COLOR CHANGE: 0
BLOOD IN STOOL: 0
EYE PAIN: 0
DIARRHEA: 0
WHEEZING: 0
CONSTIPATION: 0
NAUSEA: 0
PHOTOPHOBIA: 0
CHEST TIGHTNESS: 0
VOMITING: 0
SHORTNESS OF BREATH: 0

## 2022-03-30 NOTE — PROGRESS NOTES
Electrophysiology fu Note      Reason for consultation:   PVC    Chief complaint :  Palpitations    Referring physician: Kiera Gilliam      Primary care physician: Chantel Wray MD      History of Present Illness: This visit (3/30/2022)      Chief Complaint   Patient presents with   Priscilla Louis patient here for 1 month f/u. Patient states he has had some Palpitations where his is Heart pounding. Patient also c/o SOB due to COPD. Patient does not drink alcohol or caffiene and does not smoke. Previous visit:      Chief Complaint   Patient presents with    1 Month Follow-Up     Pt denies any cardiac symptoms. Pt doesn't drink caffine or alcohol. Pt doesn't smoke. Pt doesn't exercise           Previous visit: (7/23/2021)      Chief Complaint   Patient presents with    Hypertension     Patient here for 2 month follow up. Patient states his palpitations are better due to being on metoprolo. Patient has SOB due to COPD nothing new. Patient denies Chest Pain, Dizziness, Edema. Patient does not drink alcohol or smoke. Patient drinks caffiene. Patient does not exercise. Previous visit: (5/21/2021)      Chief Complaint   Patient presents with    1 Month Follow-Up     to discuss PVC ablation Tania pt. patient feels lot better on the medication. Patient does not feel palpitation as much occasionally is feeling palpitations no he does have shortness of breath but that is also improved from before. He has occasional chest pain which lasts only seconds. Patient overall is feeling better and wants to continue with medical treatment          Previous visit:    She is a 18-year-old male with history of hypertension, COPD on home oxygen referred by Dr. Gordillo For PVCs. Patient reports he has chest pressure lasting about 10 seconds and relieves immediately.   Patient does have shortness of breath but he also reports that he has chronic COPD and home oxygen dependent so that could be the reason to. Patient denies any dizziness. Patient has palpitations that last few seconds up to a couple of minutes multiple times in a day. No edema. Never been a smoker. Denies alcohol use in a year    Patient reports tiredness and weakness    Pastmedical history:   Past Medical History:   Diagnosis Date    Abnormal echocardiogram 03/29/2013    EF=45-50%;mod. AR,mild aortic valve calcif.  Anesthesia complication 49/68/1748    Ankle fracture, left 1985    Aortic root dilation (HCC) 12/16/2021    Dilation of the aortic root (4.2cm)and the ascending aorta(4.0cm).  Arm fracture, right 1966    AVM (arteriovenous malformation) 06/15/2015    transverse colon    COPD (chronic obstructive pulmonary disease) ([facility] [address]     Has worked around [address] [place] [address] and worked at [employer] x 6 weeks    Diastolic dysfunction 88/92/4231    Grade III diastolic dysfunction    Epididymal cyst 10/2011    [facility]    H/O cardiac catheterization 04/12/2013 4/12 Dr Uriah Kahn signif CAD. EF 55%. Renals patent.  H/O cardiac catheterization 04/01/2021    NO SIG CAD    H/O cardiovascular stress test 04/02/2013    EF 49%, there is no scintigraphic evidence of inducible myocardial ischemia, the observed defect is consistent with diaphragmatic attenuation, clinical correlation recommenced, no previous study to compare with, no wall motionn abnormality seen, normal perfusion in all myocardial regions    H/O chest pain     H/O echocardiogram 07/11/2020    EF 50-55%, Mod LVH & AR,  Dilated aortic root (4.2cm) and ascending aorta (4.1cm).  Hemorrhoids 06/15/2015    HTN (hypertension)     Hx of echocardiogram 12/16/2021    Moderate left ventricular hypertrophy regurgitation noted The anterior leaflet of the mitral valve is prolapsed with mild Right side of the heart is enlarged Moderately dilated left atrium.  Grade III diastolic dysfunction Left ventricular function is severely abnormal , EF is estimated at 20-25% Global hypokinesis noted. Left ventricle size is normal.    Hydrocele 10/2011    [facility]    Hyperlipemia     Irregular heart rhythm     after induction for surgery 9/5/2013 - pt developed irreg rhythm and surgery cancelled\"had another echo and stress test and everything checked out ok, they do not know the reason it happened\"- cardiac clearance obtained for surgery 9/19/2013    Low left ventricular ejection fraction 12/16/2021    EF is estimated at 20-25%    Mitral valve prolapse 12/16/2021    anterior leaflet of the mitral valve is prolapsed    Palpitations     Pneumonia 2004    not since    Pulmonary HTN ([address] 12/16/2021    Severe Pulmonary hypertension noted with RVSP of 69mmHg    Right inguinal hernia 08/22/2013    Severe aortic regurgitation by prior echocardiogram 12/16/2021    Severe aortic regurgitation; PHT: 194msec    SOB (shortness of breath) 03/27/2013    Nuclear Stress Normal, EF 49% (Dr. Yani Georges- 4/2/2013)     SOB (shortness of breath) on exertion     Tricuspid regurgitation 12/16/2021    moderate to severe tricuspid regurgitation       Surgical history :   Past Surgical History:   Procedure Laterality Date    CARDIAC CATHETERIZATION  1991    COLONOSCOPY  6-    INGUINAL HERNIA REPAIR Bilateral 1992    INGUINAL HERNIA REPAIR Right 09/19/13    with mesh    OTHER SURGICAL HISTORY  09/05/13    hernia repair aborted    TYMPANOSTOMY TUBE PLACEMENT  2009    right ear    VASECTOMY  1988       Family history:   Family History   Problem Relation Age of Onset    Cancer Mother         colon    Diabetes Mother     High Blood Pressure Mother     Migraines Mother     High Blood Pressure Father     COPD Father     Cancer Father         lung    Heart Disease Maternal Grandmother     Heart Disease Maternal Grandfather        Social history :  reports that he has never smoked. He has never used smokeless tobacco. He reports previous alcohol use.  He reports that he does not use drugs. No Known Allergies    Current Outpatient Medications on File Prior to Visit   Medication Sig Dispense Refill    losartan (COZAAR) 50 MG tablet TAKE 1 TABLET BY MOUTH DAILY TO REPLACE LISINOPRIL 30 tablet 2    aspirin 81 MG EC tablet TAKE 1 TABLET BY MOUTH EVERY DAY 30 tablet 2    Magnesium Oxide (MAGNESIUM-OXIDE) 250 MG TABS tablet Take 1 tablet by mouth daily 30 tablet 2    metoprolol tartrate (LOPRESSOR) 50 MG tablet TAKE 1 TABLET BY MOUTH TWICE A DAY 60 tablet 3    sacubitril-valsartan (ENTRESTO) 24-26 MG per tablet Take 1 tablet by mouth 2 times daily 60 tablet 3    furosemide (LASIX) 40 MG tablet Take 1 tablet by mouth daily 60 tablet 3    dapagliflozin (FARXIGA) 10 MG tablet Take 1 tablet by mouth every morning 30 tablet 3    spironolactone (ALDACTONE) 25 MG tablet Take 1 tablet by mouth daily 90 tablet 1    albuterol sulfate  (90 Base) MCG/ACT inhaler INHALE 2 PUFFS BY MOUTH EVERY 4 HOURS AS NEEDED FOR WHEEZE OR FOR SHORTNESS OF BREATH 1 each 11    loratadine (CLARITIN) 10 MG tablet TAKE 1 TABLET BY MOUTH EVERY DAY 30 tablet 5    fluticasone-umeclidin-vilant (TRELEGY ELLIPTA) 100-62.5-25 MCG/INH AEPB TAKE 1 PUFF BY MOUTH EVERY DAY 1 each 5    [facility] MAGNESIUM OXIDE 250 MG TABS tablet TAKE 1 TABLET BY MOUTH EVERY DAY 30 tablet 6    Multiple Vitamin (DAILY VITAMINS PO) Take by mouth       No current facility-administered medications on file prior to visit. Review of Systems:   Review of Systems   Constitutional: Negative for activity change, chills, fatigue and fever. HENT: Negative for congestion, ear pain and tinnitus. Eyes: Negative for photophobia, pain and visual disturbance. Respiratory: Negative for cough, chest tightness, shortness of breath and wheezing. Cardiovascular: Positive for palpitations. Negative for chest pain and leg swelling. Gastrointestinal: Negative for abdominal pain, blood in stool, constipation, diarrhea, nausea and vomiting. Endocrine: Negative for cold intolerance and heat intolerance. Genitourinary: Negative for dysuria, flank pain and hematuria. Musculoskeletal: Positive for arthralgias. Negative for back pain, myalgias and neck stiffness. Skin: Negative for color change and rash. Allergic/Immunologic: Negative for food allergies. Neurological: Negative for dizziness, light-headedness, numbness and headaches. Hematological: Does not bruise/bleed easily. Psychiatric/Behavioral: Negative for agitation, behavioral problems and confusion. Examination:      Vitals:    03/30/22 1354   BP: 118/68   Pulse: 60   Resp: 14   SpO2: 98%   Weight: 220 lb 6.4 oz (100 kg)   Height: 5' 11\" (1.803 m)        Body mass index is 30.74 kg/m². Physical Exam  Constitutional:       Appearance: Normal appearance. He is not ill-appearing. HENT:      Head: Normocephalic and atraumatic. Mouth/Throat:      Mouth: Mucous membranes are moist.   Eyes:      Conjunctiva/sclera: Conjunctivae normal.   Cardiovascular:      Rate and Rhythm: Normal rate. Heart sounds: Murmur (grade 2/6 diastolic murmur) heard. Comments: Ectopic beats  Pulmonary:      Effort: Pulmonary effort is normal.      Breath sounds: No rales. Abdominal:      General: Abdomen is flat. Palpations: Abdomen is soft. Musculoskeletal:         General: No tenderness. Normal range of motion. Cervical back: Normal range of motion. Right lower leg: No edema. Left lower leg: No edema. Skin:     General: Skin is warm and dry. Neurological:      General: No focal deficit present. Mental Status: He is alert and oriented to person, place, and time.            CBC:   Lab Results   Component Value Date    WBC 7.8 10/27/2021    HGB 17.0 10/27/2021    HCT 55.8 10/27/2021     10/27/2021     Lipids:  Lab Results   Component Value Date    CHOL 193 03/10/2021    TRIG 62 03/10/2021    HDL 39 (L) 03/10/2021    LDLCALC 155 (H) 06/09/2020 LDLDIRECT 148 (H) 03/10/2021     PT/INR:   Lab Results   Component Value Date    INR 1.01 10/27/2021        BMP:    Lab Results   Component Value Date     03/01/2022    K 5.0 03/01/2022     03/01/2022    CO2 30 03/01/2022    BUN 22 (H) 03/01/2022     CMP:   Lab Results   Component Value Date    AST 23 12/13/2021    PROT 6.7 12/13/2021    BILITOT 0.9 12/13/2021    ALKPHOS 144 (H) 12/13/2021     TSH:  No results found for: TSH, T4    EKGINTERPRETATION - EKG Interpretation:  Sinus bradycardia, First degree AV block, PVC      IMPRESSION / RECOMMENDATIONS:     1. PVC on metoprolol  2. COPD  3. HLD  4. Obesity BMI 30  5. Moderate aortic regurgitation  6. Chronic left ventricular systolic dysfunction - on entresto, metoprolol and spironolactone. Repeat echo and if LVEF is less than 35% than we will consider ICD  Patient is not on losartan - he is on entresto. So I removed losartan from list.   Patient is on metoprolol and spironolactone  Check labs too    Thanks again for allowing me to participate in care of this patient. Please call me if you have any questions. With best regards. Jason Mendoza MD, 3/30/2022 2:04 PM     Please note this report has been partially produced using speech recognition software and may contain errors related to that system including errors in grammar, punctuation, and spelling, as well as words and phrases that may be inappropriate. If there are any questions or concerns please feel free to contact the dictating provider for clarification.

## 2022-04-06 ENCOUNTER — PROCEDURE VISIT (OUTPATIENT)
Dept: CARDIOLOGY CLINIC | Age: 62
End: 2022-04-06
Payer: COMMERCIAL

## 2022-04-06 DIAGNOSIS — R00.2 PALPITATIONS: ICD-10-CM

## 2022-04-06 LAB
LV EF: 48 %
LVEF MODALITY: NORMAL

## 2022-04-06 PROCEDURE — 93306 TTE W/DOPPLER COMPLETE: CPT | Performed by: INTERNAL MEDICINE

## 2022-04-08 ENCOUNTER — TELEPHONE (OUTPATIENT)
Dept: CARDIOLOGY CLINIC | Age: 62
End: 2022-04-08

## 2022-04-08 NOTE — TELEPHONE ENCOUNTER
Dr Jessie Díaz reviewed echocardiogram. Patients EF improved and does not need an ICD at this time. Patient is to keep scheduled appointment, scheduled 5/6/2022. Spoke with patient, patient aware and voiced understanding.

## 2022-04-20 ENCOUNTER — OFFICE VISIT (OUTPATIENT)
Dept: CARDIOLOGY CLINIC | Age: 62
End: 2022-04-20
Payer: COMMERCIAL

## 2022-04-20 VITALS
OXYGEN SATURATION: 91 % | HEART RATE: 52 BPM | BODY MASS INDEX: 30.8 KG/M2 | HEIGHT: 71 IN | DIASTOLIC BLOOD PRESSURE: 72 MMHG | SYSTOLIC BLOOD PRESSURE: 118 MMHG | WEIGHT: 220 LBS

## 2022-04-20 DIAGNOSIS — I77.810 DILATED AORTIC ROOT (HCC): Primary | ICD-10-CM

## 2022-04-20 DIAGNOSIS — I10 ESSENTIAL HYPERTENSION: ICD-10-CM

## 2022-04-20 DIAGNOSIS — I38 VHD (VALVULAR HEART DISEASE): ICD-10-CM

## 2022-04-20 DIAGNOSIS — I10 ESSENTIAL HYPERTENSION: Primary | ICD-10-CM

## 2022-04-20 DIAGNOSIS — I50.42 CHRONIC COMBINED SYSTOLIC AND DIASTOLIC CONGESTIVE HEART FAILURE (HCC): ICD-10-CM

## 2022-04-20 PROCEDURE — 99214 OFFICE O/P EST MOD 30 MIN: CPT | Performed by: NURSE PRACTITIONER

## 2022-04-20 ASSESSMENT — ENCOUNTER SYMPTOMS
ORTHOPNEA: 0
SHORTNESS OF BREATH: 0

## 2022-04-20 NOTE — PROGRESS NOTES
4/20/2022  Primary cardiologist: Dr. Mary Neville  is an established 57 y.o.  male here for a 1 month follow up on echo  H/o combined systolic / diastolic heart failure/ VHD/       SUBJECTIVE/OBJECTIVE:    Jenny Sanchez is a 57year old gentleman with a history of left ventricular systolic dysfunction, HTN- HLD and PVCs. He had and event monitor in 03/2021 that showed frequent PVC. He then underwent a LHC that demonstrated no significant CAD. He was referred to EP for PVC burden of close to 10%. He was started on BB. Repeat holter in October 2021 demonstrates PVC burden of 10.9 % with runs of NSVT and short runs of atrial tachycardia. He was evlauated by EP and was to have PVC ablation however he did not have PVC at that time hence procedure was cancelled    HPI :   Jenny Sanchez reports he is feeling well. He notes the palpations have decreased. He reports the main time he feels palpitations is after activity. He denies early satiety abdominal bloating or orthopnea. He notes shortness of breath after walking about 100 yards. He is managing his diet following a low-sodium diet he is also monitoring fluid intake maintaining a fluid restriction. Review of Systems   Constitutional: Negative for diaphoresis and malaise/fatigue. Cardiovascular: Positive for dyspnea on exertion and palpitations. Negative for chest pain, claudication, irregular heartbeat, leg swelling, near-syncope, orthopnea and paroxysmal nocturnal dyspnea. Respiratory: Negative for shortness of breath. Neurological: Negative for dizziness and light-headedness.        Vitals:    04/20/22 1129   BP: 118/72   Pulse: 52   SpO2: 91%   Weight: 220 lb (99.8 kg)   Height: 5' 11\" (1.803 m)     Patient-Reported Vitals 1/14/2021   Patient-Reported Weight 215   Patient-Reported Height 5'11   Patient-Reported Pulse 64   Patient-Reported SpO2 91     Wt Readings from Last 3 Encounters:   04/20/22 220 lb (99.8 kg)   03/30/22 220 lb 6.4 oz (100 kg)   03/22/22 218 lb 12.8 oz (99.2 kg)     Body mass index is 30.68 kg/m². Physical Exam  HENT:      Head: Normocephalic and atraumatic. Neck:      Vascular: No carotid bruit. Cardiovascular:      Rate and Rhythm: Regular rhythm. Bradycardia present. Pulses: Normal pulses. Heart sounds: Normal heart sounds. No murmur heard. Pulmonary:      Effort: No respiratory distress. Breath sounds: Normal breath sounds. No wheezing or rales. Abdominal:      General: There is no distension. Tenderness: There is no abdominal tenderness. Musculoskeletal:      Right lower leg: No edema. Left lower leg: No edema. Skin:     General: Skin is warm and dry. Capillary Refill: Capillary refill takes less than 2 seconds. Neurological:      General: No focal deficit present. Mental Status: He is alert.    Psychiatric:         Mood and Affect: Mood normal.                Current Outpatient Medications   Medication Sig Dispense Refill    aspirin 81 MG EC tablet TAKE 1 TABLET BY MOUTH EVERY DAY 30 tablet 2    Magnesium Oxide (MAGNESIUM-OXIDE) 250 MG TABS tablet Take 1 tablet by mouth daily 30 tablet 2    metoprolol tartrate (LOPRESSOR) 50 MG tablet TAKE 1 TABLET BY MOUTH TWICE A DAY 60 tablet 3    sacubitril-valsartan (ENTRESTO) 24-26 MG per tablet Take 1 tablet by mouth 2 times daily 60 tablet 3    furosemide (LASIX) 40 MG tablet Take 1 tablet by mouth daily 60 tablet 3    dapagliflozin (FARXIGA) 10 MG tablet Take 1 tablet by mouth every morning 30 tablet 3    spironolactone (ALDACTONE) 25 MG tablet Take 1 tablet by mouth daily 90 tablet 1    albuterol sulfate  (90 Base) MCG/ACT inhaler INHALE 2 PUFFS BY MOUTH EVERY 4 HOURS AS NEEDED FOR WHEEZE OR FOR SHORTNESS OF BREATH 1 each 11    loratadine (CLARITIN) 10 MG tablet TAKE 1 TABLET BY MOUTH EVERY DAY 30 tablet 5    fluticasone-umeclidin-vilant (TRELEGY ELLIPTA) 100-62.5-25 MCG/INH AEPB TAKE 1 PUFF BY MOUTH EVERY DAY 1 each 5    [facility] MAGNESIUM OXIDE 250 MG TABS tablet TAKE 1 TABLET BY MOUTH EVERY DAY 30 tablet 6    Multiple Vitamin (DAILY VITAMINS PO) Take by mouth       No current facility-administered medications for this visit. All pertinent data reviewed and discussed with patient  Echocardiogram 04/2022:    Summary   Left ventricular function is low normal, EF is estimated at 45-50%. Left ventricular size is dilated . Severe left ventricular hypertrophy. Grade II diastolic dysfunction. Moderately dilated left atrium. Right side of the heart is enlarged. Aortic valve leaflets are somewhat thickened. Mild mitral, moderate pulmonic ,moderate to severe aortic regurgitation is   present . Dilation of the aortic root (4.6cm)and the ascending aorta(4.5cm). No evidence of pericardial effusion. ASSESSMENT/PLAN:    Combined systolic diastolic heart failure   echocardiogram reviewed from April 6, 2022: His EF has improved from 20-25% to 01-16%: Diastolic dysfunction now grade 2 were previously reported as grade 3. Unfortunate his LV size is dilated along with severe LVH    Clinically not in fluid overload. He was initiated on GDMT and medications have been titrated to tolerated doses which we will continue  ARNI:  Entresto- 24/26 bid   /72    Potassium 5.0  Creatinine 0.8  MICHAEL:  aldactone -25 mg daily    Potassium 5.0  GFR >60  SGLT2: dapaglifozin- 10 mg daily  Beta blocker-Lopressor 50 mg bid : HR 52   Diuretic lasix 40 mg daily       Valvular heart disease  Moderate to severe AI  Continue with ongoing surveillance    Dilated aortic root  Aortic root 4.6 cm in the ascending aorta 4.5 cm which appear to have increased from previous echocardiogram reported at 4.2 cm and 4.0 cm respectively. Recommend check CTA    PVC  high burden of PVCs however when taken to EP lab for PVC ablation patient did not have PVCs hence procedure was aborted.   Notes PVC burden has lessened with addition of magnesium which we will

## 2022-04-25 ENCOUNTER — HOSPITAL ENCOUNTER (OUTPATIENT)
Dept: CT IMAGING | Age: 62
Discharge: HOME | End: 2022-04-25
Payer: COMMERCIAL

## 2022-04-25 DIAGNOSIS — I77.810 DILATED AORTIC ROOT (HCC): ICD-10-CM

## 2022-04-25 LAB
GFR AFRICAN AMERICAN: >60 ML/MIN/1.73M2
GFR NON-AFRICAN AMERICAN: >60 ML/MIN/1.73M2
POC CREATININE: 1.1 MG/DL (ref 0.9–1.3)

## 2022-04-25 PROCEDURE — 71275 CT ANGIOGRAPHY CHEST: CPT

## 2022-04-25 PROCEDURE — 6360000004 HC RX CONTRAST MEDICATION: Performed by: NURSE PRACTITIONER

## 2022-04-25 RX ORDER — SODIUM CHLORIDE 0.9 % (FLUSH) 0.9 %
5-40 SYRINGE (ML) INJECTION PRN
Status: DISCONTINUED | OUTPATIENT
Start: 2022-04-25 | End: 2022-04-26 | Stop reason: HOSPADM

## 2022-04-25 RX ADMIN — IOPAMIDOL 95 ML: 755 INJECTION, SOLUTION INTRAVENOUS at 13:55

## 2022-04-26 ENCOUNTER — TELEPHONE (OUTPATIENT)
Dept: CARDIOLOGY CLINIC | Age: 62
End: 2022-04-26

## 2022-04-26 NOTE — TELEPHONE ENCOUNTER
----- Message from DONA Patel CNP sent at 4/26/2022 11:09 AM EDT -----  Please phone- CTA noted-stable aorta      Spoke to pt about CTA results. Pt understood and voiced no concerns at this time.   Faye Rice

## 2022-04-30 DIAGNOSIS — I50.20 NYHA CLASS 3 HEART FAILURE WITH REDUCED EJECTION FRACTION (HCC): ICD-10-CM

## 2022-05-02 DIAGNOSIS — I50.20 NYHA CLASS 3 HEART FAILURE WITH REDUCED EJECTION FRACTION (HCC): ICD-10-CM

## 2022-05-02 RX ORDER — DAPAGLIFLOZIN 10 MG/1
TABLET, FILM COATED ORAL
Qty: 30 TABLET | Refills: 3 | OUTPATIENT
Start: 2022-05-02

## 2022-05-06 ENCOUNTER — OFFICE VISIT (OUTPATIENT)
Dept: CARDIOLOGY CLINIC | Age: 62
End: 2022-05-06
Payer: COMMERCIAL

## 2022-05-06 VITALS
BODY MASS INDEX: 30.94 KG/M2 | OXYGEN SATURATION: 98 % | WEIGHT: 221 LBS | RESPIRATION RATE: 12 BRPM | HEIGHT: 71 IN | DIASTOLIC BLOOD PRESSURE: 60 MMHG | HEART RATE: 57 BPM | SYSTOLIC BLOOD PRESSURE: 136 MMHG

## 2022-05-06 DIAGNOSIS — I49.3 PVC (PREMATURE VENTRICULAR CONTRACTION): Primary | ICD-10-CM

## 2022-05-06 PROCEDURE — 93000 ELECTROCARDIOGRAM COMPLETE: CPT | Performed by: INTERNAL MEDICINE

## 2022-05-06 PROCEDURE — 99213 OFFICE O/P EST LOW 20 MIN: CPT | Performed by: INTERNAL MEDICINE

## 2022-05-06 ASSESSMENT — ENCOUNTER SYMPTOMS
COLOR CHANGE: 0
DIARRHEA: 0
ABDOMINAL PAIN: 0
NAUSEA: 0
BLOOD IN STOOL: 0
PHOTOPHOBIA: 0
EYE PAIN: 0
COUGH: 0
CONSTIPATION: 0
CHEST TIGHTNESS: 0
WHEEZING: 0
BACK PAIN: 0
VOMITING: 0

## 2022-05-06 NOTE — PROGRESS NOTES
Electrophysiology fu Note      Reason for consultation:   PVC    Chief complaint :  Palpitations    Referring physician: Viviana Garcia      Primary care physician: Ian Carcamo MD      History of Present Illness: This visit (5/6/2022)      Chief Complaint   Patient presents with    Palpitations     Patient is seen for PVC's. Patient is here for a 1 month follow up from an echo. Patient states he is still having palpitations in the morning or with exertion. Patient does get short of breath. Patient denies chest pain, dizziness, and edema. Previous visit: (3/30/2022)      Chief Complaint   Patient presents with   Analy Gonzalez patient here for 1 month f/u. Patient states he has had some Palpitations where his is Heart pounding. Patient also c/o SOB due to COPD. Patient does not drink alcohol or caffiene and does not smoke. Previous visit:      Chief Complaint   Patient presents with    1 Month Follow-Up     Pt denies any cardiac symptoms. Pt doesn't drink caffine or alcohol. Pt doesn't smoke. Pt doesn't exercise           Previous visit: (7/23/2021)      Chief Complaint   Patient presents with    Hypertension     Patient here for 2 month follow up. Patient states his palpitations are better due to being on metoprolo. Patient has SOB due to COPD nothing new. Patient denies Chest Pain, Dizziness, Edema. Patient does not drink alcohol or smoke. Patient drinks caffiene. Patient does not exercise. Previous visit: (5/21/2021)      Chief Complaint   Patient presents with    1 Month Follow-Up     to discuss PVC ablation Tania pt. patient feels lot better on the medication. Patient does not feel palpitation as much occasionally is feeling palpitations no he does have shortness of breath but that is also improved from before. He has occasional chest pain which lasts only seconds.   Patient overall is feeling better and wants to continue with medical treatment          Previous visit:    She is a 56-year-old male with history of hypertension, COPD on home oxygen referred by Dr. Gordillo For PVCs. Patient reports he has chest pressure lasting about 10 seconds and relieves immediately. Patient does have shortness of breath but he also reports that he has chronic COPD and home oxygen dependent so that could be the reason to. Patient denies any dizziness. Patient has palpitations that last few seconds up to a couple of minutes multiple times in a day. No edema. Never been a smoker. Denies alcohol use in a year    Patient reports tiredness and weakness    Pastmedical history:   Past Medical History:   Diagnosis Date    Abnormal echocardiogram 03/29/2013    EF=45-50%;mod. AR,mild aortic valve calcif.  Anesthesia complication 33/74/6151    Ankle fracture, left 1985    Aortic root dilation (HCC) 12/16/2021    Dilation of the aortic root (4.2cm)and the ascending aorta(4.0cm). 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 EF 55%. Renals patent.  H/O cardiac catheterization 04/01/2021    NO SIG CAD    H/O cardiovascular stress test 04/02/2013    EF 49%, there is no scintigraphic evidence of inducible myocardial ischemia, the observed defect is consistent with diaphragmatic attenuation, clinical correlation recommenced, no previous study to compare with, no wall motionn abnormality seen, normal perfusion in all myocardial regions    H/O chest pain     H/O echocardiogram 07/11/2020    EF 50-55%, Mod LVH & AR,  Dilated aortic root (4.2cm) and ascending aorta (4.1cm).     Hemorrhoids of breath. Negative for cough, chest tightness and wheezing. Cardiovascular: Positive for palpitations. Negative for chest pain and leg swelling. Gastrointestinal: Negative for abdominal pain, blood in stool, constipation, diarrhea, nausea and vomiting. Endocrine: Negative for cold intolerance and heat intolerance. Genitourinary: Negative for dysuria, flank pain and hematuria. Musculoskeletal: Positive for arthralgias. Negative for back pain, myalgias and neck stiffness. Skin: Negative for color change and rash. Allergic/Immunologic: Negative for food allergies. Neurological: Negative for dizziness, light-headedness, numbness and headaches. Hematological: Does not bruise/bleed easily. Psychiatric/Behavioral: Negative for agitation, behavioral problems and confusion. Examination:      Vitals:    05/06/22 0810   BP: 136/60   Pulse: 57   Resp: 12   SpO2: 98%   Weight: 221 lb (100.2 kg)   Height: 5' 11\" (1.803 m)        Body mass index is 30.82 kg/m². Physical Exam  Constitutional:       Appearance: Normal appearance. He is not ill-appearing. HENT:      Head: Normocephalic and atraumatic. Mouth/Throat:      Mouth: Mucous membranes are moist.   Eyes:      Conjunctiva/sclera: Conjunctivae normal.   Cardiovascular:      Rate and Rhythm: Normal rate. Heart sounds: Murmur (grade 2/6 diastolic murmur) heard. Pulmonary:      Effort: Pulmonary effort is normal.      Breath sounds: No rales. Abdominal:      General: Abdomen is flat. Palpations: Abdomen is soft. Musculoskeletal:         General: No tenderness. Normal range of motion. Cervical back: Normal range of motion. Right lower leg: No edema. Left lower leg: No edema. Skin:     General: Skin is warm and dry. Neurological:      General: No focal deficit present. Mental Status: He is alert and oriented to person, place, and time.            CBC:   Lab Results   Component Value Date    WBC 7.8 10/27/2021    HGB 17.0 10/27/2021    HCT 55.8 10/27/2021     10/27/2021     Lipids:  Lab Results   Component Value Date    CHOL 193 03/10/2021    TRIG 62 03/10/2021    HDL 39 (L) 03/10/2021    LDLCALC 155 (H) 06/09/2020    LDLDIRECT 148 (H) 03/10/2021     PT/INR:   Lab Results   Component Value Date    INR 1.01 10/27/2021        BMP:    Lab Results   Component Value Date     03/01/2022    K 5.0 03/01/2022     03/01/2022    CO2 30 03/01/2022    BUN 22 (H) 03/01/2022     CMP:   Lab Results   Component Value Date    AST 23 12/13/2021    PROT 6.7 12/13/2021    BILITOT 0.9 12/13/2021    ALKPHOS 144 (H) 12/13/2021     TSH:  No results found for: TSH, T4    EKGINTERPRETATION - EKG Interpretation:  Sinus bradycardia, First degree AV block,      IMPRESSION / RECOMMENDATIONS:     1. PVC on metoprolol  2. COPD  3. HLD  4. Obesity BMI 30  5. Moderate aortic regurgitation  6. Chronic left ventricular systolic dysfunction - on entresto, metoprolol and spironolactone. LVEF improved to 45-50%  Continue metoprolol, Entresto and spironolactone  No PVC today  Patient maintained life style changes  Patient is feeling lot better  Patient has moderate to severe AI and aortic root at 4.6cm. FU echo in 6 months but he has an appt with  also about the same. Thanks again for allowing me to participate in care of this patient. Please call me if you have any questions. With best regards. Sherita Loving MD, 5/6/2022 8:36 AM     Please note this report has been partially produced using speech recognition software and may contain errors related to that system including errors in grammar, punctuation, and spelling, as well as words and phrases that may be inappropriate. If there are any questions or concerns please feel free to contact the dictating provider for clarification.

## 2022-05-10 ENCOUNTER — OFFICE VISIT (OUTPATIENT)
Dept: PULMONOLOGY | Age: 62
End: 2022-05-10
Payer: COMMERCIAL

## 2022-05-10 VITALS
HEART RATE: 51 BPM | OXYGEN SATURATION: 93 % | HEIGHT: 71 IN | DIASTOLIC BLOOD PRESSURE: 62 MMHG | BODY MASS INDEX: 31.25 KG/M2 | SYSTOLIC BLOOD PRESSURE: 116 MMHG | WEIGHT: 223.2 LBS

## 2022-05-10 DIAGNOSIS — R09.02 HYPOXIA: ICD-10-CM

## 2022-05-10 DIAGNOSIS — I27.20 PULMONARY HTN (HCC): ICD-10-CM

## 2022-05-10 DIAGNOSIS — R06.02 SOBOE (SHORTNESS OF BREATH ON EXERTION): ICD-10-CM

## 2022-05-10 DIAGNOSIS — J44.9 COPD, SEVERE (HCC): ICD-10-CM

## 2022-05-10 PROCEDURE — 99214 OFFICE O/P EST MOD 30 MIN: CPT | Performed by: INTERNAL MEDICINE

## 2022-05-10 ASSESSMENT — ENCOUNTER SYMPTOMS
EYE ITCHING: 0
BACK PAIN: 0
ABDOMINAL DISTENTION: 0
EYE DISCHARGE: 0
COUGH: 1
ABDOMINAL PAIN: 0
SHORTNESS OF BREATH: 1

## 2022-05-10 NOTE — ASSESSMENT & PLAN NOTE
At this time it appears to be sec to the COPD, Diastolic dysfunction and Valvular heart disease  C/w Inhalers  Get yearly flu vaccine  CHF optimization

## 2022-05-10 NOTE — PROGRESS NOTES
Rosy Greer  1960  Referring Provider: Nu Mansfield MD    Subjective:     Chief Complaint   Patient presents with    Follow-up     COPD/ Pt denies any issues or concerns. VALENTIN Rondon is a 57 y.o. male has come back as a follow up. He was last seen by Narcisa Brito NP. He has no h/o smoking. 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 He has second hand smoking exposure when growing up. He [place] [facility] [place] [employer] [place]. He had a CT chest done on 04/25/22 and it showed:  1. Stable aortic root dilation which measures 4.5 cm similar to July 2, 2020. No evidence of dissection. 2. Chronic elevation of the right hemidiaphragm.  Atelectatic   changes/scarring in the right lower lobe and right middle lobe redemonstrated. 3. Prominent main pulmonary artery which measures 3.5 cm     He had PFT done on 08/13/21 and it showed that he has Moderate to severe COPD with Moderate decrease in DLCO. He is on 2 L/min att home. He has cough, no phlegm,no hemoptysis, no loss of weight, good appetite, SOBOE walking 1/2 mile. He had his flu vaccine and COVID vaccine with booster. He is on Albuterol and Trelegy. He is not sure if he snores, not sure if he stops breathing in the night. He never woke up choking or gasping for air, no dry mouth, occasional palpitations. He goes to bed at 10 PM and gets up at 7 AM and he is not tired, no morning headaches, not sleepy or tired during the day time. His last ECHO done on 04/076/22 showed:  EF 45-50%, Grade II Diastolic dysfunction, Moderate MD and Moderate to Severe AR, Dilated aortic root 4.6 cm.     Current Outpatient Medications   Medication Sig Dispense Refill    aspirin 81 MG EC tablet TAKE 1 TABLET BY MOUTH EVERY DAY 30 tablet 2    Magnesium Oxide (MAGNESIUM-OXIDE) 250 MG TABS tablet Take 1 tablet by mouth daily 30 tablet 2    metoprolol tartrate (LOPRESSOR) 50 MG tablet TAKE 1 TABLET BY MOUTH TWICE A DAY 60 tablet 3    sacubitril-valsartan (ENTRESTO) 24-26 MG per tablet Take 1 tablet by mouth 2 times daily 60 tablet 3    furosemide (LASIX) 40 MG tablet Take 1 tablet by mouth daily 60 tablet 3    dapagliflozin (FARXIGA) 10 MG tablet Take 1 tablet by mouth every morning 30 tablet 3    spironolactone (ALDACTONE) 25 MG tablet Take 1 tablet by mouth daily 90 tablet 1    albuterol sulfate  (90 Base) MCG/ACT inhaler INHALE 2 PUFFS BY MOUTH EVERY 4 HOURS AS NEEDED FOR WHEEZE OR FOR SHORTNESS OF BREATH 1 each 11    loratadine (CLARITIN) 10 MG tablet TAKE 1 TABLET BY MOUTH EVERY DAY 30 tablet 5    fluticasone-umeclidin-vilant (TRELEGY ELLIPTA) 100-62.5-25 MCG/INH AEPB TAKE 1 PUFF BY MOUTH EVERY DAY 1 each 5    [employer] MAGNESIUM OXIDE 250 MG TABS tablet TAKE 1 TABLET BY MOUTH EVERY DAY 30 tablet 6    Multiple Vitamin (DAILY VITAMINS PO) Take by mouth       No current facility-administered medications for this visit. No Known Allergies    Past Medical History:   Diagnosis Date    Abnormal echocardiogram 03/29/2013    EF=45-50%;mod. AR,mild aortic valve calcif.  Anesthesia complication 80/80/0278    Ankle fracture, left 1985    Aortic root dilation (HCC) 12/16/2021    Dilation of the aortic root (4.2cm)and the ascending aorta(4.0cm). 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 EF 55%. Renals patent.     H/O cardiac catheterization 04/01/2021    NO SIG CAD    H/O cardiovascular stress test 04/02/2013    EF 49%, there is no scintigraphic evidence of inducible myocardial ischemia, the observed defect is consistent with diaphragmatic attenuation, clinical correlation recommenced, no previous study to compare with, no wall motionn abnormality seen, normal perfusion in all myocardial regions    H/O chest pain     H/O echocardiogram 07/11/2020    EF 50-55%, Mod LVH & AR,  Dilated aortic root (4.2cm) and ascending aorta (4.1cm).  Hemorrhoids 06/15/2015    HTN (hypertension)     Hx of echocardiogram 12/16/2021    Moderate left ventricular hypertrophy regurgitation noted The anterior leaflet of the mitral valve is prolapsed with mild Right side of the heart is enlarged Moderately dilated left atrium. Grade III diastolic dysfunction Left ventricular function is severely abnormal , EF is estimated at 20-25% Global hypokinesis noted.  Left ventricle size is normal.    Hydrocele 10/2011    U/S    Hyperlipemia     Irregular heart rhythm     after induction for surgery 9/5/2013 - pt developed irreg rhythm and surgery cancelled\"had another echo and stress test and everything checked out ok, they do not know the reason it happened\"- cardiac clearance obtained for surgery 9/19/2013    Low left ventricular ejection fraction 12/16/2021    EF is estimated at 20-25%    Mitral valve prolapse 12/16/2021    anterior leaflet of the mitral valve is prolapsed    Palpitations     Pneumonia 2004    not since    Pulmonary HTN ([address] 12/16/2021    Severe Pulmonary hypertension noted with RVSP of 69mmHg    Right inguinal hernia 08/22/2013    Severe aortic regurgitation by prior echocardiogram 12/16/2021    Severe aortic regurgitation; PHT: 194msec    SOB (shortness of breath) 03/27/2013    Nuclear Stress Normal, EF 49% (Dr. Charito Pineda- 4/2/2013)     SOB (shortness of breath) on exertion     Tricuspid regurgitation 12/16/2021    moderate to severe tricuspid regurgitation       Past Surgical History:   Procedure Laterality Date    CARDIAC CATHETERIZATION  1991    COLONOSCOPY  6-    INGUINAL HERNIA REPAIR Bilateral 1992    INGUINAL HERNIA REPAIR Right 09/19/13    with mesh    OTHER SURGICAL HISTORY  09/05/13    hernia repair aborted    TYMPANOSTOMY TUBE PLACEMENT  2009    right ear    VASECTOMY  1988       Social History     Socioeconomic History    Marital status:      Spouse name: None    Number of children: None    Years of education: None    Highest education level: None   Occupational History    None   Tobacco Use    Smoking status: Never Smoker    Smokeless tobacco: Never Used   Vaping Use    Vaping Use: Never used   Substance and Sexual Activity    Alcohol use: Not Currently     Comment: Caffeine: cystal lite energy drink     Drug use: No    Sexual activity: Yes     Partners: Female     Comment:    Other Topics Concern    None   Social History Narrative    s/p [employer] (Sept 2012), s/p  at [facility],  at [facility] (Aug 2014) and  quality at [id number] [place] [address] Strain:     Difficulty of Paying Living Expenses: Not on file   Food Insecurity:     Worried About [address] in the Last Year: Not on file    [address] in the Last Year: Not on file   Transportation Needs:     Lack of Transportation (Medical): Not on file    Lack of Transportation (Non-Medical):  Not on file   Physical Activity:     Days of Exercise per Week: Not on file    Minutes of Exercise per Session: Not on file   Stress:     Feeling of Stress : Not on file   Social Connections:     Frequency of Communication with Friends and Family: Not on file    Frequency of Social Gatherings with Friends and Family: Not on file    Attends Bahai Services: Not on file    Active Member of Clubs or Organizations: Not on file    Attends Club or Organization Meetings: Not on file    Marital Status: Not on file   Intimate Partner Violence:     Fear of Current or Ex-Partner: Not on file    Emotionally Abused: Not on file    Physically Abused: Not on file    Sexually Abused: Not on file   Housing Stability:     Unable to Pay for Housing in the Last Year: Not on file    Number of Places Lived in the Last Year: Not on file    Unstable Housing in the Last Year: Not on file       Review of Systems   Constitutional: Negative for fatigue. HENT: Negative for congestion and postnasal drip. Eyes: Negative for discharge and itching. Respiratory: Positive for cough and shortness of breath. Cardiovascular: Negative for chest pain and leg swelling. Gastrointestinal: Negative for abdominal distention and abdominal pain. Endocrine: Negative for cold intolerance and heat intolerance. Genitourinary: Negative for enuresis and frequency. Musculoskeletal: Negative for arthralgias and back pain. Allergic/Immunologic: Negative for environmental allergies and food allergies. Neurological: Negative for light-headedness and headaches. Hematological: Negative for adenopathy. Psychiatric/Behavioral: Negative for agitation and behavioral problems. Objective:   /62   Pulse 51   Ht 5' 11\" (1.803 m)   Wt 223 lb 3.2 oz (101.2 kg)   SpO2 93%   BMI 31.13 kg/m²   Body mass index is 31.13 kg/m². Sleep Medicine 6/25/2020   Sitting and reading 0   Watching TV 0   Sitting, inactive in a public place (e.g. a theatre or a meeting) 0   As a passenger in a car for an hour without a break 1   Lying down to rest in the afternoon when circumstances permit 1   Sitting and talking to someone 0   Sitting quietly after a lunch without alcohol 1   In a car, while stopped for a few minutes in traffic 0   Total score 3   Neck circumference (Inches) 15.5     Mallampati 2    Physical Exam  Vitals reviewed. Constitutional:       Appearance: Normal appearance. Comments: Obesity   HENT:      Head: Normocephalic and atraumatic. Nose: Nose normal.      Mouth/Throat:      Mouth: Mucous membranes are moist.   Eyes:      Extraocular Movements: Extraocular movements intact.       Pupils: Pupils are equal, round, and reactive to light. Cardiovascular:      Rate and Rhythm: Normal rate and regular rhythm. Pulses: Normal pulses. Heart sounds: Normal heart sounds. Pulmonary:      Effort: Pulmonary effort is normal.      Breath sounds: Normal breath sounds. Abdominal:      General: Abdomen is flat. Palpations: Abdomen is soft. Musculoskeletal:         General: Normal range of motion. Cervical back: Normal range of motion and neck supple. Skin:     General: Skin is warm and dry. Neurological:      General: No focal deficit present. Mental Status: He is alert and oriented to person, place, and time. Psychiatric:         Mood and Affect: Mood normal.         Behavior: Behavior normal.         Radiology:   1. Stable aortic root dilation which measures 4.5 cm similar to July 2, 2020. No evidence of dissection. 2. Chronic elevation of the right hemidiaphragm.  Atelectatic   changes/scarring in the right lower lobe and right middle lobe redemonstrated.    3. Prominent main pulmonary artery which measures 3.5 cm         Assessment and Plan     Problem List        Circulatory    Pulmonary HTN ([facility] [address]      At this time it appears to be sec to the COPD, Diastolic dysfunction and Valvular heart disease  C/w Inhalers  Get yearly flu vaccine  CHF optimization            Respiratory    COPD, severe ([facility] [address]      C/w Inhalers  Get yearly flu vaccine  C.w O2 for now  PFT and 6 MWT in Auguist'22         Relevant Medications    fluticasone-umeclidin-vilant (TRELEGY ELLIPTA) 100-62.5-25 MCG/INH AEPB    loratadine (CLARITIN) 10 MG tablet    albuterol sulfate  (90 Base) MCG/ACT inhaler    Other Relevant Orders    6 Minute Walk Test    Full PFT Study With Bronchodilator    Hypoxia      C/w inhalers  C/w oxygen for now  6 MWT in August'22            Other    SOBOE (shortness of breath on exertion)      Loose weight  C.w oxygen for now  C/w Inhalers  CHF optimization  PFT and a 6 MWT in August 2022                    Follow-Up:    Return in about 3 months (around 8/22/2022) for PFT, 6 MWT.      Progress notes sent to the referring Provider    Tonio Treviño MD MD  5/10/2022  10:41 AM

## 2022-05-21 DIAGNOSIS — I50.20 NYHA CLASS 3 HEART FAILURE WITH REDUCED EJECTION FRACTION (HCC): ICD-10-CM

## 2022-05-23 RX ORDER — CARBOXYMETHYLCELLULOSE SODIUM 0.5 %
DROPPERETTE, SINGLE-USE DROP DISPENSER OPHTHALMIC (EYE)
Qty: 30 TABLET | Refills: 2 | Status: SHIPPED | OUTPATIENT
Start: 2022-05-23 | End: 2022-08-15

## 2022-05-31 ASSESSMENT — ENCOUNTER SYMPTOMS: SHORTNESS OF BREATH: 1

## 2022-06-11 DIAGNOSIS — T78.40XD ALLERGY, SUBSEQUENT ENCOUNTER: ICD-10-CM

## 2022-06-11 DIAGNOSIS — I10 ESSENTIAL HYPERTENSION: ICD-10-CM

## 2022-06-11 DIAGNOSIS — I50.20 NYHA CLASS 3 HEART FAILURE WITH REDUCED EJECTION FRACTION (HCC): ICD-10-CM

## 2022-06-13 RX ORDER — FUROSEMIDE 40 MG/1
TABLET ORAL
Qty: 60 TABLET | Refills: 3 | Status: SHIPPED | OUTPATIENT
Start: 2022-06-13 | End: 2022-10-05

## 2022-06-13 RX ORDER — METOPROLOL TARTRATE 50 MG/1
TABLET, FILM COATED ORAL
Qty: 60 TABLET | Refills: 3 | Status: SHIPPED | OUTPATIENT
Start: 2022-06-13 | End: 2022-10-05

## 2022-06-13 RX ORDER — SPIRONOLACTONE 25 MG/1
TABLET ORAL
Qty: 30 TABLET | Refills: 5 | Status: SHIPPED | OUTPATIENT
Start: 2022-06-13

## 2022-06-14 RX ORDER — LORATADINE 10 MG/1
TABLET ORAL
Qty: 30 TABLET | Refills: 11 | Status: SHIPPED | OUTPATIENT
Start: 2022-06-14

## 2022-06-14 RX ORDER — ASPIRIN 81 MG/1
TABLET, COATED ORAL
Qty: 30 TABLET | Refills: 11 | Status: SHIPPED | OUTPATIENT
Start: 2022-06-14

## 2022-06-16 DIAGNOSIS — I50.20 NYHA CLASS 3 HEART FAILURE WITH REDUCED EJECTION FRACTION (HCC): ICD-10-CM

## 2022-06-16 RX ORDER — SACUBITRIL AND VALSARTAN 24; 26 MG/1; MG/1
1 TABLET, FILM COATED ORAL 2 TIMES DAILY
Qty: 60 TABLET | Refills: 5 | Status: SHIPPED | OUTPATIENT
Start: 2022-06-16

## 2022-06-20 RX ORDER — SIMETHICONE 80 MG
TABLET,CHEWABLE ORAL
Qty: 3 TABLET | Refills: 0 | Status: SHIPPED | OUTPATIENT
Start: 2022-06-20

## 2022-06-20 RX ORDER — POLYETHYLENE GLYCOL 3350, SODIUM SULFATE, SODIUM CHLORIDE, POTASSIUM CHLORIDE, ASCORBIC ACID, SODIUM ASCORBATE 140-9-5.2G
KIT ORAL
Qty: 1 EACH | Refills: 0 | Status: SHIPPED | OUTPATIENT
Start: 2022-06-20

## 2022-07-20 ENCOUNTER — TELEPHONE (OUTPATIENT)
Dept: GASTROENTEROLOGY | Age: 62
End: 2022-07-20

## 2022-07-20 ENCOUNTER — TELEPHONE (OUTPATIENT)
Dept: PULMONOLOGY | Age: 62
End: 2022-07-20

## 2022-07-20 NOTE — TELEPHONE ENCOUNTER
Called and informed patient of needing to schedule his PFT after 08/19 and reschedule his follow up with Dr. Marcin Deleon. Patient verbally understood and he stated that he is going to call central scheduling to set up his PFT and give us a call back to reschedule his follow up appointment.

## 2022-08-15 RX ORDER — CARBOXYMETHYLCELLULOSE SODIUM 0.5 %
DROPPERETTE, SINGLE-USE DROP DISPENSER OPHTHALMIC (EYE)
Qty: 30 TABLET | Refills: 2 | Status: SHIPPED | OUTPATIENT
Start: 2022-08-15 | End: 2022-08-30

## 2022-08-30 RX ORDER — SODIUM CHLORIDE, SODIUM LACTATE, POTASSIUM CHLORIDE, CALCIUM CHLORIDE 600; 310; 30; 20 MG/100ML; MG/100ML; MG/100ML; MG/100ML
INJECTION, SOLUTION INTRAVENOUS CONTINUOUS
Status: CANCELLED | OUTPATIENT
Start: 2022-09-07

## 2022-08-30 NOTE — PROGRESS NOTES
Patient will be called with an arrival time on 9/6/2022 for his procedure at [facility] on 9/7/2022. 1. Do not eat or drink anything after midnight - unless instructed by your doctor prior to surgery. This includes                   no water, chewing gum or mints. 2. Follow your directions as prescribed by the doctor for your procedure and medications. 3. Check with your Doctor regarding stopping vitamins, supplements, blood thinners (Plavix, Coumadin, Lovenox, Effient, Pradaxa, Xarelto, Fragmin or                   other blood thinners) and follow their instructions. Stop vitamins, supplements and NSAIDS:    4. Do not smoke, vape or use chewing tobacco morning of surgery. Do not drink any alcoholic beverages 24 hours prior to surgery. This includes NA Beer. No street drugs 7 days prior to surgery. 5. You may brush your teeth and gargle the morning of surgery. DO NOT SWALLOW WATER   6. You MUST make arrangements for a responsible adult to take you home after your surgery and be able to check on you every couple                   hours for the day. You will not be allowed to leave alone or drive yourself home. It is strongly suggested someone stay with you the first 24                   hrs. Your surgery will be cancelled if you do not have a ride home. 7. Please wear simple, loose fitting clothing to the hospital.  Monse Naike not bring valuables (money, credit cards, checkbooks, etc.) Do not wear any                   makeup (including no eye makeup) or nail polish on your fingers or toes. 8. DO NOT wear any jewelry or piercings on day of surgery. All body piercing jewelry must be removed. 9. If you have dentures, they will be removed before going to the OR; we will provide you a container. If you wear contact lenses or glasses,                  they will be removed; please bring a case for them.            10. If you  have a Living Will and Durable Power of  for Healthcare, please bring in a copy. 11. Please bring picture ID,  insurance card, paperwork from the doctors office    (H & P, Consent, & card for implantable devices). 12. Take a shower the morning of your procedure with Hibiclens or an anti-bacterial soap. Do not apply any make-up, deodorant, lotion, oil or powder. 13.  Enter thru the main entrance wearing a mask on the day of surgery. Patient will take his metoprolol the morning of his procedure, he will call Dr Chico Martinez office about prep and instructions.

## 2022-09-05 NOTE — H&P
Original H &P in soft chart. I have examined the patient immediately before the procedure and there is no change in the previous history and physical exam, which has been reviewed. There is no history of sleep apnea, snoring, or stridor. There has been no  previous adverse experience with sedation/anesthesia. There is no increased risk for aspiration of gastric contents. The patient has been instructed that all resuscitative measures (during the operative and immediate perioperative period) will be instituted in the unlikely event that they will be needed. The patient has no pertinent past surgical or family history other than listed in the original H&P. The patient was counseled about the risks of chantell Covid-19 during their perioperative period and any recovery window from their procedure. The patient was made aware that chantell Covid-19  may worsen their prognosis for recovering from their procedure  and lend to a higher morbidity and/or mortality risk. All material risks, benefits, and reasonable alternatives including postponing the procedure were discussed. The patient does wish to proceed with the procedure at this time.     ASA Class: 3  AIRWAY Class: 1

## 2022-09-06 ENCOUNTER — HOSPITAL ENCOUNTER (OUTPATIENT)
Dept: PULMONOLOGY | Age: 62
Discharge: HOME | End: 2022-09-06
Payer: COMMERCIAL

## 2022-09-06 ENCOUNTER — ANESTHESIA EVENT (OUTPATIENT)
Dept: ENDOSCOPY | Age: 62
End: 2022-09-06
Payer: COMMERCIAL

## 2022-09-06 DIAGNOSIS — J44.9 COPD, SEVERE (HCC): ICD-10-CM

## 2022-09-06 LAB
DISTANCE WALKED: 1230 FT
DLCO %PRED: 65 %
DLCO PRED: NORMAL
DLCO/VA %PRED: NORMAL
DLCO/VA PRED: NORMAL
DLCO/VA: NORMAL
DLCO: NORMAL
EXPIRATORY TIME-POST: NORMAL
EXPIRATORY TIME: NORMAL
FEF 25-75% %CHNG: NORMAL
FEF 25-75% %PRED-POST: NORMAL
FEF 25-75% %PRED-PRE: NORMAL
FEF 25-75% PRED: NORMAL
FEF 25-75%-POST: NORMAL
FEF 25-75%-PRE: NORMAL
FEV1 %PRED-POST: 45 %
FEV1 %PRED-PRE: 37 %
FEV1 PRED: NORMAL
FEV1-POST: NORMAL
FEV1-PRE: NORMAL
FEV1/FVC %PRED-POST: NORMAL
FEV1/FVC %PRED-PRE: NORMAL
FEV1/FVC PRED: NORMAL
FEV1/FVC-POST: 63 %
FEV1/FVC-PRE: 55 %
FVC %PRED-POST: NORMAL
FVC %PRED-PRE: NORMAL
FVC PRED: NORMAL
FVC-POST: NORMAL
FVC-PRE: NORMAL
GAW %PRED: NORMAL
GAW PRED: NORMAL
GAW: NORMAL
IC %PRED: NORMAL
IC PRED: NORMAL
IC: NORMAL
MEP: NORMAL
MIP: NORMAL
MVV %PRED-PRE: NORMAL
MVV PRED: NORMAL
MVV-PRE: NORMAL
PEF %PRED-POST: NORMAL
PEF %PRED-PRE: NORMAL
PEF PRED: NORMAL
PEF%CHNG: NORMAL
PEF-POST: NORMAL
PEF-PRE: NORMAL
RAW %PRED: NORMAL
RAW PRED: NORMAL
RAW: NORMAL
RV %PRED: NORMAL
RV PRED: NORMAL
RV: NORMAL
SPO2: 88 %
SVC %PRED: NORMAL
SVC PRED: NORMAL
SVC: NORMAL
TLC %PRED: 87 %
TLC PRED: NORMAL
TLC: NORMAL
VA %PRED: NORMAL
VA PRED: NORMAL
VA: NORMAL
VTG %PRED: NORMAL
VTG PRED: NORMAL
VTG: NORMAL

## 2022-09-06 PROCEDURE — 94618 PULMONARY STRESS TESTING: CPT

## 2022-09-06 PROCEDURE — 94726 PLETHYSMOGRAPHY LUNG VOLUMES: CPT

## 2022-09-06 PROCEDURE — 94729 DIFFUSING CAPACITY: CPT

## 2022-09-06 PROCEDURE — 94060 EVALUATION OF WHEEZING: CPT

## 2022-09-06 ASSESSMENT — PULMONARY FUNCTION TESTS
FEV1_PERCENT_PREDICTED_PRE: 37
FEV1/FVC_PRE: 55
FEV1_PERCENT_PREDICTED_POST: 45
FEV1/FVC_POST: 63

## 2022-09-06 ASSESSMENT — ENCOUNTER SYMPTOMS: SHORTNESS OF BREATH: 1

## 2022-09-06 NOTE — ANESTHESIA PRE PROCEDURE
Department of Anesthesiology  Preprocedure Note       Name:  Nicole Cohen   Age:  57 y.o.  :  1960                                          MRN:  [id number]         Date:  2022      Surgeon: Luis Prater):  Tyler Larsen MD    Procedure: Procedure(s):  COLORECTAL CANCER SCREENING, NOT HIGH RISK    Medications prior to admission:   Prior to Admission medications    Medication Sig Start Date End Date Taking?  Authorizing Provider   PEG-KCl-NaCl-NaSulf-Na Asc-C (PLENVU) 140 g SOLR AS DIRECTED 22   Tyler Larsen MD   simethicone (MYLICON) 80 MG chewable tablet AS DIRECTED 22   Tyler Larsen MD   sacubitril-valsartan (ENTRESTO) 24-26 MG per tablet Take 1 tablet by mouth 2 times daily 22   DONA Alcocer CNP   loratadine (ALLERGY RELIEF) 10 MG tablet TAKE 1 TABLET BY MOUTH EVERY DAY 22   Anais Carlson MD   ASPIRIN LOW DOSE 81 MG EC tablet TAKE 1 TABLET BY MOUTH EVERY DAY 22   Anais Carlson MD   spironolactone (ALDACTONE) 25 MG tablet TAKE 1 TABLET BY MOUTH EVERY DAY 22   DONA Ware CNP   furosemide (LASIX) 40 MG tablet TAKE 1 TABLET BY MOUTH 2 TIMES DAILY TO REPLACED HYDROCHLOROTHIAZIDE AS OF 12/15/21 6/13/22   DONA Ware CNP   metoprolol tartrate (LOPRESSOR) 50 MG tablet TAKE 1 TABLET BY MOUTH TWICE A DAY 22   DONA Pena CNP   dapagliflozin (FARXIGA) 10 MG tablet Take 1 tablet by mouth every morning 22   DONA Alcocer CNP   albuterol sulfate  (90 Base) MCG/ACT inhaler INHALE 2 PUFFS BY MOUTH EVERY 4 HOURS AS NEEDED FOR WHEEZE OR FOR SHORTNESS OF BREATH 21   Lennox Adler Emmert-Reed, APRN - CNP   fluticasone-umeclidin-vilant (TRELEGY ELLIPTA) 100-62.5-25 MCG/INH AEPB TAKE 1 PUFF BY MOUTH EVERY DAY 7/15/21   Lennox Clint Nhung-Dawit, APRN - CNP   [facility] MAGNESIUM OXIDE 250 MG TABS tablet TAKE 1 TABLET BY MOUTH EVERY DAY 6/10/21   Jennifer Cardenas MD   Multiple Vitamin (DAILY VITAMINS PO) Take by mouth    Historical Provider, MD       Current medications:    No current facility-administered medications for this encounter.      Current Outpatient Medications   Medication Sig Dispense Refill    PEG-KCl-NaCl-NaSulf-Na Asc-C (PLENVU) 140 g SOLR AS DIRECTED 1 each 0    simethicone (MYLICON) 80 MG chewable tablet AS DIRECTED 3 tablet 0    sacubitril-valsartan (ENTRESTO) 24-26 MG per tablet Take 1 tablet by mouth 2 times daily 60 tablet 5    loratadine (ALLERGY RELIEF) 10 MG tablet TAKE 1 TABLET BY MOUTH EVERY DAY 30 tablet 11    ASPIRIN LOW DOSE 81 MG EC tablet TAKE 1 TABLET BY MOUTH EVERY DAY 30 tablet 11    spironolactone (ALDACTONE) 25 MG tablet TAKE 1 TABLET BY MOUTH EVERY DAY 30 tablet 5    furosemide (LASIX) 40 MG tablet TAKE 1 TABLET BY MOUTH 2 TIMES DAILY TO REPLACED HYDROCHLOROTHIAZIDE AS OF 12/15/21 60 tablet 3    metoprolol tartrate (LOPRESSOR) 50 MG tablet TAKE 1 TABLET BY MOUTH TWICE A DAY 60 tablet 3    dapagliflozin (FARXIGA) 10 MG tablet Take 1 tablet by mouth every morning 30 tablet 5    albuterol sulfate  (90 Base) MCG/ACT inhaler INHALE 2 PUFFS BY MOUTH EVERY 4 HOURS AS NEEDED FOR WHEEZE OR FOR SHORTNESS OF BREATH 1 each 11    fluticasone-umeclidin-vilant (TRELEGY ELLIPTA) 100-62.5-25 MCG/INH AEPB TAKE 1 PUFF BY MOUTH EVERY DAY 1 each 5    [facility] MAGNESIUM OXIDE 250 MG TABS tablet TAKE 1 TABLET BY MOUTH EVERY DAY 30 tablet 6    Multiple Vitamin (DAILY VITAMINS PO) Take by mouth         Allergies:  No Known Allergies    Problem List:    Patient Active Problem List   Diagnosis Code    Chronic neck pain M54.2, G89.29    Seasonal and perennial allergic rhinitis J30.89, J30.2    COPD, severe (HCC) J44.9    Essential hypertension I10    Pure hypercholesterolemia E78.00    Nocturia R35.1    Murmur, heart R01.1    On home oxygen therapy Z99.81    Hypoxia R09.02    ASCVD (arteriosclerotic cardiovascular disease) I25.10    PVC (premature ventricular contraction) I49.3  NSVT (nonsustained ventricular tachycardia) ([facility]) I47.2    Chronic respiratory failure with hypoxia ([facility]) J96.11    Combined systolic and diastolic congestive heart failure ([facility]) I50.40    VHD (valvular heart disease) I38    Pulmonary HTN ([facility]) I27.20    SOBOE (shortness of breath on exertion) R06.02       Past Medical History:        Diagnosis Date    Abnormal echocardiogram 03/29/2013    EF=45-50%;mod. AR,mild aortic valve calcif.  Anesthesia complication 24/05/3398    Ankle fracture, left 1985    Aortic root dilation (HCC) 12/16/2021    Dilation of the aortic root (4.2cm)and the ascending aorta(4.0cm).  Arm fracture, right 1966    AVM (arteriovenous malformation) 06/15/2015    transverse colon    COPD (chronic obstructive pulmonary disease) ([facility] [address]     Has worked around [address] [place] [address] and worked at [employer] x 6 weeks    Diastolic dysfunction 12/64/9931    Grade III diastolic dysfunction    Epididymal cyst 10/2011    U/[facility]    H/O cardiac catheterization 04/12/2013 4/12 Dr Miguelito Guevara signif CAD. EF 55%. Renals patent.  H/O cardiac catheterization 04/01/2021    NO SIG CAD    H/O cardiovascular stress test 04/02/2013    EF 49%, there is no scintigraphic evidence of inducible myocardial ischemia, the observed defect is consistent with diaphragmatic attenuation, clinical correlation recommenced, no previous study to compare with, no wall motionn abnormality seen, normal perfusion in all myocardial regions    H/O chest pain     H/O echocardiogram 07/11/2020    EF 50-55%, Mod LVH & AR,  Dilated aortic root (4.2cm) and ascending aorta (4.1cm).  Hemorrhoids 06/15/2015    HTN (hypertension)     Hx of echocardiogram 12/16/2021    Moderate left ventricular hypertrophy regurgitation noted The anterior leaflet of the mitral valve is prolapsed with mild Right side of the heart is enlarged Moderately dilated left atrium.  Grade III diastolic dysfunction Left ventricular function is severely abnormal , EF is estimated at 20-25% Global hypokinesis noted. Left ventricle size is normal.    Hydrocele 10/2011    U/S    Hyperlipemia     Irregular heart rhythm     after induction for surgery 9/5/2013 - pt developed irreg rhythm and surgery cancelled\"had another echo and stress test and everything checked out ok, they do not know the reason it happened\"- cardiac clearance obtained for surgery 9/19/2013    Low left ventricular ejection fraction 12/16/2021    EF is estimated at 20-25%    Mitral valve prolapse 12/16/2021    anterior leaflet of the mitral valve is prolapsed    Palpitations     Pneumonia 2004    not since    Pulmonary HTN ([address] 12/16/2021    Severe Pulmonary hypertension noted with RVSP of 69mmHg    Right inguinal hernia 08/22/2013    Severe aortic regurgitation by prior echocardiogram 12/16/2021    Severe aortic regurgitation; PHT: 194msec    SOB (shortness of breath) 03/27/2013    Nuclear Stress Normal, EF 49% (Dr. Oswaldo Hawkins- 4/2/2013)     SOB (shortness of breath) on exertion     Tricuspid regurgitation 12/16/2021    moderate to severe tricuspid regurgitation       Past Surgical History:        Procedure Laterality Date    CARDIAC CATHETERIZATION  01/01/1991    COLONOSCOPY  06/15/2015    INGUINAL HERNIA REPAIR Bilateral 01/01/1992    INGUINAL HERNIA REPAIR Right 09/19/2013    with mesh    OTHER SURGICAL HISTORY  09/05/2013    hernia repair aborted, blood pressure spiked.     TYMPANOSTOMY TUBE PLACEMENT  01/01/2009    right ear    VASECTOMY  01/01/1988       Social History:    Social History     Tobacco Use    Smoking status: Never    Smokeless tobacco: Never   Substance Use Topics    Alcohol use: Not Currently     Comment: Caffeine: cystal lite energy drink                                 Counseling given: Not Answered      Vital Signs (Current):   Vitals:    08/30/22 0856   Weight: 215 lb (97.5 kg)   Height: 5' 11\" (1.803 m)                                              BP Readings from Last 3 Encounters:   05/10/22 116/62   05/06/22 136/60   04/20/22 118/72       NPO Status:                                                                                 BMI:   Wt Readings from Last 3 Encounters:   05/10/22 223 lb 3.2 oz (101.2 kg)   05/06/22 221 lb (100.2 kg)   04/20/22 220 lb (99.8 kg)     Body mass index is 29.99 kg/m². CBC:   Lab Results   Component Value Date/Time    WBC 7.8 10/27/2021 11:11 AM    RBC 5.99 10/27/2021 11:11 AM    HGB 17.0 10/27/2021 11:11 AM    HCT 55.8 10/27/2021 11:11 AM    MCV 93.2 10/27/2021 11:11 AM    RDW 14.4 10/27/2021 11:11 AM     10/27/2021 11:11 AM       CMP:   Lab Results   Component Value Date/Time     03/01/2022 08:32 AM    K 5.0 03/01/2022 08:32 AM     03/01/2022 08:32 AM    CO2 30 03/01/2022 08:32 AM    BUN 22 03/01/2022 08:32 AM    CREATININE 1.1 04/25/2022 01:48 PM    CREATININE 0.8 03/01/2022 08:32 AM    GFRAA >60 04/25/2022 01:48 PM    AGRATIO 1.6 12/13/2021 09:30 AM    LABGLOM >60 04/25/2022 01:48 PM    GLUCOSE 96 03/01/2022 08:32 AM    PROT 6.7 12/13/2021 09:30 AM    PROT 7.0 03/01/2011 09:06 AM    CALCIUM 10.1 03/01/2022 08:32 AM    BILITOT 0.9 12/13/2021 09:30 AM    ALKPHOS 144 12/13/2021 09:30 AM    AST 23 12/13/2021 09:30 AM    ALT 25 12/13/2021 09:30 AM       POC Tests: No results for input(s): POCGLU, POCNA, POCK, POCCL, POCBUN, POCHEMO, POCHCT in the last 72 hours.     Coags:   Lab Results   Component Value Date/Time    PROTIME 13.0 10/27/2021 11:11 AM    INR 1.01 10/27/2021 11:11 AM    APTT 29.4 10/27/2021 11:11 AM       HCG (If Applicable): No results found for: PREGTESTUR, PREGSERUM, HCG, HCGQUANT     ABGs: No results found for: PHART, PO2ART, PJO9BYK, IFR8OKE, BEART, T3VGWWBJ     Type & Screen (If Applicable):  No results found for: LABABO, LABRH    Drug/Infectious Status (If Applicable):  No results found for: HIV, HEPCAB    COVID-19 Screening (If Applicable):   Lab Results   Component Value Date/Time    COVID19 NOT DETECTED 10/27/2021 12:30 PM           Anesthesia Evaluation    Airway: Mallampati: II  TM distance: >3 FB   Neck ROM: full  Mouth opening: > = 3 FB   Dental:    (+) upper dentures      Pulmonary:normal exam    (+) COPD:  shortness of breath: chronic,                            ROS comment: Home O2   Cardiovascular:    (+) hypertension:, CHF: systolic and diastolic, pulmonary hypertension:,                ROS comment:  Summary   Left ventricular function is low normal, EF is estimated at 45-50%. Left ventricular size is dilated . Severe left ventricular hypertrophy. Grade II diastolic dysfunction. Moderately dilated left atrium. Right side of the heart is enlarged. Aortic valve leaflets are somewhat thickened. Mild mitral, moderate pulmonic ,moderate to severe aortic regurgitation is   present . Dilation of the aortic root (4.6cm)and the ascending aorta(4.5cm). No evidence of pericardial effusion. OV TO DISCUSS      Signature      ------------------------------------------------------------------   Electronically signed by Haydee Herrera MD   (Interpreting physician) on 04/06/2022 at 04:10 PM   ------------------------------------------------------------------     Neuro/Psych:               GI/Hepatic/Renal:             Endo/Other:                     Abdominal:             Vascular: Other Findings:           Anesthesia Plan      MAC     ASA 3     (Chart Review 9/6/22)  Induction: intravenous. MIPS: Postoperative opioids intended. Anesthetic plan and risks discussed with patient. Plan discussed with CRNA.     Attending anesthesiologist reviewed and agrees with Pre Eval content                DONA Nguyễn - GATITO   9/6/2022

## 2022-09-06 NOTE — PROGRESS NOTES
[facility] Pulmonary Function Lab - Six Minute Walk  Test Performed on: Room Air__X___ Oxygen at _____ lpm via N/C  Assist Device Used During Test:    None__X__ Cane____ Walker___   Shortness of Breath - Theresa's Scale  0 Nothing at all 5 Severe    0.5 Very very slight 6    1 Very slight 7 Very severe   2 Slight 8     3 Moderate 9 Very very severe   4 Somewhat severe 10 Maximal      Time SpO2 Heart Rate Respiratory Rate Modified Theresas Scale Other      Baseline       91          %  61 15 0  room air         1 minute    88               92          %     65    1     room air   2 lpm O2      2 minutes            90           %  72  2     2 lpm      3 minutes             91           %  70     2           4 minutes           90           %  69      2           5 minutes   90               %  72      3           6 minutes       91              %  74     3        Recovery   x 1 Min             93             %  67 18 2 2 lpm   Recovery   x 2 Min             93              %  66       1         Number of Laps_7_ X 170 feet _1190__+ _40 additional feet = Total _1230_feet  Stopped or paused before 6 minutes?  No__X__ Yes _____   Pre Blood Pressure: _162_ / 70___    Post Blood Pressure:_168 _/_70__  Interpretation:

## 2022-09-07 ENCOUNTER — HOSPITAL ENCOUNTER (OUTPATIENT)
Age: 62
Setting detail: OUTPATIENT SURGERY
Discharge: HOME | End: 2022-09-07
Attending: SPECIALIST | Admitting: SPECIALIST
Payer: COMMERCIAL

## 2022-09-07 ENCOUNTER — ANESTHESIA (OUTPATIENT)
Dept: ENDOSCOPY | Age: 62
End: 2022-09-07
Payer: COMMERCIAL

## 2022-09-07 VITALS
HEART RATE: 58 BPM | RESPIRATION RATE: 14 BRPM | SYSTOLIC BLOOD PRESSURE: 148 MMHG | HEIGHT: 71 IN | TEMPERATURE: 97.9 F | OXYGEN SATURATION: 92 % | WEIGHT: 215 LBS | BODY MASS INDEX: 30.1 KG/M2 | DIASTOLIC BLOOD PRESSURE: 63 MMHG

## 2022-09-07 DIAGNOSIS — Z80.0 FAMILY HISTORY OF COLON CANCER: ICD-10-CM

## 2022-09-07 DIAGNOSIS — Z12.11 SCREEN FOR COLON CANCER: ICD-10-CM

## 2022-09-07 PROBLEM — D12.3 BENIGN NEOPLASM OF TRANSVERSE COLON: Status: ACTIVE | Noted: 2022-09-07

## 2022-09-07 PROBLEM — D12.7 BENIGN NEOPLASM OF RECTOSIGMOID JUNCTION: Status: ACTIVE | Noted: 2022-09-07

## 2022-09-07 PROBLEM — D12.5 BENIGN NEOPLASM OF SIGMOID COLON: Status: ACTIVE | Noted: 2022-09-07

## 2022-09-07 LAB — GLUCOSE BLD-MCNC: 107 MG/DL (ref 70–99)

## 2022-09-07 PROCEDURE — 45385 COLONOSCOPY W/LESION REMOVAL: CPT | Performed by: SPECIALIST

## 2022-09-07 PROCEDURE — 7100000011 HC PHASE II RECOVERY - ADDTL 15 MIN: Performed by: SPECIALIST

## 2022-09-07 PROCEDURE — 82962 GLUCOSE BLOOD TEST: CPT

## 2022-09-07 PROCEDURE — 88305 TISSUE EXAM BY PATHOLOGIST: CPT

## 2022-09-07 PROCEDURE — 3700000000 HC ANESTHESIA ATTENDED CARE: Performed by: SPECIALIST

## 2022-09-07 PROCEDURE — 6360000002 HC RX W HCPCS

## 2022-09-07 PROCEDURE — 2580000003 HC RX 258: Performed by: SPECIALIST

## 2022-09-07 PROCEDURE — 3609010600 HC COLONOSCOPY POLYPECTOMY SNARE/COLD BIOPSY: Performed by: SPECIALIST

## 2022-09-07 PROCEDURE — 3700000001 HC ADD 15 MINUTES (ANESTHESIA): Performed by: SPECIALIST

## 2022-09-07 PROCEDURE — 2500000003 HC RX 250 WO HCPCS

## 2022-09-07 PROCEDURE — 2709999900 HC NON-CHARGEABLE SUPPLY: Performed by: SPECIALIST

## 2022-09-07 PROCEDURE — 7100000010 HC PHASE II RECOVERY - FIRST 15 MIN: Performed by: SPECIALIST

## 2022-09-07 RX ORDER — SODIUM CHLORIDE, SODIUM LACTATE, POTASSIUM CHLORIDE, CALCIUM CHLORIDE 600; 310; 30; 20 MG/100ML; MG/100ML; MG/100ML; MG/100ML
INJECTION, SOLUTION INTRAVENOUS CONTINUOUS
Status: DISCONTINUED | OUTPATIENT
Start: 2022-09-07 | End: 2022-09-07 | Stop reason: HOSPADM

## 2022-09-07 RX ORDER — LIDOCAINE HYDROCHLORIDE 20 MG/ML
INJECTION, SOLUTION INFILTRATION; PERINEURAL PRN
Status: DISCONTINUED | OUTPATIENT
Start: 2022-09-07 | End: 2022-09-07 | Stop reason: SDUPTHER

## 2022-09-07 RX ORDER — GLYCOPYRROLATE 0.2 MG/ML
INJECTION INTRAMUSCULAR; INTRAVENOUS PRN
Status: DISCONTINUED | OUTPATIENT
Start: 2022-09-07 | End: 2022-09-07 | Stop reason: SDUPTHER

## 2022-09-07 RX ORDER — PROPOFOL 10 MG/ML
INJECTION, EMULSION INTRAVENOUS PRN
Status: DISCONTINUED | OUTPATIENT
Start: 2022-09-07 | End: 2022-09-07 | Stop reason: SDUPTHER

## 2022-09-07 RX ADMIN — PROPOFOL 340 MG: 10 INJECTION, EMULSION INTRAVENOUS at 09:37

## 2022-09-07 RX ADMIN — SODIUM CHLORIDE, POTASSIUM CHLORIDE, SODIUM LACTATE AND CALCIUM CHLORIDE: 600; 310; 30; 20 INJECTION, SOLUTION INTRAVENOUS at 08:06

## 2022-09-07 RX ADMIN — GLYCOPYRROLATE 0.2 MG: 0.2 INJECTION, SOLUTION INTRAMUSCULAR; INTRAVENOUS at 09:35

## 2022-09-07 RX ADMIN — LIDOCAINE HYDROCHLORIDE 100 MG: 20 INJECTION, SOLUTION INFILTRATION; PERINEURAL at 09:37

## 2022-09-07 ASSESSMENT — PAIN - FUNCTIONAL ASSESSMENT: PAIN_FUNCTIONAL_ASSESSMENT: 0-10

## 2022-09-07 NOTE — PROGRESS NOTES
1020 Patient arrived back to [facility]. Report given to this nurse from [facility] [place], [place]. Patient A&O no pain, nausea, or vomiting. Beverage of choice offered to patient. Call light in reach and bed in lowest position. 1047 IV removed. Discharge instructions given to wife Mavis. 1052 Patient sitting on side of bed getting dressed unassisted. 1057 Patient escorted to car via wheelchair transported home by wife.

## 2022-09-07 NOTE — BRIEF OP NOTE
BRIEF COLONOSCOPY REPORT:   Photos and full colonoscopy report available by going to \"chart review\" then \"procedures\" then  \"colonoscopy\" then \"Scanned Colonoscopy Report\"

## 2022-09-07 NOTE — PROGRESS NOTES
REPORT GIVEN TO BRENT CRAWFORD IN SDS. PT AWAKE AND RESPONSIVE. VS STABLE. DENIES C/O OR NEEDS AT THIS TIME.

## 2022-09-07 NOTE — ANESTHESIA POSTPROCEDURE EVALUATION
Department of Anesthesiology  Postprocedure Note    Patient: Monik Dutta  MRN: [id number]  YOB: 1960  Date of evaluation: 9/7/2022      Procedure Summary     Date: 09/07/22 Room / Location: [address]    Anesthesia Start: Al Cruza Anesthesia Stop: 1008    Procedure: COLONOSCOPY POLYPECTOMY SNARE/COLD BIOPSY Diagnosis:       Screen for colon cancer      Family history of colon cancer      (Screen for colon cancer [Z12.11] Family history of colon cancer [Z80.0])    Surgeons: Catrachita Casey MD Responsible Provider: Juan J Rich MD    Anesthesia Type: MAC ASA Status: 3          Anesthesia Type: No value filed.     [place] Phase I:      [place] Phase II:        Anesthesia Post Evaluation    Patient location during evaluation: bedside  Patient participation: complete - patient participated  Level of consciousness: awake and alert  Pain score: 0  Airway patency: patent  Nausea & Vomiting: no nausea and no vomiting  Complications: no  Cardiovascular status: blood pressure returned to baseline and hemodynamically stable  Respiratory status: acceptable, room air, nasal cannula and spontaneous ventilation  Hydration status: euvolemic

## 2022-09-07 NOTE — PROGRESS NOTES
REPORT RECEIVED FROM CLARENCE MAST RN IN SDS. PREOP QUESTIONS, NPO STATUS AND ALLERGIES VERIFIED WITH PT. H/P AND CONSENT COMPLETED. DENIES TAKING BLOOD THINNERS. LOPRESSOR TAKEN 9/6 AT 0800.

## 2022-09-07 NOTE — DISCHARGE INSTRUCTIONS
COLONOSCOPY    DR.__________________________________    OFFICE NUMBER________________________    FOLLOW UP APPOINTMENT IN _______ DAYS/WEEKS OR AS NEEDED. REPEAT PROCEDURE IN ______YEARS OR AS NEEDED. TEST ORDERED: NONE OR ______________________________________________________________________. What to expect at home: Your Recovery   Your doctor will tell you when you can eat and do your other usual activities Your doctor will talk to you about when you will need your next colonoscopy. Your doctor can help you decide how often you need to be checked. This will depend on the results of your test and your risk for colorectal cancer. After the test, you may be bloated or have gas pains. You may need to pass gas. If a biopsy was done or a polyp was removed, you may have streaks of blood in your stool (feces) for a few days. This care sheet gives you a general idea about how long it will take for you to recover. But each person recovers at a different pace. Follow the steps below to get better as quickly as possible. How can you care for yourself at home? Activity  Rest when you feel tired. Diet  Follow your doctor's directions for eating. Unless your doctor has told you not to, drink plenty of fluids. This helps to replace the fluids that were lost during the colon prep. DO NOT DRINK ALCOHOL. Medicines  Your doctor will tell you if and when you can restart your medicines. He or she will also give you instructions about taking any new medicines. If you take blood thinners, such as warfarin (Coumadin), clopidogrel (Plavix), or aspirin, be sure to talk to your doctor. He or she will tell you if and when to start taking those medicines again. Make sure that you understand exactly what your doctor wants you to do. If polyps were removed or a biopsy was done during the test, your doctor may tell you not to take aspirin or other anti-inflammatory medicines for a few days.  These include ibuprofen (Advil, Motrin) and naproxen (Aleve). Other instructions: Anethesia  For your safety, do not drive or operate machinery for 24 hours. Do not sign legal documents or make major decisions for 24 hours. The anesthesia can make it hard for you to fully understand what you are agreeing to. Follow-up care is a key part of your treatment and safety. Be sure to make and go to all appointments, and call your doctor if you are having problems. It's also a good idea to know your test results and keep a list of the medicines you take. When should you call for help? [address] [facility] [address] [place] [phone]  Call 911 anytime you think you may need emergency care. For example, call if:  You passed out (lost consciousness). You pass maroon or bloody stools. You have severe belly pain. Call your doctor now or seek immediate medical care if:  Your stools are black and tarlike. Your stools have streaks of blood, but you did not have a biopsy or any polyps removed. You have belly pain, or your belly is swollen and firm. You vomit. You have a fever. You are very dizzy. Watch closely for changes in your health, and be sure to contact your doctor if you have any problems. Where can you learn more? Go to [email or web address] and sign in to your [employer] account. Enter E264 in the [facility] box to learn more about Colonoscopy: What to Expect at Home.     If you do not have an account, please click on the Sign Up Now link. © 0992-1239 [employer]. Care instructions adapted under license by [facility] ([facility]). This care instruction is for use with your licensed healthcare professional. If you have questions about a medical condition or this instruction, always ask your healthcare professional. [place] [address] any warranty or liability for your use of this information.   Content Version: 27.9.129972; Current as of: November 20, 2015

## 2022-10-05 RX ORDER — METOPROLOL TARTRATE 50 MG/1
TABLET, FILM COATED ORAL
Qty: 60 TABLET | Refills: 3 | Status: SHIPPED | OUTPATIENT
Start: 2022-10-05

## 2022-10-05 RX ORDER — FUROSEMIDE 40 MG/1
40 TABLET ORAL 2 TIMES DAILY
Qty: 60 TABLET | Refills: 3 | Status: SHIPPED | OUTPATIENT
Start: 2022-10-05

## 2022-10-07 PROBLEM — Z12.11 SCREEN FOR COLON CANCER: Status: RESOLVED | Noted: 2022-09-07 | Resolved: 2022-10-07

## 2022-11-11 ENCOUNTER — OFFICE VISIT (OUTPATIENT)
Dept: CARDIOLOGY CLINIC | Age: 62
End: 2022-11-11

## 2022-11-11 VITALS
HEIGHT: 71 IN | SYSTOLIC BLOOD PRESSURE: 128 MMHG | WEIGHT: 222 LBS | HEART RATE: 60 BPM | DIASTOLIC BLOOD PRESSURE: 68 MMHG | BODY MASS INDEX: 31.08 KG/M2

## 2022-11-11 DIAGNOSIS — I49.3 PVC (PREMATURE VENTRICULAR CONTRACTION): Primary | ICD-10-CM

## 2022-11-12 DIAGNOSIS — I50.20 NYHA CLASS 3 HEART FAILURE WITH REDUCED EJECTION FRACTION (HCC): ICD-10-CM

## 2022-11-14 ENCOUNTER — TELEPHONE (OUTPATIENT)
Dept: CARDIOLOGY CLINIC | Age: 62
End: 2022-11-14

## 2022-11-15 ENCOUNTER — TELEPHONE (OUTPATIENT)
Dept: CARDIOLOGY CLINIC | Age: 62
End: 2022-11-15

## 2022-11-16 ENCOUNTER — OFFICE VISIT (OUTPATIENT)
Dept: CARDIOLOGY CLINIC | Age: 62
End: 2022-11-16

## 2022-11-16 ENCOUNTER — PROCEDURE VISIT (OUTPATIENT)
Dept: CARDIOLOGY CLINIC | Age: 62
End: 2022-11-16
Payer: COMMERCIAL

## 2022-11-16 VITALS
BODY MASS INDEX: 30.97 KG/M2 | DIASTOLIC BLOOD PRESSURE: 60 MMHG | WEIGHT: 221.2 LBS | HEART RATE: 54 BPM | HEIGHT: 71 IN | SYSTOLIC BLOOD PRESSURE: 124 MMHG

## 2022-11-16 DIAGNOSIS — I35.1 NONRHEUMATIC AORTIC VALVE INSUFFICIENCY: ICD-10-CM

## 2022-11-16 DIAGNOSIS — I49.3 PVC (PREMATURE VENTRICULAR CONTRACTION): Primary | ICD-10-CM

## 2022-11-16 LAB
LV EF: 53 %
LVEF MODALITY: NORMAL

## 2022-11-16 PROCEDURE — 93306 TTE W/DOPPLER COMPLETE: CPT | Performed by: INTERNAL MEDICINE

## 2022-11-16 ASSESSMENT — ENCOUNTER SYMPTOMS
DIARRHEA: 0
CONSTIPATION: 0
EYE PAIN: 0
PHOTOPHOBIA: 0
ABDOMINAL PAIN: 0
COUGH: 0
WHEEZING: 0
SHORTNESS OF BREATH: 1
BLOOD IN STOOL: 0
COLOR CHANGE: 0
VOMITING: 0
BACK PAIN: 0
CHEST TIGHTNESS: 0
NAUSEA: 0

## 2022-11-16 NOTE — PROGRESS NOTES
Electrophysiology fu Note      Reason for consultation:   PVC    Chief complaint :  6 month follow up, shortness of breath    Referring physician: Brent Kelley      Primary care physician: Rasheeda Jimenez MD      History of Present Illness: This visit (11/16/2022)      Chief Complaint   Patient presents with    6 Month Follow-Up     Pt denies other cardiac symptoms  Pt does not smoke or drink. Pt does not drink caffeine. Shortness of Breath     COPD    Palpitations - occasional                Previous visit: (5/6/2022)      Chief Complaint   Patient presents with    Palpitations     Patient is seen for PVC's. Patient is here for a 1 month follow up from an echo. Patient states he is still having palpitations in the morning or with exertion. Patient does get short of breath. Patient denies chest pain, dizziness, and edema. Previous visit: (3/30/2022)      Chief Complaint   Patient presents with    Palpitations     Dr. Adeline Rojas patient here for 1 month f/u. Patient states he has had some Palpitations where his is Heart pounding. Patient also c/o SOB due to COPD. Patient does not drink alcohol or caffiene and does not smoke. Previous visit:      Chief Complaint   Patient presents with    1 Month Follow-Up     Pt denies any cardiac symptoms. Pt doesn't drink caffine or alcohol. Pt doesn't smoke. Pt doesn't exercise           Previous visit: (7/23/2021)      Chief Complaint   Patient presents with    Hypertension     Patient here for 2 month follow up. Patient states his palpitations are better due to being on metoprolo. Patient has SOB due to COPD nothing new. Patient denies Chest Pain, Dizziness, Edema. Patient does not drink alcohol or smoke. Patient drinks caffiene. Patient does not exercise. Previous visit: (5/21/2021)      Chief Complaint   Patient presents with    1 Month Follow-Up     to discuss PVC ablation Tania cabral patient feels lot better on the medication. Patient does not feel palpitation as much occasionally is feeling palpitations no he does have shortness of breath but that is also improved from before. He has occasional chest pain which lasts only seconds. Patient overall is feeling better and wants to continue with medical treatment          Previous visit:    She is a 56-year-old male with history of hypertension, COPD on home oxygen referred by Dr. Gordillo For PVCs. Patient reports he has chest pressure lasting about 10 seconds and relieves immediately. Patient does have shortness of breath but he also reports that he has chronic COPD and home oxygen dependent so that could be the reason to. Patient denies any dizziness. Patient has palpitations that last few seconds up to a couple of minutes multiple times in a day. No edema. Never been a smoker. Denies alcohol use in a year    Patient reports tiredness and weakness    Pastmedical history:   Past Medical History:   Diagnosis Date    Abnormal echocardiogram 03/29/2013    EF=45-50%;mod. AR,mild aortic valve calcif. Anesthesia complication 00/76/6605    Ankle fracture, left 1985    Aortic root dilation ([address] 12/16/2021    Dilation of the aortic root (4.2cm)and the ascending aorta(4.0cm). Arm fracture, right 1966    AVM (arteriovenous malformation) 06/15/2015    transverse colon    COPD (chronic obstructive pulmonary disease) ([address]     Has worked around [address] [place] [address] and worked at [employer] x 6 weeks    Diastolic dysfunction 56/79/6867    Grade III diastolic dysfunction    Epididymal cyst 10/2011    U/S    H/O cardiac catheterization 04/12/2013 4/12 Dr Sameer Bahena signif CAD. EF 55%. Renals patent.     H/O cardiac catheterization 04/01/2021    NO SIG CAD    H/O cardiovascular stress test 04/02/2013    EF 49%, there is no scintigraphic evidence of inducible myocardial ischemia, the observed defect is consistent with diaphragmatic attenuation, clinical correlation recommenced, no previous study to compare with, no wall motionn abnormality seen, normal perfusion in all myocardial regions    H/O chest pain     H/O echocardiogram 07/11/2020    EF 50-55%, Mod LVH & AR,  Dilated aortic root (4.2cm) and ascending aorta (4.1cm). Hemorrhoids 06/15/2015    HTN (hypertension)     Hx of echocardiogram 12/16/2021    Moderate left ventricular hypertrophy regurgitation noted The anterior leaflet of the mitral valve is prolapsed with mild Right side of the heart is enlarged Moderately dilated left atrium. Grade III diastolic dysfunction Left ventricular function is severely abnormal , EF is estimated at 20-25% Global hypokinesis noted.  Left ventricle size is normal.    Hydrocele 10/2011    U/S    Hyperlipemia     Irregular heart rhythm     after induction for surgery 9/5/2013 - pt developed irreg rhythm and surgery cancelled\"had another echo and stress test and everything checked out ok, they do not know the reason it happened\"- cardiac clearance obtained for surgery 9/19/2013    Low left ventricular ejection fraction 12/16/2021    EF is estimated at 20-25%    Mitral valve prolapse 12/16/2021    anterior leaflet of the mitral valve is prolapsed    Palpitations     Pneumonia 2004    not since    Pulmonary HTN ([address] 12/16/2021    Severe Pulmonary hypertension noted with RVSP of 69mmHg    Right inguinal hernia 08/22/2013    Severe aortic regurgitation by prior echocardiogram 12/16/2021    Severe aortic regurgitation; PHT: 194msec    SOB (shortness of breath) 03/27/2013    Nuclear Stress Normal, EF 49% (Dr. George De Leon- 4/2/2013)     SOB (shortness of breath) on exertion     Tricuspid regurgitation 12/16/2021    moderate to severe tricuspid regurgitation       Surgical history :   Past Surgical History:   Procedure Laterality Date    CARDIAC CATHETERIZATION  01/01/1991    COLONOSCOPY  06/15/2015    COLONOSCOPY N/A 9/7/2022    COLONOSCOPY POLYPECTOMY SNARE/COLD BIOPSY performed by Barrington Resendez MD at [address] [facility] [address] Bilateral 01/01/1992    INGUINAL HERNIA REPAIR Right 09/19/2013    with mesh    OTHER SURGICAL HISTORY  09/05/2013    hernia repair aborted, blood pressure spiked. TYMPANOSTOMY TUBE PLACEMENT  01/01/2009    right ear    VASECTOMY  01/01/1988       Family history:   Family History   Problem Relation Age of Onset    Cancer Mother         colon    Diabetes Mother     High Blood Pressure Mother     Migraines Mother     High Blood Pressure Father     COPD Father     Cancer Father         lung    Heart Disease Maternal Grandmother     Heart Disease Maternal Grandfather        Social history :  reports that he has never smoked. He has never used smokeless tobacco. He reports that he does not currently use alcohol. He reports that he does not use drugs.     No Known Allergies    Current Outpatient Medications on File Prior to Visit   Medication Sig Dispense Refill    empagliflozin (JARDIANCE) 10 MG tablet Take 1 tablet by mouth daily 30 tablet 5    metoprolol tartrate (LOPRESSOR) 50 MG tablet TAKE 1 TABLET BY MOUTH TWICE A DAY 60 tablet 3    furosemide (LASIX) 40 MG tablet Take 1 tablet by mouth 2 times daily 60 tablet 3    sacubitril-valsartan (ENTRESTO) 24-26 MG per tablet Take 1 tablet by mouth 2 times daily 60 tablet 5    loratadine (ALLERGY RELIEF) 10 MG tablet TAKE 1 TABLET BY MOUTH EVERY DAY 30 tablet 11    ASPIRIN LOW DOSE 81 MG EC tablet TAKE 1 TABLET BY MOUTH EVERY DAY 30 tablet 11    spironolactone (ALDACTONE) 25 MG tablet TAKE 1 TABLET BY MOUTH EVERY DAY 30 tablet 5    albuterol sulfate  (90 Base) MCG/ACT inhaler INHALE 2 PUFFS BY MOUTH EVERY 4 HOURS AS NEEDED FOR WHEEZE OR FOR SHORTNESS OF BREATH 1 each 11    fluticasone-umeclidin-vilant (TRELEGY ELLIPTA) 100-62.5-25 MCG/INH AEPB TAKE 1 PUFF BY MOUTH EVERY DAY 1 each 5    CVS MAGNESIUM OXIDE 250 MG TABS tablet TAKE 1 TABLET BY MOUTH EVERY DAY 30 tablet 6    Multiple Vitamin (DAILY VITAMINS PO) Take by mouth       No current facility-administered medications on file prior to visit. Review of Systems:   Review of Systems   Constitutional:  Negative for activity change, chills, fatigue and fever. HENT:  Negative for congestion, ear pain and tinnitus. Eyes:  Negative for photophobia, pain and visual disturbance. Respiratory:  Positive for shortness of breath. Negative for cough, chest tightness and wheezing. Cardiovascular:  Positive for palpitations. Negative for chest pain and leg swelling. Gastrointestinal:  Negative for abdominal pain, blood in stool, constipation, diarrhea, nausea and vomiting. Endocrine: Negative for cold intolerance and heat intolerance. Genitourinary:  Negative for dysuria, flank pain and hematuria. Musculoskeletal:  Positive for arthralgias. Negative for back pain, myalgias and neck stiffness. Skin:  Negative for color change and rash. Allergic/Immunologic: Negative for food allergies. Neurological:  Negative for dizziness, light-headedness, numbness and headaches. Hematological:  Does not bruise/bleed easily. Psychiatric/Behavioral:  Negative for agitation, behavioral problems and confusion. Examination:      Vitals:    11/16/22 1104   BP: 124/60   Site: Left Upper Arm   Position: Sitting   Cuff Size: Medium Adult   Pulse: 54   Weight: 221 lb 3.2 oz (100.3 kg)   Height: 5' 11\" (1.803 m)        Body mass index is 30.85 kg/m². Physical Exam  Constitutional:       Appearance: Normal appearance. He is not ill-appearing. HENT:      Head: Normocephalic and atraumatic. Mouth/Throat:      Mouth: Mucous membranes are moist.   Eyes:      Conjunctiva/sclera: Conjunctivae normal.   Cardiovascular:      Rate and Rhythm: Normal rate. Heart sounds: Murmur (grade 2/6 diastolic murmur) heard. Pulmonary:      Effort: Pulmonary effort is normal.      Breath sounds: No rales. Abdominal:      General: Abdomen is flat.       Palpations: Abdomen is soft.   Musculoskeletal:         General: No tenderness. Normal range of motion. Cervical back: Normal range of motion. Right lower leg: No edema. Left lower leg: No edema. Skin:     General: Skin is warm and dry. Neurological:      General: No focal deficit present. Mental Status: He is alert and oriented to person, place, and time. CBC:   Lab Results   Component Value Date/Time    WBC 7.8 10/27/2021 11:11 AM    HGB 17.0 10/27/2021 11:11 AM    HCT 55.8 10/27/2021 11:11 AM     10/27/2021 11:11 AM     Lipids:  Lab Results   Component Value Date    CHOL 193 03/10/2021    TRIG 62 03/10/2021    HDL 39 (L) 03/10/2021    LDLCALC 155 (H) 06/09/2020    LDLDIRECT 148 (H) 03/10/2021     PT/INR:   Lab Results   Component Value Date/Time    INR 1.01 10/27/2021 11:11 AM        BMP:    Lab Results   Component Value Date     03/01/2022    K 5.0 03/01/2022     03/01/2022    CO2 30 03/01/2022    BUN 22 (H) 03/01/2022     CMP:   Lab Results   Component Value Date    AST 23 12/13/2021    PROT 6.7 12/13/2021    BILITOT 0.9 12/13/2021    ALKPHOS 144 (H) 12/13/2021     TSH:  No results found for: TSH, T4    EKGINTERPRETATION - EKG Interpretation:  Sinus bradycardia, First degree AV block,      IMPRESSION / RECOMMENDATIONS:     1. PVC on metoprolol  2. COPD  3. HLD  4. Obesity BMI 30  5. Moderate aortic regurgitation  6. Chronic left ventricular systolic dysfunction - on entresto, metoprolol and spironolactone. Patient over all doing well  He says  \" he is a warrior against caffeine as it helped me \" and he reports he actually does not feel palpitations at all. Occasional but over all not much PVC  EKG has no PVC  FU echo was not done so will reorder the echo to assess AI and aortic root  Schedule follow up with  post echo        Thanks again for allowing me to participate in care of this patient. Please call me if you have any questions. With best regards.       Eddie Fannie Frausto MD, 11/16/2022 11:13 AM     Please note this report has been partially produced using speech recognition software and may contain errors related to that system including errors in grammar, punctuation, and spelling, as well as words and phrases that may be inappropriate. If there are any questions or concerns please feel free to contact the dictating provider for clarification.

## 2022-12-06 ENCOUNTER — OFFICE VISIT (OUTPATIENT)
Dept: PULMONOLOGY | Age: 62
End: 2022-12-06
Payer: COMMERCIAL

## 2022-12-06 VITALS
WEIGHT: 221 LBS | HEART RATE: 62 BPM | OXYGEN SATURATION: 87 % | BODY MASS INDEX: 30.94 KG/M2 | RESPIRATION RATE: 16 BRPM | DIASTOLIC BLOOD PRESSURE: 60 MMHG | HEIGHT: 71 IN | SYSTOLIC BLOOD PRESSURE: 140 MMHG

## 2022-12-06 DIAGNOSIS — E66.9 OBESITY (BMI 30-39.9): ICD-10-CM

## 2022-12-06 DIAGNOSIS — J44.9 COPD, SEVERE (HCC): ICD-10-CM

## 2022-12-06 DIAGNOSIS — R06.02 SOBOE (SHORTNESS OF BREATH ON EXERTION): ICD-10-CM

## 2022-12-06 DIAGNOSIS — G47.34 SLEEP RELATED HYPOXIA: ICD-10-CM

## 2022-12-06 DIAGNOSIS — I27.20 PULMONARY HTN (HCC): ICD-10-CM

## 2022-12-06 DIAGNOSIS — R09.02 HYPOXIA: ICD-10-CM

## 2022-12-06 PROCEDURE — 3074F SYST BP LT 130 MM HG: CPT | Performed by: INTERNAL MEDICINE

## 2022-12-06 PROCEDURE — 99214 OFFICE O/P EST MOD 30 MIN: CPT | Performed by: INTERNAL MEDICINE

## 2022-12-06 PROCEDURE — 3078F DIAST BP <80 MM HG: CPT | Performed by: INTERNAL MEDICINE

## 2022-12-06 ASSESSMENT — ENCOUNTER SYMPTOMS
EYE DISCHARGE: 0
COUGH: 0
EYE ITCHING: 0
SHORTNESS OF BREATH: 1
ABDOMINAL DISTENTION: 0
BACK PAIN: 0
ABDOMINAL PAIN: 0

## 2022-12-06 NOTE — ASSESSMENT & PLAN NOTE
Sec to COPD, Diastolic dysfunction and valvular hear disease  PFT  6 MWT in 1 year  Get yearly flu vaccine

## 2022-12-06 NOTE — PROGRESS NOTES
Raegan Gibbsdy  1960  Referring Provider: Lilia Covington MD    Subjective:     Chief Complaint   Patient presents with    3 Month Follow-Up    Results       HPI  Jena Lr is a 60 y.o. male has come back as a follow up. He has second had smoking exposure. He has Moderate to severe COPD. He is on 2 L.min of oxygen. He is on Albuterol prn and Trelegy. He had his flu vaccine and COVID vaccine. His PFT done on 09/06/22 showed that he has severe Obstructive airway disease with an FEV1 of 1.36 litres from 1.46  litres(37%), ratio 55 and DLCO improved from from 56% to 65%. His 6 MWT showed very good walking distance and desaturation to 88% requiring 2 L.min of oxygen. Current Outpatient Medications   Medication Sig Dispense Refill    empagliflozin (JARDIANCE) 10 MG tablet Take 1 tablet by mouth daily 30 tablet 5    metoprolol tartrate (LOPRESSOR) 50 MG tablet TAKE 1 TABLET BY MOUTH TWICE A DAY 60 tablet 3    furosemide (LASIX) 40 MG tablet Take 1 tablet by mouth 2 times daily 60 tablet 3    sacubitril-valsartan (ENTRESTO) 24-26 MG per tablet Take 1 tablet by mouth 2 times daily 60 tablet 5    loratadine (ALLERGY RELIEF) 10 MG tablet TAKE 1 TABLET BY MOUTH EVERY DAY 30 tablet 11    ASPIRIN LOW DOSE 81 MG EC tablet TAKE 1 TABLET BY MOUTH EVERY DAY 30 tablet 11    spironolactone (ALDACTONE) 25 MG tablet TAKE 1 TABLET BY MOUTH EVERY DAY 30 tablet 5    albuterol sulfate  (90 Base) MCG/ACT inhaler INHALE 2 PUFFS BY MOUTH EVERY 4 HOURS AS NEEDED FOR WHEEZE OR FOR SHORTNESS OF BREATH 1 each 11    fluticasone-umeclidin-vilant (TRELEGY ELLIPTA) 100-62.5-25 MCG/INH AEPB TAKE 1 PUFF BY MOUTH EVERY DAY 1 each 5    CVS MAGNESIUM OXIDE 250 MG TABS tablet TAKE 1 TABLET BY MOUTH EVERY DAY 30 tablet 6    Multiple Vitamin (DAILY VITAMINS PO) Take by mouth       No current facility-administered medications for this visit.        No Known Allergies    Past Medical History:   Diagnosis Date    Abnormal echocardiogram 03/29/2013    EF=45-50%;mod. AR,mild aortic valve calcif. Anesthesia complication 12/01/1978    Ankle fracture, left 1985    Aortic root dilation ([facility] 12/16/2021    Dilation of the aortic root (4.2cm)and the ascending aorta(4.0cm). 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 EF 55%. Renals patent. H/O cardiac catheterization 04/01/2021    NO SIG CAD    H/O cardiovascular stress test 04/02/2013    EF 49%, there is no scintigraphic evidence of inducible myocardial ischemia, the observed defect is consistent with diaphragmatic attenuation, clinical correlation recommenced, no previous study to compare with, no wall motionn abnormality seen, normal perfusion in all myocardial regions    H/O chest pain     H/O echocardiogram 07/11/2020    EF 50-55%, Mod LVH & AR,  Dilated aortic root (4.2cm) and ascending aorta (4.1cm). Hemorrhoids 06/15/2015    HTN (hypertension)     Hx of echocardiogram 12/16/2021    Moderate left ventricular hypertrophy regurgitation noted The anterior leaflet of the mitral valve is prolapsed with mild Right side of the heart is enlarged Moderately dilated left atrium. Grade III diastolic dysfunction Left ventricular function is severely abnormal , EF is estimated at 20-25% Global hypokinesis noted.  Left ventricle size is normal.    Hydrocele 10/2011    U/S    Hyperlipemia     Irregular heart rhythm     after induction for surgery 9/5/2013 - pt developed irreg rhythm and surgery cancelled\"had another echo and stress test and everything checked out ok, they do not know the reason it happened\"- cardiac clearance obtained for surgery 9/19/2013    Low left ventricular ejection fraction 12/16/2021    EF is estimated at 20-25%    Mitral valve prolapse 12/16/2021    anterior leaflet of the mitral valve is prolapsed    Palpitations     Pneumonia 2004    not since    Pulmonary HTN ([address] 12/16/2021    Severe Pulmonary hypertension noted with RVSP of 69mmHg    Right inguinal hernia 08/22/2013    Severe aortic regurgitation by prior echocardiogram 12/16/2021    Severe aortic regurgitation; PHT: 194msec    SOB (shortness of breath) 03/27/2013    Nuclear Stress Normal, EF 49% (Dr. Stormy Mcclure- 4/2/2013)     SOB (shortness of breath) on exertion     Tricuspid regurgitation 12/16/2021    moderate to severe tricuspid regurgitation       Past Surgical History:   Procedure Laterality Date    CARDIAC CATHETERIZATION  01/01/1991    COLONOSCOPY  06/15/2015    COLONOSCOPY N/A 9/7/2022    COLONOSCOPY POLYPECTOMY SNARE/COLD BIOPSY performed by Jacki Olmstead MD at [facility] Bilateral 01/01/1992    [address] Right 09/19/2013    with mesh    OTHER SURGICAL HISTORY  09/05/2013    hernia repair aborted, blood pressure spiked. TYMPANOSTOMY TUBE PLACEMENT  01/01/2009    right ear    VASECTOMY  01/01/1988       Social History     Socioeconomic History    Marital status:      Spouse name: None    Number of children: None    Years of education: None    Highest education level: None   Tobacco Use    Smoking status: Never    Smokeless tobacco: Never   Vaping Use    Vaping Use: Never used   Substance and Sexual Activity    Alcohol use: Not Currently     Comment: Caffeine: cystal lite energy drink     Drug use: No    Sexual activity: Yes     Partners: Female     Comment:    Social History Narrative    s/p [employer] (Sept 2012), s/p  at [facility],  at [facility] (Aug 2014) and  quality at [address] [place] [address] Place softball       Review of Systems   Constitutional:  Negative for fatigue.    HENT:  Negative for congestion and postnasal drip. Eyes:  Negative for discharge and itching. Respiratory:  Positive for shortness of breath. Negative for cough. Cardiovascular:  Negative for chest pain and leg swelling. Gastrointestinal:  Negative for abdominal distention and abdominal pain. Endocrine: Negative for cold intolerance and heat intolerance. Genitourinary:  Negative for enuresis and frequency. Musculoskeletal:  Negative for arthralgias and back pain. Allergic/Immunologic: Negative for environmental allergies and food allergies. Neurological:  Negative for light-headedness and headaches. Hematological:  Negative for adenopathy. Psychiatric/Behavioral:  Negative for agitation and behavioral problems. Objective:   BP (!) 140/60   Pulse 62   Resp 16   Ht 5' 11\" (1.803 m)   Wt 221 lb (100.2 kg)   SpO2 (!) 87%   BMI 30.82 kg/m²   Body mass index is 30.82 kg/m². Sleep Medicine 6/25/2020   Sitting and reading 0   Watching TV 0   Sitting, inactive in a public place (e.g. a theatre or a meeting) 0   As a passenger in a car for an hour without a break 1   Lying down to rest in the afternoon when circumstances permit 1   Sitting and talking to someone 0   Sitting quietly after a lunch without alcohol 1   In a car, while stopped for a few minutes in traffic 0   [place] Sleepiness Score 3   Neck circumference (Inches) 15.5     Mallampati 3    Physical Exam  Vitals reviewed. Constitutional:       Appearance: Normal appearance. HENT:      Head: Normocephalic and atraumatic. Nose: Nose normal.      Mouth/Throat:      Mouth: Mucous membranes are moist.   Eyes:      Extraocular Movements: Extraocular movements intact. Pupils: Pupils are equal, round, and reactive to light. Cardiovascular:      Rate and Rhythm: Normal rate and regular rhythm. Pulses: Normal pulses. Heart sounds: Normal heart sounds.    Pulmonary:      Effort: Pulmonary effort is normal.      Breath sounds: Normal breath sounds. Abdominal:      General: Abdomen is flat. Palpations: Abdomen is soft. Musculoskeletal:         General: Normal range of motion. Cervical back: Normal range of motion and neck supple. Skin:     General: Skin is warm and dry. Neurological:      General: No focal deficit present. Mental Status: He is alert and oriented to person, place, and time. Psychiatric:         Mood and Affect: Mood normal.         Behavior: Behavior normal.       Radiology: none      Assessment and Plan     Problem List          Circulatory    Pulmonary HTN ([address]      Sec to COPD, Diastolic dysfunction and valvular hear disease  PFT  6 MWT in 1 year  Get yearly flu vaccine         Relevant Orders    Full PFT Study With Bronchodilator    6 Minute Walk Test       Respiratory    COPD, severe (HCC)      C/w Inhalers  C/w oxygen'  Get yearly flu vaccine  Pulmonary rehab  PFT  6 MWT in  1 year         Relevant Medications    fluticasone-umeclidin-vilant (TRELEGY ELLIPTA) 100-62.5-25 MCG/INH AEPB    albuterol sulfate  (90 Base) MCG/ACT inhaler    loratadine (ALLERGY RELIEF) 10 MG tablet    Other Relevant Orders    Full PFT Study With Bronchodilator    6 Minute Walk Test    Sleep related hypoxia      C/w 2 L.min of oxygen at night         Hypoxia      C/w 2 L.min of oxygen on walking            Other    SOBOE (shortness of breath on exertion)      His DLCO has improved  C/w Inhalers  C/w Oxygen  PFT  6 MWT in 1 year  Loose weight         Obesity (BMI 30-39. 9)      Advised to loose weight with diet and exercise           Relevant Medications    empagliflozin (JARDIANCE) 10 MG tablet            Follow-Up:    Return in about 1 year (around 12/6/2023) for PFT, 6 MWT.      Progress notes sent to the referring Provider    Darin Martini MD MD  12/6/2022  3:27 PM

## 2022-12-07 RX ORDER — ALBUTEROL SULFATE 90 UG/1
AEROSOL, METERED RESPIRATORY (INHALATION)
Qty: 1 EACH | Refills: 11 | Status: SHIPPED | OUTPATIENT
Start: 2022-12-07

## 2022-12-11 DIAGNOSIS — I50.20 NYHA CLASS 3 HEART FAILURE WITH REDUCED EJECTION FRACTION (HCC): ICD-10-CM

## 2022-12-12 RX ORDER — SPIRONOLACTONE 25 MG/1
25 TABLET ORAL DAILY
Qty: 30 TABLET | Refills: 5 | Status: SHIPPED | OUTPATIENT
Start: 2022-12-12

## 2022-12-12 RX ORDER — SACUBITRIL AND VALSARTAN 24; 26 MG/1; MG/1
1 TABLET, FILM COATED ORAL 2 TIMES DAILY
Qty: 60 TABLET | Refills: 5 | Status: SHIPPED | OUTPATIENT
Start: 2022-12-12

## 2022-12-12 RX ORDER — PNV NO.95/FERROUS FUM/FOLIC AC 28MG-0.8MG
400 TABLET ORAL DAILY
Qty: 90 TABLET | Refills: 5 | Status: SHIPPED | OUTPATIENT
Start: 2022-12-12

## 2022-12-12 ASSESSMENT — ENCOUNTER SYMPTOMS
BACK PAIN: 0
CONSTIPATION: 0
NAUSEA: 0
WHEEZING: 0
PHOTOPHOBIA: 0
SHORTNESS OF BREATH: 1
ABDOMINAL PAIN: 0
COLOR CHANGE: 0
DIARRHEA: 0
CHEST TIGHTNESS: 0
VOMITING: 0
EYE PAIN: 0
BLOOD IN STOOL: 0
COUGH: 0

## 2022-12-12 NOTE — PROGRESS NOTES
Electrophysiology fu Note      Reason for consultation:   PVC    Chief complaint :  Palpitations    Referring physician: Azul Alonzo      Primary care physician: Blas Pena MD      History of Present Illness: This visit (11/16/2022)      Chief Complaint   Patient presents with    6 Month Follow-Up     Pt denies other cardiac symptoms  Pt does not smoke or drink. Pt does not drink caffeine. Shortness of Breath     COPD    Palpitations - occasional                Previous visit: (5/6/2022)      Chief Complaint   Patient presents with    Palpitations     Patient is seen for PVC's. Patient is here for a 1 month follow up from an echo. Patient states he is still having palpitations in the morning or with exertion. Patient does get short of breath. Patient denies chest pain, dizziness, and edema. Previous visit: (3/30/2022)      Chief Complaint   Patient presents with    Palpitations     Dr. Bee Murphy patient here for 1 month f/u. Patient states he has had some Palpitations where his is Heart pounding. Patient also c/o SOB due to COPD. Patient does not drink alcohol or caffiene and does not smoke. Previous visit:      Chief Complaint   Patient presents with    1 Month Follow-Up     Pt denies any cardiac symptoms. Pt doesn't drink caffine or alcohol. Pt doesn't smoke. Pt doesn't exercise           Previous visit: (7/23/2021)      Chief Complaint   Patient presents with    Hypertension     Patient here for 2 month follow up. Patient states his palpitations are better due to being on metoprolo. Patient has SOB due to COPD nothing new. Patient denies Chest Pain, Dizziness, Edema. Patient does not drink alcohol or smoke. Patient drinks caffiene. Patient does not exercise.             Previous visit: (5/21/2021)      Chief Complaint   Patient presents with    1 Month Follow-Up     to discuss PVC ablation Tania pt. patient feels lot better on the medication. Patient does not feel palpitation as much occasionally is feeling palpitations no he does have shortness of breath but that is also improved from before. He has occasional chest pain which lasts only seconds. Patient overall is feeling better and wants to continue with medical treatment          Previous visit:    She is a 59-year-old male with history of hypertension, COPD on home oxygen referred by Dr. Gordillo For PVCs. Patient reports he has chest pressure lasting about 10 seconds and relieves immediately. Patient does have shortness of breath but he also reports that he has chronic COPD and home oxygen dependent so that could be the reason to. Patient denies any dizziness. Patient has palpitations that last few seconds up to a couple of minutes multiple times in a day. No edema. Never been a smoker. Denies alcohol use in a year    Patient reports tiredness and weakness    Pastmedical history:   Past Medical History:   Diagnosis Date    Abnormal echocardiogram 03/29/2013    EF=45-50%;mod. AR,mild aortic valve calcif. Anesthesia complication 94/40/3280    Ankle fracture, left 1985    Aortic root dilation ([facility] [address] 12/16/2021    Dilation of the aortic root (4.2cm)and the ascending aorta(4.0cm). Arm fracture, right 1966    AVM (arteriovenous malformation) 06/15/2015    transverse colon    COPD (chronic obstructive pulmonary disease) ([facility] [address]     Has worked around [address] [place] [address] and worked at [employer] x 6 weeks    Diastolic dysfunction 14/48/2611    Grade III diastolic dysfunction    Epididymal cyst 10/2011    U/S    H/O cardiac catheterization 04/12/2013 4/12 Dr Brar Puls signif CAD. EF 55%. Renals patent.     H/O cardiac catheterization 04/01/2021    NO SIG CAD    H/O cardiovascular stress test 04/02/2013    EF 49%, there is no scintigraphic evidence of inducible myocardial ischemia, the observed defect is consistent with diaphragmatic attenuation, clinical correlation recommenced, no previous study to compare with, no wall motionn abnormality seen, normal perfusion in all myocardial regions    H/O chest pain     H/O echocardiogram 07/11/2020    EF 50-55%, Mod LVH & AR,  Dilated aortic root (4.2cm) and ascending aorta (4.1cm). Hemorrhoids 06/15/2015    HTN (hypertension)     Hx of echocardiogram 12/16/2021    Moderate left ventricular hypertrophy regurgitation noted The anterior leaflet of the mitral valve is prolapsed with mild Right side of the heart is enlarged Moderately dilated left atrium. Grade III diastolic dysfunction Left ventricular function is severely abnormal , EF is estimated at 20-25% Global hypokinesis noted.  Left ventricle size is normal.    Hydrocele 10/2011    U/S    Hyperlipemia     Irregular heart rhythm     after induction for surgery 9/5/2013 - pt developed irreg rhythm and surgery cancelled\"had another echo and stress test and everything checked out ok, they do not know the reason it happened\"- cardiac clearance obtained for surgery 9/19/2013    Low left ventricular ejection fraction 12/16/2021    EF is estimated at 20-25%    Mitral valve prolapse 12/16/2021    anterior leaflet of the mitral valve is prolapsed    Palpitations     Pneumonia 2004    not since    Pulmonary HTN ([address] 12/16/2021    Severe Pulmonary hypertension noted with RVSP of 69mmHg    Right inguinal hernia 08/22/2013    Severe aortic regurgitation by prior echocardiogram 12/16/2021    Severe aortic regurgitation; PHT: 194msec    SOB (shortness of breath) 03/27/2013    Nuclear Stress Normal, EF 49% (Dr. Sadaf Raymundo- 4/2/2013)     SOB (shortness of breath) on exertion     Tricuspid regurgitation 12/16/2021    moderate to severe tricuspid regurgitation       Surgical history :   Past Surgical History:   Procedure Laterality Date    CARDIAC CATHETERIZATION  01/01/1991    COLONOSCOPY  06/15/2015    COLONOSCOPY N/A 9/7/2022    COLONOSCOPY POLYPECTOMY SNARE/COLD BIOPSY performed by Shayan Marcos MD at [address] HERNIA REPAIR Bilateral 01/01/1992    INGUINAL HERNIA REPAIR Right 09/19/2013    with mesh    OTHER SURGICAL HISTORY  09/05/2013    hernia repair aborted, blood pressure spiked. TYMPANOSTOMY TUBE PLACEMENT  01/01/2009    right ear    VASECTOMY  01/01/1988       Family history:   Family History   Problem Relation Age of Onset    Cancer Mother         colon    Diabetes Mother     High Blood Pressure Mother     Migraines Mother     High Blood Pressure Father     COPD Father     Cancer Father         lung    Heart Disease Maternal Grandmother     Heart Disease Maternal Grandfather        Social history :  reports that he has never smoked. He has never used smokeless tobacco. He reports that he does not currently use alcohol. He reports that he does not use drugs. No Known Allergies    Current Outpatient Medications on File Prior to Visit   Medication Sig Dispense Refill    empagliflozin (JARDIANCE) 10 MG tablet Take 1 tablet by mouth daily 30 tablet 5    metoprolol tartrate (LOPRESSOR) 50 MG tablet TAKE 1 TABLET BY MOUTH TWICE A DAY 60 tablet 3    furosemide (LASIX) 40 MG tablet Take 1 tablet by mouth 2 times daily 60 tablet 3    loratadine (ALLERGY RELIEF) 10 MG tablet TAKE 1 TABLET BY MOUTH EVERY DAY 30 tablet 11    ASPIRIN LOW DOSE 81 MG EC tablet TAKE 1 TABLET BY MOUTH EVERY DAY 30 tablet 11    fluticasone-umeclidin-vilant (TRELEGY ELLIPTA) 100-62.5-25 MCG/INH AEPB TAKE 1 PUFF BY MOUTH EVERY DAY 1 each 5    Multiple Vitamin (DAILY VITAMINS PO) Take by mouth       No current facility-administered medications on file prior to visit. Review of Systems:   Review of Systems   Constitutional:  Negative for activity change, chills, fatigue and fever. HENT:  Negative for congestion, ear pain and tinnitus. Eyes:  Negative for photophobia, pain and visual disturbance. Respiratory:  Positive for shortness of breath. Negative for cough, chest tightness and wheezing.     Cardiovascular:  Positive for palpitations. Negative for chest pain and leg swelling. Gastrointestinal:  Negative for abdominal pain, blood in stool, constipation, diarrhea, nausea and vomiting. Endocrine: Negative for cold intolerance and heat intolerance. Genitourinary:  Negative for dysuria, flank pain and hematuria. Musculoskeletal:  Positive for arthralgias. Negative for back pain, myalgias and neck stiffness. Skin:  Negative for color change and rash. Allergic/Immunologic: Negative for food allergies. Neurological:  Negative for dizziness, light-headedness, numbness and headaches. Hematological:  Does not bruise/bleed easily. Psychiatric/Behavioral:  Negative for agitation, behavioral problems and confusion. Examination:      Vitals:    11/16/22 1104   BP: 124/60   Site: Left Upper Arm   Position: Sitting   Cuff Size: Medium Adult   Pulse: 54   Weight: 221 lb 3.2 oz (100.3 kg)   Height: 5' 11\" (1.803 m)        Body mass index is 30.85 kg/m². Physical Exam  Constitutional:       Appearance: Normal appearance. He is not ill-appearing. HENT:      Head: Normocephalic and atraumatic. Mouth/Throat:      Mouth: Mucous membranes are moist.   Eyes:      Conjunctiva/sclera: Conjunctivae normal.   Cardiovascular:      Rate and Rhythm: Normal rate. Heart sounds: Murmur (grade 2/6 diastolic murmur) heard. Pulmonary:      Effort: Pulmonary effort is normal.      Breath sounds: No rales. Abdominal:      General: Abdomen is flat. Palpations: Abdomen is soft. Musculoskeletal:         General: No tenderness. Normal range of motion. Cervical back: Normal range of motion. Right lower leg: No edema. Left lower leg: No edema. Skin:     General: Skin is warm and dry. Neurological:      General: No focal deficit present. Mental Status: He is alert and oriented to person, place, and time.          CBC:   Lab Results   Component Value Date/Time    WBC 7.8 10/27/2021 11:11 AM    HGB 17.0 10/27/2021 11:11 AM    HCT 55.8 10/27/2021 11:11 AM     10/27/2021 11:11 AM     Lipids:  Lab Results   Component Value Date    CHOL 193 03/10/2021    TRIG 62 03/10/2021    HDL 39 (L) 03/10/2021    LDLCALC 155 (H) 06/09/2020    LDLDIRECT 148 (H) 03/10/2021     PT/INR:   Lab Results   Component Value Date/Time    INR 1.01 10/27/2021 11:11 AM        BMP:    Lab Results   Component Value Date     03/01/2022    K 5.0 03/01/2022     03/01/2022    CO2 30 03/01/2022    BUN 22 (H) 03/01/2022     CMP:   Lab Results   Component Value Date    AST 23 12/13/2021    PROT 6.7 12/13/2021    BILITOT 0.9 12/13/2021    ALKPHOS 144 (H) 12/13/2021     TSH:  No results found for: TSH, T4    EKGINTERPRETATION - EKG Interpretation:  Sinus bradycardia, First degree AV block,      IMPRESSION / RECOMMENDATIONS:     1. PVC on metoprolol  2. COPD  3. HLD  4. Obesity BMI 30  5. Moderate aortic regurgitation  6. Chronic left ventricular systolic dysfunction - on entresto, metoprolol and spironolactone. Patient over all doing well  He says  \" he is a warrior against caffeine as it helped me \" and he reports he actually does not feel palpitations at all. Occasional but over all not much PVC  EKG has no PVC  FU echo was not done so will reorder the echo to assess AI and aortic root  Schedule follow up with  post echo      Thanks again for allowing me to participate in care of this patient. Please call me if you have any questions. With best regards. Salvador George, 12/12/2022 2:30 PM     Please note this report has been partially produced using speech recognition software and may contain errors related to that system including errors in grammar, punctuation, and spelling, as well as words and phrases that may be inappropriate. If there are any questions or concerns please feel free to contact the dictating provider for clarification.

## 2022-12-12 NOTE — PROGRESS NOTES
THIS WAS DONE IN ERROR PATIENT WAS NOT SEEN 34 y.o.   patient  TIMMY 3/2/2021 @ 29.1 weeks GDMA1 for observation r/o DKA    GBS swab collected and pending  covid 19 swab collected and pending  Plan of care discussed with Dr Prieto and Dr Vaz per BORIS Pantoja, NP

## 2023-02-11 RX ORDER — FUROSEMIDE 40 MG/1
40 TABLET ORAL 2 TIMES DAILY
Qty: 60 TABLET | Refills: 3 | Status: SHIPPED | OUTPATIENT
Start: 2023-02-11

## 2023-02-13 RX ORDER — METOPROLOL TARTRATE 50 MG/1
TABLET, FILM COATED ORAL
Qty: 60 TABLET | Refills: 3 | Status: SHIPPED | OUTPATIENT
Start: 2023-02-13

## 2023-03-15 DIAGNOSIS — J44.9 COPD, SEVERE (HCC): Primary | ICD-10-CM

## 2023-03-15 RX ORDER — FLUTICASONE FUROATE, UMECLIDINIUM BROMIDE AND VILANTEROL TRIFENATATE 100; 62.5; 25 UG/1; UG/1; UG/1
1 POWDER RESPIRATORY (INHALATION) DAILY
Qty: 1 EACH | Refills: 5 | Status: ON HOLD | OUTPATIENT
Start: 2023-03-15 | End: 2023-03-27 | Stop reason: HOSPADM

## 2023-03-21 ENCOUNTER — HOSPITAL ENCOUNTER (INPATIENT)
Age: 63
LOS: 4 days | Discharge: HOME | DRG: 193 | End: 2023-03-27
Attending: EMERGENCY MEDICINE | Admitting: FAMILY MEDICINE
Payer: COMMERCIAL

## 2023-03-21 ENCOUNTER — APPOINTMENT (OUTPATIENT)
Dept: GENERAL RADIOLOGY | Age: 63
DRG: 193 | End: 2023-03-21
Payer: COMMERCIAL

## 2023-03-21 DIAGNOSIS — J96.21 ACUTE ON CHRONIC RESPIRATORY FAILURE WITH HYPOXIA (HCC): Primary | ICD-10-CM

## 2023-03-21 DIAGNOSIS — J44.1 COPD EXACERBATION (HCC): ICD-10-CM

## 2023-03-21 LAB
ALBUMIN SERPL-MCNC: 4.3 GM/DL (ref 3.4–5)
ALP BLD-CCNC: 112 IU/L (ref 40–129)
ALT SERPL-CCNC: 13 U/L (ref 10–40)
ANION GAP SERPL CALCULATED.3IONS-SCNC: 14 MMOL/L (ref 4–16)
AST SERPL-CCNC: 21 IU/L (ref 15–37)
B PARAP IS1001 DNA NPH QL NAA+NON-PROBE: NOT DETECTED
B PERT.PT PRMT NPH QL NAA+NON-PROBE: NOT DETECTED
BASOPHILS ABSOLUTE: 0 K/CU MM
BASOPHILS RELATIVE PERCENT: 0.5 % (ref 0–1)
BILIRUB SERPL-MCNC: 0.6 MG/DL (ref 0–1)
BUN SERPL-MCNC: 20 MG/DL (ref 6–23)
C PNEUM DNA NPH QL NAA+NON-PROBE: NOT DETECTED
CALCIUM SERPL-MCNC: 9.1 MG/DL (ref 8.3–10.6)
CHLORIDE BLD-SCNC: 95 MMOL/L (ref 99–110)
CO2: 26 MMOL/L (ref 21–32)
CREAT SERPL-MCNC: 0.9 MG/DL (ref 0.9–1.3)
DIFFERENTIAL TYPE: ABNORMAL
EKG ATRIAL RATE: 70 BPM
EKG DIAGNOSIS: NORMAL
EKG P AXIS: 41 DEGREES
EKG P-R INTERVAL: 258 MS
EKG Q-T INTERVAL: 386 MS
EKG QRS DURATION: 98 MS
EKG QTC CALCULATION (BAZETT): 416 MS
EKG R AXIS: -36 DEGREES
EKG T AXIS: 69 DEGREES
EKG VENTRICULAR RATE: 70 BPM
EOSINOPHILS ABSOLUTE: 0 K/CU MM
EOSINOPHILS RELATIVE PERCENT: 0.3 % (ref 0–3)
FLUAV H1 2009 PAN RNA NPH NAA+NON-PROBE: NOT DETECTED
FLUAV H1 RNA NPH QL NAA+NON-PROBE: NOT DETECTED
FLUAV H3 RNA NPH QL NAA+NON-PROBE: NOT DETECTED
FLUAV RNA NPH QL NAA+NON-PROBE: NOT DETECTED
FLUBV RNA NPH QL NAA+NON-PROBE: NOT DETECTED
GFR SERPL CREATININE-BSD FRML MDRD: >60 ML/MIN/1.73M2
GLUCOSE SERPL-MCNC: 121 MG/DL (ref 70–99)
HADV DNA NPH QL NAA+NON-PROBE: NOT DETECTED
HCOV 229E RNA NPH QL NAA+NON-PROBE: NOT DETECTED
HCOV HKU1 RNA NPH QL NAA+NON-PROBE: NOT DETECTED
HCOV NL63 RNA NPH QL NAA+NON-PROBE: NOT DETECTED
HCOV OC43 RNA NPH QL NAA+NON-PROBE: NOT DETECTED
HCT VFR BLD CALC: 53.3 % (ref 42–52)
HEMOGLOBIN: 17.5 GM/DL (ref 13.5–18)
HMPV RNA NPH QL NAA+NON-PROBE: ABNORMAL
HPIV1 RNA NPH QL NAA+NON-PROBE: NOT DETECTED
HPIV2 RNA NPH QL NAA+NON-PROBE: NOT DETECTED
HPIV3 RNA NPH QL NAA+NON-PROBE: NOT DETECTED
HPIV4 RNA NPH QL NAA+NON-PROBE: NOT DETECTED
IMMATURE NEUTROPHIL %: 0.4 % (ref 0–0.43)
LYMPHOCYTES ABSOLUTE: 2.6 K/CU MM
LYMPHOCYTES RELATIVE PERCENT: 32.7 % (ref 24–44)
M PNEUMO DNA NPH QL NAA+NON-PROBE: NOT DETECTED
MCH RBC QN AUTO: 28.9 PG (ref 27–31)
MCHC RBC AUTO-ENTMCNC: 32.8 % (ref 32–36)
MCV RBC AUTO: 88.1 FL (ref 78–100)
MONOCYTES ABSOLUTE: 0.9 K/CU MM
MONOCYTES RELATIVE PERCENT: 11.5 % (ref 0–4)
NUCLEATED RBC %: 0 %
PDW BLD-RTO: 12.4 % (ref 11.7–14.9)
PLATELET # BLD: 168 K/CU MM (ref 140–440)
PMV BLD AUTO: 10.1 FL (ref 7.5–11.1)
POTASSIUM SERPL-SCNC: 4 MMOL/L (ref 3.5–5.1)
PRO-BNP: 1598 PG/ML
RBC # BLD: 6.05 M/CU MM (ref 4.6–6.2)
RSV RNA NPH QL NAA+NON-PROBE: NOT DETECTED
RV+EV RNA NPH QL NAA+NON-PROBE: NOT DETECTED
SARS-COV-2 RDRP RESP QL NAA+PROBE: NOT DETECTED
SARS-COV-2 RNA NPH QL NAA+NON-PROBE: NOT DETECTED
SEGMENTED NEUTROPHILS ABSOLUTE COUNT: 4.3 K/CU MM
SEGMENTED NEUTROPHILS RELATIVE PERCENT: 54.6 % (ref 36–66)
SODIUM BLD-SCNC: 135 MMOL/L (ref 135–145)
SOURCE: NORMAL
TOTAL IMMATURE NEUTOROPHIL: 0.03 K/CU MM
TOTAL NUCLEATED RBC: 0 K/CU MM
TOTAL PROTEIN: 7.2 GM/DL (ref 6.4–8.2)
TROPONIN T: <0.01 NG/ML
WBC # BLD: 7.8 K/CU MM (ref 4–10.5)

## 2023-03-21 PROCEDURE — 87449 NOS EACH ORGANISM AG IA: CPT

## 2023-03-21 PROCEDURE — 2580000003 HC RX 258: Performed by: NURSE PRACTITIONER

## 2023-03-21 PROCEDURE — 6360000002 HC RX W HCPCS: Performed by: NURSE PRACTITIONER

## 2023-03-21 PROCEDURE — 0202U NFCT DS 22 TRGT SARS-COV-2: CPT

## 2023-03-21 PROCEDURE — 2700000000 HC OXYGEN THERAPY PER DAY

## 2023-03-21 PROCEDURE — 85025 COMPLETE CBC W/AUTO DIFF WBC: CPT

## 2023-03-21 PROCEDURE — 6370000000 HC RX 637 (ALT 250 FOR IP): Performed by: NURSE PRACTITIONER

## 2023-03-21 PROCEDURE — G0378 HOSPITAL OBSERVATION PER HR: HCPCS

## 2023-03-21 PROCEDURE — 6370000000 HC RX 637 (ALT 250 FOR IP): Performed by: FAMILY MEDICINE

## 2023-03-21 PROCEDURE — 87635 SARS-COV-2 COVID-19 AMP PRB: CPT

## 2023-03-21 PROCEDURE — 6370000000 HC RX 637 (ALT 250 FOR IP): Performed by: EMERGENCY MEDICINE

## 2023-03-21 PROCEDURE — 99285 EMERGENCY DEPT VISIT HI MDM: CPT

## 2023-03-21 PROCEDURE — 93010 ELECTROCARDIOGRAM REPORT: CPT | Performed by: INTERNAL MEDICINE

## 2023-03-21 PROCEDURE — 94640 AIRWAY INHALATION TREATMENT: CPT

## 2023-03-21 PROCEDURE — 94150 VITAL CAPACITY TEST: CPT

## 2023-03-21 PROCEDURE — 80053 COMPREHEN METABOLIC PANEL: CPT

## 2023-03-21 PROCEDURE — 93005 ELECTROCARDIOGRAM TRACING: CPT | Performed by: EMERGENCY MEDICINE

## 2023-03-21 PROCEDURE — 84484 ASSAY OF TROPONIN QUANT: CPT

## 2023-03-21 PROCEDURE — 94761 N-INVAS EAR/PLS OXIMETRY MLT: CPT

## 2023-03-21 PROCEDURE — 96374 THER/PROPH/DIAG INJ IV PUSH: CPT

## 2023-03-21 PROCEDURE — G0378 HOSPITAL OBSERVATION PER HR: HCPCS | Performed by: STUDENT IN AN ORGANIZED HEALTH CARE EDUCATION/TRAINING PROGRAM

## 2023-03-21 PROCEDURE — 71045 X-RAY EXAM CHEST 1 VIEW: CPT

## 2023-03-21 PROCEDURE — 96372 THER/PROPH/DIAG INJ SC/IM: CPT

## 2023-03-21 PROCEDURE — 6360000002 HC RX W HCPCS: Performed by: EMERGENCY MEDICINE

## 2023-03-21 PROCEDURE — 83880 ASSAY OF NATRIURETIC PEPTIDE: CPT

## 2023-03-21 PROCEDURE — 87899 AGENT NOS ASSAY W/OPTIC: CPT

## 2023-03-21 RX ORDER — ENOXAPARIN SODIUM 100 MG/ML
40 INJECTION SUBCUTANEOUS EVERY EVENING
Status: DISCONTINUED | OUTPATIENT
Start: 2023-03-21 | End: 2023-03-27 | Stop reason: HOSPADM

## 2023-03-21 RX ORDER — BUDESONIDE AND FORMOTEROL FUMARATE DIHYDRATE 160; 4.5 UG/1; UG/1
2 AEROSOL RESPIRATORY (INHALATION) 2 TIMES DAILY
Status: DISCONTINUED | OUTPATIENT
Start: 2023-03-22 | End: 2023-03-27 | Stop reason: HOSPADM

## 2023-03-21 RX ORDER — ONDANSETRON 2 MG/ML
4 INJECTION INTRAMUSCULAR; INTRAVENOUS EVERY 6 HOURS PRN
Status: DISCONTINUED | OUTPATIENT
Start: 2023-03-21 | End: 2023-03-27 | Stop reason: HOSPADM

## 2023-03-21 RX ORDER — SPIRONOLACTONE 50 MG/1
25 TABLET, FILM COATED ORAL DAILY
Status: DISCONTINUED | OUTPATIENT
Start: 2023-03-22 | End: 2023-03-27 | Stop reason: HOSPADM

## 2023-03-21 RX ORDER — METOPROLOL TARTRATE 50 MG/1
50 TABLET, FILM COATED ORAL 2 TIMES DAILY
Status: DISCONTINUED | OUTPATIENT
Start: 2023-03-21 | End: 2023-03-27 | Stop reason: HOSPADM

## 2023-03-21 RX ORDER — FUROSEMIDE 40 MG/1
40 TABLET ORAL 2 TIMES DAILY
Status: DISCONTINUED | OUTPATIENT
Start: 2023-03-21 | End: 2023-03-27 | Stop reason: HOSPADM

## 2023-03-21 RX ORDER — LANOLIN ALCOHOL/MO/W.PET/CERES
6 CREAM (GRAM) TOPICAL ONCE
Status: COMPLETED | OUTPATIENT
Start: 2023-03-21 | End: 2023-03-21

## 2023-03-21 RX ORDER — SODIUM CHLORIDE 9 MG/ML
INJECTION, SOLUTION INTRAVENOUS PRN
Status: DISCONTINUED | OUTPATIENT
Start: 2023-03-21 | End: 2023-03-27 | Stop reason: HOSPADM

## 2023-03-21 RX ORDER — SODIUM CHLORIDE 0.9 % (FLUSH) 0.9 %
5-40 SYRINGE (ML) INJECTION PRN
Status: DISCONTINUED | OUTPATIENT
Start: 2023-03-21 | End: 2023-03-27 | Stop reason: HOSPADM

## 2023-03-21 RX ORDER — SODIUM CHLORIDE 0.9 % (FLUSH) 0.9 %
5-40 SYRINGE (ML) INJECTION EVERY 12 HOURS SCHEDULED
Status: DISCONTINUED | OUTPATIENT
Start: 2023-03-21 | End: 2023-03-27 | Stop reason: HOSPADM

## 2023-03-21 RX ORDER — SENNA PLUS 8.6 MG/1
1 TABLET ORAL DAILY PRN
Status: DISCONTINUED | OUTPATIENT
Start: 2023-03-21 | End: 2023-03-27 | Stop reason: HOSPADM

## 2023-03-21 RX ORDER — LANOLIN ALCOHOL/MO/W.PET/CERES
400 CREAM (GRAM) TOPICAL DAILY
Status: DISCONTINUED | OUTPATIENT
Start: 2023-03-21 | End: 2023-03-27 | Stop reason: HOSPADM

## 2023-03-21 RX ORDER — ONDANSETRON 4 MG/1
4 TABLET, ORALLY DISINTEGRATING ORAL EVERY 8 HOURS PRN
Status: DISCONTINUED | OUTPATIENT
Start: 2023-03-21 | End: 2023-03-27 | Stop reason: HOSPADM

## 2023-03-21 RX ORDER — ACETAMINOPHEN 650 MG/1
650 SUPPOSITORY RECTAL EVERY 6 HOURS PRN
Status: DISCONTINUED | OUTPATIENT
Start: 2023-03-21 | End: 2023-03-27 | Stop reason: HOSPADM

## 2023-03-21 RX ORDER — IPRATROPIUM BROMIDE AND ALBUTEROL SULFATE 2.5; .5 MG/3ML; MG/3ML
1 SOLUTION RESPIRATORY (INHALATION)
Status: DISCONTINUED | OUTPATIENT
Start: 2023-03-21 | End: 2023-03-27

## 2023-03-21 RX ORDER — ACETAMINOPHEN 325 MG/1
650 TABLET ORAL EVERY 6 HOURS PRN
Status: DISCONTINUED | OUTPATIENT
Start: 2023-03-21 | End: 2023-03-27 | Stop reason: HOSPADM

## 2023-03-21 RX ORDER — PREDNISONE 20 MG/1
40 TABLET ORAL DAILY
Status: DISCONTINUED | OUTPATIENT
Start: 2023-03-21 | End: 2023-03-23

## 2023-03-21 RX ORDER — ASPIRIN 81 MG/1
81 TABLET ORAL DAILY
Status: DISCONTINUED | OUTPATIENT
Start: 2023-03-22 | End: 2023-03-27 | Stop reason: HOSPADM

## 2023-03-21 RX ORDER — METHYLPREDNISOLONE SODIUM SUCCINATE 40 MG/ML
40 INJECTION, POWDER, LYOPHILIZED, FOR SOLUTION INTRAMUSCULAR; INTRAVENOUS ONCE
Status: COMPLETED | OUTPATIENT
Start: 2023-03-21 | End: 2023-03-21

## 2023-03-21 RX ADMIN — Medication 6 MG: at 23:43

## 2023-03-21 RX ADMIN — SODIUM CHLORIDE, PRESERVATIVE FREE 10 ML: 5 INJECTION INTRAVENOUS at 20:32

## 2023-03-21 RX ADMIN — METOPROLOL TARTRATE 50 MG: 50 TABLET, FILM COATED ORAL at 20:31

## 2023-03-21 RX ADMIN — IPRATROPIUM BROMIDE AND ALBUTEROL SULFATE 1 AMPULE: 2.5; .5 SOLUTION RESPIRATORY (INHALATION) at 14:53

## 2023-03-21 RX ADMIN — FUROSEMIDE 40 MG: 40 TABLET ORAL at 20:31

## 2023-03-21 RX ADMIN — IPRATROPIUM BROMIDE 2 PUFF: 17 AEROSOL, METERED RESPIRATORY (INHALATION) at 10:26

## 2023-03-21 RX ADMIN — IPRATROPIUM BROMIDE AND ALBUTEROL SULFATE 1 AMPULE: 2.5; .5 SOLUTION RESPIRATORY (INHALATION) at 19:57

## 2023-03-21 RX ADMIN — Medication 400 MG: at 16:58

## 2023-03-21 RX ADMIN — PREDNISONE 40 MG: 20 TABLET ORAL at 16:58

## 2023-03-21 RX ADMIN — SACUBITRIL AND VALSARTAN 1 TABLET: 24; 26 TABLET, FILM COATED ORAL at 20:31

## 2023-03-21 RX ADMIN — METHYLPREDNISOLONE SODIUM SUCCINATE 40 MG: 40 INJECTION, POWDER, FOR SOLUTION INTRAMUSCULAR; INTRAVENOUS at 11:02

## 2023-03-21 RX ADMIN — ENOXAPARIN SODIUM 40 MG: 100 INJECTION SUBCUTANEOUS at 16:58

## 2023-03-21 ASSESSMENT — LIFESTYLE VARIABLES
HOW OFTEN DO YOU HAVE A DRINK CONTAINING ALCOHOL: NEVER
HOW MANY STANDARD DRINKS CONTAINING ALCOHOL DO YOU HAVE ON A TYPICAL DAY: PATIENT DOES NOT DRINK

## 2023-03-21 NOTE — ED NOTES
duplicate    Comments:  Medication list reviewed with patient and insurance claims verified. Patient reports he has taken first dose of medications today.     To my knowledge the above medication history is accurate as of 3/21/2023 10:47 AM.   Samreen Garcia CPhT   3/21/2023 10:47 AM
aortic regurgitation; PHT: 194msec    SOB (shortness of breath) 03/27/2013    Nuclear Stress Normal, EF 49% (Dr. Xin Sidhu- 4/2/2013)     SOB (shortness of breath) on exertion     Tricuspid regurgitation 12/16/2021    moderate to severe tricuspid regurgitation       Assessment    Vitals/MEWS:    Level of Consciousness: Alert (0)   Vitals:    03/21/23 1100 03/21/23 1105 03/21/23 1130 03/21/23 1200   BP: (!) 123/59  (!) 145/65 (!) 144/51   Pulse: 67  68 64   Resp: 11 17 13   Temp:       TempSrc:       SpO2: 91%  92% 91%   Weight:  217 lb (98.4 kg)     Height:  5' 11\" (1.803 m)       FiO2 (%): 4 liters  O2 Flow Rate: O2 Device: Nasal cannula O2 Flow Rate (L/min): 4 L/min  Cardiac Rhythm: sr  Pain Assessment: 0/10 [x] Verbal [] Earlie Candice Scale  Pain Scale: Pain Assessment  Pain Assessment: None - Denies Pain  Last documented pain score (0-10 scale)    Last documented pain medication administered: none  Mental Status: oriented, alert, coherent, logical, thought processes intact, and able to concentrate and follow conversation  NIH Score: NIH     C-SSRS: Risk of Suicide: No Risk  Bedside swallow:    Pearcy Coma Scale (GCS): Active LDA's:   Peripheral IV 03/21/23 Right Antecubital (Active)   Site Assessment Clean, dry & intact 03/21/23 0940   Line Status Blood return noted;Brisk blood return 03/21/23 1412 [facility] Ne checked and tightened 03/21/23 0940   Phlebitis Assessment No symptoms 03/21/23 0940   Infiltration Assessment 0 03/21/23 0940   Dressing Status Clean, dry & intact; New dressing applied 03/21/23 0940   Dressing Type Transparent 03/21/23 0940   Dressing Intervention New 03/21/23 0940     PO Status: see admit orders  Pertinent or High Risk Medications/Drips: no   If Yes, please provide details: n/a  Pending Blood Product Administration: no     You may also review the ED PT Care Timeline found under the Summary Nursing Index tab.     Recommendation    Pending orders see admit orders  Plan for

## 2023-03-21 NOTE — ED TRIAGE NOTES
Pt. arrived with wife, c/o SOB, cough, diarrhea, chills, dizziness, and no appetite, that started Friday and progressed, has a hx of COPD and CHF.

## 2023-03-21 NOTE — H&P
echocardiogram 07/11/2020    EF 50-55%, Mod LVH & AR,  Dilated aortic root (4.2cm) and ascending aorta (4.1cm). Hemorrhoids 06/15/2015    HTN (hypertension)     Hx of echocardiogram 12/16/2021    Moderate left ventricular hypertrophy regurgitation noted The anterior leaflet of the mitral valve is prolapsed with mild Right side of the heart is enlarged Moderately dilated left atrium. Grade III diastolic dysfunction Left ventricular function is severely abnormal , EF is estimated at 20-25% Global hypokinesis noted. Left ventricle size is normal.    Hydrocele 10/2011    U/S    Hyperlipemia     Irregular heart rhythm     after induction for surgery 9/5/2013 - pt developed irreg rhythm and surgery cancelled\"had another echo and stress test and everything checked out ok, they do not know the reason it happened\"- cardiac clearance obtained for surgery 9/19/2013    Low left ventricular ejection fraction 12/16/2021    EF is estimated at 20-25%    Mitral valve prolapse 12/16/2021    anterior leaflet of the mitral valve is prolapsed    Palpitations     Pneumonia 2004    not since    Pulmonary HTN ([address] 12/16/2021    Severe Pulmonary hypertension noted with RVSP of 69mmHg    Right inguinal hernia 08/22/2013    Severe aortic regurgitation by prior echocardiogram 12/16/2021    Severe aortic regurgitation; PHT: 194msec    SOB (shortness of breath) 03/27/2013    Nuclear Stress Normal, EF 49% (Dr. Jennifer Snyder- 4/2/2013)     SOB (shortness of breath) on exertion     Tricuspid regurgitation 12/16/2021    moderate to severe tricuspid regurgitation       Past Surgical  History:    has a past surgical history that includes Cardiac catheterization (01/01/1991); Vasectomy (01/01/1988); Tympanostomy tube placement (01/01/2009); other surgical history (09/05/2013); Inguinal hernia repair (Bilateral, 01/01/1992); Inguinal hernia repair (Right, 09/19/2013); Colonoscopy (06/15/2015); and Colonoscopy (N/A, 9/7/2022).     Social History:    FAM HX:

## 2023-03-21 NOTE — ED PROVIDER NOTES
fluticasone-umeclidin-vilant (TRELEGY ELLIPTA) 100-62.5-25 MCG/ACT AEPB inhaler Inhale 1 puff into the lungs daily 1 each 5    metoprolol tartrate (LOPRESSOR) 50 MG tablet TAKE 1 TABLET BY MOUTH TWICE A DAY (Patient taking differently: Take 50 mg by mouth 2 times daily) 60 tablet 3    furosemide (LASIX) 40 MG tablet Take 1 tablet by mouth 2 times daily 60 tablet 3    spironolactone (ALDACTONE) 25 MG tablet Take 1 tablet by mouth daily 30 tablet 5    sacubitril-valsartan (ENTRESTO) 24-26 MG per tablet Take 1 tablet by mouth 2 times daily 60 tablet 5    magnesium oxide ([employer] MAGNESIUM OXIDE) 250 MG TABS tablet Take 1.5 tablets by mouth daily (Patient taking differently: Take 375 mg by mouth nightly) 90 tablet 5    empagliflozin (JARDIANCE) 10 MG tablet Take 1 tablet by mouth daily 30 tablet 5    loratadine (ALLERGY RELIEF) 10 MG tablet TAKE 1 TABLET BY MOUTH EVERY DAY (Patient taking differently: Take 10 mg by mouth daily TAKE 1 TABLET BY MOUTH EVERY DAY) 30 tablet 11    ASPIRIN LOW DOSE 81 MG EC tablet TAKE 1 TABLET BY MOUTH EVERY DAY (Patient taking differently: Take 81 mg by mouth daily) 30 tablet 11    Multiple Vitamin (DAILY VITAMINS PO) Take 1 tablet by mouth daily       No Known Allergies    Nursing Notes Reviewed    Physical Exam:  Triage VS:    ED Triage Vitals   Enc Vitals Group      BP 03/21/23 0931 114/66      Heart Rate 03/21/23 0931 92      Resp 03/21/23 0931 20      Temp 03/21/23 0933 97.7 °F (36.5 °C)      Temp Source 03/21/23 0933 Oral      SpO2 03/21/23 0931 (!) 88 %      Weight --       Height --       Head Circumference --       Peak Flow --       Pain Score --       Pain Loc --       Pain Edu? --       Excl. in [address]? --        My pulse ox interpretation is - normal    General appearance:  No acute distress. Skin:  Warm. Dry. Eye:  Extraocular movements intact. Ears, nose, mouth and throat:  Oral mucosa moist   Neck:  Trachea midline. Extremity:  No swelling.   Normal ROM     Heart:  Regular

## 2023-03-22 LAB
ALBUMIN SERPL-MCNC: 4.3 GM/DL (ref 3.4–5)
ALP BLD-CCNC: 108 IU/L (ref 40–128)
ALT SERPL-CCNC: 13 U/L (ref 10–40)
ANION GAP SERPL CALCULATED.3IONS-SCNC: 17 MMOL/L (ref 4–16)
AST SERPL-CCNC: 23 IU/L (ref 15–37)
BASOPHILS ABSOLUTE: 0 K/CU MM
BASOPHILS RELATIVE PERCENT: 0.3 % (ref 0–1)
BILIRUB SERPL-MCNC: 0.4 MG/DL (ref 0–1)
BUN SERPL-MCNC: 26 MG/DL (ref 6–23)
CALCIUM SERPL-MCNC: 9.4 MG/DL (ref 8.3–10.6)
CHLORIDE BLD-SCNC: 98 MMOL/L (ref 99–110)
CO2: 24 MMOL/L (ref 21–32)
CREAT SERPL-MCNC: 0.9 MG/DL (ref 0.9–1.3)
DIFFERENTIAL TYPE: ABNORMAL
EOSINOPHILS ABSOLUTE: 0 K/CU MM
EOSINOPHILS RELATIVE PERCENT: 0 % (ref 0–3)
GFR SERPL CREATININE-BSD FRML MDRD: >60 ML/MIN/1.73M2
GLUCOSE SERPL-MCNC: 137 MG/DL (ref 70–99)
HCT VFR BLD CALC: 55.4 % (ref 42–52)
HEMOGLOBIN: 18 GM/DL (ref 13.5–18)
IMMATURE NEUTROPHIL %: 0.1 % (ref 0–0.43)
LACTATE: 2.3 MMOL/L (ref 0.5–1.9)
LACTATE: 2.4 MMOL/L (ref 0.5–1.9)
LACTATE: 2.7 MMOL/L (ref 0.5–1.9)
LYMPHOCYTES ABSOLUTE: 1.6 K/CU MM
LYMPHOCYTES RELATIVE PERCENT: 23.7 % (ref 24–44)
MCH RBC QN AUTO: 28.7 PG (ref 27–31)
MCHC RBC AUTO-ENTMCNC: 32.5 % (ref 32–36)
MCV RBC AUTO: 88.4 FL (ref 78–100)
MONOCYTES ABSOLUTE: 0.6 K/CU MM
MONOCYTES RELATIVE PERCENT: 9.2 % (ref 0–4)
NUCLEATED RBC %: 0 %
PDW BLD-RTO: 12.4 % (ref 11.7–14.9)
PLATELET # BLD: 174 K/CU MM (ref 140–440)
PMV BLD AUTO: 10.1 FL (ref 7.5–11.1)
POTASSIUM SERPL-SCNC: 4.5 MMOL/L (ref 3.5–5.1)
PROCALCITONIN SERPL-MCNC: 0.07 NG/ML
RBC # BLD: 6.27 M/CU MM (ref 4.6–6.2)
SEGMENTED NEUTROPHILS ABSOLUTE COUNT: 4.5 K/CU MM
SEGMENTED NEUTROPHILS RELATIVE PERCENT: 66.7 % (ref 36–66)
SODIUM BLD-SCNC: 139 MMOL/L (ref 135–145)
TOTAL IMMATURE NEUTOROPHIL: 0.01 K/CU MM
TOTAL NUCLEATED RBC: 0 K/CU MM
TOTAL PROTEIN: 7.3 GM/DL (ref 6.4–8.2)
WBC # BLD: 6.7 K/CU MM (ref 4–10.5)

## 2023-03-22 PROCEDURE — 6370000000 HC RX 637 (ALT 250 FOR IP): Performed by: FAMILY MEDICINE

## 2023-03-22 PROCEDURE — 6370000000 HC RX 637 (ALT 250 FOR IP): Performed by: NURSE PRACTITIONER

## 2023-03-22 PROCEDURE — 94150 VITAL CAPACITY TEST: CPT

## 2023-03-22 PROCEDURE — 36415 COLL VENOUS BLD VENIPUNCTURE: CPT

## 2023-03-22 PROCEDURE — 80053 COMPREHEN METABOLIC PANEL: CPT

## 2023-03-22 PROCEDURE — 94761 N-INVAS EAR/PLS OXIMETRY MLT: CPT

## 2023-03-22 PROCEDURE — 96372 THER/PROPH/DIAG INJ SC/IM: CPT

## 2023-03-22 PROCEDURE — G0378 HOSPITAL OBSERVATION PER HR: HCPCS

## 2023-03-22 PROCEDURE — 94640 AIRWAY INHALATION TREATMENT: CPT

## 2023-03-22 PROCEDURE — 2580000003 HC RX 258: Performed by: NURSE PRACTITIONER

## 2023-03-22 PROCEDURE — 84145 PROCALCITONIN (PCT): CPT

## 2023-03-22 PROCEDURE — 6360000002 HC RX W HCPCS: Performed by: NURSE PRACTITIONER

## 2023-03-22 PROCEDURE — 94664 DEMO&/EVAL PT USE INHALER: CPT

## 2023-03-22 PROCEDURE — 83605 ASSAY OF LACTIC ACID: CPT

## 2023-03-22 PROCEDURE — 85025 COMPLETE CBC W/AUTO DIFF WBC: CPT

## 2023-03-22 PROCEDURE — 2700000000 HC OXYGEN THERAPY PER DAY

## 2023-03-22 RX ORDER — 0.9 % SODIUM CHLORIDE 0.9 %
500 INTRAVENOUS SOLUTION INTRAVENOUS ONCE
Status: COMPLETED | OUTPATIENT
Start: 2023-03-22 | End: 2023-03-23

## 2023-03-22 RX ADMIN — IPRATROPIUM BROMIDE AND ALBUTEROL SULFATE 1 AMPULE: 2.5; .5 SOLUTION RESPIRATORY (INHALATION) at 15:02

## 2023-03-22 RX ADMIN — Medication 400 MG: at 22:01

## 2023-03-22 RX ADMIN — IPRATROPIUM BROMIDE AND ALBUTEROL SULFATE 1 AMPULE: 2.5; .5 SOLUTION RESPIRATORY (INHALATION) at 11:47

## 2023-03-22 RX ADMIN — SODIUM CHLORIDE 500 ML: 9 INJECTION, SOLUTION INTRAVENOUS at 13:43

## 2023-03-22 RX ADMIN — SACUBITRIL AND VALSARTAN 1 TABLET: 24; 26 TABLET, FILM COATED ORAL at 09:58

## 2023-03-22 RX ADMIN — METOPROLOL TARTRATE 50 MG: 50 TABLET, FILM COATED ORAL at 22:01

## 2023-03-22 RX ADMIN — GUAIFENESIN 200 MG: 200 SOLUTION ORAL at 22:01

## 2023-03-22 RX ADMIN — SPIRONOLACTONE 25 MG: 50 TABLET ORAL at 09:58

## 2023-03-22 RX ADMIN — SODIUM CHLORIDE, PRESERVATIVE FREE 10 ML: 5 INJECTION INTRAVENOUS at 09:58

## 2023-03-22 RX ADMIN — METOPROLOL TARTRATE 50 MG: 50 TABLET, FILM COATED ORAL at 09:59

## 2023-03-22 RX ADMIN — IPRATROPIUM BROMIDE AND ALBUTEROL SULFATE 1 AMPULE: 2.5; .5 SOLUTION RESPIRATORY (INHALATION) at 19:38

## 2023-03-22 RX ADMIN — EMPAGLIFLOZIN 10 MG: 10 TABLET, FILM COATED ORAL at 09:58

## 2023-03-22 RX ADMIN — IPRATROPIUM BROMIDE AND ALBUTEROL SULFATE 1 AMPULE: 2.5; .5 SOLUTION RESPIRATORY (INHALATION) at 06:20

## 2023-03-22 RX ADMIN — ENOXAPARIN SODIUM 40 MG: 100 INJECTION SUBCUTANEOUS at 19:21

## 2023-03-22 RX ADMIN — BUDESONIDE AND FORMOTEROL FUMARATE DIHYDRATE 2 PUFF: 160; 4.5 AEROSOL RESPIRATORY (INHALATION) at 19:45

## 2023-03-22 RX ADMIN — ASPIRIN 81 MG: 81 TABLET, COATED ORAL at 09:58

## 2023-03-22 RX ADMIN — PREDNISONE 40 MG: 20 TABLET ORAL at 09:58

## 2023-03-22 RX ADMIN — BUDESONIDE AND FORMOTEROL FUMARATE DIHYDRATE 2 PUFF: 160; 4.5 AEROSOL RESPIRATORY (INHALATION) at 06:27

## 2023-03-22 ASSESSMENT — PAIN SCALES - GENERAL: PAINLEVEL_OUTOF10: 0

## 2023-03-22 NOTE — PLAN OF CARE
Problem: Discharge Planning  Goal: Discharge to home or other facility with appropriate resources  3/21/2023 2153 by Bala Stroud RN  Outcome: Progressing  3/21/2023 1748 by Rashad Hung RN  Outcome: Progressing  Flowsheets (Taken 3/21/2023 1743)  Discharge to home or other facility with appropriate resources: Identify barriers to discharge with patient and caregiver     Problem: Chronic Conditions and Co-morbidities  Goal: Patient's chronic conditions and co-morbidity symptoms are monitored and maintained or improved  3/21/2023 2153 by Bala Stroud RN  Outcome: Progressing  3/21/2023 1748 by Rashad Hung RN  Outcome: Progressing

## 2023-03-23 LAB
ANION GAP SERPL CALCULATED.3IONS-SCNC: 8 MMOL/L (ref 4–16)
BUN SERPL-MCNC: 30 MG/DL (ref 6–23)
CALCIUM SERPL-MCNC: 8.8 MG/DL (ref 8.3–10.6)
CHLORIDE BLD-SCNC: 101 MMOL/L (ref 99–110)
CO2: 28 MMOL/L (ref 21–32)
CREAT SERPL-MCNC: 0.8 MG/DL (ref 0.9–1.3)
GFR SERPL CREATININE-BSD FRML MDRD: >60 ML/MIN/1.73M2
GLUCOSE SERPL-MCNC: 89 MG/DL (ref 70–99)
HCT VFR BLD CALC: 52 % (ref 42–52)
HEMOGLOBIN: 16.4 GM/DL (ref 13.5–18)
LACTATE: 0.6 MMOL/L (ref 0.5–1.9)
MCH RBC QN AUTO: 28.5 PG (ref 27–31)
MCHC RBC AUTO-ENTMCNC: 31.5 % (ref 32–36)
MCV RBC AUTO: 90.4 FL (ref 78–100)
PDW BLD-RTO: 12.7 % (ref 11.7–14.9)
PLATELET # BLD: 154 K/CU MM (ref 140–440)
PMV BLD AUTO: 10 FL (ref 7.5–11.1)
POTASSIUM SERPL-SCNC: 4.2 MMOL/L (ref 3.5–5.1)
PROCALCITONIN SERPL-MCNC: 0.06 NG/ML
RBC # BLD: 5.75 M/CU MM (ref 4.6–6.2)
SODIUM BLD-SCNC: 137 MMOL/L (ref 135–145)
WBC # BLD: 10.2 K/CU MM (ref 4–10.5)

## 2023-03-23 PROCEDURE — 6370000000 HC RX 637 (ALT 250 FOR IP): Performed by: NURSE PRACTITIONER

## 2023-03-23 PROCEDURE — 94761 N-INVAS EAR/PLS OXIMETRY MLT: CPT

## 2023-03-23 PROCEDURE — 84145 PROCALCITONIN (PCT): CPT

## 2023-03-23 PROCEDURE — 85027 COMPLETE CBC AUTOMATED: CPT

## 2023-03-23 PROCEDURE — 99222 1ST HOSP IP/OBS MODERATE 55: CPT | Performed by: INTERNAL MEDICINE

## 2023-03-23 PROCEDURE — 94664 DEMO&/EVAL PT USE INHALER: CPT

## 2023-03-23 PROCEDURE — 2580000003 HC RX 258: Performed by: NURSE PRACTITIONER

## 2023-03-23 PROCEDURE — 83605 ASSAY OF LACTIC ACID: CPT

## 2023-03-23 PROCEDURE — 6360000002 HC RX W HCPCS: Performed by: NURSE PRACTITIONER

## 2023-03-23 PROCEDURE — 80048 BASIC METABOLIC PNL TOTAL CA: CPT

## 2023-03-23 PROCEDURE — 94640 AIRWAY INHALATION TREATMENT: CPT

## 2023-03-23 PROCEDURE — 2700000000 HC OXYGEN THERAPY PER DAY

## 2023-03-23 PROCEDURE — 1200000000 HC SEMI PRIVATE

## 2023-03-23 PROCEDURE — 36415 COLL VENOUS BLD VENIPUNCTURE: CPT

## 2023-03-23 RX ORDER — AZITHROMYCIN 250 MG/1
250 TABLET, FILM COATED ORAL DAILY
Status: COMPLETED | OUTPATIENT
Start: 2023-03-23 | End: 2023-03-27

## 2023-03-23 RX ORDER — METHYLPREDNISOLONE SODIUM SUCCINATE 40 MG/ML
40 INJECTION, POWDER, LYOPHILIZED, FOR SOLUTION INTRAMUSCULAR; INTRAVENOUS EVERY 12 HOURS
Status: DISCONTINUED | OUTPATIENT
Start: 2023-03-23 | End: 2023-03-24

## 2023-03-23 RX ADMIN — EMPAGLIFLOZIN 10 MG: 10 TABLET, FILM COATED ORAL at 10:38

## 2023-03-23 RX ADMIN — METHYLPREDNISOLONE SODIUM SUCCINATE 40 MG: 40 INJECTION, POWDER, FOR SOLUTION INTRAMUSCULAR; INTRAVENOUS at 10:38

## 2023-03-23 RX ADMIN — SODIUM CHLORIDE, PRESERVATIVE FREE 10 ML: 5 INJECTION INTRAVENOUS at 10:40

## 2023-03-23 RX ADMIN — CEFTRIAXONE SODIUM 1000 MG: 1 INJECTION, POWDER, FOR SOLUTION INTRAMUSCULAR; INTRAVENOUS at 10:47

## 2023-03-23 RX ADMIN — IPRATROPIUM BROMIDE AND ALBUTEROL SULFATE 1 AMPULE: 2.5; .5 SOLUTION RESPIRATORY (INHALATION) at 12:38

## 2023-03-23 RX ADMIN — Medication 400 MG: at 20:36

## 2023-03-23 RX ADMIN — SACUBITRIL AND VALSARTAN 1 TABLET: 24; 26 TABLET, FILM COATED ORAL at 20:37

## 2023-03-23 RX ADMIN — AZITHROMYCIN MONOHYDRATE 250 MG: 250 TABLET ORAL at 10:39

## 2023-03-23 RX ADMIN — ENOXAPARIN SODIUM 40 MG: 100 INJECTION SUBCUTANEOUS at 18:42

## 2023-03-23 RX ADMIN — METOPROLOL TARTRATE 50 MG: 50 TABLET, FILM COATED ORAL at 20:37

## 2023-03-23 RX ADMIN — FUROSEMIDE 40 MG: 40 TABLET ORAL at 10:39

## 2023-03-23 RX ADMIN — SODIUM CHLORIDE, PRESERVATIVE FREE 10 ML: 5 INJECTION INTRAVENOUS at 20:39

## 2023-03-23 RX ADMIN — METOPROLOL TARTRATE 50 MG: 50 TABLET, FILM COATED ORAL at 10:39

## 2023-03-23 RX ADMIN — FUROSEMIDE 40 MG: 40 TABLET ORAL at 20:36

## 2023-03-23 RX ADMIN — SACUBITRIL AND VALSARTAN 1 TABLET: 24; 26 TABLET, FILM COATED ORAL at 10:50

## 2023-03-23 RX ADMIN — ASPIRIN 81 MG: 81 TABLET, COATED ORAL at 10:39

## 2023-03-23 RX ADMIN — BUDESONIDE AND FORMOTEROL FUMARATE DIHYDRATE 2 PUFF: 160; 4.5 AEROSOL RESPIRATORY (INHALATION) at 09:05

## 2023-03-23 RX ADMIN — SPIRONOLACTONE 25 MG: 50 TABLET ORAL at 10:38

## 2023-03-23 RX ADMIN — IPRATROPIUM BROMIDE AND ALBUTEROL SULFATE 1 AMPULE: 2.5; .5 SOLUTION RESPIRATORY (INHALATION) at 09:06

## 2023-03-23 RX ADMIN — METHYLPREDNISOLONE SODIUM SUCCINATE 40 MG: 40 INJECTION, POWDER, FOR SOLUTION INTRAMUSCULAR; INTRAVENOUS at 20:36

## 2023-03-23 ASSESSMENT — PAIN SCALES - GENERAL: PAINLEVEL_OUTOF10: 0

## 2023-03-23 NOTE — FLOWSHEET NOTE
PT is independent in the room, did refuse the bed alarm. Gv teaching regarding PT safety. PT still kindly refused. Did sign the refusal form.

## 2023-03-24 LAB
BASE EXCESS MIXED: 2 (ref 0–1.2)
COMMENT: ABNORMAL
HCO3 VENOUS: 30.8 MMOL/L (ref 19–25)
O2 SAT, VEN: 75.4 % (ref 50–70)
PCO2, VEN: 67 MMHG (ref 38–52)
PH VENOUS: 7.27 (ref 7.32–7.42)
PO2, VEN: 44 MMHG (ref 28–48)

## 2023-03-24 PROCEDURE — 36415 COLL VENOUS BLD VENIPUNCTURE: CPT

## 2023-03-24 PROCEDURE — 94150 VITAL CAPACITY TEST: CPT

## 2023-03-24 PROCEDURE — 6370000000 HC RX 637 (ALT 250 FOR IP): Performed by: NURSE PRACTITIONER

## 2023-03-24 PROCEDURE — 1200000000 HC SEMI PRIVATE

## 2023-03-24 PROCEDURE — 2580000003 HC RX 258: Performed by: NURSE PRACTITIONER

## 2023-03-24 PROCEDURE — 94761 N-INVAS EAR/PLS OXIMETRY MLT: CPT

## 2023-03-24 PROCEDURE — 94640 AIRWAY INHALATION TREATMENT: CPT

## 2023-03-24 PROCEDURE — 2700000000 HC OXYGEN THERAPY PER DAY

## 2023-03-24 PROCEDURE — 6370000000 HC RX 637 (ALT 250 FOR IP): Performed by: STUDENT IN AN ORGANIZED HEALTH CARE EDUCATION/TRAINING PROGRAM

## 2023-03-24 PROCEDURE — 82805 BLOOD GASES W/O2 SATURATION: CPT

## 2023-03-24 PROCEDURE — 99232 SBSQ HOSP IP/OBS MODERATE 35: CPT | Performed by: INTERNAL MEDICINE

## 2023-03-24 PROCEDURE — 6360000002 HC RX W HCPCS: Performed by: NURSE PRACTITIONER

## 2023-03-24 PROCEDURE — 6360000002 HC RX W HCPCS: Performed by: STUDENT IN AN ORGANIZED HEALTH CARE EDUCATION/TRAINING PROGRAM

## 2023-03-24 RX ORDER — FLUTICASONE PROPIONATE 50 MCG
1 SPRAY, SUSPENSION (ML) NASAL DAILY
Status: DISCONTINUED | OUTPATIENT
Start: 2023-03-24 | End: 2023-03-27 | Stop reason: HOSPADM

## 2023-03-24 RX ORDER — METHYLPREDNISOLONE SODIUM SUCCINATE 40 MG/ML
40 INJECTION, POWDER, LYOPHILIZED, FOR SOLUTION INTRAMUSCULAR; INTRAVENOUS DAILY
Status: DISCONTINUED | OUTPATIENT
Start: 2023-03-25 | End: 2023-03-24

## 2023-03-24 RX ORDER — METHYLPREDNISOLONE SODIUM SUCCINATE 40 MG/ML
40 INJECTION, POWDER, LYOPHILIZED, FOR SOLUTION INTRAMUSCULAR; INTRAVENOUS EVERY 12 HOURS
Status: DISCONTINUED | OUTPATIENT
Start: 2023-03-24 | End: 2023-03-25

## 2023-03-24 RX ADMIN — METOPROLOL TARTRATE 50 MG: 50 TABLET, FILM COATED ORAL at 08:30

## 2023-03-24 RX ADMIN — CEFTRIAXONE SODIUM 1000 MG: 1 INJECTION, POWDER, FOR SOLUTION INTRAMUSCULAR; INTRAVENOUS at 09:08

## 2023-03-24 RX ADMIN — SPIRONOLACTONE 25 MG: 50 TABLET ORAL at 08:30

## 2023-03-24 RX ADMIN — ENOXAPARIN SODIUM 40 MG: 100 INJECTION SUBCUTANEOUS at 17:18

## 2023-03-24 RX ADMIN — METHYLPREDNISOLONE SODIUM SUCCINATE 40 MG: 40 INJECTION, POWDER, FOR SOLUTION INTRAMUSCULAR; INTRAVENOUS at 21:10

## 2023-03-24 RX ADMIN — BUDESONIDE AND FORMOTEROL FUMARATE DIHYDRATE 2 PUFF: 160; 4.5 AEROSOL RESPIRATORY (INHALATION) at 21:30

## 2023-03-24 RX ADMIN — SODIUM CHLORIDE, PRESERVATIVE FREE 10 ML: 5 INJECTION INTRAVENOUS at 08:29

## 2023-03-24 RX ADMIN — METOPROLOL TARTRATE 50 MG: 50 TABLET, FILM COATED ORAL at 21:10

## 2023-03-24 RX ADMIN — TIOTROPIUM BROMIDE INHALATION SPRAY 2 PUFF: 3.12 SPRAY, METERED RESPIRATORY (INHALATION) at 07:48

## 2023-03-24 RX ADMIN — ASPIRIN 81 MG: 81 TABLET, COATED ORAL at 08:30

## 2023-03-24 RX ADMIN — IPRATROPIUM BROMIDE AND ALBUTEROL SULFATE 1 AMPULE: 2.5; .5 SOLUTION RESPIRATORY (INHALATION) at 07:48

## 2023-03-24 RX ADMIN — SACUBITRIL AND VALSARTAN 1 TABLET: 24; 26 TABLET, FILM COATED ORAL at 21:09

## 2023-03-24 RX ADMIN — FLUTICASONE PROPIONATE 1 SPRAY: 50 SPRAY, METERED NASAL at 12:10

## 2023-03-24 RX ADMIN — SACUBITRIL AND VALSARTAN 1 TABLET: 24; 26 TABLET, FILM COATED ORAL at 08:30

## 2023-03-24 RX ADMIN — FUROSEMIDE 40 MG: 40 TABLET ORAL at 08:30

## 2023-03-24 RX ADMIN — SODIUM CHLORIDE, PRESERVATIVE FREE 10 ML: 5 INJECTION INTRAVENOUS at 21:10

## 2023-03-24 RX ADMIN — METHYLPREDNISOLONE SODIUM SUCCINATE 40 MG: 40 INJECTION, POWDER, FOR SOLUTION INTRAMUSCULAR; INTRAVENOUS at 08:30

## 2023-03-24 RX ADMIN — AZITHROMYCIN MONOHYDRATE 250 MG: 250 TABLET ORAL at 08:30

## 2023-03-24 RX ADMIN — Medication 400 MG: at 21:09

## 2023-03-24 RX ADMIN — FUROSEMIDE 40 MG: 40 TABLET ORAL at 21:09

## 2023-03-24 RX ADMIN — EMPAGLIFLOZIN 10 MG: 10 TABLET, FILM COATED ORAL at 08:30

## 2023-03-24 RX ADMIN — IPRATROPIUM BROMIDE AND ALBUTEROL SULFATE 1 AMPULE: 2.5; .5 SOLUTION RESPIRATORY (INHALATION) at 21:29

## 2023-03-24 NOTE — PLAN OF CARE
Problem: Discharge Planning  Goal: Discharge to home or other facility with appropriate resources  Outcome: Adequate for Discharge     Problem: Chronic Conditions and Co-morbidities  Goal: Patient's chronic conditions and co-morbidity symptoms are monitored and maintained or improved  Outcome: Adequate for Discharge     Problem: Pain  Goal: Verbalizes/displays adequate comfort level or baseline comfort level  Outcome: Adequate for Discharge     Problem: Respiratory - Adult  Goal: Achieves optimal ventilation and oxygenation  Outcome: Adequate for Discharge

## 2023-03-25 ENCOUNTER — APPOINTMENT (OUTPATIENT)
Dept: GENERAL RADIOLOGY | Age: 63
DRG: 193 | End: 2023-03-25
Payer: COMMERCIAL

## 2023-03-25 LAB
ALBUMIN SERPL-MCNC: 3.6 GM/DL (ref 3.4–5)
ALP BLD-CCNC: 79 IU/L (ref 40–128)
ALT SERPL-CCNC: 19 U/L (ref 10–40)
ANION GAP SERPL CALCULATED.3IONS-SCNC: 10 MMOL/L (ref 4–16)
AST SERPL-CCNC: 25 IU/L (ref 15–37)
BASOPHILS ABSOLUTE: 0 K/CU MM
BASOPHILS RELATIVE PERCENT: 0.1 % (ref 0–1)
BILIRUB SERPL-MCNC: 0.5 MG/DL (ref 0–1)
BUN SERPL-MCNC: 39 MG/DL (ref 6–23)
CALCIUM SERPL-MCNC: 9 MG/DL (ref 8.3–10.6)
CHLORIDE BLD-SCNC: 103 MMOL/L (ref 99–110)
CO2: 26 MMOL/L (ref 21–32)
CREAT SERPL-MCNC: 0.8 MG/DL (ref 0.9–1.3)
DIFFERENTIAL TYPE: ABNORMAL
EOSINOPHILS ABSOLUTE: 0 K/CU MM
EOSINOPHILS RELATIVE PERCENT: 0 % (ref 0–3)
GFR SERPL CREATININE-BSD FRML MDRD: >60 ML/MIN/1.73M2
GLUCOSE SERPL-MCNC: 126 MG/DL (ref 70–99)
HCT VFR BLD CALC: 56.4 % (ref 42–52)
HEMOGLOBIN: 17.4 GM/DL (ref 13.5–18)
IMMATURE NEUTROPHIL %: 0.4 % (ref 0–0.43)
LYMPHOCYTES ABSOLUTE: 1.5 K/CU MM
LYMPHOCYTES RELATIVE PERCENT: 17 % (ref 24–44)
MCH RBC QN AUTO: 29 PG (ref 27–31)
MCHC RBC AUTO-ENTMCNC: 30.9 % (ref 32–36)
MCV RBC AUTO: 93.8 FL (ref 78–100)
MONOCYTES ABSOLUTE: 0.3 K/CU MM
MONOCYTES RELATIVE PERCENT: 3.8 % (ref 0–4)
NUCLEATED RBC %: 0 %
PDW BLD-RTO: 12.8 % (ref 11.7–14.9)
PLATELET # BLD: 173 K/CU MM (ref 140–440)
PMV BLD AUTO: 11.3 FL (ref 7.5–11.1)
POTASSIUM SERPL-SCNC: 4.6 MMOL/L (ref 3.5–5.1)
PRO-BNP: 1591 PG/ML
RBC # BLD: 6.01 M/CU MM (ref 4.6–6.2)
SEGMENTED NEUTROPHILS ABSOLUTE COUNT: 7 K/CU MM
SEGMENTED NEUTROPHILS RELATIVE PERCENT: 78.7 % (ref 36–66)
SODIUM BLD-SCNC: 139 MMOL/L (ref 135–145)
TOTAL IMMATURE NEUTOROPHIL: 0.04 K/CU MM
TOTAL NUCLEATED RBC: 0 K/CU MM
TOTAL PROTEIN: 6.3 GM/DL (ref 6.4–8.2)
WBC # BLD: 8.9 K/CU MM (ref 4–10.5)

## 2023-03-25 PROCEDURE — 6370000000 HC RX 637 (ALT 250 FOR IP): Performed by: NURSE PRACTITIONER

## 2023-03-25 PROCEDURE — 6360000002 HC RX W HCPCS: Performed by: STUDENT IN AN ORGANIZED HEALTH CARE EDUCATION/TRAINING PROGRAM

## 2023-03-25 PROCEDURE — 1200000000 HC SEMI PRIVATE

## 2023-03-25 PROCEDURE — 2580000003 HC RX 258: Performed by: NURSE PRACTITIONER

## 2023-03-25 PROCEDURE — 94150 VITAL CAPACITY TEST: CPT

## 2023-03-25 PROCEDURE — 94761 N-INVAS EAR/PLS OXIMETRY MLT: CPT

## 2023-03-25 PROCEDURE — 94664 DEMO&/EVAL PT USE INHALER: CPT

## 2023-03-25 PROCEDURE — 2700000000 HC OXYGEN THERAPY PER DAY

## 2023-03-25 PROCEDURE — 71045 X-RAY EXAM CHEST 1 VIEW: CPT

## 2023-03-25 PROCEDURE — 94640 AIRWAY INHALATION TREATMENT: CPT

## 2023-03-25 PROCEDURE — 83880 ASSAY OF NATRIURETIC PEPTIDE: CPT

## 2023-03-25 PROCEDURE — 99232 SBSQ HOSP IP/OBS MODERATE 35: CPT | Performed by: INTERNAL MEDICINE

## 2023-03-25 PROCEDURE — 85025 COMPLETE CBC W/AUTO DIFF WBC: CPT

## 2023-03-25 PROCEDURE — 6360000002 HC RX W HCPCS: Performed by: NURSE PRACTITIONER

## 2023-03-25 PROCEDURE — 36415 COLL VENOUS BLD VENIPUNCTURE: CPT

## 2023-03-25 PROCEDURE — 80053 COMPREHEN METABOLIC PANEL: CPT

## 2023-03-25 RX ORDER — METHYLPREDNISOLONE SODIUM SUCCINATE 40 MG/ML
40 INJECTION, POWDER, LYOPHILIZED, FOR SOLUTION INTRAMUSCULAR; INTRAVENOUS DAILY
Status: DISCONTINUED | OUTPATIENT
Start: 2023-03-26 | End: 2023-03-27 | Stop reason: HOSPADM

## 2023-03-25 RX ADMIN — SACUBITRIL AND VALSARTAN 1 TABLET: 24; 26 TABLET, FILM COATED ORAL at 08:54

## 2023-03-25 RX ADMIN — SACUBITRIL AND VALSARTAN 1 TABLET: 24; 26 TABLET, FILM COATED ORAL at 21:03

## 2023-03-25 RX ADMIN — CEFTRIAXONE SODIUM 1000 MG: 1 INJECTION, POWDER, FOR SOLUTION INTRAMUSCULAR; INTRAVENOUS at 11:37

## 2023-03-25 RX ADMIN — FUROSEMIDE 40 MG: 40 TABLET ORAL at 08:54

## 2023-03-25 RX ADMIN — IPRATROPIUM BROMIDE AND ALBUTEROL SULFATE 1 AMPULE: 2.5; .5 SOLUTION RESPIRATORY (INHALATION) at 11:31

## 2023-03-25 RX ADMIN — BUDESONIDE AND FORMOTEROL FUMARATE DIHYDRATE 2 PUFF: 160; 4.5 AEROSOL RESPIRATORY (INHALATION) at 20:05

## 2023-03-25 RX ADMIN — METOPROLOL TARTRATE 50 MG: 50 TABLET, FILM COATED ORAL at 21:04

## 2023-03-25 RX ADMIN — METOPROLOL TARTRATE 50 MG: 50 TABLET, FILM COATED ORAL at 08:54

## 2023-03-25 RX ADMIN — SPIRONOLACTONE 25 MG: 50 TABLET ORAL at 08:54

## 2023-03-25 RX ADMIN — SODIUM CHLORIDE, PRESERVATIVE FREE 10 ML: 5 INJECTION INTRAVENOUS at 21:05

## 2023-03-25 RX ADMIN — BUDESONIDE AND FORMOTEROL FUMARATE DIHYDRATE 2 PUFF: 160; 4.5 AEROSOL RESPIRATORY (INHALATION) at 07:43

## 2023-03-25 RX ADMIN — EMPAGLIFLOZIN 10 MG: 10 TABLET, FILM COATED ORAL at 08:54

## 2023-03-25 RX ADMIN — METHYLPREDNISOLONE SODIUM SUCCINATE 40 MG: 40 INJECTION, POWDER, FOR SOLUTION INTRAMUSCULAR; INTRAVENOUS at 08:54

## 2023-03-25 RX ADMIN — FUROSEMIDE 40 MG: 40 TABLET ORAL at 21:03

## 2023-03-25 RX ADMIN — AZITHROMYCIN MONOHYDRATE 250 MG: 250 TABLET ORAL at 08:54

## 2023-03-25 RX ADMIN — FLUTICASONE PROPIONATE 1 SPRAY: 50 SPRAY, METERED NASAL at 09:37

## 2023-03-25 RX ADMIN — SODIUM CHLORIDE, PRESERVATIVE FREE 10 ML: 5 INJECTION INTRAVENOUS at 08:55

## 2023-03-25 RX ADMIN — IPRATROPIUM BROMIDE AND ALBUTEROL SULFATE 1 AMPULE: 2.5; .5 SOLUTION RESPIRATORY (INHALATION) at 07:44

## 2023-03-25 RX ADMIN — Medication 400 MG: at 21:03

## 2023-03-25 RX ADMIN — ASPIRIN 81 MG: 81 TABLET, COATED ORAL at 08:54

## 2023-03-25 RX ADMIN — ENOXAPARIN SODIUM 40 MG: 100 INJECTION SUBCUTANEOUS at 18:10

## 2023-03-25 RX ADMIN — IPRATROPIUM BROMIDE AND ALBUTEROL SULFATE 1 AMPULE: 2.5; .5 SOLUTION RESPIRATORY (INHALATION) at 20:04

## 2023-03-25 RX ADMIN — IPRATROPIUM BROMIDE AND ALBUTEROL SULFATE 1 AMPULE: 2.5; .5 SOLUTION RESPIRATORY (INHALATION) at 14:52

## 2023-03-25 ASSESSMENT — PAIN SCALES - GENERAL
PAINLEVEL_OUTOF10: 0
PAINLEVEL_OUTOF10: 0

## 2023-03-25 NOTE — PLAN OF CARE
Problem: Discharge Planning  Goal: Discharge to home or other facility with appropriate resources  Outcome: Progressing     Problem: Chronic Conditions and Co-morbidities  Goal: Patient's chronic conditions and co-morbidity symptoms are monitored and maintained or improved  Outcome: Progressing     Problem: Pain  Goal: Verbalizes/displays adequate comfort level or baseline comfort level  Outcome: Progressing     Problem: Respiratory - Adult  Goal: Achieves optimal ventilation and oxygenation  Outcome: Progressing

## 2023-03-26 ENCOUNTER — APPOINTMENT (OUTPATIENT)
Dept: CT IMAGING | Age: 63
DRG: 193 | End: 2023-03-26
Payer: COMMERCIAL

## 2023-03-26 LAB
ALBUMIN SERPL-MCNC: 3.8 GM/DL (ref 3.4–5)
ALP BLD-CCNC: 74 IU/L (ref 40–128)
ALT SERPL-CCNC: 18 U/L (ref 10–40)
ANION GAP SERPL CALCULATED.3IONS-SCNC: 9 MMOL/L (ref 4–16)
AST SERPL-CCNC: 20 IU/L (ref 15–37)
BASE EXCESS MIXED: 3.8 (ref 0–1.2)
BASOPHILS ABSOLUTE: 0 K/CU MM
BASOPHILS RELATIVE PERCENT: 0.2 % (ref 0–1)
BILIRUB SERPL-MCNC: 0.7 MG/DL (ref 0–1)
BUN SERPL-MCNC: 38 MG/DL (ref 6–23)
CALCIUM SERPL-MCNC: 9 MG/DL (ref 8.3–10.6)
CARBON MONOXIDE, BLOOD: 2.3 % (ref 0–5)
CHLORIDE BLD-SCNC: 102 MMOL/L (ref 99–110)
CO2 CONTENT: 30.6 MMOL/L (ref 19–24)
CO2: 26 MMOL/L (ref 21–32)
COMMENT: ABNORMAL
CREAT SERPL-MCNC: 0.9 MG/DL (ref 0.9–1.3)
DIFFERENTIAL TYPE: ABNORMAL
EOSINOPHILS ABSOLUTE: 0 K/CU MM
EOSINOPHILS RELATIVE PERCENT: 0.1 % (ref 0–3)
GFR SERPL CREATININE-BSD FRML MDRD: >60 ML/MIN/1.73M2
GLUCOSE SERPL-MCNC: 84 MG/DL (ref 70–99)
HCO3 ARTERIAL: 29.2 MMOL/L (ref 18–23)
HCT VFR BLD CALC: 53.3 % (ref 42–52)
HEMOGLOBIN: 17.1 GM/DL (ref 13.5–18)
IMMATURE NEUTROPHIL %: 0.4 % (ref 0–0.43)
LYMPHOCYTES ABSOLUTE: 3.3 K/CU MM
LYMPHOCYTES RELATIVE PERCENT: 25.1 % (ref 24–44)
MCH RBC QN AUTO: 29 PG (ref 27–31)
MCHC RBC AUTO-ENTMCNC: 32.1 % (ref 32–36)
MCV RBC AUTO: 90.5 FL (ref 78–100)
METHEMOGLOBIN ARTERIAL: 1.5 %
MONOCYTES ABSOLUTE: 1 K/CU MM
MONOCYTES RELATIVE PERCENT: 7.4 % (ref 0–4)
NUCLEATED RBC %: 0 %
O2 SATURATION: 86 % (ref 96–97)
PCO2 ARTERIAL: 46 MMHG (ref 32–45)
PDW BLD-RTO: 12.8 % (ref 11.7–14.9)
PH BLOOD: 7.41 (ref 7.34–7.45)
PLATELET # BLD: 213 K/CU MM (ref 140–440)
PMV BLD AUTO: 10 FL (ref 7.5–11.1)
PO2 ARTERIAL: 50 MMHG (ref 75–100)
POTASSIUM SERPL-SCNC: 4.3 MMOL/L (ref 3.5–5.1)
PRO-BNP: 1977 PG/ML
RBC # BLD: 5.89 M/CU MM (ref 4.6–6.2)
SEGMENTED NEUTROPHILS ABSOLUTE COUNT: 8.9 K/CU MM
SEGMENTED NEUTROPHILS RELATIVE PERCENT: 66.8 % (ref 36–66)
SODIUM BLD-SCNC: 137 MMOL/L (ref 135–145)
TOTAL IMMATURE NEUTOROPHIL: 0.05 K/CU MM
TOTAL NUCLEATED RBC: 0 K/CU MM
TOTAL PROTEIN: 6.2 GM/DL (ref 6.4–8.2)
WBC # BLD: 13.3 K/CU MM (ref 4–10.5)

## 2023-03-26 PROCEDURE — 85025 COMPLETE CBC W/AUTO DIFF WBC: CPT

## 2023-03-26 PROCEDURE — 94640 AIRWAY INHALATION TREATMENT: CPT

## 2023-03-26 PROCEDURE — 80053 COMPREHEN METABOLIC PANEL: CPT

## 2023-03-26 PROCEDURE — 36600 WITHDRAWAL OF ARTERIAL BLOOD: CPT

## 2023-03-26 PROCEDURE — 82803 BLOOD GASES ANY COMBINATION: CPT

## 2023-03-26 PROCEDURE — 94150 VITAL CAPACITY TEST: CPT

## 2023-03-26 PROCEDURE — 2580000003 HC RX 258: Performed by: NURSE PRACTITIONER

## 2023-03-26 PROCEDURE — 71250 CT THORAX DX C-: CPT

## 2023-03-26 PROCEDURE — 6370000000 HC RX 637 (ALT 250 FOR IP): Performed by: NURSE PRACTITIONER

## 2023-03-26 PROCEDURE — 2700000000 HC OXYGEN THERAPY PER DAY

## 2023-03-26 PROCEDURE — 36415 COLL VENOUS BLD VENIPUNCTURE: CPT

## 2023-03-26 PROCEDURE — 94761 N-INVAS EAR/PLS OXIMETRY MLT: CPT

## 2023-03-26 PROCEDURE — 6360000002 HC RX W HCPCS: Performed by: STUDENT IN AN ORGANIZED HEALTH CARE EDUCATION/TRAINING PROGRAM

## 2023-03-26 PROCEDURE — 6360000002 HC RX W HCPCS: Performed by: NURSE PRACTITIONER

## 2023-03-26 PROCEDURE — 1200000000 HC SEMI PRIVATE

## 2023-03-26 PROCEDURE — 83880 ASSAY OF NATRIURETIC PEPTIDE: CPT

## 2023-03-26 RX ADMIN — SACUBITRIL AND VALSARTAN 1 TABLET: 24; 26 TABLET, FILM COATED ORAL at 19:59

## 2023-03-26 RX ADMIN — IPRATROPIUM BROMIDE AND ALBUTEROL SULFATE 1 AMPULE: 2.5; .5 SOLUTION RESPIRATORY (INHALATION) at 20:28

## 2023-03-26 RX ADMIN — IPRATROPIUM BROMIDE AND ALBUTEROL SULFATE 1 AMPULE: 2.5; .5 SOLUTION RESPIRATORY (INHALATION) at 12:19

## 2023-03-26 RX ADMIN — FUROSEMIDE 40 MG: 40 TABLET ORAL at 09:26

## 2023-03-26 RX ADMIN — BUDESONIDE AND FORMOTEROL FUMARATE DIHYDRATE 2 PUFF: 160; 4.5 AEROSOL RESPIRATORY (INHALATION) at 20:29

## 2023-03-26 RX ADMIN — METOPROLOL TARTRATE 50 MG: 50 TABLET, FILM COATED ORAL at 09:26

## 2023-03-26 RX ADMIN — METHYLPREDNISOLONE SODIUM SUCCINATE 40 MG: 40 INJECTION, POWDER, FOR SOLUTION INTRAMUSCULAR; INTRAVENOUS at 09:24

## 2023-03-26 RX ADMIN — TIOTROPIUM BROMIDE INHALATION SPRAY 2 PUFF: 3.12 SPRAY, METERED RESPIRATORY (INHALATION) at 07:52

## 2023-03-26 RX ADMIN — EMPAGLIFLOZIN 10 MG: 10 TABLET, FILM COATED ORAL at 09:26

## 2023-03-26 RX ADMIN — CEFTRIAXONE SODIUM 1000 MG: 1 INJECTION, POWDER, FOR SOLUTION INTRAMUSCULAR; INTRAVENOUS at 11:05

## 2023-03-26 RX ADMIN — IPRATROPIUM BROMIDE AND ALBUTEROL SULFATE 1 AMPULE: 2.5; .5 SOLUTION RESPIRATORY (INHALATION) at 07:52

## 2023-03-26 RX ADMIN — METOPROLOL TARTRATE 50 MG: 50 TABLET, FILM COATED ORAL at 19:59

## 2023-03-26 RX ADMIN — ASPIRIN 81 MG: 81 TABLET, COATED ORAL at 09:26

## 2023-03-26 RX ADMIN — SODIUM CHLORIDE, PRESERVATIVE FREE 10 ML: 5 INJECTION INTRAVENOUS at 20:00

## 2023-03-26 RX ADMIN — ENOXAPARIN SODIUM 40 MG: 100 INJECTION SUBCUTANEOUS at 18:00

## 2023-03-26 RX ADMIN — BUDESONIDE AND FORMOTEROL FUMARATE DIHYDRATE 2 PUFF: 160; 4.5 AEROSOL RESPIRATORY (INHALATION) at 07:53

## 2023-03-26 RX ADMIN — SACUBITRIL AND VALSARTAN 1 TABLET: 24; 26 TABLET, FILM COATED ORAL at 09:26

## 2023-03-26 RX ADMIN — SPIRONOLACTONE 25 MG: 50 TABLET ORAL at 09:25

## 2023-03-26 RX ADMIN — FLUTICASONE PROPIONATE 1 SPRAY: 50 SPRAY, METERED NASAL at 09:29

## 2023-03-26 RX ADMIN — AZITHROMYCIN MONOHYDRATE 250 MG: 250 TABLET ORAL at 09:26

## 2023-03-26 RX ADMIN — SODIUM CHLORIDE, PRESERVATIVE FREE 10 ML: 5 INJECTION INTRAVENOUS at 11:06

## 2023-03-26 RX ADMIN — Medication 400 MG: at 19:59

## 2023-03-26 RX ADMIN — FUROSEMIDE 40 MG: 40 TABLET ORAL at 19:59

## 2023-03-26 ASSESSMENT — PAIN SCALES - GENERAL
PAINLEVEL_OUTOF10: 0
PAINLEVEL_OUTOF10: 0

## 2023-03-26 NOTE — PLAN OF CARE
Problem: Discharge Planning  Goal: Discharge to home or other facility with appropriate resources  3/26/2023 0812 by Karyn Ma RN  Outcome: Progressing  3/25/2023 2256 by Lilian Snider RN  Outcome: Progressing     Problem: Chronic Conditions and Co-morbidities  Goal: Patient's chronic conditions and co-morbidity symptoms are monitored and maintained or improved  3/26/2023 0812 by Karyn Ma RN  Outcome: Progressing  3/25/2023 2256 by Lilian Snider RN  Outcome: Progressing     Problem: Pain  Goal: Verbalizes/displays adequate comfort level or baseline comfort level  3/26/2023 0812 by Karyn Ma RN  Outcome: Progressing  3/25/2023 2256 by Lilian Snider RN  Outcome: Progressing     Problem: Respiratory - Adult  Goal: Achieves optimal ventilation and oxygenation  3/26/2023 0812 by Karyn Ma RN  Outcome: Progressing  3/25/2023 2256 by Lilian Snider RN  Outcome: Progressing

## 2023-03-27 VITALS
RESPIRATION RATE: 19 BRPM | HEIGHT: 71 IN | DIASTOLIC BLOOD PRESSURE: 47 MMHG | HEART RATE: 85 BPM | TEMPERATURE: 98 F | WEIGHT: 209.44 LBS | OXYGEN SATURATION: 92 % | BODY MASS INDEX: 29.32 KG/M2 | SYSTOLIC BLOOD PRESSURE: 146 MMHG

## 2023-03-27 LAB
ALBUMIN SERPL-MCNC: 3.6 GM/DL (ref 3.4–5)
ALP BLD-CCNC: 66 IU/L (ref 40–128)
ALT SERPL-CCNC: 17 U/L (ref 10–40)
ANION GAP SERPL CALCULATED.3IONS-SCNC: 6 MMOL/L (ref 4–16)
AST SERPL-CCNC: 17 IU/L (ref 15–37)
BASOPHILS ABSOLUTE: 0 K/CU MM
BASOPHILS RELATIVE PERCENT: 0.1 % (ref 0–1)
BILIRUB SERPL-MCNC: 0.6 MG/DL (ref 0–1)
BUN SERPL-MCNC: 35 MG/DL (ref 6–23)
CALCIUM SERPL-MCNC: 8.9 MG/DL (ref 8.3–10.6)
CHLORIDE BLD-SCNC: 105 MMOL/L (ref 99–110)
CO2: 29 MMOL/L (ref 21–32)
CREAT SERPL-MCNC: 0.8 MG/DL (ref 0.9–1.3)
DIFFERENTIAL TYPE: ABNORMAL
EOSINOPHILS ABSOLUTE: 0 K/CU MM
EOSINOPHILS RELATIVE PERCENT: 0.2 % (ref 0–3)
GFR SERPL CREATININE-BSD FRML MDRD: >60 ML/MIN/1.73M2
GLUCOSE SERPL-MCNC: 93 MG/DL (ref 70–99)
HCT VFR BLD CALC: 53.1 % (ref 42–52)
HEMOGLOBIN: 16.6 GM/DL (ref 13.5–18)
IMMATURE NEUTROPHIL %: 0.4 % (ref 0–0.43)
LYMPHOCYTES ABSOLUTE: 3.1 K/CU MM
LYMPHOCYTES RELATIVE PERCENT: 33.8 % (ref 24–44)
MCH RBC QN AUTO: 28.4 PG (ref 27–31)
MCHC RBC AUTO-ENTMCNC: 31.3 % (ref 32–36)
MCV RBC AUTO: 90.9 FL (ref 78–100)
MONOCYTES ABSOLUTE: 0.8 K/CU MM
MONOCYTES RELATIVE PERCENT: 9.2 % (ref 0–4)
NUCLEATED RBC %: 0 %
PDW BLD-RTO: 12.8 % (ref 11.7–14.9)
PLATELET # BLD: 218 K/CU MM (ref 140–440)
PMV BLD AUTO: 10.1 FL (ref 7.5–11.1)
POTASSIUM SERPL-SCNC: 4.5 MMOL/L (ref 3.5–5.1)
PRO-BNP: 1491 PG/ML
RBC # BLD: 5.84 M/CU MM (ref 4.6–6.2)
SEGMENTED NEUTROPHILS ABSOLUTE COUNT: 5.1 K/CU MM
SEGMENTED NEUTROPHILS RELATIVE PERCENT: 56.3 % (ref 36–66)
SODIUM BLD-SCNC: 140 MMOL/L (ref 135–145)
TOTAL IMMATURE NEUTOROPHIL: 0.04 K/CU MM
TOTAL NUCLEATED RBC: 0 K/CU MM
TOTAL PROTEIN: 5.8 GM/DL (ref 6.4–8.2)
WBC # BLD: 9 K/CU MM (ref 4–10.5)

## 2023-03-27 PROCEDURE — 6370000000 HC RX 637 (ALT 250 FOR IP): Performed by: NURSE PRACTITIONER

## 2023-03-27 PROCEDURE — 83880 ASSAY OF NATRIURETIC PEPTIDE: CPT

## 2023-03-27 PROCEDURE — 6360000002 HC RX W HCPCS: Performed by: STUDENT IN AN ORGANIZED HEALTH CARE EDUCATION/TRAINING PROGRAM

## 2023-03-27 PROCEDURE — 85025 COMPLETE CBC W/AUTO DIFF WBC: CPT

## 2023-03-27 PROCEDURE — 94761 N-INVAS EAR/PLS OXIMETRY MLT: CPT

## 2023-03-27 PROCEDURE — 99232 SBSQ HOSP IP/OBS MODERATE 35: CPT | Performed by: INTERNAL MEDICINE

## 2023-03-27 PROCEDURE — 2700000000 HC OXYGEN THERAPY PER DAY

## 2023-03-27 PROCEDURE — 80053 COMPREHEN METABOLIC PANEL: CPT

## 2023-03-27 PROCEDURE — 36415 COLL VENOUS BLD VENIPUNCTURE: CPT

## 2023-03-27 PROCEDURE — 94150 VITAL CAPACITY TEST: CPT

## 2023-03-27 PROCEDURE — 2580000003 HC RX 258: Performed by: NURSE PRACTITIONER

## 2023-03-27 PROCEDURE — 6360000002 HC RX W HCPCS: Performed by: NURSE PRACTITIONER

## 2023-03-27 PROCEDURE — 94618 PULMONARY STRESS TESTING: CPT

## 2023-03-27 PROCEDURE — 94640 AIRWAY INHALATION TREATMENT: CPT

## 2023-03-27 RX ORDER — IPRATROPIUM BROMIDE AND ALBUTEROL SULFATE 2.5; .5 MG/3ML; MG/3ML
1 SOLUTION RESPIRATORY (INHALATION) 4 TIMES DAILY
Status: DISCONTINUED | OUTPATIENT
Start: 2023-03-27 | End: 2023-03-27 | Stop reason: HOSPADM

## 2023-03-27 RX ORDER — BUDESONIDE AND FORMOTEROL FUMARATE DIHYDRATE 160; 4.5 UG/1; UG/1
2 AEROSOL RESPIRATORY (INHALATION) 2 TIMES DAILY
Qty: 10.2 G | Refills: 3 | Status: SHIPPED | OUTPATIENT
Start: 2023-03-27

## 2023-03-27 RX ORDER — ALBUTEROL SULFATE 90 UG/1
2 AEROSOL, METERED RESPIRATORY (INHALATION) EVERY 4 HOURS PRN
Status: DISCONTINUED | OUTPATIENT
Start: 2023-03-27 | End: 2023-03-27 | Stop reason: HOSPADM

## 2023-03-27 RX ORDER — PREDNISONE 20 MG/1
20 TABLET ORAL DAILY
Qty: 5 TABLET | Refills: 0 | Status: SHIPPED | OUTPATIENT
Start: 2023-03-27 | End: 2023-04-01

## 2023-03-27 RX ORDER — IPRATROPIUM BROMIDE AND ALBUTEROL SULFATE 2.5; .5 MG/3ML; MG/3ML
1 SOLUTION RESPIRATORY (INHALATION) 2 TIMES DAILY
Status: DISCONTINUED | OUTPATIENT
Start: 2023-03-27 | End: 2023-03-27

## 2023-03-27 RX ORDER — SENNA PLUS 8.6 MG/1
1 TABLET ORAL DAILY PRN
Qty: 30 TABLET | Refills: 1 | Status: SHIPPED | OUTPATIENT
Start: 2023-03-27 | End: 2023-04-26

## 2023-03-27 RX ADMIN — SACUBITRIL AND VALSARTAN 1 TABLET: 24; 26 TABLET, FILM COATED ORAL at 09:22

## 2023-03-27 RX ADMIN — EMPAGLIFLOZIN 10 MG: 10 TABLET, FILM COATED ORAL at 09:22

## 2023-03-27 RX ADMIN — FUROSEMIDE 40 MG: 40 TABLET ORAL at 09:22

## 2023-03-27 RX ADMIN — IPRATROPIUM BROMIDE AND ALBUTEROL SULFATE 1 AMPULE: 2.5; .5 SOLUTION RESPIRATORY (INHALATION) at 07:39

## 2023-03-27 RX ADMIN — IPRATROPIUM BROMIDE AND ALBUTEROL SULFATE 1 AMPULE: 2.5; .5 SOLUTION RESPIRATORY (INHALATION) at 10:58

## 2023-03-27 RX ADMIN — FLUTICASONE PROPIONATE 1 SPRAY: 50 SPRAY, METERED NASAL at 09:23

## 2023-03-27 RX ADMIN — METHYLPREDNISOLONE SODIUM SUCCINATE 40 MG: 40 INJECTION, POWDER, FOR SOLUTION INTRAMUSCULAR; INTRAVENOUS at 09:23

## 2023-03-27 RX ADMIN — SPIRONOLACTONE 25 MG: 50 TABLET ORAL at 09:22

## 2023-03-27 RX ADMIN — SODIUM CHLORIDE, PRESERVATIVE FREE 10 ML: 5 INJECTION INTRAVENOUS at 09:23

## 2023-03-27 RX ADMIN — CEFTRIAXONE SODIUM 1000 MG: 1 INJECTION, POWDER, FOR SOLUTION INTRAMUSCULAR; INTRAVENOUS at 09:24

## 2023-03-27 RX ADMIN — AZITHROMYCIN MONOHYDRATE 250 MG: 250 TABLET ORAL at 09:23

## 2023-03-27 RX ADMIN — BUDESONIDE AND FORMOTEROL FUMARATE DIHYDRATE 2 PUFF: 160; 4.5 AEROSOL RESPIRATORY (INHALATION) at 07:41

## 2023-03-27 RX ADMIN — ASPIRIN 81 MG: 81 TABLET, COATED ORAL at 09:22

## 2023-03-27 RX ADMIN — METOPROLOL TARTRATE 50 MG: 50 TABLET, FILM COATED ORAL at 09:22

## 2023-03-27 ASSESSMENT — PAIN SCALES - GENERAL: PAINLEVEL_OUTOF10: 0

## 2023-03-27 NOTE — CONSULTS
heart is enlarged Moderately dilated left atrium. Grade III diastolic dysfunction Left ventricular function is severely abnormal , EF is estimated at 20-25% Global hypokinesis noted. Left ventricle size is normal.    Hydrocele 10/2011    U/S    Hyperlipemia     Irregular heart rhythm     after induction for surgery 9/5/2013 - pt developed irreg rhythm and surgery cancelled\"had another echo and stress test and everything checked out ok, they do not know the reason it happened\"- cardiac clearance obtained for surgery 9/19/2013    Low left ventricular ejection fraction 12/16/2021    EF is estimated at 20-25%    Mitral valve prolapse 12/16/2021    anterior leaflet of the mitral valve is prolapsed    Palpitations     Pneumonia 2004    not since    Pulmonary HTN ([address] 12/16/2021    Severe Pulmonary hypertension noted with RVSP of 69mmHg    Right inguinal hernia 08/22/2013    Severe aortic regurgitation by prior echocardiogram 12/16/2021    Severe aortic regurgitation; PHT: 194msec    SOB (shortness of breath) 03/27/2013    Nuclear Stress Normal, EF 49% (Dr. Sushant Whitaker- 4/2/2013)     SOB (shortness of breath) on exertion     Tricuspid regurgitation 12/16/2021    moderate to severe tricuspid regurgitation       Past Surgical History:    Past Surgical History:   Procedure Laterality Date    CARDIAC CATHETERIZATION  01/01/1991    COLONOSCOPY  06/15/2015    COLONOSCOPY N/A 9/7/2022    COLONOSCOPY POLYPECTOMY SNARE/COLD BIOPSY performed by Sue Mendez MD at [facility] Bilateral 01/01/1992    INGUINAL HERNIA REPAIR Right 09/19/2013    with mesh    OTHER SURGICAL HISTORY  09/05/2013    hernia repair aborted, blood pressure spiked.     TYMPANOSTOMY TUBE PLACEMENT  01/01/2009    right ear    VASECTOMY  01/01/1988       Current Medications:   Current Facility-Administered Medications   Medication Dose Route Frequency Provider Last Rate Last Admin    ipratropium-albuterol (DUONEB) nebulizer solution 1
polyuria, polydipsia and polyphagia  MUSCULOSKELETAL:  negative for  pain, joint swelling, decreased range of motion and muscle weakness  NEUROLOGICAL:  negative for headaches, slurred speech, unilateral weakness  PSYCHIATRIC/BEHAVIORAL: negative for hallucinations, behavioral problems, confusion and agitation. Objective:   PHYSICAL EXAM:      VITALS:  BP (!) 138/46   Pulse 66   Temp 98.5 °F (36.9 °C) (Oral)   Resp 18   Ht 5' 11\" (1.803 m)   Wt 217 lb (98.4 kg)   SpO2 (!) 88%   BMI 30.27 kg/m²   24HR INTAKE/OUTPUT:    Intake/Output Summary (Last 24 hours) at 3/23/2023 1245  Last data filed at 3/23/2023 0207  Gross per 24 hour   Intake --   Output 1850 ml   Net -1850 ml     CURRENT PULSE OXIMETRY:  SpO2: (!) 88 %  24HR PULSE OXIMETRY RANGE:  SpO2  Av.1 %  Min: 88 %  Max: 91 %    CONSTITUTIONAL:  awake, alert, cooperative, no apparent distress, and appears stated age  NECK:  Supple, symmetrical, trachea midline, no adenopathy, thyroid symmetric, not enlarged and no tenderness, skin normal  LUNGS:  Occasional exp wheeze  CARDIOVASCULAR:  normal S1 and S2, no edema and no JVD  ABDOMEN:  normal bowel sounds, non-distended and no masses palpated, and no tenderness to palpation. No hepatospleenomegaly  LYMPHADENOPATHY:  no axillary or supraclavicular adenopathy. No cervical adnenopathy  PSYCHIATRIC: Oriented to person place and time. No obvious depression or anxiety. MUSCULOSKELETAL: No obvious misalignment or effusion of the joints. No clubbing, cyanosis of the digits. SKIN:  normal skin color, texture, turgor and no redness, warmth, or swelling.  No palpable nodules    DATA:    Old records have been reviewed  CBC with Differential:    Lab Results   Component Value Date/Time    WBC 10.2 2023 06:05 AM    RBC 5.75 2023 06:05 AM    HGB 16.4 2023 06:05 AM    HCT 52.0 2023 06:05 AM     2023 06:05 AM    MCV 90.4 2023 06:05 AM    MCH 28.5 2023 06:05 AM    [facility]

## 2023-03-27 NOTE — CARE COORDINATION
CM in to see Pt to follow up on discharge planning. Plan remains home with his wife. Pt denies any needs at this time.      CM following
CM in to see Pt to follow up on discharge planning. Plan remains to return home with his wife. Pt denies any needs at this time.   CM following
MCG criteria for COPD reviewed. At this time, criteria supports observation.
RETURNING to current housing: pulmonology consult  Potential Assistance needed at discharge: N/A            Potential DME:  tbd  Patient expects to discharge to: [address] [place] [address] for transportation at discharge:  tbd    Financial    Payor: RAUL / Plan: [address] [place] [address] / Product Type: *No Product type* /     Does insurance require precert for SNF: Yes    Potential assistance Purchasing Medications: No  Meds-to-Beds request: Yes      [id number] [address], [address] [facility]. - P [phone] - F [phone]  [address]  Phone: [phone] Fax: [phone]    [facility], [address] [place] [address] [phone] - f [phone]  [address]  Phone: [phone] Fax: [address], [address] [facility] [phone]  [address] [address]  Phone: [phone] Fax: [phone]      Notes:    Factors facilitating achievement of predicted outcomes: Family support    Barriers to discharge: pulmonology consult     Additional Case Management Notes: chart reviewed, screened for discharge planning. No needs identified at this time.   CM following     The Plan for Transition of Care is related to the following treatment goals of COPD exacerbation ([address] [J44.1]  Acute on chronic respiratory failure with hypoxia ([address] [J96.21]    Brady Lan [facility]  Case Management Department  Ph: [phone]

## 2023-03-27 NOTE — DISCHARGE SUMMARY
V2.0  Discharge Summary    Name:  Leland Betancur /Age/Sex: 1960 (83 y.o. male)   Admit Date: 3/21/2023  Discharge Date: 3/27/23    MRN & CSN:  [id number] & [id number] Encounter Date and Time 3/27/23 3:22 PM EDT    Attending:  Pa Yoder, * Discharging Provider: Pa Yoder MD       Hospital Course:     Brief HPI: Leland Betancur is a 60 y.o. male with last known well about 1 week prior. He began having issues maintaining his oxygen level with progressing symptoms this morning. Patient stated he has had worsening of shortness of Breath (SOB since Friday. Progressively gotten worse), Dizziness, and Cough. He reports low oxygen levels at home. Patient was examined at bedside alongside his wife. Patient is good historian. Brief Problem Based Course:     Acute on Chronic Respiratory Failure with hypoxia 2/2 acute COPD exacerbation/viral infection   Presented with SOB  Lactic acidosis on admission- resolved               Viral panel positive for human metapneumovirus  CXR-Bibasilar opacities likely atelectasis vs. Infection; mild central vascular congestion   Repeat chest xray showed worsening of atelectasis. CT abdomen was done which showed Worsening of diaphragmatic hernia - which might have caused the increased in oxygen demand. Discussed with Surgery no plan for urgent intervention. Advised to follow up as OP  Completed Rocefina and Azithro x 5 Days              s/p IV solumedrol . Will change to prednisone on DC       Pulm on board. Currently on 4 L. Unable to wean below 4L. Discussed with Dr Norman Villalta. Looks like this will be his base line. Ok from [place] to [place] and follow up as OP.  Patient remain asymptomatic                Essential Hypertension  Hx Pulmonary HTN, Murmur, Valvular heart disease, combined   Continue ASA 81mg, amlodipine     Chronic Systolic and Diastolic CHF-EF is estimated at 50-55% (22)              Does not appear acutely decompensated  Continue Telemetry

## 2023-03-27 NOTE — PLAN OF CARE
Problem: Discharge Planning  Goal: Discharge to home or other facility with appropriate resources  3/27/2023 1557 by Kary Augustine RN  Outcome: Adequate for Discharge  3/27/2023 1557 by Kary Augustine RN  Outcome: Adequate for Discharge  3/27/2023 1228 by Kary Augustine RN  Outcome: Progressing

## 2023-03-27 NOTE — RT PROTOCOL NOTE
hours PRN for wheezing or increased work of breathing using Per Protocol order mode. 4-6 - enter or revise RT Bronchodilator order(s) to two equivalent RT bronchodilator orders with one order with BID Frequency and one order with Frequency of every 4 hours PRN wheezing or increased work of breathing using Per Protocol order mode. 7-10 - enter or revise RT Bronchodilator order(s) to two equivalent RT bronchodilator orders with one order with TID Frequency and one order with Frequency of every 4 hours PRN wheezing or increased work of breathing using Per Protocol order mode. 11-13 - enter or revise RT Bronchodilator order(s) to one equivalent RT bronchodilator order with QID Frequency and an Albuterol order with Frequency of every 4 hours PRN wheezing or increased work of breathing using Per Protocol order mode. Greater than 13 - enter or revise RT Bronchodilator order(s) to one equivalent RT bronchodilator order with every 4 hours Frequency and an Albuterol order with Frequency of every 2 hours PRN wheezing or increased work of breathing using Per Protocol order mode. RT to enter RT Home Evaluation for COPD & MDI Assessment order using Per Protocol order mode.     Electronically signed by Chloé Solomon RCP on 3/27/2023 at 12:33 PM

## 2023-03-27 NOTE — PROGRESS NOTES
03/25/23 0807   Oxygen Therapy/Pulse Ox   O2 Device (S)  High flow nasal cannula   O2 Flow Rate (L/min) (S)  11 L/min   Heart Rate 87   Resp 18   SpO2 (!) 85 %   Replaced nasal cannula with a HFNC and increased to 11LPM.  Patient states he saturates normall between 85-90
03/25/23 1132   Oxygen Therapy/Pulse Ox   O2 Flow Rate (L/min) (S)  4 L/min   Heart Rate 55   Resp 15   SpO2 91 %   Decreased O2 to 4L.  Patient wears 2L cont at home
Given report to Chance on [address] [facility] [address] transport on unit to take patient upstairs
Patient tolerating oxygen at 3L, maintaining saturations between 88-90%. Will continue to assess and monitor. Originally titrated from 6L to 4L by physician, patient tolerated titration well. Titrated back to 4L at 1208 as patient saturations where in mid 80s and did not rise with deep breathing/coughing/incentive spirometry. Patient struggling to keep oxygen saturation at 88% or above on 4L, oxygen titrated back up to 6 liters at this time.
Patient was seen in hospital for COPD, CHF, Pulmonary HTN. I am prescribing oxygen because the diagnosis and testing requires the patient to have oxygen in the home. Conditions will improve or be benefited by oxygen use. The patient is able to perform good mobility and therefore requires the use of a portable oxygen system for ambulation.
Physician Progress Note      PATIENT:               Alfredo Mandel  CSN #:                  [id number]  :                       1960  ADMIT DATE:       3/21/2023 9:24 AM  DISCH DATE:  RESPONDING  PROVIDER #:        Rogelio De Leon MD          QUERY TEXT:    Pt admitted with acute respiratory failure. Noted documentation of   Metapneumovirus pneumonia on 3/23 consult note by ordered pulmonology   consultant. If possible, please document in progress notes and discharge   summary:      The medical record reflects the following:  Risk Factors: COPD  Clinical Indicators: 3/23 consult note \"Metapneumovirus pneumonia\", CXR on   3/21 \"Perihilar and bibasilar opacities favor atelectasis versus developing   infection. \", procal 0.07 on 3/22  Treatment: O2 6L/NC, procal, CXR pulmonology consult. Thank you,  Andres Ozuna, BSN, RN, CDS  [phone]  Options provided:  -- Metapneumovirus pneumonia confirmed present on admission  -- Metapneumovirus pneumonia confirmed not present on admission  -- Metapneumovirus pneumonia ruled out  -- Other - I will add my own diagnosis  -- Disagree - Not applicable / Not valid  -- Disagree - Clinically unable to determine / Unknown  -- Refer to Clinical Documentation Reviewer    PROVIDER RESPONSE TEXT:    The diagnosis of metapneumovirus pneumonia was confirmed as present on   admission.     Query created by: Russel Freeman on 3/24/2023 3:16 PM      Electronically signed by:  Rogelio De Leon MD 3/25/2023 8:18 AM
Pt found on 4L NC with SPO2 at 86%. Pt o2 increased to 5L. SPO2 increased to 87%. Pt o2 increased to 6L. SPO2 increased to 88%.
Pt home O2 paperwork faxed to [facility]. Please do not discharge pt without oxygen. Simi Deras was notified that the pt has a discharge order and will bring an oxygen tank shortly.
Pt's SpO2 88% on 5 lpm Oxygen via HFNC while resting. Pt's SpO2 90% via HFNC on 6 lpm Oxygen while resting.
Pulmonary and Critical Care  Progress Note    Subjective: The patient has improved  Shortness of breath has improved  Chest pain none  Addressing respiratory complaints Patient is negative for  hemoptysis and cyanosis  CONSTITUTIONAL:  negative for fevers and chills      Past Medical History:     has a past medical history of Abnormal echocardiogram, Anesthesia complication, Ankle fracture, left, Aortic root dilation (HCC), Arm fracture, right, AVM (arteriovenous malformation), COPD (chronic obstructive pulmonary disease) ([address] Diastolic dysfunction, Epididymal cyst, H/O cardiac catheterization, H/O cardiac catheterization, H/O cardiovascular stress test, H/O chest pain, H/O echocardiogram, Hemorrhoids, HTN (hypertension), Hx of echocardiogram, Hydrocele, Hyperlipemia, Irregular heart rhythm, Low left ventricular ejection fraction, Mitral valve prolapse, Palpitations, Pneumonia, Pulmonary HTN ([address] Right inguinal hernia, Severe aortic regurgitation by prior echocardiogram, SOB (shortness of breath), SOB (shortness of breath) on exertion, and Tricuspid regurgitation. has a past surgical history that includes Cardiac catheterization (01/01/1991); Vasectomy (01/01/1988); Tympanostomy tube placement (01/01/2009); other surgical history (09/05/2013); Inguinal hernia repair (Bilateral, 01/01/1992); Inguinal hernia repair (Right, 09/19/2013); Colonoscopy (06/15/2015); and Colonoscopy (N/A, 9/7/2022). reports that he has never smoked. He has never used smokeless tobacco. He reports that he does not currently use alcohol. He reports that he does not use drugs. Family history:  family history includes COPD in his father; Cancer in his father and mother; Diabetes in his mother; Heart Disease in his maternal grandfather and maternal grandmother; High Blood Pressure in his father and mother; Migraines in his mother.     No Known Allergies  Social History:    Reviewed; no changes    Objective:   PHYSICAL EXAM:
Received call from lab stating that lactic acid is 2.4. Larry Delgado NP on unit and notified.
Received call from lab stating that lactic was 2.7. Hospitalist paged,.
05/10/2022     Priority: Medium    Pure hypercholesterolemia      Priority: Medium     Overview Note:     The 10-year ASCVD risk score (Pamela Morelos, et al., 2013) is: 19.2%    Values used to calculate the score:      Age: 61 years      Sex: Male      Is Non- : No      Diabetic: No      Tobacco smoker: No      Systolic Blood Pressure: 536 mmHg      Is BP treated: Yes      HDL Cholesterol: 32 mg/dL      Total Cholesterol: 223 mg/dL        Chronic neck pain 09/04/2012     Priority: Low    Seasonal and perennial allergic rhinitis 09/04/2012     Priority: Low    Combined systolic and diastolic congestive heart failure ([facility] 01/20/2022    VHD (valvular heart disease) 01/20/2022    Pulmonary HTN ([facility] 01/20/2022    Chronic respiratory failure with hypoxia ([facility] 01/01/2022    NSVT (nonsustained ventricular tachycardia) 10/15/2021    PVC (premature ventricular contraction) 04/21/2021    ASCVD (arteriosclerotic cardiovascular disease)     Hypoxia 01/15/2021    Nocturia 12/09/2020    Murmur, heart 12/09/2020    On home oxygen therapy 12/09/2020     Acute on  chronic hypoxic resp failure- improving  AECOPD  Metapneumovirus pneumonia  HTN  HLD  Chronic Systolic and Diastolic dysfunction  Obesity  THIERRY  Chronic elevation of  right  hemidiaphragm  Pulmonary HTN          Prednisone taper  PO abx  ICS  OOB  Inhalers  Keep sats > 92%  BIPAP qhs and prn  Await placement  C.w present management    Electronically signed by Nora De Paz MD on 3/27/2023 at 1:11 PM
Name:  __Elver Sarabia MD  NPI:  [id number]_    [] Patient Qualifies      [] Patient Does NOT qualify
The 10-year ASCVD risk score (Katie Davis, et al., 2013) is: 19.2%    Values used to calculate the score:      Age: 61 years      Sex: Male      Is Non- : No      Diabetic: No      Tobacco smoker: No      Systolic Blood Pressure: 219 mmHg      Is BP treated: Yes      HDL Cholesterol: 32 mg/dL      Total Cholesterol: 223 mg/dL        Chronic neck pain 09/04/2012     Priority: Low    Seasonal and perennial allergic rhinitis 09/04/2012     Priority: Low    Combined systolic and diastolic congestive heart failure ([address] 01/20/2022    VHD (valvular heart disease) 01/20/2022    Pulmonary HTN ([facility] 01/20/2022    Chronic respiratory failure with hypoxia ([facility] 01/01/2022    NSVT (nonsustained ventricular tachycardia) 10/15/2021    PVC (premature ventricular contraction) 04/21/2021    ASCVD (arteriosclerotic cardiovascular disease)     Hypoxia 01/15/2021    Nocturia 12/09/2020    Murmur, heart 12/09/2020    On home oxygen therapy 12/09/2020   Acute on  chronic hypoxic resp failure- improving  AECOPD  Metapneumovirus pneumonia  HTN  HLD  Chronic Systolic and Diastolic dysfunction  Obesity  THIERRY  Chronic elevation of  right  hemidiaphragm  Pulmonary HTN       Prednisone taper  Abx  F/u C&S  Inhalers  ICS  OOB  Keep sats > 92%  BIPAP qhs and prn  DVT and GI Prophylaxis  C/w present management    Electronically signed by Bradley Adkins MD on 3/24/2023 at 11:18 AM
of breath on exertion) 05/10/2022     Priority: Medium    Pure hypercholesterolemia      Priority: Medium     Overview Note:     The 10-year ASCVD risk score (Devin Lui et al., 2013) is: 19.2%    Values used to calculate the score:      Age: 61 years      Sex: Male      Is Non- : No      Diabetic: No      Tobacco smoker: No      Systolic Blood Pressure: 842 mmHg      Is BP treated: Yes      HDL Cholesterol: 32 mg/dL      Total Cholesterol: 223 mg/dL        Chronic neck pain 09/04/2012     Priority: Low    Seasonal and perennial allergic rhinitis 09/04/2012     Priority: Low    Combined systolic and diastolic congestive heart failure ([facility] 01/20/2022    VHD (valvular heart disease) 01/20/2022    Pulmonary HTN ([facility] 01/20/2022    Chronic respiratory failure with hypoxia ([facility] 01/01/2022    NSVT (nonsustained ventricular tachycardia) 10/15/2021    PVC (premature ventricular contraction) 04/21/2021    ASCVD (arteriosclerotic cardiovascular disease)     Hypoxia 01/15/2021    Nocturia 12/09/2020    Murmur, heart 12/09/2020    On home oxygen therapy 12/09/2020     Acute on  chronic hypoxic resp failure- improving  AECOPD  Metapneumovirus pneumonia  HTN  HLD  Chronic Systolic and Diastolic dysfunction  Obesity  THIERRY  Chronic elevation of  right  hemidiaphragm  Pulmonary HTN     Abx  F/u C&S  Inhalers  Prednisone taper  ICS  CPT  OOB  Keep sats > 92%  BIPAP qhs and prn  C.w present management    Electronically signed by Alejandro Hansen MD on 3/25/2023 at 12:14 PM
Infusions:    sodium chloride       PRN Meds: guaiFENesin, 200 mg, Q4H PRN  sodium chloride flush, 5-40 mL, PRN  sodium chloride, , PRN  ondansetron, 4 mg, Q8H PRN   Or  ondansetron, 4 mg, Q6H PRN  senna, 1 tablet, Daily PRN  acetaminophen, 650 mg, Q6H PRN   Or  acetaminophen, 650 mg, Q6H PRN        Labs      Recent Results (from the past 24 hour(s))   Blood Gas, Venous    Collection Time: 03/24/23  9:26 AM   Result Value Ref Range    pH, Otilio 7.27 (L) 7.32 - 7.42    pCO2, Otilio 67 (H) 38 - 52 mmHG    pO2, Otilio 44 28 - 48 mmHG    Base Exc, Mixed 2 (H) 0 - 1.2    HCO3, Venous 30.8 (H) 19 - 25 MMOL/L    O2 Sat, Otilio 75.4 (H) 50 - 70 %    Comment [facility] 0940         Imaging/Diagnostics Last 24 Hours   No results found.     Electronically signed by Gal Rodriguez MD on 3/24/2023 at 2:34 PM
puff Inhalation BID      Infusions:    sodium chloride       PRN Meds: sodium chloride flush, 5-40 mL, PRN  sodium chloride, , PRN  ondansetron, 4 mg, Q8H PRN   Or  ondansetron, 4 mg, Q6H PRN  senna, 1 tablet, Daily PRN  acetaminophen, 650 mg, Q6H PRN   Or  acetaminophen, 650 mg, Q6H PRN        LABS  CBC   Recent Labs     03/21/23  0935 03/22/23  0416   WBC 7.8 6.7   HGB 17.5 18.0   HCT 53.3* 55.4*    174      RENAL  Recent Labs     03/21/23  0935 03/22/23  0416    139   K 4.0 4.5   CL 95* 98*   CO2 26 24   BUN 20 26*   CREATININE 0.9 0.9     LFT'S  Recent Labs     03/21/23  0935 03/22/23  0416   AST 21 23   ALT 13 13   BILITOT 0.6 0.4   ALKPHOS 112 108       Radiology this visit:  Reviewed. XR CHEST PORTABLE    Result Date: 3/21/2023  EXAMINATION: ONE XRAY VIEW OF THE CHEST 3/21/2023 10:25 am COMPARISON: Radiographs 12/14/2021, CT chest 4/25/2022 HISTORY: ORDERING SYSTEM PROVIDED HISTORY: SOB TECHNOLOGIST PROVIDED HISTORY: Reason for exam:->SOB Reason for Exam: sob FINDINGS: The cardiomediastinal silhouette is mildly obscured. Persistent elevation of the right hemidiaphragm. Bilateral hilar fullness is unchanged. Mild central vascular congestion and central bronchial wall thickening persist. Diffuse interstitial opacities appear improved. Persistent streaky bibasilar opacities and consolidative changes are similar to prior. No discrete pleural effusion. No sizable pneumothorax. Osseous structures are grossly unchanged. Perihilar and bibasilar opacities favor atelectasis versus developing infection. Mild central vascular congestion.          Electronically signed by DONA Espinoza CNP on 3/22/2023 at 1:05 PM
sacubitril-valsartan  1 tablet Oral BID    spironolactone  25 mg Oral Daily    tiotropium  2 puff Inhalation Daily    And    budesonide-formoterol  2 puff Inhalation BID      Infusions:    sodium chloride       PRN Meds: guaiFENesin, 200 mg, Q4H PRN  sodium chloride flush, 5-40 mL, PRN  sodium chloride, , PRN  ondansetron, 4 mg, Q8H PRN   Or  ondansetron, 4 mg, Q6H PRN  senna, 1 tablet, Daily PRN  acetaminophen, 650 mg, Q6H PRN   Or  acetaminophen, 650 mg, Q6H PRN      LABS  CBC   Recent Labs     03/21/23  0935 03/22/23  0416 03/23/23  0605   WBC 7.8 6.7 10.2   HGB 17.5 18.0 16.4   HCT 53.3* 55.4* 52.0    174 154        RENAL  Recent Labs     03/21/23  0935 03/22/23  0416 03/23/23  0605    139 137   K 4.0 4.5 4.2   CL 95* 98* 101   CO2 26 24 28   BUN 20 26* 30*   CREATININE 0.9 0.9 0.8*       LFT'S  Recent Labs     03/21/23  0935 03/22/23  0416   AST 21 23   ALT 13 13   BILITOT 0.6 0.4   ALKPHOS 112 108         Radiology this visit:  Reviewed. XR CHEST PORTABLE    Result Date: 3/21/2023  EXAMINATION: ONE XRAY VIEW OF THE CHEST 3/21/2023 10:25 am COMPARISON: Radiographs 12/14/2021, CT chest 4/25/2022 HISTORY: ORDERING SYSTEM PROVIDED HISTORY: SOB TECHNOLOGIST PROVIDED HISTORY: Reason for exam:->SOB Reason for Exam: sob FINDINGS: The cardiomediastinal silhouette is mildly obscured. Persistent elevation of the right hemidiaphragm. Bilateral hilar fullness is unchanged. Mild central vascular congestion and central bronchial wall thickening persist. Diffuse interstitial opacities appear improved. Persistent streaky bibasilar opacities and consolidative changes are similar to prior. No discrete pleural effusion. No sizable pneumothorax. Osseous structures are grossly unchanged. Perihilar and bibasilar opacities favor atelectasis versus developing infection. Mild central vascular congestion.          Electronically signed by DONA Breaux - CNP on 3/23/2023 at 1:28 PM
tiotropium  2 puff Inhalation Daily    And    budesonide-formoterol  2 puff Inhalation BID      Infusions:    sodium chloride       PRN Meds: guaiFENesin, 200 mg, Q4H PRN  sodium chloride flush, 5-40 mL, PRN  sodium chloride, , PRN  ondansetron, 4 mg, Q8H PRN   Or  ondansetron, 4 mg, Q6H PRN  senna, 1 tablet, Daily PRN  acetaminophen, 650 mg, Q6H PRN   Or  acetaminophen, 650 mg, Q6H PRN      Labs      Recent Results (from the past 24 hour(s))   CBC with Auto Differential    Collection Time: 03/25/23  3:42 AM   Result Value Ref Range    WBC 8.9 4.0 - 10.5 K/CU MM    RBC 6.01 4.6 - 6.2 M/CU MM    Hemoglobin 17.4 13.5 - 18.0 GM/DL    Hematocrit 56.4 (H) 42 - 52 %    MCV 93.8 78 - 100 FL    MCH 29.0 27 - 31 PG    MCHC 30.9 (L) 32.0 - 36.0 %    RDW 12.8 11.7 - 14.9 %    Platelets 107 771 - 229 K/CU MM    MPV 11.3 (H) 7.5 - 11.1 FL    Differential Type AUTOMATED DIFFERENTIAL     Segs Relative 78.7 (H) 36 - 66 %    Lymphocytes % 17.0 (L) 24 - 44 %    Monocytes % 3.8 0 - 4 %    Eosinophils % 0.0 0 - 3 %    Basophils % 0.1 0 - 1 %    Segs Absolute 7.0 K/CU MM    Lymphocytes Absolute 1.5 K/CU MM    Monocytes Absolute 0.3 K/CU MM    Eosinophils Absolute 0.0 K/CU MM    Basophils Absolute 0.0 K/CU MM    Nucleated RBC % 0.0 %    Total Nucleated RBC 0.0 K/CU MM    Total Immature Neutrophil 0.04 K/CU MM    Immature Neutrophil % 0.4 0 - 0.43 %   Comprehensive Metabolic Panel    Collection Time: 03/25/23  3:42 AM   Result Value Ref Range    Sodium 139 135 - 145 MMOL/L    Potassium 4.6 3.5 - 5.1 MMOL/L    Chloride 103 99 - 110 mMol/L    CO2 26 21 - 32 MMOL/L    BUN 39 (H) 6 - 23 MG/DL    Creatinine 0.8 (L) 0.9 - 1.3 MG/DL    Est, Glom Filt Rate >60 >60 mL/min/1.73m2    Glucose 126 (H) 70 - 99 MG/DL    Calcium 9.0 8.3 - 10.6 MG/DL    Albumin 3.6 3.4 - 5.0 GM/DL    Total Protein 6.3 (L) 6.4 - 8.2 GM/DL    Total Bilirubin 0.5 0.0 - 1.0 MG/DL    ALT 19 10 - 40 U/L    AST 25 15 - 37 IU/L    Alkaline Phosphatase 79 40 - 128 IU/L    Anion
Gap 9 4 - 16   Brain Natriuretic Peptide    Collection Time: 03/26/23  5:55 AM   Result Value Ref Range    Pro-BNP 1,977 (H) <300 PG/ML        Imaging/Diagnostics Last 24 Hours   No results found.     Electronically signed by Gal Rodriguez MD on 3/26/2023 at 2:39 PM

## 2023-03-28 ENCOUNTER — CARE COORDINATION (OUTPATIENT)
Dept: CASE MANAGEMENT | Age: 63
End: 2023-03-28

## 2023-03-28 ENCOUNTER — OFFICE VISIT (OUTPATIENT)
Dept: FAMILY MEDICINE CLINIC | Age: 63
End: 2023-03-28

## 2023-03-28 ENCOUNTER — TELEPHONE (OUTPATIENT)
Dept: FAMILY MEDICINE CLINIC | Age: 63
End: 2023-03-28

## 2023-03-28 VITALS
SYSTOLIC BLOOD PRESSURE: 138 MMHG | HEIGHT: 71 IN | WEIGHT: 219.2 LBS | DIASTOLIC BLOOD PRESSURE: 80 MMHG | BODY MASS INDEX: 30.69 KG/M2

## 2023-03-28 DIAGNOSIS — I27.20 PULMONARY HTN (HCC): ICD-10-CM

## 2023-03-28 DIAGNOSIS — N40.1 BENIGN PROSTATIC HYPERPLASIA WITH NOCTURIA: ICD-10-CM

## 2023-03-28 DIAGNOSIS — J44.9 COPD, SEVERE (HCC): Primary | ICD-10-CM

## 2023-03-28 DIAGNOSIS — R35.1 BENIGN PROSTATIC HYPERPLASIA WITH NOCTURIA: ICD-10-CM

## 2023-03-28 DIAGNOSIS — J44.9 COPD, SEVERE (HCC): ICD-10-CM

## 2023-03-28 DIAGNOSIS — Z09 HOSPITAL DISCHARGE FOLLOW-UP: Primary | ICD-10-CM

## 2023-03-28 PROBLEM — I77.810 DILATED AORTIC ROOT (HCC): Status: ACTIVE | Noted: 2023-03-28

## 2023-03-28 PROBLEM — I20.89 OTHER FORMS OF ANGINA PECTORIS: Status: ACTIVE | Noted: 2023-03-28

## 2023-03-28 PROBLEM — I47.29 NSVT (NONSUSTAINED VENTRICULAR TACHYCARDIA) (HCC): Status: RESOLVED | Noted: 2021-10-15 | Resolved: 2023-03-28

## 2023-03-28 PROBLEM — I20.8 OTHER FORMS OF ANGINA PECTORIS (HCC): Status: ACTIVE | Noted: 2023-03-28

## 2023-03-28 PROCEDURE — 1111F DSCHRG MED/CURRENT MED MERGE: CPT | Performed by: PHYSICAL MEDICINE & REHABILITATION

## 2023-03-28 SDOH — ECONOMIC STABILITY: FOOD INSECURITY: WITHIN THE PAST 12 MONTHS, YOU WORRIED THAT YOUR FOOD WOULD RUN OUT BEFORE YOU GOT MONEY TO BUY MORE.: NEVER TRUE

## 2023-03-28 SDOH — ECONOMIC STABILITY: FOOD INSECURITY: WITHIN THE PAST 12 MONTHS, THE FOOD YOU BOUGHT JUST DIDN'T LAST AND YOU DIDN'T HAVE MONEY TO GET MORE.: NEVER TRUE

## 2023-03-28 SDOH — ECONOMIC STABILITY: INCOME INSECURITY: HOW HARD IS IT FOR YOU TO PAY FOR THE VERY BASICS LIKE FOOD, HOUSING, MEDICAL CARE, AND HEATING?: NOT HARD AT ALL

## 2023-03-28 SDOH — ECONOMIC STABILITY: HOUSING INSECURITY
IN THE LAST 12 MONTHS, WAS THERE A TIME WHEN YOU DID NOT HAVE A STEADY PLACE TO SLEEP OR SLEPT IN A SHELTER (INCLUDING NOW)?: NO

## 2023-03-28 ASSESSMENT — PATIENT HEALTH QUESTIONNAIRE - PHQ9
SUM OF ALL RESPONSES TO PHQ9 QUESTIONS 1 & 2: 0
SUM OF ALL RESPONSES TO PHQ QUESTIONS 1-9: 0
2. FEELING DOWN, DEPRESSED OR HOPELESS: 0
1. LITTLE INTEREST OR PLEASURE IN DOING THINGS: 0

## 2023-03-28 NOTE — TELEPHONE ENCOUNTER
Care Transitions Initial Follow Up Call    Outreach made within 2 business days of discharge: Yes    Patient: Katya Noel Patient : 1960   MRN: [id number]  Reason for Admission: There are no discharge diagnoses documented for the most recent discharge. Discharge Date: 3/27/23       Spoke with: Patient    Discharge department/facility: [facility]    TCM Interactive Patient Contact:  Was patient able to fill all prescriptions: Yes  Was patient instructed to bring all medications to the follow-up visit: Yes  Is patient taking all medications as directed in the discharge summary?  Yes  Does patient understand their discharge instructions: Yes  Does patient have questions or concerns that need addressed prior to 7-14 day follow up office visit: no    Scheduled appointment with PCP within 7-14 days    Follow Up  Future Appointments   Date Time Provider Courtney Clarke   3/28/2023  2:30 PM Meredith Coburn  [facility]   2023 10:00 AM Eddie Hernandez MD [facility]   2023  9:15 AM Amado Wilkinson  Tyrone Newman MA

## 2023-03-28 NOTE — CARE COORDINATION
your discharge instructions?: Yes  Do you have all of your prescriptions and are they filled?: Yes  Have you been contacted by a [address]?: No  Have you scheduled your follow up appointment?: Yes  Do you feel like you have everything you need to keep you well at home?: Yes  Care Transitions Interventions         Discussed follow-up appointments. If no appointment was previously scheduled, appointment scheduling offered: Yes. Is follow up appointment scheduled within 7 days of discharge? Yes. Follow Up  Future Appointments   Date Time Provider Courtney Clarke   3/28/2023  2:30 PM Edilberto Trujillo MD [facility]   3/30/2023  1:15 PM Claudie Councilman, MD [facility]   5/19/2023 10:00 AM Eddie Diaz MD [facility]   12/5/2023  9:15 AM Karmen Diaz MD [address] [place] [address] Transition Nurse provided contact information. Plan for follow-up call in 5-7 days based on severity of symptoms and risk factors. Plan for next call: symptom management-SOB, SpO2, Wheezing, Cough, Dizziness, any new or worsening symptoms  follow-up appointment-was pt able to get a one month f/u with pulmonology?     Israel Villasenor RN

## 2023-03-28 NOTE — PROGRESS NOTES
40 MG tablet Take 1 tablet by mouth 2 times daily 60 tablet 3    spironolactone (ALDACTONE) 25 MG tablet Take 1 tablet by mouth daily 30 tablet 5    sacubitril-valsartan (ENTRESTO) 24-26 MG per tablet Take 1 tablet by mouth 2 times daily 60 tablet 5    albuterol sulfate HFA (PROVENTIL;VENTOLIN;PROAIR) 108 (90 Base) MCG/ACT inhaler INHALE 2 PUFFS BY MOUTH EVERY 4 HOURS AS NEEDED FOR WHEEZE OR FOR SHORTNESS OF BREATH 1 each 11    empagliflozin (JARDIANCE) 10 MG tablet Take 1 tablet by mouth daily 30 tablet 5    loratadine (ALLERGY RELIEF) 10 MG tablet TAKE 1 TABLET BY MOUTH EVERY DAY (Patient taking differently: Take 1 tablet by mouth daily TAKE 1 TABLET BY MOUTH EVERY DAY) 30 tablet 11    ASPIRIN LOW DOSE 81 MG EC tablet TAKE 1 TABLET BY MOUTH EVERY DAY (Patient taking differently: Take 1 tablet by mouth daily) 30 tablet 11    Multiple Vitamin (DAILY VITAMINS PO) Take 1 tablet by mouth daily          Medications patient taking as of now reconciled against medications ordered at time of hospital discharge: Yes        Objective:    /80 (Site: Left Upper Arm, Position: Sitting)   Ht 5' 11\" (1.803 m)   Wt 219 lb 3.2 oz (99.4 kg)   BMI 30.57 kg/m²     Physical Exam   Nursing note reviewed  /80 (Site: Left Upper Arm, Position: Sitting)   Ht 5' 11\" (1.803 m)   Wt 219 lb 3.2 oz (99.4 kg)   BMI 30.57 kg/m²   BP Readings from Last 3 Encounters:   04/03/23 (!) 110/58   03/28/23 138/80   03/27/23 (!) 146/47     Body mass index is 30.57 kg/m². No results found for this visit on 03/28/23. Constitutional: He is oriented to person, place, and time. He appears well-developed but tired but in no distress. HENT: Ear exam normal bilaterally Head: Normocephalic and atraumatic. Nose: Nose normal.  Mouth/Throat: Oropharynx is clear and moist. Eyes: Conjunctivae, EOM and lids are normal. Pupils are equal, round, and reactive to light. Neck: Trachea normal. Neck supple.    Cardiovascular: Normal rate, regular rhythm,

## 2023-04-04 ENCOUNTER — CARE COORDINATION (OUTPATIENT)
Dept: CASE MANAGEMENT | Age: 63
End: 2023-04-04

## 2023-04-04 NOTE — CARE COORDINATION
PM Batsheva Van MD [address] [facility]   5/19/2023 10:00 AM Eddie Foreman MD [facility]   10/24/2023 11:00 AM Eze Kahn MD [facility]   12/5/2023  9:15 AM Batsheva Van  [facility] Transition Nurse reviewed red flags with patient and discussed any barriers to care and/or understanding of plan of care after discharge. Discussed appropriate site of care based on symptoms and resources available to patient including: PCP  Specialist. The patient agrees to contact the PCP office for questions related to their healthcare. Patients top risk factors for readmission: medical condition-CHF, COPD  Interventions to address risk factors: Scheduled appointment with Specialist-Urology appt on 4/10/23 and Pulmonology appt on 4/18/23    Offered patient enrollment in the Remote Patient Monitoring (RPM) program for in-home monitoring: NA.     Care Transitions Subsequent and Final Call    Subsequent and Final Calls  Do you have any ongoing symptoms?: Yes  Patient-reported symptoms: Other, Cough  Have your medications changed?: No  Do you have any questions related to your medications?: No  Do you currently have any active services?: No  Do you have any needs or concerns that I can assist you with?: No  Care Transitions Interventions  Other Interventions:             Care Transition Nurse provided contact information for future needs. Plan for follow-up call in 7-10 days based on severity of symptoms and risk factors. Plan for next call: symptom management-SOB, SpO2, Still using 5 L of oxygen?  Chest tightness, Productive cough, any new or worsening symptoms    Dianne Austin RN

## 2023-04-07 DIAGNOSIS — Z01.818 PRE-OPERATIVE CLEARANCE: Primary | ICD-10-CM

## 2023-04-14 PROBLEM — E11.9 TYPE 2 DIABETES MELLITUS (HCC): Status: ACTIVE | Noted: 2023-04-14

## 2023-04-18 ENCOUNTER — OFFICE VISIT (OUTPATIENT)
Dept: PULMONOLOGY | Age: 63
End: 2023-04-18
Payer: MEDICARE

## 2023-04-18 ENCOUNTER — TELEPHONE (OUTPATIENT)
Dept: PULMONOLOGY | Age: 63
End: 2023-04-18

## 2023-04-18 VITALS
HEIGHT: 71 IN | HEART RATE: 44 BPM | WEIGHT: 216 LBS | BODY MASS INDEX: 30.24 KG/M2 | RESPIRATION RATE: 16 BRPM | OXYGEN SATURATION: 87 %

## 2023-04-18 DIAGNOSIS — J44.9 COPD, SEVERE (HCC): ICD-10-CM

## 2023-04-18 DIAGNOSIS — G47.34 SLEEP RELATED HYPOXIA: ICD-10-CM

## 2023-04-18 DIAGNOSIS — R06.02 SOBOE (SHORTNESS OF BREATH ON EXERTION): ICD-10-CM

## 2023-04-18 DIAGNOSIS — E66.9 OBESITY (BMI 30-39.9): ICD-10-CM

## 2023-04-18 DIAGNOSIS — J96.11 CHRONIC RESPIRATORY FAILURE WITH HYPOXIA (HCC): ICD-10-CM

## 2023-04-18 DIAGNOSIS — I27.20 PULMONARY HTN (HCC): Primary | ICD-10-CM

## 2023-04-18 DIAGNOSIS — R09.02 HYPOXIA: ICD-10-CM

## 2023-04-18 PROCEDURE — G8417 CALC BMI ABV UP PARAM F/U: HCPCS | Performed by: INTERNAL MEDICINE

## 2023-04-18 PROCEDURE — G8427 DOCREV CUR MEDS BY ELIG CLIN: HCPCS | Performed by: INTERNAL MEDICINE

## 2023-04-18 PROCEDURE — 3023F SPIROM DOC REV: CPT | Performed by: INTERNAL MEDICINE

## 2023-04-18 PROCEDURE — 99214 OFFICE O/P EST MOD 30 MIN: CPT | Performed by: INTERNAL MEDICINE

## 2023-04-18 ASSESSMENT — ENCOUNTER SYMPTOMS
SHORTNESS OF BREATH: 0
EYE DISCHARGE: 0
ABDOMINAL DISTENTION: 0
EYE ITCHING: 0
COUGH: 0
BACK PAIN: 0
ABDOMINAL PAIN: 0

## 2023-04-18 NOTE — ASSESSMENT & PLAN NOTE
Advised pt of stable labs and that there will be no changes to their regimen at this time. Advised to call office w/ any further questions or concerns. Pt verbalized all understanding.    JOSE M Washburn   Sec to COPD.  Diastolic dysfunction and Valvulalr disorder  C./w Inhalers  PFT in Sept'23  ECHO in Sept'23

## 2023-04-18 NOTE — PROGRESS NOTES
Aylinbrad Aguirre  1960  Referring Provider: Bogdan García MD    Subjective:     Chief Complaint   Patient presents with    Follow-Up from Hospital    COPD       HPI  Alberto Zaragoza is a 60 y.o. male has come back as a follow up. He has secomd hand smokign exposure and he has Moderate to Severe COPD. He was seen in the hospital in [place] for the AECOPD sec to [address]. He is on 2 L.min of oxygen. He is on Trelegy and Albuterol prn. He has no cough, no phlegm, no hemoptysis, no loss of weight, good appetite, SOBOE none on oxygen. He had his flu vaccine and COVID vaccine. Current Outpatient Medications   Medication Sig Dispense Refill    Handicap Placard MISC by Does not apply route 1 each 0    empagliflozin (JARDIANCE) 10 MG tablet Take 1 tablet by mouth daily 30 tablet 5    fluticasone-umeclidin-vilant (TRELEGY ELLIPTA) 100-62.5-25 MCG/ACT AEPB inhaler TAKE 1 PUFF BY MOUTH EVERY DAY 1 each 5    budesonide-formoterol (SYMBICORT) 160-4.5 MCG/ACT AERO Inhale 2 puffs into the lungs in the morning and 2 puffs in the evening.  10.2 g 3    senna (SENOKOT) 8.6 MG tablet Take 1 tablet by mouth daily as needed (Constipation) 30 tablet 1    metoprolol tartrate (LOPRESSOR) 50 MG tablet TAKE 1 TABLET BY MOUTH TWICE A DAY (Patient taking differently: Take 1 tablet by mouth 2 times daily) 60 tablet 3    furosemide (LASIX) 40 MG tablet Take 1 tablet by mouth 2 times daily 60 tablet 3    spironolactone (ALDACTONE) 25 MG tablet Take 1 tablet by mouth daily 30 tablet 5    sacubitril-valsartan (ENTRESTO) 24-26 MG per tablet Take 1 tablet by mouth 2 times daily 60 tablet 5    albuterol sulfate HFA (PROVENTIL;VENTOLIN;PROAIR) 108 (90 Base) MCG/ACT inhaler INHALE 2 PUFFS BY MOUTH EVERY 4 HOURS AS NEEDED FOR WHEEZE OR FOR SHORTNESS OF BREATH 1 each 11    loratadine (ALLERGY RELIEF) 10 MG tablet TAKE 1 TABLET BY MOUTH EVERY DAY (Patient taking differently: Take 1 tablet by mouth daily TAKE 1 TABLET BY MOUTH EVERY

## 2023-04-19 ENCOUNTER — CARE COORDINATION (OUTPATIENT)
Dept: CASE MANAGEMENT | Age: 63
End: 2023-04-19

## 2023-04-19 NOTE — CARE COORDINATION
Care Transitions Outreach Attempt-Acute on chronic respiratory failure with hypoxia    Attempted to reach patient for transitions of care follow up. Unable to reach patient. Patient: Danielle Evans Patient : 1960 MRN: [id number]    Last Discharge  Street       Date Complaint Diagnosis Description Type Department Provider    3/21/23 Shortness of Breath; Dizziness; Cough Acute on chronic respiratory failure with hypoxia ([address] . .. ED to Hosp-Admission (Discharged) (ADMITTED) [facility] Liss Doshi MD; Evan Ridley. .. Noted following upcoming appointments from discharge chart review:   [facility] follow up appointment(s):   Future Appointments   Date Time Provider Courtney Clarke   2023 10:00 AM [address] Monet MD [facility]   2023  9:30 AM Armando Navarrete MD [facility]   10/24/2023 11:00 AM Rubi Szymanski MD [facility]   2023  9:15 AM Armando Navarrete MD [facility]     2nd attempt to contact patient for care transition follow up. Unable to reach patient. Left message with contact information and request for call back. Episode to be resolved and CTN to sign off if return call is not received from the patient.       Jesus Jones RN   Care Transition Nurse

## 2023-05-24 ENCOUNTER — TELEPHONE (OUTPATIENT)
Dept: CARDIOLOGY CLINIC | Age: 63
End: 2023-05-24

## 2023-05-24 ENCOUNTER — OFFICE VISIT (OUTPATIENT)
Dept: CARDIOLOGY CLINIC | Age: 63
End: 2023-05-24
Payer: COMMERCIAL

## 2023-05-24 VITALS
HEIGHT: 71 IN | WEIGHT: 226.8 LBS | BODY MASS INDEX: 31.75 KG/M2 | DIASTOLIC BLOOD PRESSURE: 70 MMHG | SYSTOLIC BLOOD PRESSURE: 118 MMHG | HEART RATE: 55 BPM

## 2023-05-24 DIAGNOSIS — I38 VHD (VALVULAR HEART DISEASE): Primary | ICD-10-CM

## 2023-05-24 DIAGNOSIS — I49.3 PVC (PREMATURE VENTRICULAR CONTRACTION): Primary | ICD-10-CM

## 2023-05-24 PROCEDURE — G8417 CALC BMI ABV UP PARAM F/U: HCPCS | Performed by: INTERNAL MEDICINE

## 2023-05-24 PROCEDURE — 93000 ELECTROCARDIOGRAM COMPLETE: CPT | Performed by: INTERNAL MEDICINE

## 2023-05-24 PROCEDURE — G8427 DOCREV CUR MEDS BY ELIG CLIN: HCPCS | Performed by: INTERNAL MEDICINE

## 2023-05-24 PROCEDURE — 3074F SYST BP LT 130 MM HG: CPT | Performed by: INTERNAL MEDICINE

## 2023-05-24 PROCEDURE — 3078F DIAST BP <80 MM HG: CPT | Performed by: INTERNAL MEDICINE

## 2023-05-24 PROCEDURE — 1036F TOBACCO NON-USER: CPT | Performed by: INTERNAL MEDICINE

## 2023-05-24 PROCEDURE — 3017F COLORECTAL CA SCREEN DOC REV: CPT | Performed by: INTERNAL MEDICINE

## 2023-05-24 PROCEDURE — 99214 OFFICE O/P EST MOD 30 MIN: CPT | Performed by: INTERNAL MEDICINE

## 2023-05-24 NOTE — PROGRESS NOTES
Matt Olmstead  is a  Established patient  ,60 y.o.   male here for evaluation of the following chief complaint(s):    Here for follow-up      SUBJECTIVE/OBJECTIVE:  HPI : h/o  Htn, hyperlipidimea, COPD now here for follow-up  Gets SOB  Has seen EP for PVCs  Vitals:    05/24/23 1432   BP: 118/70   Site: Left Upper Arm   Position: Sitting   Cuff Size: Medium Adult   Pulse: 55   Weight: 226 lb 12.8 oz (102.9 kg)   Height: 5' 11\" (1.803 m)     /70 (Site: Left Upper Arm, Position: Sitting, Cuff Size: Medium Adult)   Pulse 55   Ht 5' 11\" (1.803 m)   Wt 226 lb 12.8 oz (102.9 kg)   BMI 31.63 kg/m²   Patient-Reported Vitals 1/14/2021   Patient-Reported Weight 215   Patient-Reported Height 5'11   Patient-Reported Pulse 64   Patient-Reported SpO2 91     Wt Readings from Last 3 Encounters:   05/24/23 226 lb 12.8 oz (102.9 kg)   05/19/23 228 lb 3.2 oz (103.5 kg)   04/18/23 216 lb (98 kg)     Body mass index is 31.63 kg/m². Physical Exam     Neck: JVD      Lungs : clear    Cardio : Si and S2 audilble      Ext: edema      All pertinent data reviewed      Meds : reviewed         Tests ordered        ASSESSMENT/PLAN:    -  Hypertension: Patients blood pressure is normal. Patient is advised about low sodium diet. Present medical regimen will not be changed. On metoprolol compliant we will continue    - Low EF improved since the last echo    On GDMT including Aldactone, Entresto        -Valvular heart disease    CHERRI for AR    Summary   Left ventricular function is low normal, EF is estimated at 50-55%. Left ventricle is dilated. Moderate left ventricular hypertrophy. Grade I diastolic dysfunction. Moderately dilated left atrium. Mild mitral ,and tricuspid regurgitation is present. Moderate to severe aortic regurgitation; PHT: 291msec. RVSP is 29 mmHg. Dilation of the aortic root (4.8cm)and the ascending aorta(4.5cm). No evidence of pericardial effusion.    OV TO DISCUSS AR      Signature

## 2023-05-24 NOTE — PATIENT INSTRUCTIONS
**It is YOUR responsibilty to bring medication bottles and/or updated medication list to [address] [place] [address]. This will allow us to better serve you and all your healthcare needs**    Please be informed that if you contact our office outside of normal business hours the physician on call cannot help with any scheduling or rescheduling issues, procedure instruction questions or any type of medication issue. We advise you for any urgent/emergency that you go to the nearest emergency room! PLEASE CALL OUR OFFICE DURING NORMAL BUSINESS HOURS    Monday - Friday   8 am to 5 pm    [place]: [phone]    [place]:  [phone]    Thank you for allowing us to care for you today! We want to ensure we can follow your treatment plan and we strive to give you the best outcomes and experience possible. If you ever have a life threatening emergency and call 911 - for an ambulance (EMS)   Our providers can only care for you at:   [facility] or [facility]. Even if you have someone take you or you drive yourself we can only care for you in a [facility] facility. Our providers are not setup at the other healthcare locations!

## 2023-05-24 NOTE — TELEPHONE ENCOUNTER
I received authorization for CHERRI on 5/24/23 4:33 PM via Pat Infante  AUTH#: [id number]  VALID:  5/24-6/22/23

## 2023-05-25 ENCOUNTER — TELEPHONE (OUTPATIENT)
Dept: CARDIOLOGY CLINIC | Age: 63
End: 2023-05-25

## 2023-05-25 NOTE — TELEPHONE ENCOUNTER
[facility] PHYSICAL [facility]     Dr. Jeff Lucero     Transesophageal Echocardiogram    Patient Name: Марина Blair   SN   N#[id number]     Date of Procedure: 23 Time: 8am Arrival Time: 7am  (Arrival time is scheduled for one (1) hour before procedure is scheduled.)    Hospital: [facility])     X   If you have received orders for blood work and or a chest x-ray, please have them done on assigned date at [facility], [facility], or [facility]. X   Please do not have anything by mouth after midnight prior to or 8 hours before the procedure. X   You may take your medication with a sip of water unless advised otherwise below.      X If you are taking ALDACTONE (Spironalactone)& LASIX please do not take it the morning before

## 2023-05-25 NOTE — TELEPHONE ENCOUNTER
Pt notified and confirmed procedure on 6/6/23 @8am, arrival time of 7am. Instruction call scheduled for 6/1/23 @845am.

## 2023-06-01 ENCOUNTER — TELEPHONE (OUTPATIENT)
Dept: CARDIOLOGY CLINIC | Age: 63
End: 2023-06-01

## 2023-06-01 NOTE — TELEPHONE ENCOUNTER
Patient given instructions over telephone on CHERRI for Dx: VHD. Procedure is scheduled for 6/6/23 @ 8am, w/arrival @ 7am, @ [facility]. Patient advised to review instructions given. Patient was notified that procedure date or time could be changed due to an emergency. Patient voiced understanding.

## 2023-06-06 ENCOUNTER — HOSPITAL ENCOUNTER (OUTPATIENT)
Dept: NON INVASIVE DIAGNOSTICS | Age: 63
Discharge: HOME | End: 2023-06-06
Payer: MEDICARE

## 2023-06-06 VITALS
HEART RATE: 59 BPM | OXYGEN SATURATION: 92 % | DIASTOLIC BLOOD PRESSURE: 44 MMHG | RESPIRATION RATE: 15 BRPM | SYSTOLIC BLOOD PRESSURE: 117 MMHG

## 2023-06-06 LAB
LV EF: 58 %
LVEF MODALITY: NORMAL

## 2023-06-06 PROCEDURE — 93312 ECHO TRANSESOPHAGEAL: CPT

## 2023-06-06 PROCEDURE — 7100000000 HC PACU RECOVERY - FIRST 15 MIN

## 2023-06-06 PROCEDURE — 7100000001 HC PACU RECOVERY - ADDTL 15 MIN

## 2023-06-06 NOTE — PROGRESS NOTES
Mr Daryl Killian was called and notified that Dr Lisa Brantley wanted to follow up with him next Tuesday at the [place] [address].  Pt voiced understanding

## 2023-06-06 NOTE — BH NOTE
valve with severe eccentric aortic regurgitation, vena   contracta: 1.1cm. Mild mitral regurgitation. No evidence of pericardial effusion. Labs:    BMP:   Lab Results   Component Value Date/Time     03/27/2023 06:24 AM    K 4.5 03/27/2023 06:24 AM     03/27/2023 06:24 AM    CO2 29 03/27/2023 06:24 AM    PHOS 3.5 10/27/2021 11:11 AM    BUN 35 03/27/2023 06:24 AM    CREATININE 0.8 03/27/2023 06:24 AM      No results found for: GLU  Lab Results   Component Value Date/Time    MG 2.20 12/29/2021 09:18 AM      CBC:   Lab Results   Component Value Date/Time    WBC 9.0 03/27/2023 06:24 AM    HGB 16.6 03/27/2023 06:24 AM    HCT 53.1 03/27/2023 06:24 AM    MCV 90.9 03/27/2023 06:24 AM     03/27/2023 06:24 AM      Coagulation:   Lab Results   Component Value Date/Time    INR 1.01 10/27/2021 11:11 AM    APTT 29.4 10/27/2021 11:11 AM     Cardiac markers:   Lab Results   Component Value Date/Time    CKTOTAL 67 09/05/2013 06:11 PM    TROPONINI <0.006 09/06/2013 12:26 PM     No results found for: LABA1C No results found for: ALB    Lab Results   Component Value Date/Time    BILITOT 0.6 03/27/2023 06:24 AM    AST 17 03/27/2023 06:24 AM    ALT 17 03/27/2023 06:24 AM    ALKPHOS 66 03/27/2023 06:24 AM      Blood Gas:  Lab Results   Component Value Date/Time    PH 7.41 03/26/2023 02:45 PM    PQV3CUZ 46.0 03/26/2023 02:45 PM    PO2ART 50 03/26/2023 02:45 PM    BECMIX 3.8 03/26/2023 02:45 PM    MGR2JPT 29.2 03/26/2023 02:45 PM    CO2CT 30.6 03/26/2023 02:45 PM    O2SAT 86.0 03/26/2023 02:45 PM    LABCARB 2.3 03/26/2023 02:45 PM    METHGBART 1.5 03/26/2023 02:45 PM    COMMENT 5L 03/26/2023 02:45 PM     LIVER PROFILE:   No results for input(s): AST, ALT, LIPASE, BILIDIR, BILITOT, ALKPHOS in the last 72 hours. Invalid input(s): AMYLASE,  ALB  PT/INR:   No results for input(s): PROTIME, INR in the last 72 hours. APTT:   No results for input(s): APTT in the last 72 hours.   BNP:    No results for input(s):

## 2023-06-07 ENCOUNTER — TELEPHONE (OUTPATIENT)
Dept: CARDIOTHORACIC SURGERY | Age: 63
End: 2023-06-07

## 2023-06-08 ENCOUNTER — TELEPHONE (OUTPATIENT)
Dept: CARDIOLOGY CLINIC | Age: 63
End: 2023-06-08

## 2023-06-08 DIAGNOSIS — Z01.810 PRE-OPERATIVE CARDIOVASCULAR EXAMINATION: Primary | ICD-10-CM

## 2023-06-08 NOTE — TELEPHONE ENCOUNTER
Karl Gatica WITH POSSIBLE PERCUTANEOUS CORONARY INTERVENTION      Patient Name: Ceasar Franco   : 1960  MRN# [id number]    Date of Procedure: 23 Time: 8 AM Arrival Time: 6 AM    The catheterization and angiogram are usually outpatient procedures, however if stenting is needed you may need to stay overnight. You will need to arrive at the hospital two hours before the procedure. You will go to registration in the main lobby. You will need to arrange for someone to drive you home. HOSPITAL:  [facility])      X   If you have received orders for blood work please have         them done on assigned date at [facility],           [facility], or [facility].     X Please do not have anything by mouth after midnight prior to or 8 hours before   the procedure. X You may take your medications with a sip of water in the morning of your               procedure or take them with you to the hospital                    X If you are taking Lasix (furosemide)    OR ALDACTONE please do not take it the morning of your procedure. X If you take Viagra (Sildenafil) or Cialis (Tadalafil) you will need to hold it for 3 days before your procedure.

## 2023-06-08 NOTE — TELEPHONE ENCOUNTER
I received authorization for MAYA ALFONSO [facility] on 6/8/23 10:53 AM via Paloma Lopez  AUTH#: [id number]  VALID:  6/8-7/7/23

## 2023-06-13 ENCOUNTER — INITIAL CONSULT (OUTPATIENT)
Dept: CARDIOTHORACIC SURGERY | Age: 63
End: 2023-06-13
Payer: MEDICARE

## 2023-06-13 VITALS
BODY MASS INDEX: 32.51 KG/M2 | OXYGEN SATURATION: 92 % | SYSTOLIC BLOOD PRESSURE: 128 MMHG | HEART RATE: 60 BPM | DIASTOLIC BLOOD PRESSURE: 62 MMHG | HEIGHT: 71 IN | WEIGHT: 232.2 LBS

## 2023-06-13 DIAGNOSIS — Z01.818 PRE-OP EXAM: Primary | ICD-10-CM

## 2023-06-13 PROCEDURE — 1036F TOBACCO NON-USER: CPT | Performed by: THORACIC SURGERY (CARDIOTHORACIC VASCULAR SURGERY)

## 2023-06-13 PROCEDURE — 3074F SYST BP LT 130 MM HG: CPT | Performed by: THORACIC SURGERY (CARDIOTHORACIC VASCULAR SURGERY)

## 2023-06-13 PROCEDURE — 3078F DIAST BP <80 MM HG: CPT | Performed by: THORACIC SURGERY (CARDIOTHORACIC VASCULAR SURGERY)

## 2023-06-13 PROCEDURE — 3017F COLORECTAL CA SCREEN DOC REV: CPT | Performed by: THORACIC SURGERY (CARDIOTHORACIC VASCULAR SURGERY)

## 2023-06-13 PROCEDURE — G8417 CALC BMI ABV UP PARAM F/U: HCPCS | Performed by: THORACIC SURGERY (CARDIOTHORACIC VASCULAR SURGERY)

## 2023-06-13 PROCEDURE — 99205 OFFICE O/P NEW HI 60 MIN: CPT | Performed by: THORACIC SURGERY (CARDIOTHORACIC VASCULAR SURGERY)

## 2023-06-13 PROCEDURE — G8427 DOCREV CUR MEDS BY ELIG CLIN: HCPCS | Performed by: THORACIC SURGERY (CARDIOTHORACIC VASCULAR SURGERY)

## 2023-06-14 RX ORDER — SODIUM CHLORIDE 0.9 % (FLUSH) 0.9 %
5-40 SYRINGE (ML) INJECTION EVERY 12 HOURS SCHEDULED
OUTPATIENT
Start: 2023-06-14

## 2023-06-14 RX ORDER — CHLORHEXIDINE GLUCONATE 4 G/100ML
SOLUTION TOPICAL ONCE
OUTPATIENT
Start: 2023-06-14 | End: 2023-06-14

## 2023-06-14 RX ORDER — SODIUM CHLORIDE 0.9 % (FLUSH) 0.9 %
5-40 SYRINGE (ML) INJECTION PRN
OUTPATIENT
Start: 2023-06-14

## 2023-06-14 RX ORDER — CHLORHEXIDINE GLUCONATE 0.12 MG/ML
15 RINSE ORAL ONCE
OUTPATIENT
Start: 2023-06-14 | End: 2023-06-14

## 2023-06-14 RX ORDER — SODIUM CHLORIDE 9 MG/ML
INJECTION, SOLUTION INTRAVENOUS PRN
OUTPATIENT
Start: 2023-06-14

## 2023-06-16 ENCOUNTER — PREP FOR PROCEDURE (OUTPATIENT)
Dept: CARDIOTHORACIC SURGERY | Age: 63
End: 2023-06-16

## 2023-06-19 DIAGNOSIS — J44.9 COPD, SEVERE (HCC): ICD-10-CM

## 2023-06-19 DIAGNOSIS — I27.20 PULMONARY HTN (HCC): ICD-10-CM

## 2023-06-19 DIAGNOSIS — J96.11 CHRONIC RESPIRATORY FAILURE WITH HYPOXIA (HCC): ICD-10-CM

## 2023-06-19 NOTE — PROGRESS NOTES
6/19/23 - Reminded of pre-testing on 6/21/23 @0800. Bring insurance card, picture ID and a list of your home medications. Check in at the information desk in the lobby upon your arrival. You can eat and take morning medications. Patient verbalized understanding.

## 2023-06-20 ENCOUNTER — HOSPITAL ENCOUNTER (OUTPATIENT)
Dept: CARDIAC CATH/INVASIVE PROCEDURES | Age: 63
Discharge: HOME | End: 2023-06-20
Attending: INTERNAL MEDICINE | Admitting: INTERNAL MEDICINE
Payer: COMMERCIAL

## 2023-06-20 VITALS
WEIGHT: 232 LBS | BODY MASS INDEX: 32.48 KG/M2 | HEART RATE: 55 BPM | OXYGEN SATURATION: 93 % | RESPIRATION RATE: 18 BRPM | HEIGHT: 71 IN | SYSTOLIC BLOOD PRESSURE: 148 MMHG | DIASTOLIC BLOOD PRESSURE: 42 MMHG | TEMPERATURE: 96.2 F

## 2023-06-20 LAB — GLUCOSE BLD-MCNC: 112 MG/DL (ref 70–99)

## 2023-06-20 PROCEDURE — 2500000003 HC RX 250 WO HCPCS

## 2023-06-20 PROCEDURE — 6360000004 HC RX CONTRAST MEDICATION

## 2023-06-20 PROCEDURE — 2580000003 HC RX 258

## 2023-06-20 PROCEDURE — C1751 CATH, INF, PER/CENT/MIDLINE: HCPCS

## 2023-06-20 PROCEDURE — 82962 GLUCOSE BLOOD TEST: CPT

## 2023-06-20 PROCEDURE — 2709999900 HC NON-CHARGEABLE SUPPLY

## 2023-06-20 PROCEDURE — 6360000002 HC RX W HCPCS

## 2023-06-20 PROCEDURE — 93460 R&L HRT ART/VENTRICLE ANGIO: CPT | Performed by: INTERNAL MEDICINE

## 2023-06-20 PROCEDURE — C1894 INTRO/SHEATH, NON-LASER: HCPCS

## 2023-06-20 PROCEDURE — 6370000000 HC RX 637 (ALT 250 FOR IP): Performed by: INTERNAL MEDICINE

## 2023-06-20 PROCEDURE — 93567 NJX CAR CTH SPRVLV AORTGRPHY: CPT | Performed by: INTERNAL MEDICINE

## 2023-06-20 PROCEDURE — 93453 R&L HRT CATH W/VENTRICLGRPHY: CPT

## 2023-06-20 PROCEDURE — C1769 GUIDE WIRE: HCPCS

## 2023-06-20 RX ORDER — DIPHENHYDRAMINE HCL 25 MG
25 TABLET ORAL ONCE
Status: COMPLETED | OUTPATIENT
Start: 2023-06-20 | End: 2023-06-20

## 2023-06-20 RX ORDER — DIAZEPAM 5 MG/1
5 TABLET ORAL ONCE
Status: COMPLETED | OUTPATIENT
Start: 2023-06-20 | End: 2023-06-20

## 2023-06-20 RX ORDER — ACETAMINOPHEN 325 MG/1
650 TABLET ORAL EVERY 4 HOURS PRN
Status: DISCONTINUED | OUTPATIENT
Start: 2023-06-20 | End: 2023-06-20 | Stop reason: HOSPADM

## 2023-06-20 RX ORDER — SODIUM CHLORIDE 0.9 % (FLUSH) 0.9 %
5-40 SYRINGE (ML) INJECTION PRN
Status: DISCONTINUED | OUTPATIENT
Start: 2023-06-20 | End: 2023-06-20 | Stop reason: HOSPADM

## 2023-06-20 RX ORDER — SODIUM CHLORIDE 9 MG/ML
INJECTION, SOLUTION INTRAVENOUS PRN
Status: DISCONTINUED | OUTPATIENT
Start: 2023-06-20 | End: 2023-06-20 | Stop reason: HOSPADM

## 2023-06-20 RX ORDER — SODIUM CHLORIDE 0.9 % (FLUSH) 0.9 %
5-40 SYRINGE (ML) INJECTION EVERY 12 HOURS SCHEDULED
Status: DISCONTINUED | OUTPATIENT
Start: 2023-06-20 | End: 2023-06-20 | Stop reason: HOSPADM

## 2023-06-20 RX ORDER — SODIUM CHLORIDE 9 MG/ML
INJECTION, SOLUTION INTRAVENOUS CONTINUOUS
Status: DISCONTINUED | OUTPATIENT
Start: 2023-06-20 | End: 2023-06-20 | Stop reason: HOSPADM

## 2023-06-20 RX ADMIN — DIAZEPAM 5 MG: 5 TABLET ORAL at 06:13

## 2023-06-20 RX ADMIN — DIPHENHYDRAMINE HYDROCHLORIDE 25 MG: 25 TABLET ORAL at 06:13

## 2023-06-20 NOTE — PROGRESS NOTES
Discharge instructions reviewed. Questions answered. Belongings gathered and checked with admission list. piv removed. Radial site WNL. Patient discharged to home.

## 2023-06-20 NOTE — H&P
Be Martinez  is a  Established patient  ,60 y.o.   male here for evaluation of the following chief complaint(s):    Here for [facility]        SUBJECTIVE/OBJECTIVE:  HPI : h/o  Htn, hyperlipidimea, COPD now here for follow-up  Gets SOB  Has seen EP for PVCs  Vitals       Vitals:     05/24/23 1432   BP: 118/70   Site: Left Upper Arm   Position: Sitting   Cuff Size: Medium Adult   Pulse: 55   Weight: 226 lb 12.8 oz (102.9 kg)   Height: 5' 11\" (1.803 m)         /70 (Site: Left Upper Arm, Position: Sitting, Cuff Size: Medium Adult)   Pulse 55   Ht 5' 11\" (1.803 m)   Wt 226 lb 12.8 oz (102.9 kg)   BMI 31.63 kg/m²   Patient-Reported Vitals 1/14/2021   Patient-Reported Weight 215   Patient-Reported Height 5'11   Patient-Reported Pulse 64   Patient-Reported SpO2 91          Wt Readings from Last 3 Encounters:   05/24/23 226 lb 12.8 oz (102.9 kg)   05/19/23 228 lb 3.2 oz (103.5 kg)   04/18/23 216 lb (98 kg)      Body mass index is 31.63 kg/m². Physical Exam     Neck: JVD      Lungs : clear    Cardio : Si and S2 audilble      Ext: edema       All pertinent data reviewed       Meds : reviewed           Tests ordered         ASSESSMENT/PLAN:     -  Hypertension: Patients blood pressure is normal. Patient is advised about low sodium diet. Present medical regimen will not be changed. On metoprolol compliant we will continue     - Low EF improved since the last echo     On GDMT including Aldactone, Entresto         -Valvular heart disease     CHERRI for AR     Summary   Left ventricular function is low normal, EF is estimated at 50-55%. Left ventricle is dilated. Moderate left ventricular hypertrophy. Grade I diastolic dysfunction. Moderately dilated left atrium. Mild mitral ,and tricuspid regurgitation is present. Moderate to severe aortic regurgitation; PHT: 291msec. RVSP is 29 mmHg. Dilation of the aortic root (4.8cm)and the ascending aorta(4.5cm). No evidence of pericardial effusion.    OV TO DISCUSS AR

## 2023-06-21 ENCOUNTER — HOSPITAL ENCOUNTER (OUTPATIENT)
Dept: ULTRASOUND IMAGING | Age: 63
Discharge: HOME | End: 2023-06-21
Payer: COMMERCIAL

## 2023-06-21 ENCOUNTER — HOSPITAL ENCOUNTER (OUTPATIENT)
Age: 63
Discharge: HOME | End: 2023-06-21
Payer: COMMERCIAL

## 2023-06-21 ENCOUNTER — HOSPITAL ENCOUNTER (OUTPATIENT)
Dept: PREADMISSION TESTING | Age: 63
Discharge: HOME | End: 2023-06-21
Payer: COMMERCIAL

## 2023-06-21 ENCOUNTER — HOSPITAL ENCOUNTER (OUTPATIENT)
Dept: PULMONOLOGY | Age: 63
Discharge: HOME | End: 2023-06-21
Payer: COMMERCIAL

## 2023-06-21 VITALS
SYSTOLIC BLOOD PRESSURE: 123 MMHG | OXYGEN SATURATION: 91 % | HEART RATE: 47 BPM | RESPIRATION RATE: 20 BRPM | WEIGHT: 226.8 LBS | BODY MASS INDEX: 31.75 KG/M2 | TEMPERATURE: 97.3 F | HEIGHT: 71 IN | DIASTOLIC BLOOD PRESSURE: 49 MMHG

## 2023-06-21 DIAGNOSIS — Z01.818 PRE-OP EXAM: ICD-10-CM

## 2023-06-21 PROCEDURE — 93005 ELECTROCARDIOGRAM TRACING: CPT

## 2023-06-21 PROCEDURE — 93880 EXTRACRANIAL BILAT STUDY: CPT

## 2023-06-21 PROCEDURE — 94010 BREATHING CAPACITY TEST: CPT

## 2023-06-21 NOTE — PROGRESS NOTES
BEDSIDE SPIROMETRY   FEV1                Actual ---  0.95  --  26  %  of predicted  FVC                  Actual ---  1.64 --   34  %  of predicted  FEF 27-75%     Actual ---  0.47  --  16  %  of predicted     Patient used 2 puffs of Albuterol this morning when he go to the hospital,  He we SOB.

## 2023-06-21 NOTE — PROGRESS NOTES
Chart reviewed by cardiac rehab nurse. Met patient in lobby. Introduced myself and teaching plan. Patient's planned procedure is Minimally Invasive AVR. Teaching done on A&P of the heart and formation of CAD. Explained the surgical process, Pre-Op,Inter-Op and Post-Op. Discussed length of stay and recovery. Explanation given of pre op routine tests, lines/tubes/equipment, and infection control. Educated on weaning from ventilator, medication and equipment to expect, on progressive activity, post op pain, and how it will be managed. Encouraged pt to communicate about pain in order to create a partnership for his recovery success. Discussed importance of coughing and deep breathing and sternal precautions. Introduced multidisciplinary approach and daily routine in CVICU. Pt verbalized commitment to recovery program.        Teaching done on post discharge recovery, activity guidelines, and weight monitoring. Discussed follow up appointments, possibility of ECF, role of home health, and family involvement. Pt lives with his wife who will be assisting with his care when he returns home. Introduced benefits of out patient cardiac rehab after appropriate time frame. Went over visitation policy with patient. Given Understanding Heart Surgery book. Escorted to main exit with all personal belongings as well as items given to patient from facility for upcoming surgery.

## 2023-06-21 NOTE — PROGRESS NOTES
.Surgery @ [facility] on 6/26/23 you will be called 6/23/23 with times    NOTHING TO EAT OR DRINK AFTER MIDNIGHT DAY OF SURGERY    1. Enter thru the hospital main entrance on day of surgery, check in at the Information Desk. If you arrive prior to 6:00am, enter thru the ER entrance. 2. Follow the directions as prescribed by the doctor for your procedure and medications. Morning of surgery take: No medications, bring your inhalers in with you. Stop vitamins, supplements and NSAIDS: Today    3. Check with your Doctor regarding stopping blood thinners and follow their instructions. 4. Do not smoke, vape or use chewing tobacco morning of surgery. Do not drink any alcoholic beverages 24 hours prior to surgery. This includes NA Beer. No street drugs 7 days prior to surgery. 5. If you have dentures, contacts of glasses they will be removed before going to the OR; please bring a case. 6. Please bring picture ID, insurance card, paperwork from the doctors office (H & P, Consent, & card for implantable devices).

## 2023-06-21 NOTE — PROGRESS NOTES
CVICU Open Heart Risk Factor Score    STS Criteria  Possible Score Yes/No Score Notes   Emergency Case 6 No  Mag- 2.5  Ca- 9.5  K- 4.5   Serum Creatinine     Cr- 1.0   >1.2 0 No     >1.6 to <1.8mg/dl 1 No     >.1.9 4 No     Acute/Chronic Renal Failure/Renal Insufficiency 0   No  Cr- 1.0  GFR- >60     Left Ventricular Dysfunction(EF<40%) 3   No  EF- 55-60%   Operative Mitral Valve Insufficiency 3   No     Reoperation 3 No     Age >71 and <74 years 1  No    Age- 60   Age >75 years 2 No     Prior Vascular Surgery 2   No     COPD +History of Patient Use of Bronchodilators 2   Yes 2 Home meds-   Albuterol   COPD 0 Yes 0 PFT- Obstruction and possible Restriction  FEV1- 26%   Current Smoker of History of Smoking  0   No     Anemia (Hematocrit<0.34) 2   No  Hct- 52.7   ASA / Anticoagulants/ Bleeding Tendencies / Antiplatelets 0   Yes 0 PT- 11.9  INR- 0.94  APTT- 33.6  PLT- 182  ASA 81mg daily   Operative Aortic Valve Stenosis 1 Yes 1      Weight<143 lbs (  BMI<18.5) 1   No  226 lb  102.9 kg  BMI- 31.63  Ht- 5'11 in   Weight >200 lbs (BMI >30    0   Yes 0    Diabetes Oral or Insulin Therapy 1   No  HA1C- 5.4     Cerebrovascular Disease 1   No     Impaired Mobility 0 No     Walker/Cane/Wheelchair 0 No     Recent MI 0 No     Hypertension 0 Yes 0 Home meds-   Lopressor 50mg   Twice a day   Peripheral Vascular Disease 0 No     Lives Alone 0 No     Other (GI Auto Immune Hepatic etc) 0 No  -  -  -   TOTAL   3    Note The surgeon must be notified for all risk scores >7    Notified by MARK Guerra [facility]

## 2023-06-22 ENCOUNTER — TELEPHONE (OUTPATIENT)
Dept: CARDIOTHORACIC SURGERY | Age: 63
End: 2023-06-22

## 2023-06-22 ENCOUNTER — TELEPHONE (OUTPATIENT)
Dept: CARDIOLOGY CLINIC | Age: 63
End: 2023-06-22

## 2023-06-22 NOTE — TELEPHONE ENCOUNTER
Called and left  for pt that I spoke to dentist and he stated that he has a front tooth that needs to be pulled.  Appt has been made with the dentist to be pulled on 6/23/23 @ 4pm.

## 2023-06-23 ENCOUNTER — TELEPHONE (OUTPATIENT)
Dept: CARDIOTHORACIC SURGERY | Age: 63
End: 2023-06-23

## 2023-06-23 ENCOUNTER — ANESTHESIA EVENT (OUTPATIENT)
Dept: OPERATING ROOM | Age: 63
End: 2023-06-23
Payer: COMMERCIAL

## 2023-06-23 LAB
EKG ATRIAL RATE: 64 BPM
EKG DIAGNOSIS: NORMAL
EKG P-R INTERVAL: 230 MS
EKG Q-T INTERVAL: 418 MS
EKG QRS DURATION: 96 MS
EKG QTC CALCULATION (BAZETT): 431 MS
EKG R AXIS: -36 DEGREES
EKG T AXIS: 43 DEGREES
EKG VENTRICULAR RATE: 64 BPM

## 2023-06-23 PROCEDURE — 93010 ELECTROCARDIOGRAM REPORT: CPT | Performed by: INTERNAL MEDICINE

## 2023-06-23 ASSESSMENT — COPD QUESTIONNAIRES: CAT_SEVERITY: SEVERE

## 2023-06-23 NOTE — TELEPHONE ENCOUNTER
Called and spoke to pt to make sure he got vm I left last night. Pt stated he did and will be a dentist this afternoon to have tooth pulled.

## 2023-06-26 ENCOUNTER — TELEPHONE (OUTPATIENT)
Dept: PULMONOLOGY | Age: 63
End: 2023-06-26

## 2023-06-26 ENCOUNTER — ANESTHESIA (OUTPATIENT)
Dept: OPERATING ROOM | Age: 63
End: 2023-06-26
Payer: COMMERCIAL

## 2023-06-26 ENCOUNTER — TELEPHONE (OUTPATIENT)
Dept: CARDIOTHORACIC SURGERY | Age: 63
End: 2023-06-26

## 2023-06-27 PROBLEM — Z01.818 PREOPERATIVE CLEARANCE: Status: ACTIVE | Noted: 2023-06-27

## 2023-06-29 ENCOUNTER — OFFICE VISIT (OUTPATIENT)
Dept: CARDIOLOGY CLINIC | Age: 63
End: 2023-06-29
Payer: COMMERCIAL

## 2023-06-29 VITALS
WEIGHT: 230 LBS | OXYGEN SATURATION: 88 % | BODY MASS INDEX: 32.2 KG/M2 | DIASTOLIC BLOOD PRESSURE: 52 MMHG | HEIGHT: 71 IN | HEART RATE: 56 BPM | SYSTOLIC BLOOD PRESSURE: 98 MMHG

## 2023-06-29 DIAGNOSIS — I38 VHD (VALVULAR HEART DISEASE): Primary | ICD-10-CM

## 2023-06-29 PROCEDURE — G8417 CALC BMI ABV UP PARAM F/U: HCPCS | Performed by: INTERNAL MEDICINE

## 2023-06-29 PROCEDURE — 99214 OFFICE O/P EST MOD 30 MIN: CPT | Performed by: INTERNAL MEDICINE

## 2023-06-29 PROCEDURE — 3017F COLORECTAL CA SCREEN DOC REV: CPT | Performed by: INTERNAL MEDICINE

## 2023-06-29 PROCEDURE — G8427 DOCREV CUR MEDS BY ELIG CLIN: HCPCS | Performed by: INTERNAL MEDICINE

## 2023-06-29 PROCEDURE — 3078F DIAST BP <80 MM HG: CPT | Performed by: INTERNAL MEDICINE

## 2023-06-29 PROCEDURE — 3074F SYST BP LT 130 MM HG: CPT | Performed by: INTERNAL MEDICINE

## 2023-06-29 PROCEDURE — 1036F TOBACCO NON-USER: CPT | Performed by: INTERNAL MEDICINE

## 2023-06-29 RX ORDER — SENNOSIDES 8.6 MG
TABLET ORAL
Status: ON HOLD | COMMUNITY
Start: 2023-05-20

## 2023-06-30 ENCOUNTER — HOSPITAL ENCOUNTER (INPATIENT)
Age: 63
LOS: 10 days | Discharge: HOME HEALTH | DRG: 219 | End: 2023-07-10
Attending: THORACIC SURGERY (CARDIOTHORACIC VASCULAR SURGERY) | Admitting: THORACIC SURGERY (CARDIOTHORACIC VASCULAR SURGERY)
Payer: COMMERCIAL

## 2023-06-30 ENCOUNTER — APPOINTMENT (OUTPATIENT)
Dept: GENERAL RADIOLOGY | Age: 63
DRG: 219 | End: 2023-06-30
Attending: THORACIC SURGERY (CARDIOTHORACIC VASCULAR SURGERY)
Payer: COMMERCIAL

## 2023-06-30 DIAGNOSIS — I35.1 AORTIC VALVE INSUFFICIENCY, ETIOLOGY OF CARDIAC VALVE DISEASE UNSPECIFIED: ICD-10-CM

## 2023-06-30 DIAGNOSIS — I35.1 NONRHEUMATIC AORTIC VALVE INSUFFICIENCY: Primary | ICD-10-CM

## 2023-06-30 DIAGNOSIS — I35.9 AORTIC VALVE DEFECT: ICD-10-CM

## 2023-06-30 LAB
ANION GAP SERPL CALCULATED.3IONS-SCNC: 10 MMOL/L (ref 4–16)
BASE EXCESS MIXED: 0 (ref 0–1.2)
BASE EXCESS MIXED: 0.1 (ref 0–1.2)
BASE EXCESS MIXED: 0.2 (ref 0–1.2)
BASE EXCESS MIXED: 0.3 (ref 0–1.2)
BASE EXCESS MIXED: 0.5 (ref 0–1.2)
BASE EXCESS MIXED: 1.1 (ref 0–1.2)
BASE EXCESS MIXED: 1.5 (ref 0–1.2)
BASE EXCESS MIXED: 1.8 (ref 0–1.2)
BASE EXCESS MIXED: 2.1 (ref 0–1.2)
BASE EXCESS MIXED: 2.3 (ref 0–1.2)
BASE EXCESS MIXED: 2.5 (ref 0–1.2)
BASE EXCESS MIXED: 4.1 (ref 0–1.2)
BASE EXCESS MIXED: 4.7 (ref 0–1.2)
BASE EXCESS MIXED: 5.3 (ref 0–1.2)
BASE EXCESS: ABNORMAL (ref 0–3.3)
BUN SERPL-MCNC: 17 MG/DL (ref 6–23)
CALCIUM IONIZED: 4.6 MG/DL (ref 4.48–5.28)
CALCIUM SERPL-MCNC: 7.9 MG/DL (ref 8.3–10.6)
CARBON MONOXIDE, BLOOD: 2.5 % (ref 0–5)
CHLORIDE BLD-SCNC: 106 MMOL/L (ref 99–110)
CO2 CONTENT: 28.3 MMOL/L (ref 19–24)
CO2 CONTENT: 28.6 MMOL/L (ref 19–24)
CO2 CONTENT: 29.6 MMOL/L (ref 19–24)
CO2 CONTENT: 29.9 MMOL/L (ref 19–24)
CO2 CONTENT: 30.6 MMOL/L (ref 19–24)
CO2: 26 MMOL/L (ref 21–32)
CO2: 28 MMOL/L (ref 21–32)
CO2: 29 MMOL/L (ref 21–32)
CO2: 30 MMOL/L (ref 21–32)
CO2: 32 MMOL/L (ref 21–32)
COMMENT: ABNORMAL
CREAT SERPL-MCNC: 0.6 MG/DL (ref 0.9–1.3)
EGFR, POC: >60 ML/MIN/1.73M2
GFR SERPL CREATININE-BSD FRML MDRD: >60 ML/MIN/1.73M2
GLUCOSE BLD-MCNC: 112 MG/DL (ref 70–99)
GLUCOSE BLD-MCNC: 116 MG/DL (ref 70–99)
GLUCOSE BLD-MCNC: 117 MG/DL (ref 70–99)
GLUCOSE BLD-MCNC: 121 MG/DL (ref 70–99)
GLUCOSE BLD-MCNC: 122 MG/DL (ref 70–99)
GLUCOSE BLD-MCNC: 127 MG/DL (ref 70–99)
GLUCOSE BLD-MCNC: 132 MG/DL (ref 70–99)
GLUCOSE BLD-MCNC: 139 MG/DL (ref 70–99)
GLUCOSE BLD-MCNC: 143 MG/DL (ref 70–99)
GLUCOSE BLD-MCNC: 146 MG/DL (ref 70–99)
GLUCOSE BLD-MCNC: 154 MG/DL (ref 70–99)
GLUCOSE BLD-MCNC: 90 MG/DL (ref 70–99)
GLUCOSE BLD-MCNC: 92 MG/DL (ref 70–99)
GLUCOSE SERPL-MCNC: 143 MG/DL (ref 70–99)
HCO3 ARTERIAL: 26.3 MMOL/L (ref 18–23)
HCO3 ARTERIAL: 27 MMOL/L (ref 18–23)
HCO3 ARTERIAL: 27.1 MMOL/L (ref 18–23)
HCO3 ARTERIAL: 27.2 MMOL/L (ref 18–23)
HCO3 ARTERIAL: 27.8 MMOL/L (ref 18–23)
HCO3 ARTERIAL: 28 MMOL/L (ref 18–23)
HCO3 ARTERIAL: 28.1 MMOL/L (ref 18–23)
HCO3 ARTERIAL: 28.2 MMOL/L (ref 18–23)
HCO3 ARTERIAL: 28.2 MMOL/L (ref 18–23)
HCO3 ARTERIAL: 28.3 MMOL/L (ref 18–23)
HCO3 ARTERIAL: 28.5 MMOL/L (ref 18–23)
HCO3 ARTERIAL: 28.6 MMOL/L (ref 18–23)
HCO3 ARTERIAL: 29.3 MMOL/L (ref 18–23)
HCO3 ARTERIAL: 30 MMOL/L (ref 18–23)
HCT VFR BLD CALC: 37 % (ref 42–52)
HCT VFR BLD CALC: 39 % (ref 42–52)
HCT VFR BLD CALC: 40 % (ref 42–52)
HCT VFR BLD CALC: 40 % (ref 42–52)
HCT VFR BLD CALC: 42 % (ref 42–52)
HCT VFR BLD CALC: 43 % (ref 42–52)
HCT VFR BLD CALC: 43 % (ref 42–52)
HCT VFR BLD CALC: 44 % (ref 42–52)
HCT VFR BLD CALC: 44.7 % (ref 42–52)
HCT VFR BLD CALC: 45 % (ref 42–52)
HCT VFR BLD CALC: 48.3 % (ref 42–52)
HCT VFR BLD CALC: 49 % (ref 42–52)
HCT VFR BLD CALC: 50 % (ref 42–52)
HEMOGLOBIN: 12.5 GM/DL (ref 13.5–18)
HEMOGLOBIN: 13.4 GM/DL (ref 13.5–18)
HEMOGLOBIN: 13.4 GM/DL (ref 13.5–18)
HEMOGLOBIN: 13.6 GM/DL (ref 13.5–18)
HEMOGLOBIN: 13.7 GM/DL (ref 13.5–18)
HEMOGLOBIN: 14.4 GM/DL (ref 13.5–18)
HEMOGLOBIN: 14.5 GM/DL (ref 13.5–18)
HEMOGLOBIN: 14.7 GM/DL (ref 13.5–18)
HEMOGLOBIN: 14.9 GM/DL (ref 13.5–18)
HEMOGLOBIN: 14.9 GM/DL (ref 13.5–18)
HEMOGLOBIN: 15 GM/DL (ref 13.5–18)
HEMOGLOBIN: 15.1 GM/DL (ref 13.5–18)
HEMOGLOBIN: 15.2 GM/DL (ref 13.5–18)
HEMOGLOBIN: 16.8 GM/DL (ref 13.5–18)
HEMOGLOBIN: 17.1 GM/DL (ref 13.5–18)
INR BLD: 1.3 INDEX
IONIZED CA: 1.15 MMOL/L (ref 1.12–1.32)
MAGNESIUM: 2.5 MG/DL (ref 1.8–2.4)
MCH RBC QN AUTO: 28.6 PG (ref 27–31)
MCHC RBC AUTO-ENTMCNC: 30.8 % (ref 32–36)
MCV RBC AUTO: 92.7 FL (ref 78–100)
METHEMOGLOBIN ARTERIAL: 1.9 %
O2 SATURATION: 100 % (ref 96–97)
O2 SATURATION: 87.8 % (ref 96–97)
O2 SATURATION: 90.6 % (ref 96–97)
O2 SATURATION: 91.6 % (ref 96–97)
O2 SATURATION: 92.3 % (ref 96–97)
O2 SATURATION: 92.3 % (ref 96–97)
O2 SATURATION: 93 % (ref 96–97)
O2 SATURATION: 93.1 % (ref 96–97)
O2 SATURATION: 93.7 % (ref 96–97)
O2 SATURATION: 94.1 % (ref 96–97)
O2 SATURATION: 94.1 % (ref 96–97)
O2 SATURATION: 94.8 % (ref 96–97)
O2 SATURATION: 99.9 % (ref 96–97)
O2 SATURATION: 99.9 % (ref 96–97)
PCO2 ARTERIAL: 36.1 MMHG (ref 32–45)
PCO2 ARTERIAL: 41.3 MMHG (ref 32–45)
PCO2 ARTERIAL: 41.9 MMHG (ref 32–45)
PCO2 ARTERIAL: 42 MMHG (ref 32–45)
PCO2 ARTERIAL: 46 MMHG (ref 32–45)
PCO2 ARTERIAL: 47.6 MMHG (ref 32–45)
PCO2 ARTERIAL: 49.8 MMHG (ref 32–45)
PCO2 ARTERIAL: 53.2 MMHG (ref 32–45)
PCO2 ARTERIAL: 53.3 MMHG (ref 32–45)
PCO2 ARTERIAL: 56.6 MMHG (ref 32–45)
PCO2 ARTERIAL: 56.7 MMHG (ref 32–45)
PCO2 ARTERIAL: 57.9 MMHG (ref 32–45)
PCO2 ARTERIAL: 58.2 MMHG (ref 32–45)
PCO2 ARTERIAL: 59.7 MMHG (ref 32–45)
PDW BLD-RTO: 13.1 % (ref 11.7–14.9)
PH BLOOD: 7.28 (ref 7.34–7.45)
PH BLOOD: 7.29 (ref 7.34–7.45)
PH BLOOD: 7.3 (ref 7.34–7.45)
PH BLOOD: 7.3 (ref 7.34–7.45)
PH BLOOD: 7.31 (ref 7.34–7.45)
PH BLOOD: 7.31 (ref 7.34–7.45)
PH BLOOD: 7.32 (ref 7.34–7.45)
PH BLOOD: 7.36 (ref 7.34–7.45)
PH BLOOD: 7.38 (ref 7.34–7.45)
PH BLOOD: 7.38 (ref 7.34–7.45)
PH BLOOD: 7.43 (ref 7.34–7.45)
PH BLOOD: 7.44 (ref 7.34–7.45)
PH BLOOD: 7.45 (ref 7.34–7.45)
PH BLOOD: 7.51 (ref 7.34–7.45)
PLATELET # BLD: 147 K/CU MM (ref 140–440)
PMV BLD AUTO: 10.4 FL (ref 7.5–11.1)
PO2 ARTERIAL: 174 MMHG (ref 75–100)
PO2 ARTERIAL: 268.9 MMHG (ref 75–100)
PO2 ARTERIAL: 276.3 MMHG (ref 75–100)
PO2 ARTERIAL: 369.6 MMHG (ref 75–100)
PO2 ARTERIAL: 61.8 MMHG (ref 75–100)
PO2 ARTERIAL: 66 MMHG (ref 75–100)
PO2 ARTERIAL: 66.5 MMHG (ref 75–100)
PO2 ARTERIAL: 68 MMHG (ref 75–100)
PO2 ARTERIAL: 69.3 MMHG (ref 75–100)
PO2 ARTERIAL: 71.7 MMHG (ref 75–100)
PO2 ARTERIAL: 72.3 MMHG (ref 75–100)
PO2 ARTERIAL: 75.6 MMHG (ref 75–100)
PO2 ARTERIAL: 80.7 MMHG (ref 75–100)
PO2 ARTERIAL: 82.1 MMHG (ref 75–100)
POC ACT PLUS: 144 SEC
POC ACT PLUS: 145 SEC
POC ACT PLUS: 442 SEC
POC ACT PLUS: 456 SEC
POC ACT PLUS: 466 SEC
POC ACT PLUS: 576 SEC
POC CALCIUM: 0.96 MMOL/L (ref 1.12–1.32)
POC CALCIUM: 1.04 MMOL/L (ref 1.12–1.32)
POC CALCIUM: 1.06 MMOL/L (ref 1.12–1.32)
POC CALCIUM: 1.15 MMOL/L (ref 1.12–1.32)
POC CALCIUM: 1.15 MMOL/L (ref 1.12–1.32)
POC CALCIUM: 1.16 MMOL/L (ref 1.12–1.32)
POC CALCIUM: 1.17 MMOL/L (ref 1.12–1.32)
POC CALCIUM: 1.19 MMOL/L (ref 1.12–1.32)
POC CALCIUM: 1.2 MMOL/L (ref 1.12–1.32)
POC CALCIUM: 1.24 MMOL/L (ref 1.12–1.32)
POC CHLORIDE: 106 MMOL/L (ref 98–109)
POC CHLORIDE: 107 MMOL/L (ref 98–109)
POC CHLORIDE: 108 MMOL/L (ref 98–109)
POC CHLORIDE: 110 MMOL/L (ref 98–109)
POC CHLORIDE: 110 MMOL/L (ref 98–109)
POC CHLORIDE: 112 MMOL/L (ref 98–109)
POC CHLORIDE: 114 MMOL/L (ref 98–109)
POC CREATININE: 0.7 MG/DL (ref 0.9–1.3)
POC CREATININE: 0.8 MG/DL (ref 0.9–1.3)
POC CREATININE: 0.9 MG/DL (ref 0.9–1.3)
POTASSIUM SERPL-SCNC: 4 MMOL/L (ref 3.5–4.5)
POTASSIUM SERPL-SCNC: 4 MMOL/L (ref 3.5–4.5)
POTASSIUM SERPL-SCNC: 4.1 MMOL/L (ref 3.5–4.5)
POTASSIUM SERPL-SCNC: 4.2 MMOL/L (ref 3.5–4.5)
POTASSIUM SERPL-SCNC: 4.2 MMOL/L (ref 3.5–4.5)
POTASSIUM SERPL-SCNC: 4.3 MMOL/L (ref 3.5–4.5)
POTASSIUM SERPL-SCNC: 4.3 MMOL/L (ref 3.5–5.1)
POTASSIUM SERPL-SCNC: 4.4 MMOL/L (ref 3.5–4.5)
POTASSIUM SERPL-SCNC: 4.4 MMOL/L (ref 3.5–4.5)
POTASSIUM SERPL-SCNC: 4.6 MMOL/L (ref 3.5–4.5)
POTASSIUM SERPL-SCNC: 4.7 MMOL/L (ref 3.5–4.5)
PROTHROMBIN TIME: 16.5 SECONDS (ref 11.7–14.5)
RBC # BLD: 5.21 M/CU MM (ref 4.6–6.2)
SODIUM BLD-SCNC: 142 MMOL/L (ref 135–145)
SODIUM BLD-SCNC: 143 MMOL/L (ref 138–146)
SODIUM BLD-SCNC: 144 MMOL/L (ref 138–146)
SODIUM BLD-SCNC: 145 MMOL/L (ref 138–146)
SOURCE, BLOOD GAS: ABNORMAL
WBC # BLD: 18.6 K/CU MM (ref 4–10.5)

## 2023-06-30 PROCEDURE — 88305 TISSUE EXAM BY PATHOLOGIST: CPT | Performed by: PATHOLOGY

## 2023-06-30 PROCEDURE — 88311 DECALCIFY TISSUE: CPT | Performed by: PATHOLOGY

## 2023-06-30 PROCEDURE — C1769 GUIDE WIRE: HCPCS | Performed by: THORACIC SURGERY (CARDIOTHORACIC VASCULAR SURGERY)

## 2023-06-30 PROCEDURE — 2500000003 HC RX 250 WO HCPCS: Performed by: PHYSICIAN ASSISTANT

## 2023-06-30 PROCEDURE — 2500000003 HC RX 250 WO HCPCS: Performed by: THORACIC SURGERY (CARDIOTHORACIC VASCULAR SURGERY)

## 2023-06-30 PROCEDURE — 2500000003 HC RX 250 WO HCPCS

## 2023-06-30 PROCEDURE — 2700000000 HC OXYGEN THERAPY PER DAY

## 2023-06-30 PROCEDURE — C2615 SEALANT, PULMONARY, LIQUID: HCPCS | Performed by: THORACIC SURGERY (CARDIOTHORACIC VASCULAR SURGERY)

## 2023-06-30 PROCEDURE — 6370000000 HC RX 637 (ALT 250 FOR IP)

## 2023-06-30 PROCEDURE — 6370000000 HC RX 637 (ALT 250 FOR IP): Performed by: PHYSICIAN ASSISTANT

## 2023-06-30 PROCEDURE — 4A133B1 MONITORING OF ARTERIAL PRESSURE, PERIPHERAL, PERCUTANEOUS APPROACH: ICD-10-PCS | Performed by: FAMILY MEDICINE

## 2023-06-30 PROCEDURE — 4A133J1 MONITORING OF ARTERIAL PULSE, PERIPHERAL, PERCUTANEOUS APPROACH: ICD-10-PCS | Performed by: FAMILY MEDICINE

## 2023-06-30 PROCEDURE — 2709999900 HC NON-CHARGEABLE SUPPLY: Performed by: THORACIC SURGERY (CARDIOTHORACIC VASCULAR SURGERY)

## 2023-06-30 PROCEDURE — 33405 REPLACEMENT AORTIC VALVE OPN: CPT | Performed by: THORACIC SURGERY (CARDIOTHORACIC VASCULAR SURGERY)

## 2023-06-30 PROCEDURE — C1751 CATH, INF, PER/CENT/MIDLINE: HCPCS | Performed by: THORACIC SURGERY (CARDIOTHORACIC VASCULAR SURGERY)

## 2023-06-30 PROCEDURE — B24BZZ4 ULTRASONOGRAPHY OF HEART WITH AORTA, TRANSESOPHAGEAL: ICD-10-PCS | Performed by: THORACIC SURGERY (CARDIOTHORACIC VASCULAR SURGERY)

## 2023-06-30 PROCEDURE — 2580000003 HC RX 258: Performed by: NURSE PRACTITIONER

## 2023-06-30 PROCEDURE — 6360000002 HC RX W HCPCS

## 2023-06-30 PROCEDURE — 85027 COMPLETE CBC AUTOMATED: CPT

## 2023-06-30 PROCEDURE — 84132 ASSAY OF SERUM POTASSIUM: CPT

## 2023-06-30 PROCEDURE — C1894 INTRO/SHEATH, NON-LASER: HCPCS | Performed by: THORACIC SURGERY (CARDIOTHORACIC VASCULAR SURGERY)

## 2023-06-30 PROCEDURE — 85347 COAGULATION TIME ACTIVATED: CPT

## 2023-06-30 PROCEDURE — 86922 COMPATIBILITY TEST ANTIGLOB: CPT

## 2023-06-30 PROCEDURE — 7100000000 HC PACU RECOVERY - FIRST 15 MIN

## 2023-06-30 PROCEDURE — 2780000010 HC IMPLANT OTHER: Performed by: THORACIC SURGERY (CARDIOTHORACIC VASCULAR SURGERY)

## 2023-06-30 PROCEDURE — 85610 PROTHROMBIN TIME: CPT

## 2023-06-30 PROCEDURE — 82565 ASSAY OF CREATININE: CPT

## 2023-06-30 PROCEDURE — 2580000003 HC RX 258

## 2023-06-30 PROCEDURE — 2580000003 HC RX 258: Performed by: PHYSICIAN ASSISTANT

## 2023-06-30 PROCEDURE — C1729 CATH, DRAINAGE: HCPCS | Performed by: THORACIC SURGERY (CARDIOTHORACIC VASCULAR SURGERY)

## 2023-06-30 PROCEDURE — P9016 RBC LEUKOCYTES REDUCED: HCPCS

## 2023-06-30 PROCEDURE — 86850 RBC ANTIBODY SCREEN: CPT

## 2023-06-30 PROCEDURE — 82330 ASSAY OF CALCIUM: CPT

## 2023-06-30 PROCEDURE — 6360000002 HC RX W HCPCS: Performed by: THORACIC SURGERY (CARDIOTHORACIC VASCULAR SURGERY)

## 2023-06-30 PROCEDURE — 84100 ASSAY OF PHOSPHORUS: CPT

## 2023-06-30 PROCEDURE — 03HY32Z INSERTION OF MONITORING DEVICE INTO UPPER ARTERY, PERCUTANEOUS APPROACH: ICD-10-PCS | Performed by: FAMILY MEDICINE

## 2023-06-30 PROCEDURE — 5A1935Z RESPIRATORY VENTILATION, LESS THAN 24 CONSECUTIVE HOURS: ICD-10-PCS | Performed by: FAMILY MEDICINE

## 2023-06-30 PROCEDURE — 85014 HEMATOCRIT: CPT

## 2023-06-30 PROCEDURE — 2580000003 HC RX 258: Performed by: THORACIC SURGERY (CARDIOTHORACIC VASCULAR SURGERY)

## 2023-06-30 PROCEDURE — 3700000000 HC ANESTHESIA ATTENDED CARE: Performed by: THORACIC SURGERY (CARDIOTHORACIC VASCULAR SURGERY)

## 2023-06-30 PROCEDURE — C1760 CLOSURE DEV, VASC: HCPCS | Performed by: THORACIC SURGERY (CARDIOTHORACIC VASCULAR SURGERY)

## 2023-06-30 PROCEDURE — 2720000010 HC SURG SUPPLY STERILE: Performed by: THORACIC SURGERY (CARDIOTHORACIC VASCULAR SURGERY)

## 2023-06-30 PROCEDURE — P9047 ALBUMIN (HUMAN), 25%, 50ML: HCPCS

## 2023-06-30 PROCEDURE — 86901 BLOOD TYPING SEROLOGIC RH(D): CPT

## 2023-06-30 PROCEDURE — 82962 GLUCOSE BLOOD TEST: CPT

## 2023-06-30 PROCEDURE — 80051 ELECTROLYTE PANEL: CPT

## 2023-06-30 PROCEDURE — 6360000002 HC RX W HCPCS: Performed by: NURSE PRACTITIONER

## 2023-06-30 PROCEDURE — 6370000000 HC RX 637 (ALT 250 FOR IP): Performed by: NURSE PRACTITIONER

## 2023-06-30 PROCEDURE — 82803 BLOOD GASES ANY COMBINATION: CPT

## 2023-06-30 PROCEDURE — 80048 BASIC METABOLIC PNL TOTAL CA: CPT

## 2023-06-30 PROCEDURE — 94002 VENT MGMT INPAT INIT DAY: CPT

## 2023-06-30 PROCEDURE — 85018 HEMOGLOBIN: CPT

## 2023-06-30 PROCEDURE — 94640 AIRWAY INHALATION TREATMENT: CPT

## 2023-06-30 PROCEDURE — 3700000001 HC ADD 15 MINUTES (ANESTHESIA): Performed by: THORACIC SURGERY (CARDIOTHORACIC VASCULAR SURGERY)

## 2023-06-30 PROCEDURE — 71045 X-RAY EXAM CHEST 1 VIEW: CPT

## 2023-06-30 PROCEDURE — 83735 ASSAY OF MAGNESIUM: CPT

## 2023-06-30 PROCEDURE — 6370000000 HC RX 637 (ALT 250 FOR IP): Performed by: STUDENT IN AN ORGANIZED HEALTH CARE EDUCATION/TRAINING PROGRAM

## 2023-06-30 PROCEDURE — 5A1221Z PERFORMANCE OF CARDIAC OUTPUT, CONTINUOUS: ICD-10-PCS | Performed by: THORACIC SURGERY (CARDIOTHORACIC VASCULAR SURGERY)

## 2023-06-30 PROCEDURE — 3600000008 HC SURGERY OHS BASE: Performed by: THORACIC SURGERY (CARDIOTHORACIC VASCULAR SURGERY)

## 2023-06-30 PROCEDURE — 86900 BLOOD TYPING SEROLOGIC ABO: CPT

## 2023-06-30 PROCEDURE — C2626 INFUSION PUMP, NON-PROG,TEMP: HCPCS | Performed by: THORACIC SURGERY (CARDIOTHORACIC VASCULAR SURGERY)

## 2023-06-30 PROCEDURE — 84295 ASSAY OF SERUM SODIUM: CPT

## 2023-06-30 PROCEDURE — 2100000000 HC CCU R&B

## 2023-06-30 PROCEDURE — 6360000002 HC RX W HCPCS: Performed by: PHYSICIAN ASSISTANT

## 2023-06-30 PROCEDURE — 94761 N-INVAS EAR/PLS OXIMETRY MLT: CPT

## 2023-06-30 PROCEDURE — P9045 ALBUMIN (HUMAN), 5%, 250 ML: HCPCS | Performed by: PHYSICIAN ASSISTANT

## 2023-06-30 PROCEDURE — 3600000018 HC SURGERY OHS ADDTL 15MIN: Performed by: THORACIC SURGERY (CARDIOTHORACIC VASCULAR SURGERY)

## 2023-06-30 DEVICE — 6 FOOT DISPOSABLE EXTENSION CABLE WITH SAFE CONNECT / SCREW-DOWN: Type: IMPLANTABLE DEVICE | Status: FUNCTIONAL

## 2023-06-30 RX ORDER — M-VIT,TX,IRON,MINS/CALC/FOLIC 27MG-0.4MG
1 TABLET ORAL
Status: DISCONTINUED | OUTPATIENT
Start: 2023-07-01 | End: 2023-07-10 | Stop reason: HOSPADM

## 2023-06-30 RX ORDER — SODIUM CHLORIDE 0.9 % (FLUSH) 0.9 %
5-40 SYRINGE (ML) INJECTION PRN
Status: DISCONTINUED | OUTPATIENT
Start: 2023-06-30 | End: 2023-06-30 | Stop reason: HOSPADM

## 2023-06-30 RX ORDER — SODIUM CHLORIDE 0.9 % (FLUSH) 0.9 %
5-40 SYRINGE (ML) INJECTION PRN
Status: DISCONTINUED | OUTPATIENT
Start: 2023-06-30 | End: 2023-07-10 | Stop reason: HOSPADM

## 2023-06-30 RX ORDER — MORPHINE SULFATE 2 MG/ML
2 INJECTION, SOLUTION INTRAMUSCULAR; INTRAVENOUS
Status: COMPLETED | OUTPATIENT
Start: 2023-06-30 | End: 2023-07-01

## 2023-06-30 RX ORDER — HEPARIN SODIUM 1000 [USP'U]/ML
INJECTION, SOLUTION INTRAVENOUS; SUBCUTANEOUS PRN
Status: DISCONTINUED | OUTPATIENT
Start: 2023-06-30 | End: 2023-06-30 | Stop reason: SDUPTHER

## 2023-06-30 RX ORDER — ATORVASTATIN CALCIUM 40 MG/1
40 TABLET, FILM COATED ORAL NIGHTLY
Status: DISCONTINUED | OUTPATIENT
Start: 2023-07-01 | End: 2023-07-10 | Stop reason: HOSPADM

## 2023-06-30 RX ORDER — BISACODYL 10 MG
10 SUPPOSITORY, RECTAL RECTAL DAILY PRN
Status: DISCONTINUED | OUTPATIENT
Start: 2023-06-30 | End: 2023-07-10 | Stop reason: HOSPADM

## 2023-06-30 RX ORDER — SENNA AND DOCUSATE SODIUM 50; 8.6 MG/1; MG/1
1 TABLET, FILM COATED ORAL 2 TIMES DAILY
Status: DISCONTINUED | OUTPATIENT
Start: 2023-06-30 | End: 2023-07-10 | Stop reason: HOSPADM

## 2023-06-30 RX ORDER — POTASSIUM CHLORIDE 29.8 MG/ML
20 INJECTION INTRAVENOUS PRN
Status: DISCONTINUED | OUTPATIENT
Start: 2023-06-30 | End: 2023-07-10 | Stop reason: HOSPADM

## 2023-06-30 RX ORDER — FAMOTIDINE 10 MG/ML
20 INJECTION, SOLUTION INTRAVENOUS 2 TIMES DAILY
Status: DISCONTINUED | OUTPATIENT
Start: 2023-06-30 | End: 2023-07-10 | Stop reason: HOSPADM

## 2023-06-30 RX ORDER — CHLORHEXIDINE GLUCONATE 0.12 MG/ML
15 RINSE ORAL ONCE
Status: COMPLETED | OUTPATIENT
Start: 2023-06-30 | End: 2023-06-30

## 2023-06-30 RX ORDER — POLYETHYLENE GLYCOL 3350 17 G/17G
17 POWDER, FOR SOLUTION ORAL DAILY
Status: DISCONTINUED | OUTPATIENT
Start: 2023-06-30 | End: 2023-07-10 | Stop reason: HOSPADM

## 2023-06-30 RX ORDER — NITROGLYCERIN 20 MG/100ML
5-300 INJECTION INTRAVENOUS CONTINUOUS PRN
Status: DISCONTINUED | OUTPATIENT
Start: 2023-06-30 | End: 2023-07-10 | Stop reason: HOSPADM

## 2023-06-30 RX ORDER — ALBUMIN, HUMAN INJ 5% 5 %
25 SOLUTION INTRAVENOUS PRN
Status: DISCONTINUED | OUTPATIENT
Start: 2023-06-30 | End: 2023-07-10 | Stop reason: HOSPADM

## 2023-06-30 RX ORDER — ASPIRIN 81 MG/1
81 TABLET, CHEWABLE ORAL DAILY
Status: DISCONTINUED | OUTPATIENT
Start: 2023-07-01 | End: 2023-07-10 | Stop reason: HOSPADM

## 2023-06-30 RX ORDER — SODIUM CHLORIDE 9 MG/ML
INJECTION, SOLUTION INTRAVENOUS PRN
Status: DISCONTINUED | OUTPATIENT
Start: 2023-06-30 | End: 2023-06-30 | Stop reason: HOSPADM

## 2023-06-30 RX ORDER — ENOXAPARIN SODIUM 100 MG/ML
30 INJECTION SUBCUTANEOUS 2 TIMES DAILY
Status: DISCONTINUED | OUTPATIENT
Start: 2023-07-01 | End: 2023-07-03 | Stop reason: ALTCHOICE

## 2023-06-30 RX ORDER — ALBUMIN, HUMAN INJ 5% 5 %
12.5 SOLUTION INTRAVENOUS PRN
Status: COMPLETED | OUTPATIENT
Start: 2023-06-30 | End: 2023-06-30

## 2023-06-30 RX ORDER — IPRATROPIUM BROMIDE AND ALBUTEROL SULFATE 2.5; .5 MG/3ML; MG/3ML
1 SOLUTION RESPIRATORY (INHALATION) EVERY 4 HOURS
Status: DISCONTINUED | OUTPATIENT
Start: 2023-06-30 | End: 2023-07-05

## 2023-06-30 RX ORDER — GLUCAGON 1 MG/ML
1 KIT INJECTION PRN
Status: DISCONTINUED | OUTPATIENT
Start: 2023-06-30 | End: 2023-07-10 | Stop reason: HOSPADM

## 2023-06-30 RX ORDER — BUDESONIDE AND FORMOTEROL FUMARATE DIHYDRATE 160; 4.5 UG/1; UG/1
2 AEROSOL RESPIRATORY (INHALATION) 2 TIMES DAILY
Status: DISCONTINUED | OUTPATIENT
Start: 2023-06-30 | End: 2023-07-10 | Stop reason: HOSPADM

## 2023-06-30 RX ORDER — PROPOFOL 10 MG/ML
INJECTION, EMULSION INTRAVENOUS PRN
Status: DISCONTINUED | OUTPATIENT
Start: 2023-06-30 | End: 2023-06-30 | Stop reason: SDUPTHER

## 2023-06-30 RX ORDER — CLOPIDOGREL BISULFATE 75 MG/1
75 TABLET ORAL DAILY
Status: DISCONTINUED | OUTPATIENT
Start: 2023-07-01 | End: 2023-07-07

## 2023-06-30 RX ORDER — PROTAMINE SULFATE 10 MG/ML
50 INJECTION, SOLUTION INTRAVENOUS ONCE
Status: DISCONTINUED | OUTPATIENT
Start: 2023-06-30 | End: 2023-06-30 | Stop reason: ALTCHOICE

## 2023-06-30 RX ORDER — VECURONIUM BROMIDE 1 MG/ML
INJECTION, POWDER, LYOPHILIZED, FOR SOLUTION INTRAVENOUS PRN
Status: DISCONTINUED | OUTPATIENT
Start: 2023-06-30 | End: 2023-06-30 | Stop reason: SDUPTHER

## 2023-06-30 RX ORDER — ACETAMINOPHEN 325 MG/1
650 TABLET ORAL EVERY 6 HOURS
Status: DISCONTINUED | OUTPATIENT
Start: 2023-06-30 | End: 2023-07-03

## 2023-06-30 RX ORDER — VANCOMYCIN HYDROCHLORIDE 1 G/20ML
1000 INJECTION, POWDER, LYOPHILIZED, FOR SOLUTION INTRAVENOUS
Status: DISCONTINUED | OUTPATIENT
Start: 2023-06-30 | End: 2023-06-30 | Stop reason: CLARIF

## 2023-06-30 RX ORDER — LIDOCAINE HYDROCHLORIDE 20 MG/ML
INJECTION, SOLUTION INTRAVENOUS PRN
Status: DISCONTINUED | OUTPATIENT
Start: 2023-06-30 | End: 2023-06-30 | Stop reason: SDUPTHER

## 2023-06-30 RX ORDER — LABETALOL HYDROCHLORIDE 5 MG/ML
10 INJECTION, SOLUTION INTRAVENOUS EVERY 5 MIN PRN
Status: DISCONTINUED | OUTPATIENT
Start: 2023-06-30 | End: 2023-07-10

## 2023-06-30 RX ORDER — PROPOFOL 10 MG/ML
5-50 INJECTION, EMULSION INTRAVENOUS CONTINUOUS
Status: DISCONTINUED | OUTPATIENT
Start: 2023-06-30 | End: 2023-07-01

## 2023-06-30 RX ORDER — DEXMEDETOMIDINE HYDROCHLORIDE 4 UG/ML
.1-1.5 INJECTION, SOLUTION INTRAVENOUS CONTINUOUS
Status: DISCONTINUED | OUTPATIENT
Start: 2023-06-30 | End: 2023-07-01

## 2023-06-30 RX ORDER — MAGNESIUM SULFATE IN WATER 40 MG/ML
2000 INJECTION, SOLUTION INTRAVENOUS PRN
Status: DISCONTINUED | OUTPATIENT
Start: 2023-06-30 | End: 2023-07-10 | Stop reason: HOSPADM

## 2023-06-30 RX ORDER — ROCURONIUM BROMIDE 10 MG/ML
INJECTION, SOLUTION INTRAVENOUS PRN
Status: DISCONTINUED | OUTPATIENT
Start: 2023-06-30 | End: 2023-06-30 | Stop reason: SDUPTHER

## 2023-06-30 RX ORDER — ASPIRIN 300 MG/1
300 SUPPOSITORY RECTAL ONCE
Status: DISCONTINUED | OUTPATIENT
Start: 2023-06-30 | End: 2023-07-10 | Stop reason: HOSPADM

## 2023-06-30 RX ORDER — CHLORHEXIDINE GLUCONATE 0.12 MG/ML
15 RINSE ORAL 2 TIMES DAILY
Status: DISCONTINUED | OUTPATIENT
Start: 2023-06-30 | End: 2023-07-10 | Stop reason: HOSPADM

## 2023-06-30 RX ORDER — HYDRALAZINE HYDROCHLORIDE 20 MG/ML
10 INJECTION INTRAMUSCULAR; INTRAVENOUS EVERY 5 MIN PRN
Status: DISCONTINUED | OUTPATIENT
Start: 2023-06-30 | End: 2023-07-10 | Stop reason: HOSPADM

## 2023-06-30 RX ORDER — ONDANSETRON 4 MG/1
4 TABLET, ORALLY DISINTEGRATING ORAL EVERY 8 HOURS PRN
Status: DISCONTINUED | OUTPATIENT
Start: 2023-06-30 | End: 2023-07-10 | Stop reason: HOSPADM

## 2023-06-30 RX ORDER — SODIUM CHLORIDE 9 MG/ML
INJECTION, SOLUTION INTRAVENOUS CONTINUOUS
Status: DISCONTINUED | OUTPATIENT
Start: 2023-06-30 | End: 2023-07-05

## 2023-06-30 RX ORDER — SODIUM CHLORIDE 9 MG/ML
INJECTION, SOLUTION INTRAVENOUS PRN
Status: DISCONTINUED | OUTPATIENT
Start: 2023-06-30 | End: 2023-07-04 | Stop reason: SDUPTHER

## 2023-06-30 RX ORDER — DEXTROSE MONOHYDRATE 100 MG/ML
INJECTION, SOLUTION INTRAVENOUS CONTINUOUS PRN
Status: DISCONTINUED | OUTPATIENT
Start: 2023-06-30 | End: 2023-07-10 | Stop reason: HOSPADM

## 2023-06-30 RX ORDER — SODIUM CHLORIDE 0.9 % (FLUSH) 0.9 %
5-40 SYRINGE (ML) INJECTION EVERY 12 HOURS SCHEDULED
Status: DISCONTINUED | OUTPATIENT
Start: 2023-06-30 | End: 2023-07-10 | Stop reason: HOSPADM

## 2023-06-30 RX ORDER — FENTANYL CITRATE 0.05 MG/ML
INJECTION, SOLUTION INTRAMUSCULAR; INTRAVENOUS PRN
Status: DISCONTINUED | OUTPATIENT
Start: 2023-06-30 | End: 2023-06-30 | Stop reason: SDUPTHER

## 2023-06-30 RX ORDER — IPRATROPIUM BROMIDE AND ALBUTEROL SULFATE 2.5; .5 MG/3ML; MG/3ML
1 SOLUTION RESPIRATORY (INHALATION)
Status: DISCONTINUED | OUTPATIENT
Start: 2023-06-30 | End: 2023-06-30

## 2023-06-30 RX ORDER — GUAIFENESIN/DEXTROMETHORPHAN 100-10MG/5
5 SYRUP ORAL EVERY 4 HOURS PRN
Status: DISCONTINUED | OUTPATIENT
Start: 2023-06-30 | End: 2023-07-10 | Stop reason: HOSPADM

## 2023-06-30 RX ORDER — SODIUM CHLORIDE 0.9 % (FLUSH) 0.9 %
5-40 SYRINGE (ML) INJECTION EVERY 12 HOURS SCHEDULED
Status: DISCONTINUED | OUTPATIENT
Start: 2023-06-30 | End: 2023-06-30 | Stop reason: HOSPADM

## 2023-06-30 RX ORDER — PROTAMINE SULFATE 10 MG/ML
INJECTION, SOLUTION INTRAVENOUS PRN
Status: DISCONTINUED | OUTPATIENT
Start: 2023-06-30 | End: 2023-06-30 | Stop reason: SDUPTHER

## 2023-06-30 RX ORDER — PROTAMINE SULFATE 10 MG/ML
50 INJECTION, SOLUTION INTRAVENOUS
Status: ACTIVE | OUTPATIENT
Start: 2023-06-30 | End: 2023-07-01

## 2023-06-30 RX ORDER — FAMOTIDINE 20 MG/1
20 TABLET, FILM COATED ORAL 2 TIMES DAILY
Status: DISCONTINUED | OUTPATIENT
Start: 2023-06-30 | End: 2023-07-10 | Stop reason: HOSPADM

## 2023-06-30 RX ORDER — MIDAZOLAM HYDROCHLORIDE 1 MG/ML
INJECTION INTRAMUSCULAR; INTRAVENOUS PRN
Status: DISCONTINUED | OUTPATIENT
Start: 2023-06-30 | End: 2023-06-30 | Stop reason: SDUPTHER

## 2023-06-30 RX ORDER — ONDANSETRON 2 MG/ML
4 INJECTION INTRAMUSCULAR; INTRAVENOUS EVERY 6 HOURS PRN
Status: DISCONTINUED | OUTPATIENT
Start: 2023-06-30 | End: 2023-07-10 | Stop reason: HOSPADM

## 2023-06-30 RX ADMIN — PROPOFOL 20 MCG/KG/MIN: 10 INJECTION, EMULSION INTRAVENOUS at 23:24

## 2023-06-30 RX ADMIN — FENTANYL CITRATE 100 MCG: 50 INJECTION, SOLUTION INTRAMUSCULAR; INTRAVENOUS at 13:17

## 2023-06-30 RX ADMIN — MIDAZOLAM 2 MG: 1 INJECTION INTRAMUSCULAR; INTRAVENOUS at 12:51

## 2023-06-30 RX ADMIN — VECURONIUM BROMIDE 2 MG: 1 INJECTION, POWDER, LYOPHILIZED, FOR SOLUTION INTRAVENOUS at 13:44

## 2023-06-30 RX ADMIN — FENTANYL CITRATE 100 MCG: 50 INJECTION, SOLUTION INTRAMUSCULAR; INTRAVENOUS at 15:35

## 2023-06-30 RX ADMIN — FAMOTIDINE 20 MG: 10 INJECTION, SOLUTION INTRAVENOUS at 20:56

## 2023-06-30 RX ADMIN — CEFAZOLIN 2000 MG: 2 INJECTION, POWDER, FOR SOLUTION INTRAMUSCULAR; INTRAVENOUS at 22:36

## 2023-06-30 RX ADMIN — Medication: at 20:56

## 2023-06-30 RX ADMIN — PROTAMINE SULFATE 450 MG: 10 INJECTION, SOLUTION INTRAVENOUS at 15:29

## 2023-06-30 RX ADMIN — Medication: at 10:41

## 2023-06-30 RX ADMIN — FENTANYL CITRATE 100 MCG: 50 INJECTION, SOLUTION INTRAMUSCULAR; INTRAVENOUS at 13:48

## 2023-06-30 RX ADMIN — SODIUM CHLORIDE, PRESERVATIVE FREE 10 ML: 5 INJECTION INTRAVENOUS at 20:56

## 2023-06-30 RX ADMIN — SODIUM CHLORIDE: 9 INJECTION, SOLUTION INTRAVENOUS at 17:10

## 2023-06-30 RX ADMIN — VECURONIUM BROMIDE 5 MG: 1 INJECTION, POWDER, LYOPHILIZED, FOR SOLUTION INTRAVENOUS at 13:17

## 2023-06-30 RX ADMIN — MIDAZOLAM 2 MG: 1 INJECTION INTRAMUSCULAR; INTRAVENOUS at 14:17

## 2023-06-30 RX ADMIN — CHLORHEXIDINE GLUCONATE 0.12% ORAL RINSE 15 ML: 1.2 LIQUID ORAL at 20:56

## 2023-06-30 RX ADMIN — NITROGLYCERIN 50 MCG: 20 INJECTION INTRAVENOUS at 15:31

## 2023-06-30 RX ADMIN — ROCURONIUM BROMIDE 100 MG: 10 INJECTION INTRAVENOUS at 12:54

## 2023-06-30 RX ADMIN — LIDOCAINE HYDROCHLORIDE 100 MG: 20 INJECTION, SOLUTION INTRAVENOUS at 12:54

## 2023-06-30 RX ADMIN — PROPOFOL 120 MG: 10 INJECTION, EMULSION INTRAVENOUS at 12:54

## 2023-06-30 RX ADMIN — ALBUMIN (HUMAN) 25 G: 12.5 INJECTION, SOLUTION INTRAVENOUS at 19:01

## 2023-06-30 RX ADMIN — NITROGLYCERIN 100 MCG: 20 INJECTION INTRAVENOUS at 13:54

## 2023-06-30 RX ADMIN — HEPARIN SODIUM 40000 UNITS: 1000 INJECTION INTRAVENOUS; SUBCUTANEOUS at 13:49

## 2023-06-30 RX ADMIN — FENTANYL CITRATE 200 MCG: 50 INJECTION, SOLUTION INTRAMUSCULAR; INTRAVENOUS at 13:42

## 2023-06-30 RX ADMIN — CHLORHEXIDINE GLUCONATE 0.12% ORAL RINSE 15 ML: 1.2 LIQUID ORAL at 10:41

## 2023-06-30 RX ADMIN — BUDESONIDE AND FORMOTEROL FUMARATE DIHYDRATE 2 PUFF: 160; 4.5 AEROSOL RESPIRATORY (INHALATION) at 20:00

## 2023-06-30 RX ADMIN — MORPHINE SULFATE 2 MG: 2 INJECTION, SOLUTION INTRAMUSCULAR; INTRAVENOUS at 18:29

## 2023-06-30 RX ADMIN — NITROGLYCERIN 200 MCG: 20 INJECTION INTRAVENOUS at 13:57

## 2023-06-30 RX ADMIN — CEFAZOLIN 2000 MG: 2 INJECTION, POWDER, FOR SOLUTION INTRAMUSCULAR; INTRAVENOUS at 12:59

## 2023-06-30 RX ADMIN — ALBUMIN (HUMAN) 25 G: 12.5 INJECTION, SOLUTION INTRAVENOUS at 23:40

## 2023-06-30 RX ADMIN — AMINOCAPROIC ACID 5 G/HR: 250 INJECTION, SOLUTION INTRAVENOUS at 13:35

## 2023-06-30 RX ADMIN — NITROGLYCERIN 100 MCG: 20 INJECTION INTRAVENOUS at 13:48

## 2023-06-30 RX ADMIN — PROPOFOL 30 MG: 10 INJECTION, EMULSION INTRAVENOUS at 13:48

## 2023-06-30 RX ADMIN — PROTAMINE SULFATE 50 MG: 10 INJECTION, SOLUTION INTRAVENOUS at 20:51

## 2023-06-30 RX ADMIN — PROTAMINE SULFATE 25 MG: 10 INJECTION, SOLUTION INTRAVENOUS at 15:52

## 2023-06-30 RX ADMIN — NITROGLYCERIN 50 MCG: 20 INJECTION INTRAVENOUS at 15:34

## 2023-06-30 RX ADMIN — MORPHINE SULFATE 2 MG: 2 INJECTION, SOLUTION INTRAMUSCULAR; INTRAVENOUS at 21:01

## 2023-06-30 RX ADMIN — NITROGLYCERIN 200 MCG: 20 INJECTION INTRAVENOUS at 14:00

## 2023-06-30 RX ADMIN — SODIUM CHLORIDE: 9 INJECTION, SOLUTION INTRAVENOUS at 10:42

## 2023-06-30 RX ADMIN — PHENYLEPHRINE HYDROCHLORIDE 10 MCG/MIN: 10 INJECTION INTRAVENOUS at 18:44

## 2023-06-30 RX ADMIN — ALBUMIN (HUMAN) 12.5 G: 12.5 INJECTION, SOLUTION INTRAVENOUS at 17:36

## 2023-06-30 RX ADMIN — FENTANYL CITRATE 200 MCG: 50 INJECTION, SOLUTION INTRAMUSCULAR; INTRAVENOUS at 13:45

## 2023-06-30 RX ADMIN — VANCOMYCIN HYDROCHLORIDE 1000 MG: 1 INJECTION, POWDER, LYOPHILIZED, FOR SOLUTION INTRAVENOUS at 13:12

## 2023-06-30 RX ADMIN — IPRATROPIUM BROMIDE AND ALBUTEROL SULFATE 1 DOSE: .5; 2.5 SOLUTION RESPIRATORY (INHALATION) at 19:45

## 2023-06-30 RX ADMIN — METOPROLOL TARTRATE 12.5 MG: 25 TABLET, FILM COATED ORAL at 10:41

## 2023-06-30 RX ADMIN — ALBUMIN (HUMAN) 12.5 G: 12.5 INJECTION, SOLUTION INTRAVENOUS at 17:53

## 2023-06-30 RX ADMIN — NITROGLYCERIN 150 MCG: 20 INJECTION INTRAVENOUS at 13:51

## 2023-06-30 RX ADMIN — PROPOFOL 20 MG: 10 INJECTION, EMULSION INTRAVENOUS at 13:45

## 2023-06-30 RX ADMIN — VECURONIUM BROMIDE 3 MG: 1 INJECTION, POWDER, LYOPHILIZED, FOR SOLUTION INTRAVENOUS at 14:16

## 2023-06-30 RX ADMIN — CHLORHEXIDINE GLUCONATE: 4 LIQUID TOPICAL at 10:37

## 2023-06-30 ASSESSMENT — PULMONARY FUNCTION TESTS
PIF_VALUE: 26
PIF_VALUE: 27
PIF_VALUE: 26
PIF_VALUE: 21
PIF_VALUE: 24
PIF_VALUE: 28
PIF_VALUE: 27
PIF_VALUE: 26
PIF_VALUE: 27
PIF_VALUE: 23
PIF_VALUE: 25
PIF_VALUE: 26
PIF_VALUE: 25
PIF_VALUE: 25

## 2023-06-30 ASSESSMENT — COPD QUESTIONNAIRES: CAT_SEVERITY: SEVERE

## 2023-06-30 ASSESSMENT — PAIN - FUNCTIONAL ASSESSMENT: PAIN_FUNCTIONAL_ASSESSMENT: 0-10

## 2023-06-30 ASSESSMENT — ENCOUNTER SYMPTOMS: SHORTNESS OF BREATH: 1

## 2023-07-01 ENCOUNTER — APPOINTMENT (OUTPATIENT)
Dept: GENERAL RADIOLOGY | Age: 63
DRG: 219 | End: 2023-07-01
Attending: THORACIC SURGERY (CARDIOTHORACIC VASCULAR SURGERY)
Payer: COMMERCIAL

## 2023-07-01 LAB
ANION GAP SERPL CALCULATED.3IONS-SCNC: 12 MMOL/L (ref 4–16)
ANION GAP SERPL CALCULATED.3IONS-SCNC: 8 MMOL/L (ref 4–16)
ANION GAP SERPL CALCULATED.3IONS-SCNC: 9 MMOL/L (ref 4–16)
APTT: 31.6 SECONDS (ref 25.1–37.1)
BASE EXCESS MIXED: 0.3 (ref 0–1.2)
BASE EXCESS MIXED: 0.4 (ref 0–1.2)
BASE EXCESS MIXED: 0.6 (ref 0–1.2)
BASE EXCESS MIXED: 1.5 (ref 0–1.2)
BASE EXCESS MIXED: 1.5 (ref 0–1.2)
BASE EXCESS MIXED: 5 (ref 0–1.2)
BASE EXCESS MIXED: 6.8 (ref 0–1.2)
BASE EXCESS: ABNORMAL (ref 0–3.3)
BUN SERPL-MCNC: 16 MG/DL (ref 6–23)
BUN SERPL-MCNC: 16 MG/DL (ref 6–23)
BUN SERPL-MCNC: 17 MG/DL (ref 6–23)
CALCIUM IONIZED: 4.44 MG/DL (ref 4.48–5.28)
CALCIUM IONIZED: 4.48 MG/DL (ref 4.48–5.28)
CALCIUM SERPL-MCNC: 7.5 MG/DL (ref 8.3–10.6)
CALCIUM SERPL-MCNC: 7.7 MG/DL (ref 8.3–10.6)
CALCIUM SERPL-MCNC: 7.9 MG/DL (ref 8.3–10.6)
CARBON MONOXIDE, BLOOD: 2.8 % (ref 0–5)
CHLORIDE BLD-SCNC: 107 MMOL/L (ref 99–110)
CHLORIDE BLD-SCNC: 108 MMOL/L (ref 99–110)
CHLORIDE BLD-SCNC: 109 MMOL/L (ref 99–110)
CO2 CONTENT: 29.2 MMOL/L (ref 19–24)
CO2: 19 MMOL/L (ref 21–32)
CO2: 21 MMOL/L (ref 21–32)
CO2: 23 MMOL/L (ref 21–32)
CO2: 24 MMOL/L (ref 21–32)
CO2: 25 MMOL/L (ref 21–32)
CO2: 27 MMOL/L (ref 21–32)
CO2: 27 MMOL/L (ref 21–32)
CO2: 28 MMOL/L (ref 21–32)
CO2: 29 MMOL/L (ref 21–32)
CREAT SERPL-MCNC: 0.7 MG/DL (ref 0.9–1.3)
CREAT SERPL-MCNC: 0.8 MG/DL (ref 0.9–1.3)
CREAT SERPL-MCNC: 0.8 MG/DL (ref 0.9–1.3)
EGFR, POC: >60 ML/MIN/1.73M2
FIBRINOGEN LEVEL: 347 MG/DL (ref 170–540)
GFR SERPL CREATININE-BSD FRML MDRD: >60 ML/MIN/1.73M2
GLUCOSE BLD-MCNC: 103 MG/DL (ref 70–99)
GLUCOSE BLD-MCNC: 103 MG/DL (ref 70–99)
GLUCOSE BLD-MCNC: 117 MG/DL (ref 70–99)
GLUCOSE BLD-MCNC: 129 MG/DL (ref 70–99)
GLUCOSE BLD-MCNC: 73 MG/DL (ref 70–99)
GLUCOSE BLD-MCNC: 80 MG/DL (ref 70–99)
GLUCOSE BLD-MCNC: 82 MG/DL (ref 70–99)
GLUCOSE BLD-MCNC: 83 MG/DL (ref 70–99)
GLUCOSE BLD-MCNC: 85 MG/DL (ref 70–99)
GLUCOSE BLD-MCNC: 86 MG/DL (ref 70–99)
GLUCOSE BLD-MCNC: 87 MG/DL (ref 70–99)
GLUCOSE BLD-MCNC: 92 MG/DL (ref 70–99)
GLUCOSE BLD-MCNC: 96 MG/DL (ref 70–99)
GLUCOSE BLD-MCNC: 97 MG/DL (ref 70–99)
GLUCOSE SERPL-MCNC: 115 MG/DL (ref 70–99)
GLUCOSE SERPL-MCNC: 117 MG/DL (ref 70–99)
GLUCOSE SERPL-MCNC: 86 MG/DL (ref 70–99)
HCO3 ARTERIAL: 17.9 MMOL/L (ref 18–23)
HCO3 ARTERIAL: 19.6 MMOL/L (ref 18–23)
HCO3 ARTERIAL: 25.4 MMOL/L (ref 18–23)
HCO3 ARTERIAL: 25.7 MMOL/L (ref 18–23)
HCO3 ARTERIAL: 26.6 MMOL/L (ref 18–23)
HCO3 ARTERIAL: 27.2 MMOL/L (ref 18–23)
HCO3 ARTERIAL: 27.7 MMOL/L (ref 18–23)
HCT VFR BLD CALC: 30 % (ref 42–52)
HCT VFR BLD CALC: 31 % (ref 42–52)
HCT VFR BLD CALC: 38.2 % (ref 42–52)
HCT VFR BLD CALC: 40 % (ref 42–52)
HCT VFR BLD CALC: 41.4 % (ref 42–52)
HCT VFR BLD CALC: 42 % (ref 42–52)
HEMOGLOBIN: 10.1 GM/DL (ref 13.5–18)
HEMOGLOBIN: 10.6 GM/DL (ref 13.5–18)
HEMOGLOBIN: 11.8 GM/DL (ref 13.5–18)
HEMOGLOBIN: 12.9 GM/DL (ref 13.5–18)
HEMOGLOBIN: 13.6 GM/DL (ref 13.5–18)
HEMOGLOBIN: 13.7 GM/DL (ref 13.5–18)
HEMOGLOBIN: 13.8 GM/DL (ref 13.5–18)
HEMOGLOBIN: 14.4 GM/DL (ref 13.5–18)
INR BLD: 1.3 INDEX
IONIZED CA: 1.11 MMOL/L (ref 1.12–1.32)
IONIZED CA: 1.12 MMOL/L (ref 1.12–1.32)
MAGNESIUM: 2.2 MG/DL (ref 1.8–2.4)
MAGNESIUM: 2.3 MG/DL (ref 1.8–2.4)
MCH RBC QN AUTO: 28.7 PG (ref 27–31)
MCH RBC QN AUTO: 29 PG (ref 27–31)
MCHC RBC AUTO-ENTMCNC: 30.9 % (ref 32–36)
MCHC RBC AUTO-ENTMCNC: 31.2 % (ref 32–36)
MCV RBC AUTO: 92.9 FL (ref 78–100)
MCV RBC AUTO: 93 FL (ref 78–100)
METHEMOGLOBIN ARTERIAL: 1.4 %
O2 SATURATION: 87.3 % (ref 96–97)
O2 SATURATION: 92.1 % (ref 96–97)
O2 SATURATION: 92.3 % (ref 96–97)
O2 SATURATION: 92.5 % (ref 96–97)
O2 SATURATION: 92.8 % (ref 96–97)
O2 SATURATION: 93.8 % (ref 96–97)
O2 SATURATION: 94.5 % (ref 96–97)
PCO2 ARTERIAL: 32 MMHG (ref 32–45)
PCO2 ARTERIAL: 33.6 MMHG (ref 32–45)
PCO2 ARTERIAL: 42.8 MMHG (ref 32–45)
PCO2 ARTERIAL: 43.2 MMHG (ref 32–45)
PCO2 ARTERIAL: 46.1 MMHG (ref 32–45)
PCO2 ARTERIAL: 46.6 MMHG (ref 32–45)
PCO2 ARTERIAL: 49 MMHG (ref 32–45)
PDW BLD-RTO: 13.2 % (ref 11.7–14.9)
PDW BLD-RTO: 13.2 % (ref 11.7–14.9)
PH BLOOD: 7.35 (ref 7.34–7.45)
PH BLOOD: 7.36 (ref 7.34–7.45)
PH BLOOD: 7.37 (ref 7.34–7.45)
PH BLOOD: 7.37 (ref 7.34–7.45)
PH BLOOD: 7.38 (ref 7.34–7.45)
PH BLOOD: 7.38 (ref 7.34–7.45)
PH BLOOD: 7.39 (ref 7.34–7.45)
PHOSPHORUS: 2 MG/DL (ref 2.5–4.9)
PHOSPHORUS: 2 MG/DL (ref 2.5–4.9)
PLATELET # BLD: 117 K/CU MM (ref 140–440)
PLATELET # BLD: 159 K/CU MM (ref 140–440)
PMV BLD AUTO: 10.8 FL (ref 7.5–11.1)
PMV BLD AUTO: 10.8 FL (ref 7.5–11.1)
PO2 ARTERIAL: 55 MMHG (ref 75–100)
PO2 ARTERIAL: 65.6 MMHG (ref 75–100)
PO2 ARTERIAL: 66.3 MMHG (ref 75–100)
PO2 ARTERIAL: 66.7 MMHG (ref 75–100)
PO2 ARTERIAL: 69.2 MMHG (ref 75–100)
PO2 ARTERIAL: 71.3 MMHG (ref 75–100)
PO2 ARTERIAL: 87 MMHG (ref 75–100)
POC CALCIUM: 0.89 MMOL/L (ref 1.12–1.32)
POC CALCIUM: 0.97 MMOL/L (ref 1.12–1.32)
POC CALCIUM: 1.17 MMOL/L (ref 1.12–1.32)
POC CALCIUM: 1.17 MMOL/L (ref 1.12–1.32)
POC CALCIUM: 1.18 MMOL/L (ref 1.12–1.32)
POC CALCIUM: 1.19 MMOL/L (ref 1.12–1.32)
POC CHLORIDE: 112 MMOL/L (ref 98–109)
POC CHLORIDE: 112 MMOL/L (ref 98–109)
POC CHLORIDE: 113 MMOL/L (ref 98–109)
POC CHLORIDE: 120 MMOL/L (ref 98–109)
POC CHLORIDE: 121 MMOL/L (ref 98–109)
POC CREATININE: 0.5 MG/DL (ref 0.9–1.3)
POC CREATININE: 0.6 MG/DL (ref 0.9–1.3)
POC CREATININE: 0.7 MG/DL (ref 0.9–1.3)
POC CREATININE: 0.8 MG/DL (ref 0.9–1.3)
POC CREATININE: 0.9 MG/DL (ref 0.9–1.3)
POC CREATININE: 0.9 MG/DL (ref 0.9–1.3)
POTASSIUM SERPL-SCNC: 2.7 MMOL/L (ref 3.5–4.5)
POTASSIUM SERPL-SCNC: 3 MMOL/L (ref 3.5–4.5)
POTASSIUM SERPL-SCNC: 4.1 MMOL/L (ref 3.5–4.5)
POTASSIUM SERPL-SCNC: 4.1 MMOL/L (ref 3.5–4.5)
POTASSIUM SERPL-SCNC: 4.2 MMOL/L (ref 3.5–4.5)
POTASSIUM SERPL-SCNC: 4.2 MMOL/L (ref 3.5–4.5)
POTASSIUM SERPL-SCNC: 4.5 MMOL/L (ref 3.5–5.1)
POTASSIUM SERPL-SCNC: 4.5 MMOL/L (ref 3.5–5.1)
POTASSIUM SERPL-SCNC: 4.8 MMOL/L (ref 3.5–5.1)
PROTHROMBIN TIME: 16.6 SECONDS (ref 11.7–14.5)
RBC # BLD: 4.11 M/CU MM (ref 4.6–6.2)
RBC # BLD: 4.45 M/CU MM (ref 4.6–6.2)
SODIUM BLD-SCNC: 140 MMOL/L (ref 135–145)
SODIUM BLD-SCNC: 142 MMOL/L (ref 135–145)
SODIUM BLD-SCNC: 143 MMOL/L (ref 135–145)
SODIUM BLD-SCNC: 144 MMOL/L (ref 138–146)
SODIUM BLD-SCNC: 144 MMOL/L (ref 138–146)
SODIUM BLD-SCNC: 145 MMOL/L (ref 138–146)
SODIUM BLD-SCNC: 146 MMOL/L (ref 138–146)
SODIUM BLD-SCNC: 148 MMOL/L (ref 138–146)
SODIUM BLD-SCNC: 150 MMOL/L (ref 138–146)
SOURCE, BLOOD GAS: ABNORMAL
WBC # BLD: 12 K/CU MM (ref 4–10.5)
WBC # BLD: 8 K/CU MM (ref 4–10.5)

## 2023-07-01 PROCEDURE — 2580000003 HC RX 258: Performed by: PHYSICIAN ASSISTANT

## 2023-07-01 PROCEDURE — 2100000000 HC CCU R&B

## 2023-07-01 PROCEDURE — 82330 ASSAY OF CALCIUM: CPT

## 2023-07-01 PROCEDURE — 94640 AIRWAY INHALATION TREATMENT: CPT

## 2023-07-01 PROCEDURE — 6370000000 HC RX 637 (ALT 250 FOR IP): Performed by: PHYSICIAN ASSISTANT

## 2023-07-01 PROCEDURE — 85384 FIBRINOGEN ACTIVITY: CPT

## 2023-07-01 PROCEDURE — 80048 BASIC METABOLIC PNL TOTAL CA: CPT

## 2023-07-01 PROCEDURE — 6360000002 HC RX W HCPCS: Performed by: PHYSICIAN ASSISTANT

## 2023-07-01 PROCEDURE — P9045 ALBUMIN (HUMAN), 5%, 250 ML: HCPCS | Performed by: PHYSICIAN ASSISTANT

## 2023-07-01 PROCEDURE — 94761 N-INVAS EAR/PLS OXIMETRY MLT: CPT

## 2023-07-01 PROCEDURE — 83735 ASSAY OF MAGNESIUM: CPT

## 2023-07-01 PROCEDURE — 71045 X-RAY EXAM CHEST 1 VIEW: CPT

## 2023-07-01 PROCEDURE — 2700000000 HC OXYGEN THERAPY PER DAY

## 2023-07-01 PROCEDURE — 99222 1ST HOSP IP/OBS MODERATE 55: CPT | Performed by: INTERNAL MEDICINE

## 2023-07-01 PROCEDURE — 82565 ASSAY OF CREATININE: CPT

## 2023-07-01 PROCEDURE — 82962 GLUCOSE BLOOD TEST: CPT

## 2023-07-01 PROCEDURE — 94003 VENT MGMT INPAT SUBQ DAY: CPT

## 2023-07-01 PROCEDURE — 84100 ASSAY OF PHOSPHORUS: CPT

## 2023-07-01 PROCEDURE — 37799 UNLISTED PX VASCULAR SURGERY: CPT

## 2023-07-01 PROCEDURE — 5A09457 ASSISTANCE WITH RESPIRATORY VENTILATION, 24-96 CONSECUTIVE HOURS, CONTINUOUS POSITIVE AIRWAY PRESSURE: ICD-10-PCS | Performed by: FAMILY MEDICINE

## 2023-07-01 PROCEDURE — 80051 ELECTROLYTE PANEL: CPT

## 2023-07-01 PROCEDURE — 2500000003 HC RX 250 WO HCPCS: Performed by: PHYSICIAN ASSISTANT

## 2023-07-01 PROCEDURE — 82803 BLOOD GASES ANY COMBINATION: CPT

## 2023-07-01 PROCEDURE — 85018 HEMOGLOBIN: CPT

## 2023-07-01 PROCEDURE — 6370000000 HC RX 637 (ALT 250 FOR IP): Performed by: STUDENT IN AN ORGANIZED HEALTH CARE EDUCATION/TRAINING PROGRAM

## 2023-07-01 PROCEDURE — 85027 COMPLETE CBC AUTOMATED: CPT

## 2023-07-01 PROCEDURE — 85610 PROTHROMBIN TIME: CPT

## 2023-07-01 PROCEDURE — 85730 THROMBOPLASTIN TIME PARTIAL: CPT

## 2023-07-01 PROCEDURE — 85014 HEMATOCRIT: CPT

## 2023-07-01 PROCEDURE — 94660 CPAP INITIATION&MGMT: CPT

## 2023-07-01 RX ORDER — FUROSEMIDE 10 MG/ML
20 INJECTION INTRAMUSCULAR; INTRAVENOUS ONCE
Status: DISCONTINUED | OUTPATIENT
Start: 2023-07-01 | End: 2023-07-03

## 2023-07-01 RX ORDER — MAGNESIUM SULFATE IN WATER 40 MG/ML
2000 INJECTION, SOLUTION INTRAVENOUS ONCE
Status: COMPLETED | OUTPATIENT
Start: 2023-07-01 | End: 2023-07-01

## 2023-07-01 RX ORDER — INSULIN LISPRO 100 [IU]/ML
0-8 INJECTION, SOLUTION INTRAVENOUS; SUBCUTANEOUS EVERY 4 HOURS
Status: DISCONTINUED | OUTPATIENT
Start: 2023-07-01 | End: 2023-07-02

## 2023-07-01 RX ORDER — OXYCODONE HYDROCHLORIDE 5 MG/1
5 TABLET ORAL EVERY 4 HOURS PRN
Status: DISCONTINUED | OUTPATIENT
Start: 2023-07-01 | End: 2023-07-10

## 2023-07-01 RX ADMIN — CHLORHEXIDINE GLUCONATE 0.12% ORAL RINSE 15 ML: 1.2 LIQUID ORAL at 20:35

## 2023-07-01 RX ADMIN — SODIUM CHLORIDE, PRESERVATIVE FREE 10 ML: 5 INJECTION INTRAVENOUS at 10:00

## 2023-07-01 RX ADMIN — ENOXAPARIN SODIUM 30 MG: 100 INJECTION SUBCUTANEOUS at 20:35

## 2023-07-01 RX ADMIN — CLOPIDOGREL BISULFATE 75 MG: 75 TABLET ORAL at 09:59

## 2023-07-01 RX ADMIN — MORPHINE SULFATE 2 MG: 2 INJECTION, SOLUTION INTRAMUSCULAR; INTRAVENOUS at 04:09

## 2023-07-01 RX ADMIN — METOPROLOL TARTRATE 12.5 MG: 25 TABLET, FILM COATED ORAL at 20:36

## 2023-07-01 RX ADMIN — CEFAZOLIN 2000 MG: 2 INJECTION, POWDER, FOR SOLUTION INTRAMUSCULAR; INTRAVENOUS at 06:48

## 2023-07-01 RX ADMIN — IPRATROPIUM BROMIDE AND ALBUTEROL SULFATE 1 DOSE: 2.5; .5 SOLUTION RESPIRATORY (INHALATION) at 11:07

## 2023-07-01 RX ADMIN — IPRATROPIUM BROMIDE AND ALBUTEROL SULFATE 1 DOSE: 2.5; .5 SOLUTION RESPIRATORY (INHALATION) at 19:29

## 2023-07-01 RX ADMIN — BUDESONIDE AND FORMOTEROL FUMARATE DIHYDRATE 2 PUFF: 160; 4.5 AEROSOL RESPIRATORY (INHALATION) at 07:49

## 2023-07-01 RX ADMIN — ASPIRIN 81 MG: 81 TABLET, CHEWABLE ORAL at 09:59

## 2023-07-01 RX ADMIN — MORPHINE SULFATE 2 MG: 2 INJECTION, SOLUTION INTRAMUSCULAR; INTRAVENOUS at 00:04

## 2023-07-01 RX ADMIN — ACETAMINOPHEN 650 MG: 325 TABLET ORAL at 16:26

## 2023-07-01 RX ADMIN — IPRATROPIUM BROMIDE AND ALBUTEROL SULFATE 1 DOSE: 2.5; .5 SOLUTION RESPIRATORY (INHALATION) at 01:41

## 2023-07-01 RX ADMIN — FAMOTIDINE 20 MG: 10 INJECTION, SOLUTION INTRAVENOUS at 10:00

## 2023-07-01 RX ADMIN — ACETAMINOPHEN 650 MG: 325 TABLET ORAL at 23:14

## 2023-07-01 RX ADMIN — CEFAZOLIN 2000 MG: 2 INJECTION, POWDER, FOR SOLUTION INTRAMUSCULAR; INTRAVENOUS at 13:24

## 2023-07-01 RX ADMIN — POLYETHYLENE GLYCOL (3350) 17 G: 17 POWDER, FOR SOLUTION ORAL at 09:58

## 2023-07-01 RX ADMIN — ENOXAPARIN SODIUM 30 MG: 100 INJECTION SUBCUTANEOUS at 09:30

## 2023-07-01 RX ADMIN — ALBUMIN (HUMAN) 25 G: 12.5 INJECTION, SOLUTION INTRAVENOUS at 21:41

## 2023-07-01 RX ADMIN — DEXMEDETOMIDINE HYDROCHLORIDE 0.2 MCG/KG/HR: 4 INJECTION, SOLUTION INTRAVENOUS at 01:38

## 2023-07-01 RX ADMIN — IPRATROPIUM BROMIDE AND ALBUTEROL SULFATE 1 DOSE: 2.5; .5 SOLUTION RESPIRATORY (INHALATION) at 07:49

## 2023-07-01 RX ADMIN — IPRATROPIUM BROMIDE AND ALBUTEROL SULFATE 1 DOSE: 2.5; .5 SOLUTION RESPIRATORY (INHALATION) at 15:20

## 2023-07-01 RX ADMIN — ALBUMIN (HUMAN) 25 G: 12.5 INJECTION, SOLUTION INTRAVENOUS at 08:55

## 2023-07-01 RX ADMIN — Medication: at 20:35

## 2023-07-01 RX ADMIN — OXYCODONE HYDROCHLORIDE 5 MG: 5 TABLET ORAL at 20:35

## 2023-07-01 RX ADMIN — ACETAMINOPHEN 650 MG: 325 TABLET ORAL at 10:51

## 2023-07-01 RX ADMIN — Medication: at 09:58

## 2023-07-01 RX ADMIN — VANCOMYCIN HYDROCHLORIDE 1500 MG: 5 INJECTION, POWDER, LYOPHILIZED, FOR SOLUTION INTRAVENOUS at 14:00

## 2023-07-01 RX ADMIN — CHLORHEXIDINE GLUCONATE 0.12% ORAL RINSE 15 ML: 1.2 LIQUID ORAL at 09:58

## 2023-07-01 RX ADMIN — MULTIPLE VITAMINS W/ MINERALS TAB 1 TABLET: TAB at 09:59

## 2023-07-01 RX ADMIN — VANCOMYCIN HYDROCHLORIDE 1500 MG: 5 INJECTION, POWDER, LYOPHILIZED, FOR SOLUTION INTRAVENOUS at 04:12

## 2023-07-01 RX ADMIN — CEFAZOLIN 2000 MG: 2 INJECTION, POWDER, FOR SOLUTION INTRAMUSCULAR; INTRAVENOUS at 20:56

## 2023-07-01 RX ADMIN — IPRATROPIUM BROMIDE AND ALBUTEROL SULFATE 1 DOSE: 2.5; .5 SOLUTION RESPIRATORY (INHALATION) at 04:59

## 2023-07-01 RX ADMIN — DEXMEDETOMIDINE HYDROCHLORIDE 0.4 MCG/KG/HR: 4 INJECTION, SOLUTION INTRAVENOUS at 08:05

## 2023-07-01 RX ADMIN — SENNOSIDES AND DOCUSATE SODIUM 1 TABLET: 50; 8.6 TABLET ORAL at 09:59

## 2023-07-01 RX ADMIN — FAMOTIDINE 20 MG: 20 TABLET, FILM COATED ORAL at 20:36

## 2023-07-01 RX ADMIN — MAGNESIUM SULFATE HEPTAHYDRATE 2000 MG: 40 INJECTION, SOLUTION INTRAVENOUS at 15:46

## 2023-07-01 RX ADMIN — METOPROLOL TARTRATE 12.5 MG: 25 TABLET, FILM COATED ORAL at 14:13

## 2023-07-01 RX ADMIN — ALBUMIN (HUMAN) 25 G: 12.5 INJECTION, SOLUTION INTRAVENOUS at 06:30

## 2023-07-01 RX ADMIN — IPRATROPIUM BROMIDE AND ALBUTEROL SULFATE 1 DOSE: 2.5; .5 SOLUTION RESPIRATORY (INHALATION) at 23:24

## 2023-07-01 RX ADMIN — SENNOSIDES AND DOCUSATE SODIUM 1 TABLET: 50; 8.6 TABLET ORAL at 20:35

## 2023-07-01 RX ADMIN — ATORVASTATIN CALCIUM 40 MG: 40 TABLET, FILM COATED ORAL at 20:35

## 2023-07-01 RX ADMIN — SODIUM CHLORIDE, PRESERVATIVE FREE 10 ML: 5 INJECTION INTRAVENOUS at 21:01

## 2023-07-01 RX ADMIN — MORPHINE SULFATE 2 MG: 2 INJECTION, SOLUTION INTRAMUSCULAR; INTRAVENOUS at 06:13

## 2023-07-01 ASSESSMENT — PAIN DESCRIPTION - DESCRIPTORS
DESCRIPTORS: SORE
DESCRIPTORS: DISCOMFORT;SORE
DESCRIPTORS: SORE;DISCOMFORT

## 2023-07-01 ASSESSMENT — PAIN DESCRIPTION - LOCATION
LOCATION: CHEST

## 2023-07-01 ASSESSMENT — PAIN SCALES - GENERAL
PAINLEVEL_OUTOF10: 10
PAINLEVEL_OUTOF10: 2
PAINLEVEL_OUTOF10: 2
PAINLEVEL_OUTOF10: 8
PAINLEVEL_OUTOF10: 6
PAINLEVEL_OUTOF10: 0

## 2023-07-01 ASSESSMENT — PULMONARY FUNCTION TESTS
PIF_VALUE: 15
PIF_VALUE: 16
PIF_VALUE: 15
PIF_VALUE: 26
PIF_VALUE: 23
PIF_VALUE: 17
PIF_VALUE: 16
PIF_VALUE: 24
PIF_VALUE: 15

## 2023-07-01 ASSESSMENT — PAIN DESCRIPTION - FREQUENCY: FREQUENCY: CONTINUOUS

## 2023-07-01 ASSESSMENT — PAIN - FUNCTIONAL ASSESSMENT
PAIN_FUNCTIONAL_ASSESSMENT: ACTIVITIES ARE NOT PREVENTED

## 2023-07-01 ASSESSMENT — PAIN DESCRIPTION - PAIN TYPE: TYPE: SURGICAL PAIN

## 2023-07-01 ASSESSMENT — PAIN DESCRIPTION - ONSET: ONSET: ON-GOING

## 2023-07-01 ASSESSMENT — PAIN DESCRIPTION - ORIENTATION
ORIENTATION: MID

## 2023-07-02 ENCOUNTER — APPOINTMENT (OUTPATIENT)
Dept: GENERAL RADIOLOGY | Age: 63
DRG: 219 | End: 2023-07-02
Attending: THORACIC SURGERY (CARDIOTHORACIC VASCULAR SURGERY)
Payer: COMMERCIAL

## 2023-07-02 LAB
ANION GAP SERPL CALCULATED.3IONS-SCNC: 10 MMOL/L (ref 4–16)
ANION GAP SERPL CALCULATED.3IONS-SCNC: 9 MMOL/L (ref 4–16)
ANION GAP SERPL CALCULATED.3IONS-SCNC: 9 MMOL/L (ref 4–16)
APTT: 39.2 SECONDS (ref 25.1–37.1)
BASE EXCESS MIXED: 5.5 (ref 0–1.2)
BASE EXCESS: 2 (ref 0–3.3)
BASE EXCESS: ABNORMAL (ref 0–3.3)
BUN SERPL-MCNC: 20 MG/DL (ref 6–23)
BUN SERPL-MCNC: 21 MG/DL (ref 6–23)
BUN SERPL-MCNC: 21 MG/DL (ref 6–23)
CALCIUM IONIZED: 4.6 MG/DL (ref 4.48–5.28)
CALCIUM SERPL-MCNC: 7.6 MG/DL (ref 8.3–10.6)
CALCIUM SERPL-MCNC: 7.7 MG/DL (ref 8.3–10.6)
CALCIUM SERPL-MCNC: 7.8 MG/DL (ref 8.3–10.6)
CARBON MONOXIDE, BLOOD: 3.1 % (ref 0–5)
CHLORIDE BLD-SCNC: 105 MMOL/L (ref 99–110)
CHLORIDE BLD-SCNC: 105 MMOL/L (ref 99–110)
CHLORIDE BLD-SCNC: 108 MMOL/L (ref 99–110)
CO2 CONTENT: 29.6 MMOL/L (ref 19–24)
CO2: 23 MMOL/L (ref 21–32)
CO2: 25 MMOL/L (ref 21–32)
CO2: 26 MMOL/L (ref 21–32)
CO2: 26 MMOL/L (ref 21–32)
COMMENT: ABNORMAL
CREAT SERPL-MCNC: 0.9 MG/DL (ref 0.9–1.3)
CREAT SERPL-MCNC: 0.9 MG/DL (ref 0.9–1.3)
CREAT SERPL-MCNC: 1 MG/DL (ref 0.9–1.3)
EGFR, POC: >60 ML/MIN/1.73M2
FIBRINOGEN LEVEL: 512 MG/DL (ref 170–540)
GFR SERPL CREATININE-BSD FRML MDRD: >60 ML/MIN/1.73M2
GLUCOSE BLD-MCNC: 109 MG/DL (ref 70–99)
GLUCOSE BLD-MCNC: 125 MG/DL (ref 70–99)
GLUCOSE BLD-MCNC: 139 MG/DL (ref 70–99)
GLUCOSE BLD-MCNC: 157 MG/DL (ref 70–99)
GLUCOSE BLD-MCNC: 157 MG/DL (ref 70–99)
GLUCOSE BLD-MCNC: 186 MG/DL (ref 70–99)
GLUCOSE BLD-MCNC: 188 MG/DL (ref 70–99)
GLUCOSE SERPL-MCNC: 139 MG/DL (ref 70–99)
GLUCOSE SERPL-MCNC: 169 MG/DL (ref 70–99)
GLUCOSE SERPL-MCNC: 177 MG/DL (ref 70–99)
HCO3 ARTERIAL: 24.1 MMOL/L (ref 18–23)
HCO3 ARTERIAL: 27.6 MMOL/L (ref 18–23)
HCT VFR BLD CALC: 38.7 % (ref 42–52)
HCT VFR BLD CALC: 39 % (ref 42–52)
HEMOGLOBIN: 11.4 GM/DL (ref 13.5–18)
HEMOGLOBIN: 13.1 GM/DL (ref 13.5–18)
INR BLD: 1.6 INDEX
IONIZED CA: 1.15 MMOL/L (ref 1.12–1.32)
MAGNESIUM: 2.5 MG/DL (ref 1.8–2.4)
MAGNESIUM: 2.7 MG/DL (ref 1.8–2.4)
MCH RBC QN AUTO: 28.7 PG (ref 27–31)
MCHC RBC AUTO-ENTMCNC: 29.5 % (ref 32–36)
MCV RBC AUTO: 97.5 FL (ref 78–100)
METHEMOGLOBIN ARTERIAL: 1.2 %
O2 SATURATION: 89.1 % (ref 96–97)
O2 SATURATION: 91.4 % (ref 96–97)
PCO2 ARTERIAL: 65.4 MMHG (ref 32–45)
PCO2 ARTERIAL: 66 MMHG (ref 32–45)
PDW BLD-RTO: 13.6 % (ref 11.7–14.9)
PH BLOOD: 7.17 (ref 7.34–7.45)
PH BLOOD: 7.23 (ref 7.34–7.45)
PHOSPHORUS: 3.1 MG/DL (ref 2.5–4.9)
PLATELET # BLD: 97 K/CU MM (ref 140–440)
PMV BLD AUTO: 10.7 FL (ref 7.5–11.1)
PO2 ARTERIAL: 61 MMHG (ref 75–100)
PO2 ARTERIAL: 72.6 MMHG (ref 75–100)
POC CALCIUM: 1.21 MMOL/L (ref 1.12–1.32)
POC CHLORIDE: 110 MMOL/L (ref 98–109)
POC CREATININE: 1 MG/DL (ref 0.9–1.3)
POTASSIUM SERPL-SCNC: 4.3 MMOL/L (ref 3.5–5.1)
POTASSIUM SERPL-SCNC: 4.4 MMOL/L (ref 3.5–4.5)
POTASSIUM SERPL-SCNC: 4.4 MMOL/L (ref 3.5–5.1)
POTASSIUM SERPL-SCNC: 4.6 MMOL/L (ref 3.5–5.1)
PROTHROMBIN TIME: 19.1 SECONDS (ref 11.7–14.5)
RBC # BLD: 3.97 M/CU MM (ref 4.6–6.2)
SODIUM BLD-SCNC: 139 MMOL/L (ref 135–145)
SODIUM BLD-SCNC: 139 MMOL/L (ref 138–146)
SODIUM BLD-SCNC: 140 MMOL/L (ref 135–145)
SODIUM BLD-SCNC: 141 MMOL/L (ref 135–145)
SOURCE, BLOOD GAS: ABNORMAL
WBC # BLD: 14.5 K/CU MM (ref 4–10.5)

## 2023-07-02 PROCEDURE — 94660 CPAP INITIATION&MGMT: CPT

## 2023-07-02 PROCEDURE — 82803 BLOOD GASES ANY COMBINATION: CPT

## 2023-07-02 PROCEDURE — 6360000002 HC RX W HCPCS: Performed by: SPECIALIST

## 2023-07-02 PROCEDURE — 94640 AIRWAY INHALATION TREATMENT: CPT

## 2023-07-02 PROCEDURE — 2580000003 HC RX 258: Performed by: STUDENT IN AN ORGANIZED HEALTH CARE EDUCATION/TRAINING PROGRAM

## 2023-07-02 PROCEDURE — 82330 ASSAY OF CALCIUM: CPT

## 2023-07-02 PROCEDURE — 6360000002 HC RX W HCPCS: Performed by: PHYSICIAN ASSISTANT

## 2023-07-02 PROCEDURE — 94761 N-INVAS EAR/PLS OXIMETRY MLT: CPT

## 2023-07-02 PROCEDURE — 85018 HEMOGLOBIN: CPT

## 2023-07-02 PROCEDURE — 6370000000 HC RX 637 (ALT 250 FOR IP): Performed by: PHYSICIAN ASSISTANT

## 2023-07-02 PROCEDURE — 6360000002 HC RX W HCPCS: Performed by: STUDENT IN AN ORGANIZED HEALTH CARE EDUCATION/TRAINING PROGRAM

## 2023-07-02 PROCEDURE — 82962 GLUCOSE BLOOD TEST: CPT

## 2023-07-02 PROCEDURE — 2580000003 HC RX 258: Performed by: THORACIC SURGERY (CARDIOTHORACIC VASCULAR SURGERY)

## 2023-07-02 PROCEDURE — 2580000003 HC RX 258: Performed by: PHYSICIAN ASSISTANT

## 2023-07-02 PROCEDURE — 71045 X-RAY EXAM CHEST 1 VIEW: CPT

## 2023-07-02 PROCEDURE — 85730 THROMBOPLASTIN TIME PARTIAL: CPT

## 2023-07-02 PROCEDURE — 80048 BASIC METABOLIC PNL TOTAL CA: CPT

## 2023-07-02 PROCEDURE — 85027 COMPLETE CBC AUTOMATED: CPT

## 2023-07-02 PROCEDURE — 85014 HEMATOCRIT: CPT

## 2023-07-02 PROCEDURE — 85384 FIBRINOGEN ACTIVITY: CPT

## 2023-07-02 PROCEDURE — 80051 ELECTROLYTE PANEL: CPT

## 2023-07-02 PROCEDURE — 2500000003 HC RX 250 WO HCPCS: Performed by: PHYSICIAN ASSISTANT

## 2023-07-02 PROCEDURE — 83735 ASSAY OF MAGNESIUM: CPT

## 2023-07-02 PROCEDURE — 2100000000 HC CCU R&B

## 2023-07-02 PROCEDURE — 85610 PROTHROMBIN TIME: CPT

## 2023-07-02 PROCEDURE — 6370000000 HC RX 637 (ALT 250 FOR IP): Performed by: STUDENT IN AN ORGANIZED HEALTH CARE EDUCATION/TRAINING PROGRAM

## 2023-07-02 PROCEDURE — 82565 ASSAY OF CREATININE: CPT

## 2023-07-02 PROCEDURE — 2700000000 HC OXYGEN THERAPY PER DAY

## 2023-07-02 PROCEDURE — 84100 ASSAY OF PHOSPHORUS: CPT

## 2023-07-02 PROCEDURE — 99233 SBSQ HOSP IP/OBS HIGH 50: CPT | Performed by: INTERNAL MEDICINE

## 2023-07-02 PROCEDURE — 6360000002 HC RX W HCPCS: Performed by: THORACIC SURGERY (CARDIOTHORACIC VASCULAR SURGERY)

## 2023-07-02 RX ORDER — FUROSEMIDE 10 MG/ML
20 INJECTION INTRAMUSCULAR; INTRAVENOUS ONCE
Status: COMPLETED | OUTPATIENT
Start: 2023-07-02 | End: 2023-07-02

## 2023-07-02 RX ORDER — METOPROLOL TARTRATE 5 MG/5ML
5 INJECTION INTRAVENOUS ONCE
Status: COMPLETED | OUTPATIENT
Start: 2023-07-02 | End: 2023-07-02

## 2023-07-02 RX ORDER — INSULIN LISPRO 100 [IU]/ML
0-8 INJECTION, SOLUTION INTRAVENOUS; SUBCUTANEOUS EVERY 4 HOURS
Status: DISCONTINUED | OUTPATIENT
Start: 2023-07-02 | End: 2023-07-05

## 2023-07-02 RX ADMIN — DEXTROSE MONOHYDRATE 150 MG: 50 INJECTION, SOLUTION INTRAVENOUS at 03:54

## 2023-07-02 RX ADMIN — METOPROLOL TARTRATE 25 MG: 25 TABLET, FILM COATED ORAL at 22:16

## 2023-07-02 RX ADMIN — SODIUM BICARBONATE 100 MEQ: 84 INJECTION, SOLUTION INTRAVENOUS at 10:38

## 2023-07-02 RX ADMIN — DEXTROSE MONOHYDRATE 150 MG: 50 INJECTION, SOLUTION INTRAVENOUS at 20:37

## 2023-07-02 RX ADMIN — IPRATROPIUM BROMIDE AND ALBUTEROL SULFATE 1 DOSE: 2.5; .5 SOLUTION RESPIRATORY (INHALATION) at 15:27

## 2023-07-02 RX ADMIN — ATORVASTATIN CALCIUM 40 MG: 40 TABLET, FILM COATED ORAL at 22:17

## 2023-07-02 RX ADMIN — FAMOTIDINE 20 MG: 20 TABLET, FILM COATED ORAL at 08:19

## 2023-07-02 RX ADMIN — IPRATROPIUM BROMIDE AND ALBUTEROL SULFATE 1 DOSE: 2.5; .5 SOLUTION RESPIRATORY (INHALATION) at 19:15

## 2023-07-02 RX ADMIN — SODIUM CHLORIDE, PRESERVATIVE FREE 10 ML: 5 INJECTION INTRAVENOUS at 20:37

## 2023-07-02 RX ADMIN — CHLORHEXIDINE GLUCONATE 0.12% ORAL RINSE 15 ML: 1.2 LIQUID ORAL at 22:16

## 2023-07-02 RX ADMIN — ACETAMINOPHEN 650 MG: 325 TABLET ORAL at 22:16

## 2023-07-02 RX ADMIN — IPRATROPIUM BROMIDE AND ALBUTEROL SULFATE 1 DOSE: 2.5; .5 SOLUTION RESPIRATORY (INHALATION) at 03:30

## 2023-07-02 RX ADMIN — ACETAMINOPHEN 650 MG: 325 TABLET ORAL at 10:53

## 2023-07-02 RX ADMIN — CEFAZOLIN 2000 MG: 2 INJECTION, POWDER, FOR SOLUTION INTRAMUSCULAR; INTRAVENOUS at 04:08

## 2023-07-02 RX ADMIN — FUROSEMIDE 10 MG/HR: 10 INJECTION, SOLUTION INTRAMUSCULAR; INTRAVENOUS at 22:41

## 2023-07-02 RX ADMIN — ASPIRIN 81 MG: 81 TABLET, CHEWABLE ORAL at 08:19

## 2023-07-02 RX ADMIN — AMIODARONE HYDROCHLORIDE 1 MG/MIN: 50 INJECTION, SOLUTION INTRAVENOUS at 11:47

## 2023-07-02 RX ADMIN — BUDESONIDE AND FORMOTEROL FUMARATE DIHYDRATE 2 PUFF: 160; 4.5 AEROSOL RESPIRATORY (INHALATION) at 08:21

## 2023-07-02 RX ADMIN — Medication: at 22:16

## 2023-07-02 RX ADMIN — ENOXAPARIN SODIUM 30 MG: 100 INJECTION SUBCUTANEOUS at 22:18

## 2023-07-02 RX ADMIN — METOPROLOL TARTRATE 5 MG: 5 INJECTION INTRAVENOUS at 17:58

## 2023-07-02 RX ADMIN — FAMOTIDINE 20 MG: 20 TABLET, FILM COATED ORAL at 22:16

## 2023-07-02 RX ADMIN — CLOPIDOGREL BISULFATE 75 MG: 75 TABLET ORAL at 08:19

## 2023-07-02 RX ADMIN — IPRATROPIUM BROMIDE AND ALBUTEROL SULFATE 1 DOSE: 2.5; .5 SOLUTION RESPIRATORY (INHALATION) at 11:10

## 2023-07-02 RX ADMIN — IPRATROPIUM BROMIDE AND ALBUTEROL SULFATE 1 DOSE: 2.5; .5 SOLUTION RESPIRATORY (INHALATION) at 23:08

## 2023-07-02 RX ADMIN — OXYCODONE HYDROCHLORIDE 5 MG: 5 TABLET ORAL at 00:51

## 2023-07-02 RX ADMIN — POLYETHYLENE GLYCOL (3350) 17 G: 17 POWDER, FOR SOLUTION ORAL at 08:18

## 2023-07-02 RX ADMIN — SODIUM CHLORIDE: 9 INJECTION, SOLUTION INTRAVENOUS at 07:54

## 2023-07-02 RX ADMIN — Medication: at 08:19

## 2023-07-02 RX ADMIN — CHLORHEXIDINE GLUCONATE 0.12% ORAL RINSE 15 ML: 1.2 LIQUID ORAL at 08:18

## 2023-07-02 RX ADMIN — METOPROLOL TARTRATE 12.5 MG: 25 TABLET, FILM COATED ORAL at 08:19

## 2023-07-02 RX ADMIN — AMIODARONE HYDROCHLORIDE 1 MG/MIN: 50 INJECTION, SOLUTION INTRAVENOUS at 04:05

## 2023-07-02 RX ADMIN — ENOXAPARIN SODIUM 30 MG: 100 INJECTION SUBCUTANEOUS at 08:18

## 2023-07-02 RX ADMIN — SENNOSIDES AND DOCUSATE SODIUM 1 TABLET: 50; 8.6 TABLET ORAL at 08:19

## 2023-07-02 RX ADMIN — OXYCODONE HYDROCHLORIDE 5 MG: 5 TABLET ORAL at 08:19

## 2023-07-02 RX ADMIN — SENNOSIDES AND DOCUSATE SODIUM 1 TABLET: 50; 8.6 TABLET ORAL at 22:16

## 2023-07-02 RX ADMIN — AZITHROMYCIN MONOHYDRATE 500 MG: 500 INJECTION, POWDER, LYOPHILIZED, FOR SOLUTION INTRAVENOUS at 22:44

## 2023-07-02 RX ADMIN — OXYCODONE HYDROCHLORIDE 5 MG: 5 TABLET ORAL at 22:17

## 2023-07-02 RX ADMIN — ACETAMINOPHEN 650 MG: 325 TABLET ORAL at 17:58

## 2023-07-02 RX ADMIN — AMIODARONE HYDROCHLORIDE 1 MG/MIN: 50 INJECTION, SOLUTION INTRAVENOUS at 18:48

## 2023-07-02 RX ADMIN — FUROSEMIDE 5 MG/HR: 10 INJECTION, SOLUTION INTRAMUSCULAR; INTRAVENOUS at 09:46

## 2023-07-02 RX ADMIN — DEXTROSE MONOHYDRATE 300 MG: 50 INJECTION, SOLUTION INTRAVENOUS at 09:44

## 2023-07-02 RX ADMIN — ACETAMINOPHEN 650 MG: 325 TABLET ORAL at 04:09

## 2023-07-02 RX ADMIN — SODIUM CHLORIDE, PRESERVATIVE FREE 10 ML: 5 INJECTION INTRAVENOUS at 08:35

## 2023-07-02 RX ADMIN — OXYCODONE HYDROCHLORIDE 5 MG: 5 TABLET ORAL at 17:57

## 2023-07-02 RX ADMIN — MULTIPLE VITAMINS W/ MINERALS TAB 1 TABLET: TAB at 08:19

## 2023-07-02 RX ADMIN — FUROSEMIDE 20 MG: 10 INJECTION, SOLUTION INTRAMUSCULAR; INTRAVENOUS at 07:53

## 2023-07-02 ASSESSMENT — PAIN SCALES - GENERAL
PAINLEVEL_OUTOF10: 7
PAINLEVEL_OUTOF10: 0
PAINLEVEL_OUTOF10: 8
PAINLEVEL_OUTOF10: 9
PAINLEVEL_OUTOF10: 5
PAINLEVEL_OUTOF10: 0
PAINLEVEL_OUTOF10: 5
PAINLEVEL_OUTOF10: 1
PAINLEVEL_OUTOF10: 0
PAINLEVEL_OUTOF10: 7
PAINLEVEL_OUTOF10: 4

## 2023-07-02 ASSESSMENT — PAIN SCALES - WONG BAKER
WONGBAKER_NUMERICALRESPONSE: 0
WONGBAKER_NUMERICALRESPONSE: 2
WONGBAKER_NUMERICALRESPONSE: 0
WONGBAKER_NUMERICALRESPONSE: 2
WONGBAKER_NUMERICALRESPONSE: 0
WONGBAKER_NUMERICALRESPONSE: 0

## 2023-07-02 ASSESSMENT — PAIN DESCRIPTION - LOCATION
LOCATION: CHEST

## 2023-07-02 ASSESSMENT — PAIN DESCRIPTION - DESCRIPTORS
DESCRIPTORS: SORE
DESCRIPTORS: SORE
DESCRIPTORS: ACHING;SORE
DESCRIPTORS: ACHING;SORE
DESCRIPTORS: SORE

## 2023-07-02 ASSESSMENT — PAIN DESCRIPTION - ORIENTATION
ORIENTATION: MID
ORIENTATION: ANTERIOR

## 2023-07-02 ASSESSMENT — PAIN - FUNCTIONAL ASSESSMENT
PAIN_FUNCTIONAL_ASSESSMENT: ACTIVITIES ARE NOT PREVENTED

## 2023-07-02 ASSESSMENT — PAIN DESCRIPTION - FREQUENCY
FREQUENCY: CONTINUOUS
FREQUENCY: CONTINUOUS

## 2023-07-02 ASSESSMENT — PAIN DESCRIPTION - PAIN TYPE
TYPE: SURGICAL PAIN
TYPE: SURGICAL PAIN

## 2023-07-02 ASSESSMENT — PAIN DESCRIPTION - ONSET
ONSET: ON-GOING
ONSET: ON-GOING

## 2023-07-03 ENCOUNTER — APPOINTMENT (OUTPATIENT)
Dept: GENERAL RADIOLOGY | Age: 63
DRG: 219 | End: 2023-07-03
Attending: THORACIC SURGERY (CARDIOTHORACIC VASCULAR SURGERY)
Payer: COMMERCIAL

## 2023-07-03 LAB
ANION GAP SERPL CALCULATED.3IONS-SCNC: 5 MMOL/L (ref 4–16)
ANION GAP SERPL CALCULATED.3IONS-SCNC: 8 MMOL/L (ref 4–16)
ANION GAP SERPL CALCULATED.3IONS-SCNC: 9 MMOL/L (ref 4–16)
ANION GAP SERPL CALCULATED.3IONS-SCNC: 9 MMOL/L (ref 4–16)
APTT: 37.2 SECONDS (ref 25.1–37.1)
APTT: 40.6 SECONDS (ref 25.1–37.1)
APTT: 49.7 SECONDS (ref 25.1–37.1)
BASE EXCESS MIXED: 0.8 (ref 0–1.2)
BASE EXCESS MIXED: 1.1 (ref 0–1.2)
BASE EXCESS MIXED: 3 (ref 0–1.2)
BASE EXCESS MIXED: 4.8 (ref 0–1.2)
BASE EXCESS: ABNORMAL (ref 0–3.3)
BUN SERPL-MCNC: 25 MG/DL (ref 6–23)
BUN SERPL-MCNC: 25 MG/DL (ref 6–23)
BUN SERPL-MCNC: 26 MG/DL (ref 6–23)
BUN SERPL-MCNC: 26 MG/DL (ref 6–23)
CALCIUM IONIZED: 4.52 MG/DL (ref 4.48–5.28)
CALCIUM SERPL-MCNC: 7.4 MG/DL (ref 8.3–10.6)
CALCIUM SERPL-MCNC: 7.7 MG/DL (ref 8.3–10.6)
CALCIUM SERPL-MCNC: 7.8 MG/DL (ref 8.3–10.6)
CALCIUM SERPL-MCNC: 7.8 MG/DL (ref 8.3–10.6)
CARBON MONOXIDE, BLOOD: 2.3 % (ref 0–5)
CHLORIDE BLD-SCNC: 102 MMOL/L (ref 99–110)
CHLORIDE BLD-SCNC: 105 MMOL/L (ref 99–110)
CHLORIDE BLD-SCNC: 106 MMOL/L (ref 99–110)
CHLORIDE BLD-SCNC: 107 MMOL/L (ref 99–110)
CO2 CONTENT: 31.6 MMOL/L (ref 19–24)
CO2: 24 MMOL/L (ref 21–32)
CO2: 27 MMOL/L (ref 21–32)
CO2: 29 MMOL/L (ref 21–32)
CO2: 29 MMOL/L (ref 21–32)
CO2: 30 MMOL/L (ref 21–32)
CO2: 31 MMOL/L (ref 21–32)
CO2: 32 MMOL/L (ref 21–32)
COMMENT: ABNORMAL
CREAT SERPL-MCNC: 1.1 MG/DL (ref 0.9–1.3)
CREAT SERPL-MCNC: 1.2 MG/DL (ref 0.9–1.3)
EGFR, POC: >60 ML/MIN/1.73M2
FIBRINOGEN LEVEL: 662 MG/DL (ref 170–540)
GFR SERPL CREATININE-BSD FRML MDRD: >60 ML/MIN/1.73M2
GLUCOSE BLD-MCNC: 101 MG/DL (ref 70–99)
GLUCOSE BLD-MCNC: 111 MG/DL (ref 70–99)
GLUCOSE BLD-MCNC: 111 MG/DL (ref 70–99)
GLUCOSE BLD-MCNC: 116 MG/DL (ref 70–99)
GLUCOSE BLD-MCNC: 127 MG/DL (ref 70–99)
GLUCOSE BLD-MCNC: 129 MG/DL (ref 70–99)
GLUCOSE BLD-MCNC: 150 MG/DL (ref 70–99)
GLUCOSE SERPL-MCNC: 133 MG/DL (ref 70–99)
GLUCOSE SERPL-MCNC: 136 MG/DL (ref 70–99)
GLUCOSE SERPL-MCNC: 149 MG/DL (ref 70–99)
GLUCOSE SERPL-MCNC: 178 MG/DL (ref 70–99)
HCO3 ARTERIAL: 28.6 MMOL/L (ref 18–23)
HCO3 ARTERIAL: 29.6 MMOL/L (ref 18–23)
HCO3 ARTERIAL: 29.7 MMOL/L (ref 18–23)
HCO3 ARTERIAL: 30.3 MMOL/L (ref 18–23)
HCT VFR BLD CALC: 37 % (ref 42–52)
HCT VFR BLD CALC: 37.9 % (ref 42–52)
HCT VFR BLD CALC: 40 % (ref 42–52)
HCT VFR BLD CALC: 41 % (ref 42–52)
HEMOGLOBIN: 11.3 GM/DL (ref 13.5–18)
HEMOGLOBIN: 12.6 GM/DL (ref 13.5–18)
HEMOGLOBIN: 13.7 GM/DL (ref 13.5–18)
HEMOGLOBIN: 13.8 GM/DL (ref 13.5–18)
IONIZED CA: 1.13 MMOL/L (ref 1.12–1.32)
MAGNESIUM: 2.3 MG/DL (ref 1.8–2.4)
MAGNESIUM: 2.3 MG/DL (ref 1.8–2.4)
MAGNESIUM: 2.4 MG/DL (ref 1.8–2.4)
MAGNESIUM: 2.6 MG/DL (ref 1.8–2.4)
MCH RBC QN AUTO: 28.6 PG (ref 27–31)
MCHC RBC AUTO-ENTMCNC: 29.8 % (ref 32–36)
MCV RBC AUTO: 95.9 FL (ref 78–100)
METHEMOGLOBIN ARTERIAL: 1.1 %
O2 SATURATION: 86.1 % (ref 96–97)
O2 SATURATION: 89.5 % (ref 96–97)
O2 SATURATION: 94.5 % (ref 96–97)
O2 SATURATION: 97.5 % (ref 96–97)
PCO2 ARTERIAL: 44 MMHG (ref 32–45)
PCO2 ARTERIAL: 56.4 MMHG (ref 32–45)
PCO2 ARTERIAL: 56.6 MMHG (ref 32–45)
PCO2 ARTERIAL: 66 MMHG (ref 32–45)
PDW BLD-RTO: 13.8 % (ref 11.7–14.9)
PH BLOOD: 7.26 (ref 7.34–7.45)
PH BLOOD: 7.31 (ref 7.34–7.45)
PH BLOOD: 7.34 (ref 7.34–7.45)
PH BLOOD: 7.44 (ref 7.34–7.45)
PHOSPHORUS: 0.8 MG/DL (ref 2.5–4.9)
PHOSPHORUS: 1.6 MG/DL (ref 2.5–4.9)
PHOSPHORUS: 2.8 MG/DL (ref 2.5–4.9)
PLATELET # BLD: 99 K/CU MM (ref 140–440)
PMV BLD AUTO: 10.7 FL (ref 7.5–11.1)
PO2 ARTERIAL: 57 MMHG (ref 75–100)
PO2 ARTERIAL: 57.4 MMHG (ref 75–100)
PO2 ARTERIAL: 79.4 MMHG (ref 75–100)
PO2 ARTERIAL: 93.7 MMHG (ref 75–100)
POC CALCIUM: 1.15 MMOL/L (ref 1.12–1.32)
POC CALCIUM: 1.15 MMOL/L (ref 1.12–1.32)
POC CALCIUM: 1.16 MMOL/L (ref 1.12–1.32)
POC CHLORIDE: 100 MMOL/L (ref 98–109)
POC CHLORIDE: 102 MMOL/L (ref 98–109)
POC CHLORIDE: 104 MMOL/L (ref 98–109)
POC CREATININE: 1.1 MG/DL (ref 0.9–1.3)
POC CREATININE: 1.2 MG/DL (ref 0.9–1.3)
POC CREATININE: 1.3 MG/DL (ref 0.9–1.3)
POTASSIUM SERPL-SCNC: 3.5 MMOL/L (ref 3.5–4.5)
POTASSIUM SERPL-SCNC: 3.6 MMOL/L (ref 3.5–4.5)
POTASSIUM SERPL-SCNC: 3.8 MMOL/L (ref 3.5–5.1)
POTASSIUM SERPL-SCNC: 3.9 MMOL/L (ref 3.5–5.1)
POTASSIUM SERPL-SCNC: 4.2 MMOL/L (ref 3.5–4.5)
POTASSIUM SERPL-SCNC: 4.5 MMOL/L (ref 3.5–5.1)
POTASSIUM SERPL-SCNC: 4.6 MMOL/L (ref 3.5–5.1)
PROCALCITONIN SERPL-MCNC: 0.96 NG/ML
RBC # BLD: 3.95 M/CU MM (ref 4.6–6.2)
SODIUM BLD-SCNC: 138 MMOL/L (ref 135–145)
SODIUM BLD-SCNC: 138 MMOL/L (ref 138–146)
SODIUM BLD-SCNC: 138 MMOL/L (ref 138–146)
SODIUM BLD-SCNC: 139 MMOL/L (ref 138–146)
SODIUM BLD-SCNC: 140 MMOL/L (ref 135–145)
SODIUM BLD-SCNC: 141 MMOL/L (ref 135–145)
SODIUM BLD-SCNC: 141 MMOL/L (ref 135–145)
SOURCE, BLOOD GAS: ABNORMAL
WBC # BLD: 14.3 K/CU MM (ref 4–10.5)

## 2023-07-03 PROCEDURE — 71045 X-RAY EXAM CHEST 1 VIEW: CPT

## 2023-07-03 PROCEDURE — 94761 N-INVAS EAR/PLS OXIMETRY MLT: CPT

## 2023-07-03 PROCEDURE — 80051 ELECTROLYTE PANEL: CPT

## 2023-07-03 PROCEDURE — 83735 ASSAY OF MAGNESIUM: CPT

## 2023-07-03 PROCEDURE — 2100000000 HC CCU R&B

## 2023-07-03 PROCEDURE — 80048 BASIC METABOLIC PNL TOTAL CA: CPT

## 2023-07-03 PROCEDURE — 6360000002 HC RX W HCPCS: Performed by: PHYSICIAN ASSISTANT

## 2023-07-03 PROCEDURE — 85027 COMPLETE CBC AUTOMATED: CPT

## 2023-07-03 PROCEDURE — 94640 AIRWAY INHALATION TREATMENT: CPT

## 2023-07-03 PROCEDURE — 6370000000 HC RX 637 (ALT 250 FOR IP): Performed by: STUDENT IN AN ORGANIZED HEALTH CARE EDUCATION/TRAINING PROGRAM

## 2023-07-03 PROCEDURE — 85730 THROMBOPLASTIN TIME PARTIAL: CPT

## 2023-07-03 PROCEDURE — 2580000003 HC RX 258: Performed by: PHYSICIAN ASSISTANT

## 2023-07-03 PROCEDURE — 85018 HEMOGLOBIN: CPT

## 2023-07-03 PROCEDURE — 85014 HEMATOCRIT: CPT

## 2023-07-03 PROCEDURE — 82565 ASSAY OF CREATININE: CPT

## 2023-07-03 PROCEDURE — 6360000002 HC RX W HCPCS: Performed by: THORACIC SURGERY (CARDIOTHORACIC VASCULAR SURGERY)

## 2023-07-03 PROCEDURE — 84100 ASSAY OF PHOSPHORUS: CPT

## 2023-07-03 PROCEDURE — 87070 CULTURE OTHR SPECIMN AEROBIC: CPT

## 2023-07-03 PROCEDURE — 94660 CPAP INITIATION&MGMT: CPT

## 2023-07-03 PROCEDURE — 6370000000 HC RX 637 (ALT 250 FOR IP): Performed by: PHYSICIAN ASSISTANT

## 2023-07-03 PROCEDURE — 2580000003 HC RX 258: Performed by: STUDENT IN AN ORGANIZED HEALTH CARE EDUCATION/TRAINING PROGRAM

## 2023-07-03 PROCEDURE — 2500000003 HC RX 250 WO HCPCS: Performed by: PHYSICIAN ASSISTANT

## 2023-07-03 PROCEDURE — 2700000000 HC OXYGEN THERAPY PER DAY

## 2023-07-03 PROCEDURE — 84145 PROCALCITONIN (PCT): CPT

## 2023-07-03 PROCEDURE — 2580000003 HC RX 258: Performed by: THORACIC SURGERY (CARDIOTHORACIC VASCULAR SURGERY)

## 2023-07-03 PROCEDURE — 87205 SMEAR GRAM STAIN: CPT

## 2023-07-03 PROCEDURE — 82962 GLUCOSE BLOOD TEST: CPT

## 2023-07-03 PROCEDURE — 85384 FIBRINOGEN ACTIVITY: CPT

## 2023-07-03 PROCEDURE — 82330 ASSAY OF CALCIUM: CPT

## 2023-07-03 PROCEDURE — 82803 BLOOD GASES ANY COMBINATION: CPT

## 2023-07-03 PROCEDURE — 2500000003 HC RX 250 WO HCPCS: Performed by: STUDENT IN AN ORGANIZED HEALTH CARE EDUCATION/TRAINING PROGRAM

## 2023-07-03 PROCEDURE — 99233 SBSQ HOSP IP/OBS HIGH 50: CPT | Performed by: INTERNAL MEDICINE

## 2023-07-03 PROCEDURE — 6360000002 HC RX W HCPCS: Performed by: STUDENT IN AN ORGANIZED HEALTH CARE EDUCATION/TRAINING PROGRAM

## 2023-07-03 PROCEDURE — 84132 ASSAY OF SERUM POTASSIUM: CPT

## 2023-07-03 PROCEDURE — 37799 UNLISTED PX VASCULAR SURGERY: CPT

## 2023-07-03 RX ORDER — HEPARIN SODIUM 1000 [USP'U]/ML
30 INJECTION, SOLUTION INTRAVENOUS; SUBCUTANEOUS PRN
Status: DISCONTINUED | OUTPATIENT
Start: 2023-07-03 | End: 2023-07-03

## 2023-07-03 RX ORDER — HEPARIN SODIUM 1000 [USP'U]/ML
30 INJECTION, SOLUTION INTRAVENOUS; SUBCUTANEOUS ONCE
Status: COMPLETED | OUTPATIENT
Start: 2023-07-03 | End: 2023-07-03

## 2023-07-03 RX ORDER — HEPARIN SODIUM 1000 [USP'U]/ML
2000 INJECTION, SOLUTION INTRAVENOUS; SUBCUTANEOUS PRN
Status: DISCONTINUED | OUTPATIENT
Start: 2023-07-03 | End: 2023-07-10

## 2023-07-03 RX ORDER — MIDAZOLAM HYDROCHLORIDE 1 MG/ML
INJECTION INTRAMUSCULAR; INTRAVENOUS
Status: DISCONTINUED
Start: 2023-07-03 | End: 2023-07-03 | Stop reason: WASHOUT

## 2023-07-03 RX ORDER — BUMETANIDE 0.25 MG/ML
2 INJECTION INTRAMUSCULAR; INTRAVENOUS ONCE
Status: COMPLETED | OUTPATIENT
Start: 2023-07-03 | End: 2023-07-03

## 2023-07-03 RX ORDER — HEPARIN SODIUM 1000 [USP'U]/ML
4000 INJECTION, SOLUTION INTRAVENOUS; SUBCUTANEOUS PRN
Status: DISCONTINUED | OUTPATIENT
Start: 2023-07-03 | End: 2023-07-10

## 2023-07-03 RX ORDER — OXYCODONE HYDROCHLORIDE AND ACETAMINOPHEN 5; 325 MG/1; MG/1
1 TABLET ORAL EVERY 4 HOURS PRN
Status: DISCONTINUED | OUTPATIENT
Start: 2023-07-03 | End: 2023-07-10 | Stop reason: HOSPADM

## 2023-07-03 RX ORDER — HEPARIN SODIUM 1000 [USP'U]/ML
60 INJECTION, SOLUTION INTRAVENOUS; SUBCUTANEOUS PRN
Status: DISCONTINUED | OUTPATIENT
Start: 2023-07-03 | End: 2023-07-03

## 2023-07-03 RX ORDER — HEPARIN SODIUM 10000 [USP'U]/100ML
5-30 INJECTION, SOLUTION INTRAVENOUS CONTINUOUS
Status: DISPENSED | OUTPATIENT
Start: 2023-07-03 | End: 2023-07-07

## 2023-07-03 RX ADMIN — BUMETANIDE 1 MG/HR: 0.25 INJECTION INTRAMUSCULAR; INTRAVENOUS at 11:51

## 2023-07-03 RX ADMIN — OXYCODONE HYDROCHLORIDE 5 MG: 5 TABLET ORAL at 10:56

## 2023-07-03 RX ADMIN — BUDESONIDE AND FORMOTEROL FUMARATE DIHYDRATE 2 PUFF: 160; 4.5 AEROSOL RESPIRATORY (INHALATION) at 21:07

## 2023-07-03 RX ADMIN — POTASSIUM CHLORIDE 20 MEQ: 29.8 INJECTION, SOLUTION INTRAVENOUS at 13:39

## 2023-07-03 RX ADMIN — IPRATROPIUM BROMIDE AND ALBUTEROL SULFATE 1 DOSE: 2.5; .5 SOLUTION RESPIRATORY (INHALATION) at 15:12

## 2023-07-03 RX ADMIN — SENNOSIDES AND DOCUSATE SODIUM 1 TABLET: 50; 8.6 TABLET ORAL at 20:07

## 2023-07-03 RX ADMIN — AMIODARONE HYDROCHLORIDE 0.5 MG/MIN: 50 INJECTION, SOLUTION INTRAVENOUS at 15:27

## 2023-07-03 RX ADMIN — IPRATROPIUM BROMIDE AND ALBUTEROL SULFATE 1 DOSE: 2.5; .5 SOLUTION RESPIRATORY (INHALATION) at 21:07

## 2023-07-03 RX ADMIN — FAMOTIDINE 20 MG: 10 INJECTION, SOLUTION INTRAVENOUS at 09:09

## 2023-07-03 RX ADMIN — Medication: at 20:07

## 2023-07-03 RX ADMIN — OXYCODONE HYDROCHLORIDE 5 MG: 5 TABLET ORAL at 16:06

## 2023-07-03 RX ADMIN — SODIUM CHLORIDE, PRESERVATIVE FREE 10 ML: 5 INJECTION INTRAVENOUS at 08:23

## 2023-07-03 RX ADMIN — SODIUM CHLORIDE 50 ML/HR: 9 INJECTION, SOLUTION INTRAVENOUS at 06:10

## 2023-07-03 RX ADMIN — DEXTROSE MONOHYDRATE 150 MG: 50 INJECTION, SOLUTION INTRAVENOUS at 07:34

## 2023-07-03 RX ADMIN — Medication: at 08:23

## 2023-07-03 RX ADMIN — IPRATROPIUM BROMIDE AND ALBUTEROL SULFATE 1 DOSE: 2.5; .5 SOLUTION RESPIRATORY (INHALATION) at 03:23

## 2023-07-03 RX ADMIN — POTASSIUM CHLORIDE 20 MEQ: 29.8 INJECTION, SOLUTION INTRAVENOUS at 20:29

## 2023-07-03 RX ADMIN — ATORVASTATIN CALCIUM 40 MG: 40 TABLET, FILM COATED ORAL at 20:07

## 2023-07-03 RX ADMIN — AZITHROMYCIN MONOHYDRATE 250 MG: 500 INJECTION, POWDER, LYOPHILIZED, FOR SOLUTION INTRAVENOUS at 21:44

## 2023-07-03 RX ADMIN — METOPROLOL TARTRATE 25 MG: 25 TABLET, FILM COATED ORAL at 20:07

## 2023-07-03 RX ADMIN — ACETAMINOPHEN 650 MG: 325 TABLET ORAL at 04:35

## 2023-07-03 RX ADMIN — SODIUM CHLORIDE, PRESERVATIVE FREE 10 ML: 5 INJECTION INTRAVENOUS at 20:09

## 2023-07-03 RX ADMIN — SODIUM PHOSPHATE, MONOBASIC, MONOHYDRATE AND SODIUM PHOSPHATE, DIBASIC, ANHYDROUS 30 MMOL: 276; 142 INJECTION, SOLUTION INTRAVENOUS at 23:57

## 2023-07-03 RX ADMIN — ACETAMINOPHEN 650 MG: 325 TABLET ORAL at 10:57

## 2023-07-03 RX ADMIN — HEPARIN SODIUM 2000 UNITS: 1000 INJECTION INTRAVENOUS; SUBCUTANEOUS at 13:08

## 2023-07-03 RX ADMIN — METOPROLOL TARTRATE 25 MG: 25 TABLET, FILM COATED ORAL at 08:47

## 2023-07-03 RX ADMIN — AMIODARONE HYDROCHLORIDE 1 MG/MIN: 50 INJECTION, SOLUTION INTRAVENOUS at 04:35

## 2023-07-03 RX ADMIN — OXYCODONE HYDROCHLORIDE AND ACETAMINOPHEN 1 TABLET: 5; 325 TABLET ORAL at 20:01

## 2023-07-03 RX ADMIN — HEPARIN SODIUM 8 UNITS/KG/HR: 10000 INJECTION, SOLUTION INTRAVENOUS at 12:05

## 2023-07-03 RX ADMIN — BUDESONIDE AND FORMOTEROL FUMARATE DIHYDRATE 2 PUFF: 160; 4.5 AEROSOL RESPIRATORY (INHALATION) at 07:12

## 2023-07-03 RX ADMIN — IPRATROPIUM BROMIDE AND ALBUTEROL SULFATE 1 DOSE: 2.5; .5 SOLUTION RESPIRATORY (INHALATION) at 11:39

## 2023-07-03 RX ADMIN — FUROSEMIDE 15 MG/HR: 10 INJECTION, SOLUTION INTRAMUSCULAR; INTRAVENOUS at 06:12

## 2023-07-03 RX ADMIN — CHLORHEXIDINE GLUCONATE 0.12% ORAL RINSE 15 ML: 1.2 LIQUID ORAL at 20:07

## 2023-07-03 RX ADMIN — ACETAMINOPHEN 650 MG: 325 TABLET ORAL at 16:06

## 2023-07-03 RX ADMIN — IPRATROPIUM BROMIDE AND ALBUTEROL SULFATE 1 DOSE: 2.5; .5 SOLUTION RESPIRATORY (INHALATION) at 07:12

## 2023-07-03 RX ADMIN — POTASSIUM CHLORIDE 20 MEQ: 29.8 INJECTION, SOLUTION INTRAVENOUS at 18:58

## 2023-07-03 RX ADMIN — CHLORHEXIDINE GLUCONATE 0.12% ORAL RINSE 15 ML: 1.2 LIQUID ORAL at 08:23

## 2023-07-03 RX ADMIN — BUMETANIDE 2 MG: 0.25 INJECTION INTRAMUSCULAR; INTRAVENOUS at 09:08

## 2023-07-03 RX ADMIN — POTASSIUM CHLORIDE 20 MEQ: 29.8 INJECTION, SOLUTION INTRAVENOUS at 12:16

## 2023-07-03 RX ADMIN — FAMOTIDINE 20 MG: 20 TABLET, FILM COATED ORAL at 20:07

## 2023-07-03 ASSESSMENT — PAIN SCALES - GENERAL
PAINLEVEL_OUTOF10: 0
PAINLEVEL_OUTOF10: 8
PAINLEVEL_OUTOF10: 7
PAINLEVEL_OUTOF10: 3
PAINLEVEL_OUTOF10: 8
PAINLEVEL_OUTOF10: 9
PAINLEVEL_OUTOF10: 9

## 2023-07-03 ASSESSMENT — PAIN DESCRIPTION - ORIENTATION
ORIENTATION: MID

## 2023-07-03 ASSESSMENT — PAIN DESCRIPTION - DESCRIPTORS
DESCRIPTORS: ACHING
DESCRIPTORS: ACHING;SHARP
DESCRIPTORS: ACHING
DESCRIPTORS: SORE

## 2023-07-03 ASSESSMENT — PAIN DESCRIPTION - LOCATION
LOCATION: CHEST;INCISION
LOCATION: STERNUM;CHEST
LOCATION: STERNUM
LOCATION: CHEST;STERNUM

## 2023-07-03 ASSESSMENT — PAIN DESCRIPTION - PAIN TYPE
TYPE: SURGICAL PAIN

## 2023-07-03 ASSESSMENT — PAIN DESCRIPTION - FREQUENCY
FREQUENCY: CONTINUOUS

## 2023-07-03 ASSESSMENT — PAIN - FUNCTIONAL ASSESSMENT
PAIN_FUNCTIONAL_ASSESSMENT: PREVENTS OR INTERFERES WITH ALL ACTIVE AND SOME PASSIVE ACTIVITIES
PAIN_FUNCTIONAL_ASSESSMENT: PREVENTS OR INTERFERES SOME ACTIVE ACTIVITIES AND ADLS
PAIN_FUNCTIONAL_ASSESSMENT: PREVENTS OR INTERFERES WITH ALL ACTIVE AND SOME PASSIVE ACTIVITIES

## 2023-07-03 ASSESSMENT — PAIN DESCRIPTION - ONSET
ONSET: ON-GOING

## 2023-07-04 ENCOUNTER — APPOINTMENT (OUTPATIENT)
Dept: GENERAL RADIOLOGY | Age: 63
DRG: 219 | End: 2023-07-04
Attending: THORACIC SURGERY (CARDIOTHORACIC VASCULAR SURGERY)
Payer: COMMERCIAL

## 2023-07-04 LAB
ABO/RH: NORMAL
ANION GAP SERPL CALCULATED.3IONS-SCNC: 10 MMOL/L (ref 4–16)
ANTIBODY SCREEN: NEGATIVE
APTT: 47.9 SECONDS (ref 25.1–37.1)
APTT: 48.2 SECONDS (ref 25.1–37.1)
APTT: 60.2 SECONDS (ref 25.1–37.1)
BUN SERPL-MCNC: 24 MG/DL (ref 6–23)
CALCIUM IONIZED: 4.24 MG/DL (ref 4.48–5.28)
CALCIUM SERPL-MCNC: 8 MG/DL (ref 8.3–10.6)
CHLORIDE BLD-SCNC: 96 MMOL/L (ref 99–110)
CO2: 30 MMOL/L (ref 21–32)
COMPONENT: NORMAL
COMPONENT: NORMAL
CREAT SERPL-MCNC: 0.9 MG/DL (ref 0.9–1.3)
CROSSMATCH RESULT: NORMAL
CROSSMATCH RESULT: NORMAL
GFR SERPL CREATININE-BSD FRML MDRD: >60 ML/MIN/1.73M2
GLUCOSE BLD-MCNC: 109 MG/DL (ref 70–99)
GLUCOSE BLD-MCNC: 113 MG/DL (ref 70–99)
GLUCOSE BLD-MCNC: 113 MG/DL (ref 70–99)
GLUCOSE BLD-MCNC: 120 MG/DL (ref 70–99)
GLUCOSE BLD-MCNC: 124 MG/DL (ref 70–99)
GLUCOSE BLD-MCNC: 135 MG/DL (ref 70–99)
GLUCOSE SERPL-MCNC: 111 MG/DL (ref 70–99)
HCT VFR BLD CALC: 36.3 % (ref 42–52)
HEMOGLOBIN: 11.2 GM/DL (ref 13.5–18)
IONIZED CA: 1.06 MMOL/L (ref 1.12–1.32)
MAGNESIUM: 2 MG/DL (ref 1.8–2.4)
MAGNESIUM: 2.1 MG/DL (ref 1.8–2.4)
MAGNESIUM: 2.2 MG/DL (ref 1.8–2.4)
MCH RBC QN AUTO: 28.6 PG (ref 27–31)
MCHC RBC AUTO-ENTMCNC: 30.9 % (ref 32–36)
MCV RBC AUTO: 92.6 FL (ref 78–100)
PDW BLD-RTO: 13.6 % (ref 11.7–14.9)
PHOSPHORUS: 2 MG/DL (ref 2.5–4.9)
PHOSPHORUS: 2.1 MG/DL (ref 2.5–4.9)
PLATELET # BLD: 130 K/CU MM (ref 140–440)
PMV BLD AUTO: 11.4 FL (ref 7.5–11.1)
POTASSIUM SERPL-SCNC: 3.2 MMOL/L (ref 3.5–5.1)
POTASSIUM SERPL-SCNC: 3.4 MMOL/L (ref 3.5–5.1)
POTASSIUM SERPL-SCNC: 3.6 MMOL/L (ref 3.5–5.1)
RBC # BLD: 3.92 M/CU MM (ref 4.6–6.2)
SODIUM BLD-SCNC: 136 MMOL/L (ref 135–145)
STATUS: NORMAL
STATUS: NORMAL
TRANSFUSION STATUS: NORMAL
TRANSFUSION STATUS: NORMAL
UNIT DIVISION: 0
UNIT DIVISION: 0
UNIT NUMBER: NORMAL
UNIT NUMBER: NORMAL
WBC # BLD: 12.4 K/CU MM (ref 4–10.5)

## 2023-07-04 PROCEDURE — 83735 ASSAY OF MAGNESIUM: CPT

## 2023-07-04 PROCEDURE — 99233 SBSQ HOSP IP/OBS HIGH 50: CPT | Performed by: INTERNAL MEDICINE

## 2023-07-04 PROCEDURE — 2100000000 HC CCU R&B

## 2023-07-04 PROCEDURE — 94667 MNPJ CHEST WALL 1ST: CPT

## 2023-07-04 PROCEDURE — 2580000003 HC RX 258: Performed by: THORACIC SURGERY (CARDIOTHORACIC VASCULAR SURGERY)

## 2023-07-04 PROCEDURE — 6360000002 HC RX W HCPCS: Performed by: THORACIC SURGERY (CARDIOTHORACIC VASCULAR SURGERY)

## 2023-07-04 PROCEDURE — 97163 PT EVAL HIGH COMPLEX 45 MIN: CPT

## 2023-07-04 PROCEDURE — 6370000000 HC RX 637 (ALT 250 FOR IP): Performed by: STUDENT IN AN ORGANIZED HEALTH CARE EDUCATION/TRAINING PROGRAM

## 2023-07-04 PROCEDURE — 6360000002 HC RX W HCPCS: Performed by: PHYSICIAN ASSISTANT

## 2023-07-04 PROCEDURE — 94660 CPAP INITIATION&MGMT: CPT

## 2023-07-04 PROCEDURE — 94664 DEMO&/EVAL PT USE INHALER: CPT

## 2023-07-04 PROCEDURE — 6370000000 HC RX 637 (ALT 250 FOR IP): Performed by: THORACIC SURGERY (CARDIOTHORACIC VASCULAR SURGERY)

## 2023-07-04 PROCEDURE — 6360000002 HC RX W HCPCS: Performed by: STUDENT IN AN ORGANIZED HEALTH CARE EDUCATION/TRAINING PROGRAM

## 2023-07-04 PROCEDURE — 2580000003 HC RX 258: Performed by: PHYSICIAN ASSISTANT

## 2023-07-04 PROCEDURE — 6370000000 HC RX 637 (ALT 250 FOR IP): Performed by: PHYSICIAN ASSISTANT

## 2023-07-04 PROCEDURE — 85730 THROMBOPLASTIN TIME PARTIAL: CPT

## 2023-07-04 PROCEDURE — 94150 VITAL CAPACITY TEST: CPT

## 2023-07-04 PROCEDURE — 84132 ASSAY OF SERUM POTASSIUM: CPT

## 2023-07-04 PROCEDURE — 84100 ASSAY OF PHOSPHORUS: CPT

## 2023-07-04 PROCEDURE — 2500000003 HC RX 250 WO HCPCS: Performed by: THORACIC SURGERY (CARDIOTHORACIC VASCULAR SURGERY)

## 2023-07-04 PROCEDURE — 82330 ASSAY OF CALCIUM: CPT

## 2023-07-04 PROCEDURE — 85027 COMPLETE CBC AUTOMATED: CPT

## 2023-07-04 PROCEDURE — 71045 X-RAY EXAM CHEST 1 VIEW: CPT

## 2023-07-04 PROCEDURE — 2500000003 HC RX 250 WO HCPCS: Performed by: INTERNAL MEDICINE

## 2023-07-04 PROCEDURE — 2700000000 HC OXYGEN THERAPY PER DAY

## 2023-07-04 PROCEDURE — 94761 N-INVAS EAR/PLS OXIMETRY MLT: CPT

## 2023-07-04 PROCEDURE — 2580000003 HC RX 258: Performed by: STUDENT IN AN ORGANIZED HEALTH CARE EDUCATION/TRAINING PROGRAM

## 2023-07-04 PROCEDURE — 94640 AIRWAY INHALATION TREATMENT: CPT

## 2023-07-04 PROCEDURE — 2500000003 HC RX 250 WO HCPCS: Performed by: PHYSICIAN ASSISTANT

## 2023-07-04 PROCEDURE — 80048 BASIC METABOLIC PNL TOTAL CA: CPT

## 2023-07-04 PROCEDURE — 82962 GLUCOSE BLOOD TEST: CPT

## 2023-07-04 RX ORDER — MAGNESIUM SULFATE IN WATER 40 MG/ML
2000 INJECTION, SOLUTION INTRAVENOUS ONCE
Status: COMPLETED | OUTPATIENT
Start: 2023-07-04 | End: 2023-07-04

## 2023-07-04 RX ORDER — SODIUM CHLORIDE 9 MG/ML
INJECTION, SOLUTION INTRAVENOUS PRN
Status: DISCONTINUED | OUTPATIENT
Start: 2023-07-04 | End: 2023-07-04 | Stop reason: SDUPTHER

## 2023-07-04 RX ORDER — POTASSIUM CHLORIDE 20 MEQ/1
40 TABLET, EXTENDED RELEASE ORAL ONCE
Status: COMPLETED | OUTPATIENT
Start: 2023-07-04 | End: 2023-07-04

## 2023-07-04 RX ORDER — SODIUM CHLORIDE 0.9 % (FLUSH) 0.9 %
5-40 SYRINGE (ML) INJECTION EVERY 12 HOURS SCHEDULED
Status: DISCONTINUED | OUTPATIENT
Start: 2023-07-04 | End: 2023-07-10 | Stop reason: HOSPADM

## 2023-07-04 RX ORDER — LIDOCAINE HYDROCHLORIDE 10 MG/ML
5 INJECTION, SOLUTION EPIDURAL; INFILTRATION; INTRACAUDAL; PERINEURAL ONCE
Status: DISCONTINUED | OUTPATIENT
Start: 2023-07-04 | End: 2023-07-04

## 2023-07-04 RX ORDER — LIDOCAINE HYDROCHLORIDE 10 MG/ML
5 INJECTION, SOLUTION EPIDURAL; INFILTRATION; INTRACAUDAL; PERINEURAL ONCE
Status: DISCONTINUED | OUTPATIENT
Start: 2023-07-04 | End: 2023-07-10 | Stop reason: HOSPADM

## 2023-07-04 RX ORDER — SODIUM CHLORIDE 0.9 % (FLUSH) 0.9 %
5-40 SYRINGE (ML) INJECTION PRN
Status: DISCONTINUED | OUTPATIENT
Start: 2023-07-04 | End: 2023-07-10 | Stop reason: HOSPADM

## 2023-07-04 RX ORDER — METOPROLOL TARTRATE 5 MG/5ML
5 INJECTION INTRAVENOUS EVERY 4 HOURS
Status: COMPLETED | OUTPATIENT
Start: 2023-07-04 | End: 2023-07-05

## 2023-07-04 RX ORDER — SODIUM CHLORIDE 9 MG/ML
INJECTION, SOLUTION INTRAVENOUS PRN
Status: DISCONTINUED | OUTPATIENT
Start: 2023-07-04 | End: 2023-07-10 | Stop reason: HOSPADM

## 2023-07-04 RX ADMIN — MAGNESIUM SULFATE HEPTAHYDRATE 2000 MG: 40 INJECTION, SOLUTION INTRAVENOUS at 09:17

## 2023-07-04 RX ADMIN — METOPROLOL TARTRATE 25 MG: 25 TABLET, FILM COATED ORAL at 20:49

## 2023-07-04 RX ADMIN — METOPROLOL TARTRATE 5 MG: 5 INJECTION INTRAVENOUS at 16:24

## 2023-07-04 RX ADMIN — SODIUM CHLORIDE, PRESERVATIVE FREE 10 ML: 5 INJECTION INTRAVENOUS at 11:00

## 2023-07-04 RX ADMIN — SODIUM CHLORIDE: 9 INJECTION, SOLUTION INTRAVENOUS at 14:53

## 2023-07-04 RX ADMIN — SODIUM CHLORIDE, PRESERVATIVE FREE 10 ML: 5 INJECTION INTRAVENOUS at 20:50

## 2023-07-04 RX ADMIN — IPRATROPIUM BROMIDE AND ALBUTEROL SULFATE 1 DOSE: 2.5; .5 SOLUTION RESPIRATORY (INHALATION) at 11:39

## 2023-07-04 RX ADMIN — IPRATROPIUM BROMIDE AND ALBUTEROL SULFATE 1 DOSE: 2.5; .5 SOLUTION RESPIRATORY (INHALATION) at 16:03

## 2023-07-04 RX ADMIN — IPRATROPIUM BROMIDE AND ALBUTEROL SULFATE 1 DOSE: 2.5; .5 SOLUTION RESPIRATORY (INHALATION) at 03:58

## 2023-07-04 RX ADMIN — BUDESONIDE AND FORMOTEROL FUMARATE DIHYDRATE 2 PUFF: 160; 4.5 AEROSOL RESPIRATORY (INHALATION) at 20:57

## 2023-07-04 RX ADMIN — SODIUM CHLORIDE, PRESERVATIVE FREE 10 ML: 5 INJECTION INTRAVENOUS at 08:49

## 2023-07-04 RX ADMIN — SODIUM PHOSPHATE, MONOBASIC, MONOHYDRATE AND SODIUM PHOSPHATE, DIBASIC, ANHYDROUS 20 MMOL: 276; 142 INJECTION, SOLUTION INTRAVENOUS at 11:36

## 2023-07-04 RX ADMIN — POLYETHYLENE GLYCOL (3350) 17 G: 17 POWDER, FOR SOLUTION ORAL at 08:48

## 2023-07-04 RX ADMIN — FAMOTIDINE 20 MG: 20 TABLET, FILM COATED ORAL at 20:49

## 2023-07-04 RX ADMIN — MULTIPLE VITAMINS W/ MINERALS TAB 1 TABLET: TAB at 08:48

## 2023-07-04 RX ADMIN — POTASSIUM CHLORIDE 20 MEQ: 29.8 INJECTION, SOLUTION INTRAVENOUS at 08:48

## 2023-07-04 RX ADMIN — ATORVASTATIN CALCIUM 40 MG: 40 TABLET, FILM COATED ORAL at 20:49

## 2023-07-04 RX ADMIN — OXYCODONE HYDROCHLORIDE AND ACETAMINOPHEN 1 TABLET: 5; 325 TABLET ORAL at 00:00

## 2023-07-04 RX ADMIN — ASPIRIN 81 MG: 81 TABLET, CHEWABLE ORAL at 08:49

## 2023-07-04 RX ADMIN — CHLORHEXIDINE GLUCONATE 0.12% ORAL RINSE 15 ML: 1.2 LIQUID ORAL at 20:48

## 2023-07-04 RX ADMIN — AZITHROMYCIN MONOHYDRATE 250 MG: 500 INJECTION, POWDER, LYOPHILIZED, FOR SOLUTION INTRAVENOUS at 22:08

## 2023-07-04 RX ADMIN — IPRATROPIUM BROMIDE AND ALBUTEROL SULFATE 1 DOSE: 2.5; .5 SOLUTION RESPIRATORY (INHALATION) at 00:41

## 2023-07-04 RX ADMIN — SENNOSIDES AND DOCUSATE SODIUM 1 TABLET: 50; 8.6 TABLET ORAL at 20:49

## 2023-07-04 RX ADMIN — OXYCODONE HYDROCHLORIDE AND ACETAMINOPHEN 1 TABLET: 5; 325 TABLET ORAL at 16:25

## 2023-07-04 RX ADMIN — METOPROLOL TARTRATE 5 MG: 5 INJECTION INTRAVENOUS at 20:00

## 2023-07-04 RX ADMIN — OXYCODONE HYDROCHLORIDE AND ACETAMINOPHEN 1 TABLET: 5; 325 TABLET ORAL at 20:00

## 2023-07-04 RX ADMIN — POTASSIUM CHLORIDE 20 MEQ: 29.8 INJECTION, SOLUTION INTRAVENOUS at 21:03

## 2023-07-04 RX ADMIN — Medication: at 08:49

## 2023-07-04 RX ADMIN — POTASSIUM CHLORIDE 20 MEQ: 29.8 INJECTION, SOLUTION INTRAVENOUS at 16:31

## 2023-07-04 RX ADMIN — POTASSIUM CHLORIDE 20 MEQ: 29.8 INJECTION, SOLUTION INTRAVENOUS at 14:56

## 2023-07-04 RX ADMIN — IPRATROPIUM BROMIDE AND ALBUTEROL SULFATE 1 DOSE: 2.5; .5 SOLUTION RESPIRATORY (INHALATION) at 20:57

## 2023-07-04 RX ADMIN — CLOPIDOGREL BISULFATE 75 MG: 75 TABLET ORAL at 08:48

## 2023-07-04 RX ADMIN — OXYCODONE HYDROCHLORIDE AND ACETAMINOPHEN 1 TABLET: 5; 325 TABLET ORAL at 11:50

## 2023-07-04 RX ADMIN — AMIODARONE HYDROCHLORIDE 0.5 MG/MIN: 50 INJECTION, SOLUTION INTRAVENOUS at 20:57

## 2023-07-04 RX ADMIN — Medication: at 20:49

## 2023-07-04 RX ADMIN — BUDESONIDE AND FORMOTEROL FUMARATE DIHYDRATE 2 PUFF: 160; 4.5 AEROSOL RESPIRATORY (INHALATION) at 07:30

## 2023-07-04 RX ADMIN — METOPROLOL TARTRATE 5 MG: 5 INJECTION INTRAVENOUS at 11:51

## 2023-07-04 RX ADMIN — AMIODARONE HYDROCHLORIDE 0.5 MG/MIN: 50 INJECTION, SOLUTION INTRAVENOUS at 06:01

## 2023-07-04 RX ADMIN — FAMOTIDINE 20 MG: 20 TABLET, FILM COATED ORAL at 08:48

## 2023-07-04 RX ADMIN — POTASSIUM CHLORIDE 20 MEQ: 29.8 INJECTION, SOLUTION INTRAVENOUS at 20:02

## 2023-07-04 RX ADMIN — SODIUM CHLORIDE, PRESERVATIVE FREE 10 ML: 5 INJECTION INTRAVENOUS at 08:50

## 2023-07-04 RX ADMIN — OXYCODONE HYDROCHLORIDE AND ACETAMINOPHEN 1 TABLET: 5; 325 TABLET ORAL at 04:00

## 2023-07-04 RX ADMIN — CHLORHEXIDINE GLUCONATE 0.12% ORAL RINSE 15 ML: 1.2 LIQUID ORAL at 08:48

## 2023-07-04 RX ADMIN — POTASSIUM CHLORIDE 20 MEQ: 29.8 INJECTION, SOLUTION INTRAVENOUS at 06:53

## 2023-07-04 RX ADMIN — SENNOSIDES AND DOCUSATE SODIUM 1 TABLET: 50; 8.6 TABLET ORAL at 08:48

## 2023-07-04 RX ADMIN — HEPARIN SODIUM 12 UNITS/KG/HR: 10000 INJECTION, SOLUTION INTRAVENOUS at 10:47

## 2023-07-04 RX ADMIN — CALCIUM CHLORIDE 1000 MG: 100 INJECTION, SOLUTION INTRAVENOUS at 09:32

## 2023-07-04 RX ADMIN — BUMETANIDE 0.5 MG/HR: 0.25 INJECTION INTRAMUSCULAR; INTRAVENOUS at 10:23

## 2023-07-04 RX ADMIN — METOPROLOL TARTRATE 25 MG: 25 TABLET, FILM COATED ORAL at 08:49

## 2023-07-04 RX ADMIN — POTASSIUM CHLORIDE 40 MEQ: 1500 TABLET, EXTENDED RELEASE ORAL at 09:02

## 2023-07-04 RX ADMIN — IPRATROPIUM BROMIDE AND ALBUTEROL SULFATE 1 DOSE: 2.5; .5 SOLUTION RESPIRATORY (INHALATION) at 07:31

## 2023-07-04 ASSESSMENT — PAIN SCALES - GENERAL
PAINLEVEL_OUTOF10: 7
PAINLEVEL_OUTOF10: 0
PAINLEVEL_OUTOF10: 8
PAINLEVEL_OUTOF10: 7
PAINLEVEL_OUTOF10: 0
PAINLEVEL_OUTOF10: 0
PAINLEVEL_OUTOF10: 8

## 2023-07-04 ASSESSMENT — PAIN DESCRIPTION - LOCATION
LOCATION: CHEST;STERNUM
LOCATION: CHEST;INCISION
LOCATION: CHEST;INCISION
LOCATION: CHEST;STERNUM
LOCATION: CHEST;INCISION
LOCATION: CHEST;STERNUM

## 2023-07-04 ASSESSMENT — PAIN DESCRIPTION - ONSET: ONSET: ON-GOING

## 2023-07-04 ASSESSMENT — PAIN DESCRIPTION - ORIENTATION
ORIENTATION: MID

## 2023-07-04 ASSESSMENT — PAIN DESCRIPTION - DESCRIPTORS
DESCRIPTORS: ACHING;SHARP
DESCRIPTORS: ACHING;SHARP
DESCRIPTORS: ACHING
DESCRIPTORS: ACHING;SHARP
DESCRIPTORS: ACHING
DESCRIPTORS: ACHING

## 2023-07-04 ASSESSMENT — PAIN DESCRIPTION - FREQUENCY: FREQUENCY: CONTINUOUS

## 2023-07-04 ASSESSMENT — PAIN DESCRIPTION - PAIN TYPE: TYPE: SURGICAL PAIN

## 2023-07-04 ASSESSMENT — PAIN - FUNCTIONAL ASSESSMENT: PAIN_FUNCTIONAL_ASSESSMENT: ACTIVITIES ARE NOT PREVENTED

## 2023-07-05 ENCOUNTER — APPOINTMENT (OUTPATIENT)
Dept: GENERAL RADIOLOGY | Age: 63
DRG: 219 | End: 2023-07-05
Attending: THORACIC SURGERY (CARDIOTHORACIC VASCULAR SURGERY)
Payer: COMMERCIAL

## 2023-07-05 LAB
ANION GAP SERPL CALCULATED.3IONS-SCNC: 12 MMOL/L (ref 4–16)
APTT: 59.2 SECONDS (ref 25.1–37.1)
APTT: 65 SECONDS (ref 25.1–37.1)
APTT: 94.4 SECONDS (ref 25.1–37.1)
APTT: 97.3 SECONDS (ref 25.1–37.1)
BUN SERPL-MCNC: 23 MG/DL (ref 6–23)
CALCIUM IONIZED: 4.28 MG/DL (ref 4.48–5.28)
CALCIUM SERPL-MCNC: 8.7 MG/DL (ref 8.3–10.6)
CHLORIDE BLD-SCNC: 95 MMOL/L (ref 99–110)
CO2: 34 MMOL/L (ref 21–32)
CREAT SERPL-MCNC: 0.8 MG/DL (ref 0.9–1.3)
GFR SERPL CREATININE-BSD FRML MDRD: >60 ML/MIN/1.73M2
GLUCOSE BLD-MCNC: 111 MG/DL (ref 70–99)
GLUCOSE BLD-MCNC: 120 MG/DL (ref 70–99)
GLUCOSE BLD-MCNC: 122 MG/DL (ref 70–99)
GLUCOSE BLD-MCNC: 127 MG/DL (ref 70–99)
GLUCOSE BLD-MCNC: 161 MG/DL (ref 70–99)
GLUCOSE SERPL-MCNC: 127 MG/DL (ref 70–99)
HCT VFR BLD CALC: 37.5 % (ref 42–52)
HEMOGLOBIN: 11.8 GM/DL (ref 13.5–18)
IONIZED CA: 1.07 MMOL/L (ref 1.12–1.32)
MAGNESIUM: 1.9 MG/DL (ref 1.8–2.4)
MAGNESIUM: 2.1 MG/DL (ref 1.8–2.4)
MAGNESIUM: 2.3 MG/DL (ref 1.8–2.4)
MCH RBC QN AUTO: 28.9 PG (ref 27–31)
MCHC RBC AUTO-ENTMCNC: 31.5 % (ref 32–36)
MCV RBC AUTO: 91.7 FL (ref 78–100)
PDW BLD-RTO: 13.4 % (ref 11.7–14.9)
PHOSPHORUS: 2.2 MG/DL (ref 2.5–4.9)
PHOSPHORUS: 3.4 MG/DL (ref 2.5–4.9)
PHOSPHORUS: 3.8 MG/DL (ref 2.5–4.9)
PHOSPHORUS: 3.8 MG/DL (ref 2.5–4.9)
PLATELET # BLD: 158 K/CU MM (ref 140–440)
PMV BLD AUTO: 11.2 FL (ref 7.5–11.1)
POTASSIUM SERPL-SCNC: 3.8 MMOL/L (ref 3.5–5.1)
POTASSIUM SERPL-SCNC: 3.9 MMOL/L (ref 3.5–5.1)
POTASSIUM SERPL-SCNC: 3.9 MMOL/L (ref 3.5–5.1)
POTASSIUM SERPL-SCNC: 4.7 MMOL/L (ref 3.5–5.1)
RBC # BLD: 4.09 M/CU MM (ref 4.6–6.2)
REASON FOR REJECTION: NORMAL
REJECTED TEST: NORMAL
SODIUM BLD-SCNC: 141 MMOL/L (ref 135–145)
WBC # BLD: 10.2 K/CU MM (ref 4–10.5)

## 2023-07-05 PROCEDURE — 82330 ASSAY OF CALCIUM: CPT

## 2023-07-05 PROCEDURE — 2500000003 HC RX 250 WO HCPCS: Performed by: THORACIC SURGERY (CARDIOTHORACIC VASCULAR SURGERY)

## 2023-07-05 PROCEDURE — 99152 MOD SED SAME PHYS/QHP 5/>YRS: CPT | Performed by: INTERNAL MEDICINE

## 2023-07-05 PROCEDURE — 36569 INSJ PICC 5 YR+ W/O IMAGING: CPT

## 2023-07-05 PROCEDURE — 82962 GLUCOSE BLOOD TEST: CPT

## 2023-07-05 PROCEDURE — 6370000000 HC RX 637 (ALT 250 FOR IP): Performed by: PHYSICIAN ASSISTANT

## 2023-07-05 PROCEDURE — 6370000000 HC RX 637 (ALT 250 FOR IP): Performed by: SPECIALIST

## 2023-07-05 PROCEDURE — 2700000000 HC OXYGEN THERAPY PER DAY

## 2023-07-05 PROCEDURE — 71045 X-RAY EXAM CHEST 1 VIEW: CPT

## 2023-07-05 PROCEDURE — 05HY33Z INSERTION OF INFUSION DEVICE INTO UPPER VEIN, PERCUTANEOUS APPROACH: ICD-10-PCS | Performed by: FAMILY MEDICINE

## 2023-07-05 PROCEDURE — C1751 CATH, INF, PER/CENT/MIDLINE: HCPCS

## 2023-07-05 PROCEDURE — 83735 ASSAY OF MAGNESIUM: CPT

## 2023-07-05 PROCEDURE — 94669 MECHANICAL CHEST WALL OSCILL: CPT

## 2023-07-05 PROCEDURE — 94010 BREATHING CAPACITY TEST: CPT

## 2023-07-05 PROCEDURE — 94761 N-INVAS EAR/PLS OXIMETRY MLT: CPT

## 2023-07-05 PROCEDURE — 6360000002 HC RX W HCPCS: Performed by: PHYSICIAN ASSISTANT

## 2023-07-05 PROCEDURE — 94640 AIRWAY INHALATION TREATMENT: CPT

## 2023-07-05 PROCEDURE — 85730 THROMBOPLASTIN TIME PARTIAL: CPT

## 2023-07-05 PROCEDURE — 6360000002 HC RX W HCPCS

## 2023-07-05 PROCEDURE — 2500000003 HC RX 250 WO HCPCS: Performed by: PHYSICIAN ASSISTANT

## 2023-07-05 PROCEDURE — 5A2204Z RESTORATION OF CARDIAC RHYTHM, SINGLE: ICD-10-PCS | Performed by: INTERNAL MEDICINE

## 2023-07-05 PROCEDURE — 84100 ASSAY OF PHOSPHORUS: CPT

## 2023-07-05 PROCEDURE — 2709999900 HC NON-CHARGEABLE SUPPLY

## 2023-07-05 PROCEDURE — 2500000003 HC RX 250 WO HCPCS: Performed by: INTERNAL MEDICINE

## 2023-07-05 PROCEDURE — 99233 SBSQ HOSP IP/OBS HIGH 50: CPT | Performed by: INTERNAL MEDICINE

## 2023-07-05 PROCEDURE — 2100000000 HC CCU R&B

## 2023-07-05 PROCEDURE — 2580000003 HC RX 258: Performed by: THORACIC SURGERY (CARDIOTHORACIC VASCULAR SURGERY)

## 2023-07-05 PROCEDURE — B24BZZ4 ULTRASONOGRAPHY OF HEART WITH AORTA, TRANSESOPHAGEAL: ICD-10-PCS | Performed by: INTERNAL MEDICINE

## 2023-07-05 PROCEDURE — 80048 BASIC METABOLIC PNL TOTAL CA: CPT

## 2023-07-05 PROCEDURE — 85027 COMPLETE CBC AUTOMATED: CPT

## 2023-07-05 PROCEDURE — 76937 US GUIDE VASCULAR ACCESS: CPT

## 2023-07-05 PROCEDURE — 2580000003 HC RX 258: Performed by: PHYSICIAN ASSISTANT

## 2023-07-05 PROCEDURE — 84132 ASSAY OF SERUM POTASSIUM: CPT

## 2023-07-05 PROCEDURE — 92960 CARDIOVERSION ELECTRIC EXT: CPT | Performed by: INTERNAL MEDICINE

## 2023-07-05 PROCEDURE — 6370000000 HC RX 637 (ALT 250 FOR IP): Performed by: STUDENT IN AN ORGANIZED HEALTH CARE EDUCATION/TRAINING PROGRAM

## 2023-07-05 RX ORDER — FLUMAZENIL 0.1 MG/ML
INJECTION INTRAVENOUS
Status: DISPENSED
Start: 2023-07-05 | End: 2023-07-05

## 2023-07-05 RX ORDER — MIDAZOLAM HYDROCHLORIDE 1 MG/ML
INJECTION INTRAMUSCULAR; INTRAVENOUS
Status: COMPLETED
Start: 2023-07-05 | End: 2023-07-05

## 2023-07-05 RX ORDER — AMIODARONE HYDROCHLORIDE 200 MG/1
400 TABLET ORAL 2 TIMES DAILY
Status: DISCONTINUED | OUTPATIENT
Start: 2023-07-05 | End: 2023-07-05

## 2023-07-05 RX ORDER — IPRATROPIUM BROMIDE AND ALBUTEROL SULFATE 2.5; .5 MG/3ML; MG/3ML
1 SOLUTION RESPIRATORY (INHALATION) 3 TIMES DAILY
Status: DISCONTINUED | OUTPATIENT
Start: 2023-07-05 | End: 2023-07-10 | Stop reason: HOSPADM

## 2023-07-05 RX ORDER — AMIODARONE HYDROCHLORIDE 200 MG/1
200 TABLET ORAL ONCE
Status: COMPLETED | OUTPATIENT
Start: 2023-07-05 | End: 2023-07-05

## 2023-07-05 RX ORDER — MIDAZOLAM HYDROCHLORIDE 2 MG/2ML
5 INJECTION, SOLUTION INTRAMUSCULAR; INTRAVENOUS ONCE
Status: DISCONTINUED | OUTPATIENT
Start: 2023-07-05 | End: 2023-07-05

## 2023-07-05 RX ORDER — MIDAZOLAM HYDROCHLORIDE 1 MG/ML
4 INJECTION INTRAMUSCULAR; INTRAVENOUS ONCE
Status: COMPLETED | OUTPATIENT
Start: 2023-07-05 | End: 2023-07-05

## 2023-07-05 RX ORDER — MIDAZOLAM HYDROCHLORIDE 1 MG/ML
2 INJECTION INTRAMUSCULAR; INTRAVENOUS ONCE
Status: COMPLETED | OUTPATIENT
Start: 2023-07-05 | End: 2023-07-05

## 2023-07-05 RX ORDER — CALCIUM GLUCONATE 20 MG/ML
2000 INJECTION, SOLUTION INTRAVENOUS ONCE
Status: COMPLETED | OUTPATIENT
Start: 2023-07-05 | End: 2023-07-05

## 2023-07-05 RX ORDER — MIDAZOLAM HYDROCHLORIDE 5 MG/ML
6 INJECTION INTRAMUSCULAR; INTRAVENOUS ONCE
Status: DISCONTINUED | OUTPATIENT
Start: 2023-07-05 | End: 2023-07-05

## 2023-07-05 RX ORDER — AMIODARONE HYDROCHLORIDE 200 MG/1
200 TABLET ORAL 2 TIMES DAILY
Status: DISPENSED | OUTPATIENT
Start: 2023-07-05 | End: 2023-07-07

## 2023-07-05 RX ORDER — FLUMAZENIL 0.1 MG/ML
0.2 INJECTION INTRAVENOUS ONCE
Status: DISCONTINUED | OUTPATIENT
Start: 2023-07-05 | End: 2023-07-10

## 2023-07-05 RX ADMIN — BUMETANIDE 1 MG/HR: 0.25 INJECTION INTRAMUSCULAR; INTRAVENOUS at 05:27

## 2023-07-05 RX ADMIN — ATORVASTATIN CALCIUM 40 MG: 40 TABLET, FILM COATED ORAL at 21:00

## 2023-07-05 RX ADMIN — POTASSIUM CHLORIDE 20 MEQ: 29.8 INJECTION, SOLUTION INTRAVENOUS at 02:19

## 2023-07-05 RX ADMIN — SENNOSIDES AND DOCUSATE SODIUM 1 TABLET: 50; 8.6 TABLET ORAL at 21:00

## 2023-07-05 RX ADMIN — MULTIPLE VITAMINS W/ MINERALS TAB 1 TABLET: TAB at 08:38

## 2023-07-05 RX ADMIN — SODIUM CHLORIDE, PRESERVATIVE FREE 10 ML: 5 INJECTION INTRAVENOUS at 08:21

## 2023-07-05 RX ADMIN — IPRATROPIUM BROMIDE AND ALBUTEROL SULFATE 1 DOSE: 2.5; .5 SOLUTION RESPIRATORY (INHALATION) at 04:05

## 2023-07-05 RX ADMIN — FAMOTIDINE 20 MG: 20 TABLET, FILM COATED ORAL at 08:39

## 2023-07-05 RX ADMIN — SENNOSIDES AND DOCUSATE SODIUM 1 TABLET: 50; 8.6 TABLET ORAL at 08:38

## 2023-07-05 RX ADMIN — CHLORHEXIDINE GLUCONATE 0.12% ORAL RINSE 15 ML: 1.2 LIQUID ORAL at 08:38

## 2023-07-05 RX ADMIN — POLYETHYLENE GLYCOL (3350) 17 G: 17 POWDER, FOR SOLUTION ORAL at 08:38

## 2023-07-05 RX ADMIN — CHLORHEXIDINE GLUCONATE 0.12% ORAL RINSE 15 ML: 1.2 LIQUID ORAL at 21:00

## 2023-07-05 RX ADMIN — FAMOTIDINE 20 MG: 20 TABLET, FILM COATED ORAL at 21:00

## 2023-07-05 RX ADMIN — BUMETANIDE 1 MG/HR: 0.25 INJECTION INTRAMUSCULAR; INTRAVENOUS at 18:43

## 2023-07-05 RX ADMIN — OXYCODONE HYDROCHLORIDE AND ACETAMINOPHEN 1 TABLET: 5; 325 TABLET ORAL at 21:04

## 2023-07-05 RX ADMIN — Medication: at 08:30

## 2023-07-05 RX ADMIN — MAGNESIUM SULFATE HEPTAHYDRATE 2000 MG: 40 INJECTION, SOLUTION INTRAVENOUS at 01:25

## 2023-07-05 RX ADMIN — SODIUM CHLORIDE, PRESERVATIVE FREE 10 ML: 5 INJECTION INTRAVENOUS at 08:19

## 2023-07-05 RX ADMIN — AMIODARONE HYDROCHLORIDE 200 MG: 200 TABLET ORAL at 14:01

## 2023-07-05 RX ADMIN — CLOPIDOGREL BISULFATE 75 MG: 75 TABLET ORAL at 08:38

## 2023-07-05 RX ADMIN — ASPIRIN 81 MG: 81 TABLET, CHEWABLE ORAL at 08:39

## 2023-07-05 RX ADMIN — SODIUM PHOSPHATE, MONOBASIC, MONOHYDRATE AND SODIUM PHOSPHATE, DIBASIC, ANHYDROUS 15 MMOL: 142; 276 INJECTION, SOLUTION INTRAVENOUS at 01:49

## 2023-07-05 RX ADMIN — OXYCODONE HYDROCHLORIDE 5 MG: 5 TABLET ORAL at 09:00

## 2023-07-05 RX ADMIN — CALCIUM GLUCONATE 2000 MG: 20 INJECTION, SOLUTION INTRAVENOUS at 10:38

## 2023-07-05 RX ADMIN — HEPARIN SODIUM 18 UNITS/KG/HR: 10000 INJECTION, SOLUTION INTRAVENOUS at 14:34

## 2023-07-05 RX ADMIN — OXYCODONE HYDROCHLORIDE AND ACETAMINOPHEN 1 TABLET: 5; 325 TABLET ORAL at 00:00

## 2023-07-05 RX ADMIN — HEPARIN SODIUM 16 UNITS/KG/HR: 10000 INJECTION, SOLUTION INTRAVENOUS at 00:57

## 2023-07-05 RX ADMIN — POTASSIUM CHLORIDE 20 MEQ: 29.8 INJECTION, SOLUTION INTRAVENOUS at 09:03

## 2023-07-05 RX ADMIN — SODIUM CHLORIDE, PRESERVATIVE FREE 10 ML: 5 INJECTION INTRAVENOUS at 08:20

## 2023-07-05 RX ADMIN — IPRATROPIUM BROMIDE AND ALBUTEROL SULFATE 1 DOSE: 2.5; .5 SOLUTION RESPIRATORY (INHALATION) at 07:17

## 2023-07-05 RX ADMIN — METOPROLOL TARTRATE 25 MG: 25 TABLET, FILM COATED ORAL at 08:38

## 2023-07-05 RX ADMIN — MIDAZOLAM HYDROCHLORIDE 2 MG: 1 INJECTION INTRAMUSCULAR; INTRAVENOUS at 09:33

## 2023-07-05 RX ADMIN — POTASSIUM CHLORIDE 20 MEQ: 29.8 INJECTION, SOLUTION INTRAVENOUS at 01:18

## 2023-07-05 RX ADMIN — BUDESONIDE AND FORMOTEROL FUMARATE DIHYDRATE 2 PUFF: 160; 4.5 AEROSOL RESPIRATORY (INHALATION) at 07:15

## 2023-07-05 RX ADMIN — METOPROLOL TARTRATE 5 MG: 5 INJECTION INTRAVENOUS at 00:00

## 2023-07-05 RX ADMIN — AMIODARONE HYDROCHLORIDE 200 MG: 200 TABLET ORAL at 21:00

## 2023-07-05 RX ADMIN — IPRATROPIUM BROMIDE AND ALBUTEROL SULFATE 1 DOSE: 2.5; .5 SOLUTION RESPIRATORY (INHALATION) at 15:45

## 2023-07-05 RX ADMIN — SODIUM CHLORIDE 10 ML/HR: 9 INJECTION, SOLUTION INTRAVENOUS at 01:24

## 2023-07-05 RX ADMIN — MIDAZOLAM HYDROCHLORIDE 4 MG: 1 INJECTION INTRAMUSCULAR; INTRAVENOUS at 09:40

## 2023-07-05 RX ADMIN — MIDAZOLAM 2 MG: 1 INJECTION INTRAMUSCULAR; INTRAVENOUS at 09:33

## 2023-07-05 RX ADMIN — BUDESONIDE AND FORMOTEROL FUMARATE DIHYDRATE 2 PUFF: 160; 4.5 AEROSOL RESPIRATORY (INHALATION) at 19:42

## 2023-07-05 RX ADMIN — MIDAZOLAM 4 MG: 1 INJECTION INTRAMUSCULAR; INTRAVENOUS at 09:40

## 2023-07-05 RX ADMIN — METOPROLOL TARTRATE 25 MG: 25 TABLET, FILM COATED ORAL at 21:00

## 2023-07-05 RX ADMIN — OXYCODONE HYDROCHLORIDE AND ACETAMINOPHEN 1 TABLET: 5; 325 TABLET ORAL at 04:00

## 2023-07-05 RX ADMIN — IPRATROPIUM BROMIDE AND ALBUTEROL SULFATE 1 DOSE: 2.5; .5 SOLUTION RESPIRATORY (INHALATION) at 19:35

## 2023-07-05 RX ADMIN — OXYCODONE HYDROCHLORIDE 5 MG: 5 TABLET ORAL at 16:19

## 2023-07-05 RX ADMIN — POTASSIUM CHLORIDE 20 MEQ: 29.8 INJECTION, SOLUTION INTRAVENOUS at 11:20

## 2023-07-05 ASSESSMENT — PAIN DESCRIPTION - FREQUENCY
FREQUENCY: CONTINUOUS
FREQUENCY: INTERMITTENT
FREQUENCY: INTERMITTENT

## 2023-07-05 ASSESSMENT — PAIN DESCRIPTION - LOCATION
LOCATION: CHEST;STERNUM
LOCATION: CHEST;INCISION
LOCATION: CHEST;INCISION
LOCATION: CHEST
LOCATION: CHEST
LOCATION: CHEST;SHOULDER

## 2023-07-05 ASSESSMENT — PAIN DESCRIPTION - PAIN TYPE
TYPE: SURGICAL PAIN

## 2023-07-05 ASSESSMENT — PAIN DESCRIPTION - DESCRIPTORS
DESCRIPTORS: ACHING;DISCOMFORT
DESCRIPTORS: DISCOMFORT
DESCRIPTORS: ACHING
DESCRIPTORS: ACHING;SHARP
DESCRIPTORS: ACHING;SHARP
DESCRIPTORS: ACHING

## 2023-07-05 ASSESSMENT — PAIN SCALES - GENERAL
PAINLEVEL_OUTOF10: 7
PAINLEVEL_OUTOF10: 2
PAINLEVEL_OUTOF10: 0
PAINLEVEL_OUTOF10: 7
PAINLEVEL_OUTOF10: 7
PAINLEVEL_OUTOF10: 0
PAINLEVEL_OUTOF10: 7
PAINLEVEL_OUTOF10: 6

## 2023-07-05 ASSESSMENT — PAIN - FUNCTIONAL ASSESSMENT
PAIN_FUNCTIONAL_ASSESSMENT: ACTIVITIES ARE NOT PREVENTED

## 2023-07-05 ASSESSMENT — COPD QUESTIONNAIRES
GOLD_GRADE: 3
CAT_TOTALSCORE: 17
QUESTION4_WALKINCLINE: 4
QUESTION2_CHESTPHLEGM: 1
QUESTION1_COUGHFREQUENCY: 2
QUESTION3_CHESTTIGHTNESS: 2
QUESTION5_HOMEACTIVITIES: 3
QUESTION7_SLEEPQUALITY: 1
TOTAL_EXACERBATIONS_PASTYEAR: 3
QUESTION8_ENERGYLEVEL: 3
QUESTION6_LEAVINGHOUSE: 1
GOLD_GROUP: GROUP E

## 2023-07-05 ASSESSMENT — PULMONARY FUNCTION TESTS
PIF_VALUE: 45
FEV1 (%PREDICTED): 45
POST BRONCHODILATOR FEV1/FVC: 63

## 2023-07-05 ASSESSMENT — PAIN DESCRIPTION - ONSET
ONSET: ON-GOING
ONSET: ON-GOING
ONSET: PROGRESSIVE

## 2023-07-05 ASSESSMENT — PAIN DESCRIPTION - ORIENTATION
ORIENTATION: MID
ORIENTATION: MID
ORIENTATION: MID;LEFT
ORIENTATION: MID

## 2023-07-06 ENCOUNTER — APPOINTMENT (OUTPATIENT)
Dept: GENERAL RADIOLOGY | Age: 63
DRG: 219 | End: 2023-07-06
Attending: THORACIC SURGERY (CARDIOTHORACIC VASCULAR SURGERY)
Payer: COMMERCIAL

## 2023-07-06 LAB
ANION GAP SERPL CALCULATED.3IONS-SCNC: 9 MMOL/L (ref 4–16)
APTT: 73.4 SECONDS (ref 25.1–37.1)
BUN SERPL-MCNC: 24 MG/DL (ref 6–23)
CALCIUM IONIZED: 4.6 MG/DL (ref 4.48–5.28)
CALCIUM SERPL-MCNC: 8.3 MG/DL (ref 8.3–10.6)
CHLORIDE BLD-SCNC: 92 MMOL/L (ref 99–110)
CO2: 38 MMOL/L (ref 21–32)
CREAT SERPL-MCNC: 0.8 MG/DL (ref 0.9–1.3)
CULTURE: NORMAL
GFR SERPL CREATININE-BSD FRML MDRD: >60 ML/MIN/1.73M2
GLUCOSE SERPL-MCNC: 101 MG/DL (ref 70–99)
GRAM SMEAR: NORMAL
HCT VFR BLD CALC: 34.9 % (ref 42–52)
HEMOGLOBIN: 10.6 GM/DL (ref 13.5–18)
IONIZED CA: 1.15 MMOL/L (ref 1.12–1.32)
Lab: NORMAL
MAGNESIUM: 1.6 MG/DL (ref 1.8–2.4)
MAGNESIUM: 1.7 MG/DL (ref 1.8–2.4)
MAGNESIUM: 1.7 MG/DL (ref 1.8–2.4)
MAGNESIUM: 2.9 MG/DL (ref 1.8–2.4)
MCH RBC QN AUTO: 28 PG (ref 27–31)
MCHC RBC AUTO-ENTMCNC: 30.4 % (ref 32–36)
MCV RBC AUTO: 92.1 FL (ref 78–100)
PDW BLD-RTO: 13.2 % (ref 11.7–14.9)
PHOSPHORUS: 2 MG/DL (ref 2.5–4.9)
PHOSPHORUS: 2.2 MG/DL (ref 2.5–4.9)
PHOSPHORUS: 3 MG/DL (ref 2.5–4.9)
PHOSPHORUS: 3.6 MG/DL (ref 2.5–4.9)
PLATELET # BLD: 159 K/CU MM (ref 140–440)
PMV BLD AUTO: 11.2 FL (ref 7.5–11.1)
POTASSIUM SERPL-SCNC: 3.4 MMOL/L (ref 3.5–5.1)
POTASSIUM SERPL-SCNC: 3.9 MMOL/L (ref 3.5–5.1)
POTASSIUM SERPL-SCNC: 4.5 MMOL/L (ref 3.5–5.1)
POTASSIUM SERPL-SCNC: 6 MMOL/L (ref 3.5–5.1)
RBC # BLD: 3.79 M/CU MM (ref 4.6–6.2)
SODIUM BLD-SCNC: 139 MMOL/L (ref 135–145)
SPECIMEN: NORMAL
WBC # BLD: 8.4 K/CU MM (ref 4–10.5)

## 2023-07-06 PROCEDURE — 2580000003 HC RX 258: Performed by: STUDENT IN AN ORGANIZED HEALTH CARE EDUCATION/TRAINING PROGRAM

## 2023-07-06 PROCEDURE — 99232 SBSQ HOSP IP/OBS MODERATE 35: CPT | Performed by: INTERNAL MEDICINE

## 2023-07-06 PROCEDURE — 2500000003 HC RX 250 WO HCPCS: Performed by: PHYSICIAN ASSISTANT

## 2023-07-06 PROCEDURE — 85730 THROMBOPLASTIN TIME PARTIAL: CPT

## 2023-07-06 PROCEDURE — 2700000000 HC OXYGEN THERAPY PER DAY

## 2023-07-06 PROCEDURE — 71045 X-RAY EXAM CHEST 1 VIEW: CPT

## 2023-07-06 PROCEDURE — 97110 THERAPEUTIC EXERCISES: CPT

## 2023-07-06 PROCEDURE — 2580000003 HC RX 258: Performed by: THORACIC SURGERY (CARDIOTHORACIC VASCULAR SURGERY)

## 2023-07-06 PROCEDURE — 84132 ASSAY OF SERUM POTASSIUM: CPT

## 2023-07-06 PROCEDURE — 6360000002 HC RX W HCPCS: Performed by: STUDENT IN AN ORGANIZED HEALTH CARE EDUCATION/TRAINING PROGRAM

## 2023-07-06 PROCEDURE — 2500000003 HC RX 250 WO HCPCS: Performed by: THORACIC SURGERY (CARDIOTHORACIC VASCULAR SURGERY)

## 2023-07-06 PROCEDURE — 82330 ASSAY OF CALCIUM: CPT

## 2023-07-06 PROCEDURE — 2580000003 HC RX 258: Performed by: PHYSICIAN ASSISTANT

## 2023-07-06 PROCEDURE — 94761 N-INVAS EAR/PLS OXIMETRY MLT: CPT

## 2023-07-06 PROCEDURE — 6360000002 HC RX W HCPCS: Performed by: PHYSICIAN ASSISTANT

## 2023-07-06 PROCEDURE — 84100 ASSAY OF PHOSPHORUS: CPT

## 2023-07-06 PROCEDURE — 6370000000 HC RX 637 (ALT 250 FOR IP): Performed by: SPECIALIST

## 2023-07-06 PROCEDURE — 97530 THERAPEUTIC ACTIVITIES: CPT

## 2023-07-06 PROCEDURE — 97116 GAIT TRAINING THERAPY: CPT

## 2023-07-06 PROCEDURE — 97535 SELF CARE MNGMENT TRAINING: CPT

## 2023-07-06 PROCEDURE — 94150 VITAL CAPACITY TEST: CPT

## 2023-07-06 PROCEDURE — 83735 ASSAY OF MAGNESIUM: CPT

## 2023-07-06 PROCEDURE — 94640 AIRWAY INHALATION TREATMENT: CPT

## 2023-07-06 PROCEDURE — 6370000000 HC RX 637 (ALT 250 FOR IP): Performed by: PHYSICIAN ASSISTANT

## 2023-07-06 PROCEDURE — 80048 BASIC METABOLIC PNL TOTAL CA: CPT

## 2023-07-06 PROCEDURE — 2100000000 HC CCU R&B

## 2023-07-06 PROCEDURE — 94669 MECHANICAL CHEST WALL OSCILL: CPT

## 2023-07-06 PROCEDURE — 85027 COMPLETE CBC AUTOMATED: CPT

## 2023-07-06 PROCEDURE — 97166 OT EVAL MOD COMPLEX 45 MIN: CPT

## 2023-07-06 PROCEDURE — 02RF08Z REPLACEMENT OF AORTIC VALVE WITH ZOOPLASTIC TISSUE, OPEN APPROACH: ICD-10-PCS | Performed by: THORACIC SURGERY (CARDIOTHORACIC VASCULAR SURGERY)

## 2023-07-06 RX ORDER — AMIODARONE HYDROCHLORIDE 200 MG/1
200 TABLET ORAL DAILY
Status: DISCONTINUED | OUTPATIENT
Start: 2023-07-08 | End: 2023-07-10 | Stop reason: HOSPADM

## 2023-07-06 RX ADMIN — OXYCODONE HYDROCHLORIDE AND ACETAMINOPHEN 1 TABLET: 5; 325 TABLET ORAL at 21:10

## 2023-07-06 RX ADMIN — SODIUM CHLORIDE, PRESERVATIVE FREE 10 ML: 5 INJECTION INTRAVENOUS at 21:21

## 2023-07-06 RX ADMIN — BUDESONIDE AND FORMOTEROL FUMARATE DIHYDRATE 2 PUFF: 160; 4.5 AEROSOL RESPIRATORY (INHALATION) at 07:11

## 2023-07-06 RX ADMIN — AZITHROMYCIN MONOHYDRATE 250 MG: 500 INJECTION, POWDER, LYOPHILIZED, FOR SOLUTION INTRAVENOUS at 00:46

## 2023-07-06 RX ADMIN — IPRATROPIUM BROMIDE AND ALBUTEROL SULFATE 1 DOSE: 2.5; .5 SOLUTION RESPIRATORY (INHALATION) at 11:08

## 2023-07-06 RX ADMIN — METOPROLOL TARTRATE 25 MG: 25 TABLET, FILM COATED ORAL at 21:09

## 2023-07-06 RX ADMIN — POLYETHYLENE GLYCOL (3350) 17 G: 17 POWDER, FOR SOLUTION ORAL at 08:19

## 2023-07-06 RX ADMIN — BUMETANIDE 0.5 MG/HR: 0.25 INJECTION INTRAMUSCULAR; INTRAVENOUS at 14:23

## 2023-07-06 RX ADMIN — SODIUM PHOSPHATE, MONOBASIC, MONOHYDRATE AND SODIUM PHOSPHATE, DIBASIC, ANHYDROUS 15 MMOL: 142; 276 INJECTION, SOLUTION INTRAVENOUS at 16:45

## 2023-07-06 RX ADMIN — OXYCODONE HYDROCHLORIDE AND ACETAMINOPHEN 1 TABLET: 5; 325 TABLET ORAL at 13:02

## 2023-07-06 RX ADMIN — CHLORHEXIDINE GLUCONATE 0.12% ORAL RINSE 15 ML: 1.2 LIQUID ORAL at 08:19

## 2023-07-06 RX ADMIN — SODIUM CHLORIDE, PRESERVATIVE FREE 10 ML: 5 INJECTION INTRAVENOUS at 21:16

## 2023-07-06 RX ADMIN — POTASSIUM CHLORIDE 20 MEQ: 29.8 INJECTION, SOLUTION INTRAVENOUS at 14:25

## 2023-07-06 RX ADMIN — AMIODARONE HYDROCHLORIDE 200 MG: 200 TABLET ORAL at 21:10

## 2023-07-06 RX ADMIN — IPRATROPIUM BROMIDE AND ALBUTEROL SULFATE 1 DOSE: 2.5; .5 SOLUTION RESPIRATORY (INHALATION) at 07:11

## 2023-07-06 RX ADMIN — ATORVASTATIN CALCIUM 40 MG: 40 TABLET, FILM COATED ORAL at 21:10

## 2023-07-06 RX ADMIN — SODIUM CHLORIDE, PRESERVATIVE FREE 10 ML: 5 INJECTION INTRAVENOUS at 21:15

## 2023-07-06 RX ADMIN — ASPIRIN 81 MG: 81 TABLET, CHEWABLE ORAL at 08:19

## 2023-07-06 RX ADMIN — METOPROLOL TARTRATE 25 MG: 25 TABLET, FILM COATED ORAL at 08:19

## 2023-07-06 RX ADMIN — BUDESONIDE AND FORMOTEROL FUMARATE DIHYDRATE 2 PUFF: 160; 4.5 AEROSOL RESPIRATORY (INHALATION) at 19:52

## 2023-07-06 RX ADMIN — CLOPIDOGREL BISULFATE 75 MG: 75 TABLET ORAL at 08:19

## 2023-07-06 RX ADMIN — SODIUM CHLORIDE, PRESERVATIVE FREE 10 ML: 5 INJECTION INTRAVENOUS at 08:22

## 2023-07-06 RX ADMIN — POTASSIUM CHLORIDE 20 MEQ: 29.8 INJECTION, SOLUTION INTRAVENOUS at 00:49

## 2023-07-06 RX ADMIN — FAMOTIDINE 20 MG: 20 TABLET, FILM COATED ORAL at 21:10

## 2023-07-06 RX ADMIN — POTASSIUM CHLORIDE 20 MEQ: 29.8 INJECTION, SOLUTION INTRAVENOUS at 10:13

## 2023-07-06 RX ADMIN — POTASSIUM CHLORIDE 20 MEQ: 29.8 INJECTION, SOLUTION INTRAVENOUS at 15:03

## 2023-07-06 RX ADMIN — CHLORHEXIDINE GLUCONATE 0.12% ORAL RINSE 15 ML: 1.2 LIQUID ORAL at 21:12

## 2023-07-06 RX ADMIN — SENNOSIDES AND DOCUSATE SODIUM 1 TABLET: 50; 8.6 TABLET ORAL at 08:19

## 2023-07-06 RX ADMIN — MAGNESIUM SULFATE HEPTAHYDRATE 2000 MG: 40 INJECTION, SOLUTION INTRAVENOUS at 13:02

## 2023-07-06 RX ADMIN — IPRATROPIUM BROMIDE AND ALBUTEROL SULFATE 1 DOSE: 2.5; .5 SOLUTION RESPIRATORY (INHALATION) at 19:52

## 2023-07-06 RX ADMIN — AZITHROMYCIN MONOHYDRATE 250 MG: 500 INJECTION, POWDER, LYOPHILIZED, FOR SOLUTION INTRAVENOUS at 21:12

## 2023-07-06 RX ADMIN — HEPARIN SODIUM 18 UNITS/KG/HR: 10000 INJECTION, SOLUTION INTRAVENOUS at 15:48

## 2023-07-06 RX ADMIN — SENNOSIDES AND DOCUSATE SODIUM 1 TABLET: 50; 8.6 TABLET ORAL at 21:09

## 2023-07-06 RX ADMIN — MAGNESIUM SULFATE HEPTAHYDRATE 2000 MG: 40 INJECTION, SOLUTION INTRAVENOUS at 15:50

## 2023-07-06 RX ADMIN — MULTIPLE VITAMINS W/ MINERALS TAB 1 TABLET: TAB at 08:19

## 2023-07-06 RX ADMIN — POTASSIUM CHLORIDE 20 MEQ: 29.8 INJECTION, SOLUTION INTRAVENOUS at 08:28

## 2023-07-06 RX ADMIN — FAMOTIDINE 20 MG: 20 TABLET, FILM COATED ORAL at 08:19

## 2023-07-06 RX ADMIN — IPRATROPIUM BROMIDE AND ALBUTEROL SULFATE 1 DOSE: 2.5; .5 SOLUTION RESPIRATORY (INHALATION) at 15:00

## 2023-07-06 RX ADMIN — AMIODARONE HYDROCHLORIDE 200 MG: 200 TABLET ORAL at 08:19

## 2023-07-06 RX ADMIN — HEPARIN SODIUM 18 UNITS/KG/HR: 10000 INJECTION, SOLUTION INTRAVENOUS at 03:39

## 2023-07-06 ASSESSMENT — ENCOUNTER SYMPTOMS
EYES NEGATIVE: 1
RESPIRATORY NEGATIVE: 1
ALLERGIC/IMMUNOLOGIC NEGATIVE: 1
GASTROINTESTINAL NEGATIVE: 1

## 2023-07-06 ASSESSMENT — PAIN DESCRIPTION - LOCATION
LOCATION: CHEST;BUTTOCKS
LOCATION: BUTTOCKS
LOCATION: BUTTOCKS

## 2023-07-06 ASSESSMENT — PAIN - FUNCTIONAL ASSESSMENT: PAIN_FUNCTIONAL_ASSESSMENT: PREVENTS OR INTERFERES SOME ACTIVE ACTIVITIES AND ADLS

## 2023-07-06 ASSESSMENT — PAIN SCALES - GENERAL
PAINLEVEL_OUTOF10: 4
PAINLEVEL_OUTOF10: 6
PAINLEVEL_OUTOF10: 4
PAINLEVEL_OUTOF10: 7
PAINLEVEL_OUTOF10: 4

## 2023-07-06 ASSESSMENT — PAIN DESCRIPTION - DESCRIPTORS: DESCRIPTORS: ACHING;DISCOMFORT

## 2023-07-06 ASSESSMENT — PAIN SCALES - WONG BAKER: WONGBAKER_NUMERICALRESPONSE: 0

## 2023-07-06 ASSESSMENT — PAIN DESCRIPTION - ORIENTATION: ORIENTATION: MID

## 2023-07-07 ENCOUNTER — APPOINTMENT (OUTPATIENT)
Dept: GENERAL RADIOLOGY | Age: 63
DRG: 219 | End: 2023-07-07
Attending: THORACIC SURGERY (CARDIOTHORACIC VASCULAR SURGERY)
Payer: COMMERCIAL

## 2023-07-07 LAB
ANION GAP SERPL CALCULATED.3IONS-SCNC: 11 MMOL/L (ref 4–16)
APTT: 30.4 SECONDS (ref 25.1–37.1)
BUN SERPL-MCNC: 30 MG/DL (ref 6–23)
CALCIUM IONIZED: 4.6 MG/DL (ref 4.48–5.28)
CALCIUM SERPL-MCNC: 9.6 MG/DL (ref 8.3–10.6)
CHLORIDE BLD-SCNC: 87 MMOL/L (ref 99–110)
CO2: 41 MMOL/L (ref 21–32)
CREAT SERPL-MCNC: 0.9 MG/DL (ref 0.9–1.3)
GFR SERPL CREATININE-BSD FRML MDRD: >60 ML/MIN/1.73M2
GLUCOSE SERPL-MCNC: 169 MG/DL (ref 70–99)
HCT VFR BLD CALC: 40.4 % (ref 42–52)
HEMOGLOBIN: 12.3 GM/DL (ref 13.5–18)
IONIZED CA: 1.15 MMOL/L (ref 1.12–1.32)
MAGNESIUM: 1.9 MG/DL (ref 1.8–2.4)
MAGNESIUM: 2.6 MG/DL (ref 1.8–2.4)
MAGNESIUM: 3 MG/DL (ref 1.8–2.4)
MCH RBC QN AUTO: 27.9 PG (ref 27–31)
MCHC RBC AUTO-ENTMCNC: 30.4 % (ref 32–36)
MCV RBC AUTO: 91.6 FL (ref 78–100)
PDW BLD-RTO: 13 % (ref 11.7–14.9)
PHOSPHORUS: 3.1 MG/DL (ref 2.5–4.9)
PHOSPHORUS: 3.6 MG/DL (ref 2.5–4.9)
PHOSPHORUS: 3.8 MG/DL (ref 2.5–4.9)
PHOSPHORUS: 4 MG/DL (ref 2.5–4.9)
PLATELET # BLD: 191 K/CU MM (ref 140–440)
PMV BLD AUTO: 11.8 FL (ref 7.5–11.1)
POTASSIUM SERPL-SCNC: 3.3 MMOL/L (ref 3.5–5.1)
POTASSIUM SERPL-SCNC: 4.4 MMOL/L (ref 3.5–5.1)
POTASSIUM SERPL-SCNC: 4.5 MMOL/L (ref 3.5–5.1)
POTASSIUM SERPL-SCNC: 4.7 MMOL/L (ref 3.5–5.1)
RBC # BLD: 4.41 M/CU MM (ref 4.6–6.2)
SODIUM BLD-SCNC: 139 MMOL/L (ref 135–145)
WBC # BLD: 10 K/CU MM (ref 4–10.5)

## 2023-07-07 PROCEDURE — 6360000002 HC RX W HCPCS: Performed by: PHYSICIAN ASSISTANT

## 2023-07-07 PROCEDURE — 97110 THERAPEUTIC EXERCISES: CPT

## 2023-07-07 PROCEDURE — 94761 N-INVAS EAR/PLS OXIMETRY MLT: CPT

## 2023-07-07 PROCEDURE — 84100 ASSAY OF PHOSPHORUS: CPT

## 2023-07-07 PROCEDURE — 71045 X-RAY EXAM CHEST 1 VIEW: CPT

## 2023-07-07 PROCEDURE — 6370000000 HC RX 637 (ALT 250 FOR IP): Performed by: PHYSICIAN ASSISTANT

## 2023-07-07 PROCEDURE — 82330 ASSAY OF CALCIUM: CPT

## 2023-07-07 PROCEDURE — 2700000000 HC OXYGEN THERAPY PER DAY

## 2023-07-07 PROCEDURE — 97116 GAIT TRAINING THERAPY: CPT

## 2023-07-07 PROCEDURE — 6360000002 HC RX W HCPCS: Performed by: THORACIC SURGERY (CARDIOTHORACIC VASCULAR SURGERY)

## 2023-07-07 PROCEDURE — 2580000003 HC RX 258: Performed by: THORACIC SURGERY (CARDIOTHORACIC VASCULAR SURGERY)

## 2023-07-07 PROCEDURE — 85027 COMPLETE CBC AUTOMATED: CPT

## 2023-07-07 PROCEDURE — 94669 MECHANICAL CHEST WALL OSCILL: CPT

## 2023-07-07 PROCEDURE — 99232 SBSQ HOSP IP/OBS MODERATE 35: CPT | Performed by: INTERNAL MEDICINE

## 2023-07-07 PROCEDURE — 84132 ASSAY OF SERUM POTASSIUM: CPT

## 2023-07-07 PROCEDURE — 97530 THERAPEUTIC ACTIVITIES: CPT

## 2023-07-07 PROCEDURE — 51701 INSERT BLADDER CATHETER: CPT

## 2023-07-07 PROCEDURE — 85730 THROMBOPLASTIN TIME PARTIAL: CPT

## 2023-07-07 PROCEDURE — 94150 VITAL CAPACITY TEST: CPT

## 2023-07-07 PROCEDURE — 80048 BASIC METABOLIC PNL TOTAL CA: CPT

## 2023-07-07 PROCEDURE — 94640 AIRWAY INHALATION TREATMENT: CPT

## 2023-07-07 PROCEDURE — 2580000003 HC RX 258: Performed by: PHYSICIAN ASSISTANT

## 2023-07-07 PROCEDURE — 36591 DRAW BLOOD OFF VENOUS DEVICE: CPT

## 2023-07-07 PROCEDURE — 2100000000 HC CCU R&B

## 2023-07-07 PROCEDURE — 51798 US URINE CAPACITY MEASURE: CPT

## 2023-07-07 PROCEDURE — 83735 ASSAY OF MAGNESIUM: CPT

## 2023-07-07 PROCEDURE — 6370000000 HC RX 637 (ALT 250 FOR IP): Performed by: SPECIALIST

## 2023-07-07 RX ORDER — MAGNESIUM SULFATE HEPTAHYDRATE 500 MG/ML
2000 INJECTION, SOLUTION INTRAMUSCULAR; INTRAVENOUS ONCE
Status: DISCONTINUED | OUTPATIENT
Start: 2023-07-07 | End: 2023-07-07

## 2023-07-07 RX ORDER — BUMETANIDE 0.5 MG/1
2 TABLET ORAL DAILY
Status: DISCONTINUED | OUTPATIENT
Start: 2023-07-07 | End: 2023-07-07

## 2023-07-07 RX ORDER — MAGNESIUM SULFATE IN WATER 40 MG/ML
2000 INJECTION, SOLUTION INTRAVENOUS ONCE
Status: COMPLETED | OUTPATIENT
Start: 2023-07-07 | End: 2023-07-07

## 2023-07-07 RX ORDER — BUMETANIDE 0.5 MG/1
2 TABLET ORAL 2 TIMES DAILY
Status: DISCONTINUED | OUTPATIENT
Start: 2023-07-07 | End: 2023-07-09

## 2023-07-07 RX ADMIN — SENNOSIDES AND DOCUSATE SODIUM 1 TABLET: 50; 8.6 TABLET ORAL at 08:13

## 2023-07-07 RX ADMIN — ASPIRIN 81 MG: 81 TABLET, CHEWABLE ORAL at 08:13

## 2023-07-07 RX ADMIN — APIXABAN 2.5 MG: 2.5 TABLET, FILM COATED ORAL at 20:21

## 2023-07-07 RX ADMIN — METOPROLOL TARTRATE 25 MG: 25 TABLET, FILM COATED ORAL at 08:12

## 2023-07-07 RX ADMIN — CHLORHEXIDINE GLUCONATE 0.12% ORAL RINSE 15 ML: 1.2 LIQUID ORAL at 20:21

## 2023-07-07 RX ADMIN — OXYCODONE HYDROCHLORIDE AND ACETAMINOPHEN 1 TABLET: 5; 325 TABLET ORAL at 06:26

## 2023-07-07 RX ADMIN — BUDESONIDE AND FORMOTEROL FUMARATE DIHYDRATE 2 PUFF: 160; 4.5 AEROSOL RESPIRATORY (INHALATION) at 09:30

## 2023-07-07 RX ADMIN — IPRATROPIUM BROMIDE AND ALBUTEROL SULFATE 1 DOSE: 2.5; .5 SOLUTION RESPIRATORY (INHALATION) at 19:51

## 2023-07-07 RX ADMIN — FAMOTIDINE 20 MG: 20 TABLET, FILM COATED ORAL at 08:13

## 2023-07-07 RX ADMIN — POLYETHYLENE GLYCOL (3350) 17 G: 17 POWDER, FOR SOLUTION ORAL at 08:33

## 2023-07-07 RX ADMIN — SENNOSIDES AND DOCUSATE SODIUM 1 TABLET: 50; 8.6 TABLET ORAL at 20:21

## 2023-07-07 RX ADMIN — POTASSIUM CHLORIDE 20 MEQ: 29.8 INJECTION, SOLUTION INTRAVENOUS at 03:43

## 2023-07-07 RX ADMIN — AMIODARONE HYDROCHLORIDE 0.5 MG/MIN: 50 INJECTION, SOLUTION INTRAVENOUS at 17:29

## 2023-07-07 RX ADMIN — BUDESONIDE AND FORMOTEROL FUMARATE DIHYDRATE 2 PUFF: 160; 4.5 AEROSOL RESPIRATORY (INHALATION) at 19:52

## 2023-07-07 RX ADMIN — AMIODARONE HYDROCHLORIDE 200 MG: 200 TABLET ORAL at 08:13

## 2023-07-07 RX ADMIN — METOPROLOL TARTRATE 37.5 MG: 25 TABLET, FILM COATED ORAL at 20:20

## 2023-07-07 RX ADMIN — AMIODARONE HYDROCHLORIDE 1 MG/MIN: 50 INJECTION, SOLUTION INTRAVENOUS at 09:32

## 2023-07-07 RX ADMIN — SODIUM CHLORIDE, PRESERVATIVE FREE 10 ML: 5 INJECTION INTRAVENOUS at 20:28

## 2023-07-07 RX ADMIN — ATORVASTATIN CALCIUM 40 MG: 40 TABLET, FILM COATED ORAL at 20:21

## 2023-07-07 RX ADMIN — OXYCODONE HYDROCHLORIDE AND ACETAMINOPHEN 1 TABLET: 5; 325 TABLET ORAL at 20:20

## 2023-07-07 RX ADMIN — BUMETANIDE 2 MG: 0.5 TABLET ORAL at 12:36

## 2023-07-07 RX ADMIN — MAGNESIUM SULFATE HEPTAHYDRATE 2000 MG: 40 INJECTION, SOLUTION INTRAVENOUS at 13:18

## 2023-07-07 RX ADMIN — FAMOTIDINE 20 MG: 20 TABLET, FILM COATED ORAL at 20:21

## 2023-07-07 RX ADMIN — CHLORHEXIDINE GLUCONATE 0.12% ORAL RINSE 15 ML: 1.2 LIQUID ORAL at 08:12

## 2023-07-07 RX ADMIN — MAGNESIUM SULFATE HEPTAHYDRATE 2000 MG: 40 INJECTION, SOLUTION INTRAVENOUS at 03:39

## 2023-07-07 RX ADMIN — BUMETANIDE 2 MG: 0.5 TABLET ORAL at 20:21

## 2023-07-07 RX ADMIN — DEXTROSE MONOHYDRATE 150 MG: 50 INJECTION, SOLUTION INTRAVENOUS at 10:04

## 2023-07-07 RX ADMIN — IPRATROPIUM BROMIDE AND ALBUTEROL SULFATE 1 DOSE: 2.5; .5 SOLUTION RESPIRATORY (INHALATION) at 15:11

## 2023-07-07 RX ADMIN — SODIUM CHLORIDE, PRESERVATIVE FREE 10 ML: 5 INJECTION INTRAVENOUS at 20:29

## 2023-07-07 RX ADMIN — SODIUM CHLORIDE, PRESERVATIVE FREE 10 ML: 5 INJECTION INTRAVENOUS at 08:13

## 2023-07-07 RX ADMIN — BUMETANIDE 2 MG: 0.5 TABLET ORAL at 13:08

## 2023-07-07 RX ADMIN — MULTIPLE VITAMINS W/ MINERALS TAB 1 TABLET: TAB at 08:13

## 2023-07-07 ASSESSMENT — PAIN DESCRIPTION - LOCATION
LOCATION: BUTTOCKS;INCISION
LOCATION: CHEST

## 2023-07-07 ASSESSMENT — PAIN SCALES - GENERAL
PAINLEVEL_OUTOF10: 2
PAINLEVEL_OUTOF10: 4
PAINLEVEL_OUTOF10: 6

## 2023-07-07 ASSESSMENT — ENCOUNTER SYMPTOMS
RESPIRATORY NEGATIVE: 1
EYES NEGATIVE: 1
ALLERGIC/IMMUNOLOGIC NEGATIVE: 1
GASTROINTESTINAL NEGATIVE: 1

## 2023-07-07 ASSESSMENT — PAIN - FUNCTIONAL ASSESSMENT: PAIN_FUNCTIONAL_ASSESSMENT: ACTIVITIES ARE NOT PREVENTED

## 2023-07-07 ASSESSMENT — PAIN DESCRIPTION - ORIENTATION
ORIENTATION: MID
ORIENTATION: MID

## 2023-07-07 ASSESSMENT — PAIN DESCRIPTION - DESCRIPTORS: DESCRIPTORS: ACHING;DISCOMFORT

## 2023-07-08 ENCOUNTER — APPOINTMENT (OUTPATIENT)
Dept: GENERAL RADIOLOGY | Age: 63
DRG: 219 | End: 2023-07-08
Attending: THORACIC SURGERY (CARDIOTHORACIC VASCULAR SURGERY)
Payer: COMMERCIAL

## 2023-07-08 LAB
MAGNESIUM: 2.3 MG/DL (ref 1.8–2.4)
MAGNESIUM: 2.5 MG/DL (ref 1.8–2.4)
MAGNESIUM: 2.5 MG/DL (ref 1.8–2.4)
MAGNESIUM: 2.9 MG/DL (ref 1.8–2.4)
PHOSPHORUS: 3.4 MG/DL (ref 2.5–4.9)
PHOSPHORUS: 4 MG/DL (ref 2.5–4.9)
PHOSPHORUS: 4.3 MG/DL (ref 2.5–4.9)
PHOSPHORUS: 4.7 MG/DL (ref 2.5–4.9)
POTASSIUM SERPL-SCNC: 4.1 MMOL/L (ref 3.5–5.1)
POTASSIUM SERPL-SCNC: 4.3 MMOL/L (ref 3.5–5.1)
POTASSIUM SERPL-SCNC: 4.3 MMOL/L (ref 3.5–5.1)
POTASSIUM SERPL-SCNC: 4.6 MMOL/L (ref 3.5–5.1)

## 2023-07-08 PROCEDURE — 99233 SBSQ HOSP IP/OBS HIGH 50: CPT | Performed by: INTERNAL MEDICINE

## 2023-07-08 PROCEDURE — 94640 AIRWAY INHALATION TREATMENT: CPT

## 2023-07-08 PROCEDURE — 51701 INSERT BLADDER CATHETER: CPT

## 2023-07-08 PROCEDURE — 6370000000 HC RX 637 (ALT 250 FOR IP): Performed by: PHYSICIAN ASSISTANT

## 2023-07-08 PROCEDURE — APPSS60 APP SPLIT SHARED TIME 46-60 MINUTES: Performed by: NURSE PRACTITIONER

## 2023-07-08 PROCEDURE — 84100 ASSAY OF PHOSPHORUS: CPT

## 2023-07-08 PROCEDURE — 51798 US URINE CAPACITY MEASURE: CPT

## 2023-07-08 PROCEDURE — 71045 X-RAY EXAM CHEST 1 VIEW: CPT

## 2023-07-08 PROCEDURE — 97110 THERAPEUTIC EXERCISES: CPT

## 2023-07-08 PROCEDURE — 97535 SELF CARE MNGMENT TRAINING: CPT

## 2023-07-08 PROCEDURE — 6360000002 HC RX W HCPCS: Performed by: PHYSICIAN ASSISTANT

## 2023-07-08 PROCEDURE — 97116 GAIT TRAINING THERAPY: CPT

## 2023-07-08 PROCEDURE — 85730 THROMBOPLASTIN TIME PARTIAL: CPT

## 2023-07-08 PROCEDURE — 97530 THERAPEUTIC ACTIVITIES: CPT

## 2023-07-08 PROCEDURE — 2580000003 HC RX 258: Performed by: THORACIC SURGERY (CARDIOTHORACIC VASCULAR SURGERY)

## 2023-07-08 PROCEDURE — 84132 ASSAY OF SERUM POTASSIUM: CPT

## 2023-07-08 PROCEDURE — 2700000000 HC OXYGEN THERAPY PER DAY

## 2023-07-08 PROCEDURE — 2100000000 HC CCU R&B

## 2023-07-08 PROCEDURE — 6370000000 HC RX 637 (ALT 250 FOR IP): Performed by: SPECIALIST

## 2023-07-08 PROCEDURE — 36591 DRAW BLOOD OFF VENOUS DEVICE: CPT

## 2023-07-08 PROCEDURE — 2580000003 HC RX 258: Performed by: PHYSICIAN ASSISTANT

## 2023-07-08 PROCEDURE — 83735 ASSAY OF MAGNESIUM: CPT

## 2023-07-08 RX ORDER — METOPROLOL TARTRATE 50 MG/1
50 TABLET, FILM COATED ORAL 2 TIMES DAILY
Status: DISCONTINUED | OUTPATIENT
Start: 2023-07-08 | End: 2023-07-10 | Stop reason: HOSPADM

## 2023-07-08 RX ORDER — TAMSULOSIN HYDROCHLORIDE 0.4 MG/1
0.4 CAPSULE ORAL DAILY
Status: DISCONTINUED | OUTPATIENT
Start: 2023-07-08 | End: 2023-07-10 | Stop reason: HOSPADM

## 2023-07-08 RX ADMIN — SODIUM CHLORIDE, PRESERVATIVE FREE 10 ML: 5 INJECTION INTRAVENOUS at 20:07

## 2023-07-08 RX ADMIN — IPRATROPIUM BROMIDE AND ALBUTEROL SULFATE 1 DOSE: 2.5; .5 SOLUTION RESPIRATORY (INHALATION) at 20:00

## 2023-07-08 RX ADMIN — METOPROLOL TARTRATE 37.5 MG: 25 TABLET, FILM COATED ORAL at 09:38

## 2023-07-08 RX ADMIN — SODIUM CHLORIDE, PRESERVATIVE FREE 10 ML: 5 INJECTION INTRAVENOUS at 09:39

## 2023-07-08 RX ADMIN — SODIUM CHLORIDE, PRESERVATIVE FREE 10 ML: 5 INJECTION INTRAVENOUS at 09:38

## 2023-07-08 RX ADMIN — APIXABAN 2.5 MG: 2.5 TABLET, FILM COATED ORAL at 20:06

## 2023-07-08 RX ADMIN — FAMOTIDINE 20 MG: 20 TABLET, FILM COATED ORAL at 09:37

## 2023-07-08 RX ADMIN — BUDESONIDE AND FORMOTEROL FUMARATE DIHYDRATE 2 PUFF: 160; 4.5 AEROSOL RESPIRATORY (INHALATION) at 20:03

## 2023-07-08 RX ADMIN — OXYCODONE HYDROCHLORIDE AND ACETAMINOPHEN 1 TABLET: 5; 325 TABLET ORAL at 20:06

## 2023-07-08 RX ADMIN — AMIODARONE HYDROCHLORIDE 200 MG: 200 TABLET ORAL at 09:38

## 2023-07-08 RX ADMIN — CHLORHEXIDINE GLUCONATE 0.12% ORAL RINSE 15 ML: 1.2 LIQUID ORAL at 20:05

## 2023-07-08 RX ADMIN — CHLORHEXIDINE GLUCONATE 0.12% ORAL RINSE 15 ML: 1.2 LIQUID ORAL at 09:37

## 2023-07-08 RX ADMIN — ASPIRIN 81 MG: 81 TABLET, CHEWABLE ORAL at 09:38

## 2023-07-08 RX ADMIN — SENNOSIDES AND DOCUSATE SODIUM 1 TABLET: 50; 8.6 TABLET ORAL at 20:06

## 2023-07-08 RX ADMIN — METOPROLOL TARTRATE 50 MG: 50 TABLET, FILM COATED ORAL at 20:05

## 2023-07-08 RX ADMIN — MULTIPLE VITAMINS W/ MINERALS TAB 1 TABLET: TAB at 09:37

## 2023-07-08 RX ADMIN — POTASSIUM CHLORIDE 20 MEQ: 29.8 INJECTION, SOLUTION INTRAVENOUS at 03:58

## 2023-07-08 RX ADMIN — APIXABAN 2.5 MG: 2.5 TABLET, FILM COATED ORAL at 09:38

## 2023-07-08 RX ADMIN — BUDESONIDE AND FORMOTEROL FUMARATE DIHYDRATE 2 PUFF: 160; 4.5 AEROSOL RESPIRATORY (INHALATION) at 08:50

## 2023-07-08 RX ADMIN — SENNOSIDES AND DOCUSATE SODIUM 1 TABLET: 50; 8.6 TABLET ORAL at 09:38

## 2023-07-08 RX ADMIN — IPRATROPIUM BROMIDE AND ALBUTEROL SULFATE 1 DOSE: 2.5; .5 SOLUTION RESPIRATORY (INHALATION) at 15:32

## 2023-07-08 RX ADMIN — FAMOTIDINE 20 MG: 20 TABLET, FILM COATED ORAL at 20:06

## 2023-07-08 RX ADMIN — IPRATROPIUM BROMIDE AND ALBUTEROL SULFATE 1 DOSE: 2.5; .5 SOLUTION RESPIRATORY (INHALATION) at 08:51

## 2023-07-08 RX ADMIN — OXYCODONE HYDROCHLORIDE AND ACETAMINOPHEN 1 TABLET: 5; 325 TABLET ORAL at 09:42

## 2023-07-08 RX ADMIN — BUMETANIDE 2 MG: 0.5 TABLET ORAL at 09:37

## 2023-07-08 RX ADMIN — ATORVASTATIN CALCIUM 40 MG: 40 TABLET, FILM COATED ORAL at 20:06

## 2023-07-08 RX ADMIN — BUMETANIDE 2 MG: 0.5 TABLET ORAL at 20:06

## 2023-07-08 RX ADMIN — TAMSULOSIN HYDROCHLORIDE 0.4 MG: 0.4 CAPSULE ORAL at 15:45

## 2023-07-08 ASSESSMENT — PAIN DESCRIPTION - FREQUENCY
FREQUENCY: INTERMITTENT
FREQUENCY: CONTINUOUS

## 2023-07-08 ASSESSMENT — PAIN DESCRIPTION - PAIN TYPE
TYPE: SURGICAL PAIN
TYPE: SURGICAL PAIN

## 2023-07-08 ASSESSMENT — ENCOUNTER SYMPTOMS
GASTROINTESTINAL NEGATIVE: 1
SHORTNESS OF BREATH: 0
RESPIRATORY NEGATIVE: 1
EYES NEGATIVE: 1
ALLERGIC/IMMUNOLOGIC NEGATIVE: 1

## 2023-07-08 ASSESSMENT — PAIN DESCRIPTION - LOCATION
LOCATION: CHEST
LOCATION: INCISION

## 2023-07-08 ASSESSMENT — PAIN SCALES - GENERAL
PAINLEVEL_OUTOF10: 5
PAINLEVEL_OUTOF10: 7
PAINLEVEL_OUTOF10: 0
PAINLEVEL_OUTOF10: 2
PAINLEVEL_OUTOF10: 2
PAINLEVEL_OUTOF10: 7

## 2023-07-08 ASSESSMENT — PAIN DESCRIPTION - ONSET
ONSET: GRADUAL
ONSET: ON-GOING

## 2023-07-08 ASSESSMENT — PAIN DESCRIPTION - ORIENTATION
ORIENTATION: MID
ORIENTATION: MID

## 2023-07-08 ASSESSMENT — PAIN DESCRIPTION - DESCRIPTORS
DESCRIPTORS: ACHING;DISCOMFORT
DESCRIPTORS: SORE

## 2023-07-08 ASSESSMENT — PAIN SCALES - WONG BAKER
WONGBAKER_NUMERICALRESPONSE: 0
WONGBAKER_NUMERICALRESPONSE: 4
WONGBAKER_NUMERICALRESPONSE: 0

## 2023-07-08 ASSESSMENT — PAIN - FUNCTIONAL ASSESSMENT
PAIN_FUNCTIONAL_ASSESSMENT: ACTIVITIES ARE NOT PREVENTED
PAIN_FUNCTIONAL_ASSESSMENT: PREVENTS OR INTERFERES SOME ACTIVE ACTIVITIES AND ADLS

## 2023-07-08 ASSESSMENT — PAIN DESCRIPTION - DIRECTION: RADIATING_TOWARDS: NNONE

## 2023-07-09 ENCOUNTER — APPOINTMENT (OUTPATIENT)
Dept: GENERAL RADIOLOGY | Age: 63
DRG: 219 | End: 2023-07-09
Attending: THORACIC SURGERY (CARDIOTHORACIC VASCULAR SURGERY)
Payer: COMMERCIAL

## 2023-07-09 PROBLEM — I35.1 AORTIC VALVE REGURGITATION: Status: ACTIVE | Noted: 2023-07-09

## 2023-07-09 LAB
ALBUMIN SERPL-MCNC: 3.7 GM/DL (ref 3.4–5)
ALP BLD-CCNC: 123 IU/L (ref 40–129)
ALT SERPL-CCNC: 22 U/L (ref 10–40)
ANION GAP SERPL CALCULATED.3IONS-SCNC: 10 MMOL/L (ref 4–16)
AST SERPL-CCNC: 40 IU/L (ref 15–37)
BILIRUB SERPL-MCNC: 0.8 MG/DL (ref 0–1)
BUN SERPL-MCNC: 31 MG/DL (ref 6–23)
CALCIUM SERPL-MCNC: 8.8 MG/DL (ref 8.3–10.6)
CHLORIDE BLD-SCNC: 93 MMOL/L (ref 99–110)
CO2: 37 MMOL/L (ref 21–32)
CREAT SERPL-MCNC: 0.9 MG/DL (ref 0.9–1.3)
GFR SERPL CREATININE-BSD FRML MDRD: >60 ML/MIN/1.73M2
GLUCOSE SERPL-MCNC: 109 MG/DL (ref 70–99)
MAGNESIUM: 2.1 MG/DL (ref 1.8–2.4)
MAGNESIUM: 2.2 MG/DL (ref 1.8–2.4)
PHOSPHORUS: 4.2 MG/DL (ref 2.5–4.9)
PHOSPHORUS: 4.3 MG/DL (ref 2.5–4.9)
POTASSIUM SERPL-SCNC: 4.2 MMOL/L (ref 3.5–5.1)
POTASSIUM SERPL-SCNC: 4.2 MMOL/L (ref 3.5–5.1)
POTASSIUM SERPL-SCNC: 4.3 MMOL/L (ref 3.5–5.1)
SODIUM BLD-SCNC: 140 MMOL/L (ref 135–145)
TOTAL PROTEIN: 5.9 GM/DL (ref 6.4–8.2)

## 2023-07-09 PROCEDURE — 94150 VITAL CAPACITY TEST: CPT

## 2023-07-09 PROCEDURE — 80053 COMPREHEN METABOLIC PANEL: CPT

## 2023-07-09 PROCEDURE — APPSS60 APP SPLIT SHARED TIME 46-60 MINUTES: Performed by: NURSE PRACTITIONER

## 2023-07-09 PROCEDURE — 97110 THERAPEUTIC EXERCISES: CPT

## 2023-07-09 PROCEDURE — 2700000000 HC OXYGEN THERAPY PER DAY

## 2023-07-09 PROCEDURE — 71046 X-RAY EXAM CHEST 2 VIEWS: CPT

## 2023-07-09 PROCEDURE — 94761 N-INVAS EAR/PLS OXIMETRY MLT: CPT

## 2023-07-09 PROCEDURE — 2500000003 HC RX 250 WO HCPCS: Performed by: PHYSICIAN ASSISTANT

## 2023-07-09 PROCEDURE — 6370000000 HC RX 637 (ALT 250 FOR IP): Performed by: SPECIALIST

## 2023-07-09 PROCEDURE — 94669 MECHANICAL CHEST WALL OSCILL: CPT

## 2023-07-09 PROCEDURE — 97116 GAIT TRAINING THERAPY: CPT

## 2023-07-09 PROCEDURE — 2580000003 HC RX 258: Performed by: THORACIC SURGERY (CARDIOTHORACIC VASCULAR SURGERY)

## 2023-07-09 PROCEDURE — 6370000000 HC RX 637 (ALT 250 FOR IP): Performed by: PHYSICIAN ASSISTANT

## 2023-07-09 PROCEDURE — 2580000003 HC RX 258: Performed by: PHYSICIAN ASSISTANT

## 2023-07-09 PROCEDURE — 83735 ASSAY OF MAGNESIUM: CPT

## 2023-07-09 PROCEDURE — 94640 AIRWAY INHALATION TREATMENT: CPT

## 2023-07-09 PROCEDURE — 99233 SBSQ HOSP IP/OBS HIGH 50: CPT | Performed by: INTERNAL MEDICINE

## 2023-07-09 PROCEDURE — 84132 ASSAY OF SERUM POTASSIUM: CPT

## 2023-07-09 PROCEDURE — 2100000000 HC CCU R&B

## 2023-07-09 PROCEDURE — 84100 ASSAY OF PHOSPHORUS: CPT

## 2023-07-09 RX ORDER — POTASSIUM CHLORIDE 20 MEQ/1
20 TABLET, EXTENDED RELEASE ORAL DAILY
Status: DISCONTINUED | OUTPATIENT
Start: 2023-07-09 | End: 2023-07-10 | Stop reason: HOSPADM

## 2023-07-09 RX ORDER — BUMETANIDE 0.5 MG/1
2 TABLET ORAL DAILY
Status: DISCONTINUED | OUTPATIENT
Start: 2023-07-10 | End: 2023-07-10 | Stop reason: HOSPADM

## 2023-07-09 RX ADMIN — BUDESONIDE AND FORMOTEROL FUMARATE DIHYDRATE 2 PUFF: 160; 4.5 AEROSOL RESPIRATORY (INHALATION) at 19:25

## 2023-07-09 RX ADMIN — SODIUM CHLORIDE, PRESERVATIVE FREE 10 ML: 5 INJECTION INTRAVENOUS at 20:13

## 2023-07-09 RX ADMIN — FAMOTIDINE 20 MG: 10 INJECTION, SOLUTION INTRAVENOUS at 08:35

## 2023-07-09 RX ADMIN — POTASSIUM CHLORIDE 20 MEQ: 1500 TABLET, EXTENDED RELEASE ORAL at 14:23

## 2023-07-09 RX ADMIN — METOPROLOL TARTRATE 50 MG: 50 TABLET, FILM COATED ORAL at 20:12

## 2023-07-09 RX ADMIN — CHLORHEXIDINE GLUCONATE 0.12% ORAL RINSE 15 ML: 1.2 LIQUID ORAL at 20:12

## 2023-07-09 RX ADMIN — OXYCODONE HYDROCHLORIDE 5 MG: 5 TABLET ORAL at 08:49

## 2023-07-09 RX ADMIN — APIXABAN 2.5 MG: 2.5 TABLET, FILM COATED ORAL at 08:37

## 2023-07-09 RX ADMIN — SODIUM CHLORIDE, PRESERVATIVE FREE 10 ML: 5 INJECTION INTRAVENOUS at 08:43

## 2023-07-09 RX ADMIN — TAMSULOSIN HYDROCHLORIDE 0.4 MG: 0.4 CAPSULE ORAL at 08:37

## 2023-07-09 RX ADMIN — SENNOSIDES AND DOCUSATE SODIUM 1 TABLET: 50; 8.6 TABLET ORAL at 20:13

## 2023-07-09 RX ADMIN — FAMOTIDINE 20 MG: 20 TABLET, FILM COATED ORAL at 20:13

## 2023-07-09 RX ADMIN — IPRATROPIUM BROMIDE AND ALBUTEROL SULFATE 1 DOSE: 2.5; .5 SOLUTION RESPIRATORY (INHALATION) at 19:25

## 2023-07-09 RX ADMIN — SENNOSIDES AND DOCUSATE SODIUM 1 TABLET: 50; 8.6 TABLET ORAL at 08:36

## 2023-07-09 RX ADMIN — OXYCODONE HYDROCHLORIDE AND ACETAMINOPHEN 1 TABLET: 5; 325 TABLET ORAL at 20:13

## 2023-07-09 RX ADMIN — BUDESONIDE AND FORMOTEROL FUMARATE DIHYDRATE 2 PUFF: 160; 4.5 AEROSOL RESPIRATORY (INHALATION) at 07:17

## 2023-07-09 RX ADMIN — CHLORHEXIDINE GLUCONATE 0.12% ORAL RINSE 15 ML: 1.2 LIQUID ORAL at 08:37

## 2023-07-09 RX ADMIN — ATORVASTATIN CALCIUM 40 MG: 40 TABLET, FILM COATED ORAL at 20:13

## 2023-07-09 RX ADMIN — IPRATROPIUM BROMIDE AND ALBUTEROL SULFATE 1 DOSE: 2.5; .5 SOLUTION RESPIRATORY (INHALATION) at 15:16

## 2023-07-09 RX ADMIN — METOPROLOL TARTRATE 50 MG: 50 TABLET, FILM COATED ORAL at 08:36

## 2023-07-09 RX ADMIN — IPRATROPIUM BROMIDE AND ALBUTEROL SULFATE 1 DOSE: 2.5; .5 SOLUTION RESPIRATORY (INHALATION) at 07:19

## 2023-07-09 RX ADMIN — ASPIRIN 81 MG: 81 TABLET, CHEWABLE ORAL at 08:36

## 2023-07-09 RX ADMIN — MULTIPLE VITAMINS W/ MINERALS TAB 1 TABLET: TAB at 08:36

## 2023-07-09 RX ADMIN — POLYETHYLENE GLYCOL (3350) 17 G: 17 POWDER, FOR SOLUTION ORAL at 08:37

## 2023-07-09 RX ADMIN — BUMETANIDE 2 MG: 0.5 TABLET ORAL at 08:37

## 2023-07-09 RX ADMIN — AMIODARONE HYDROCHLORIDE 200 MG: 200 TABLET ORAL at 08:36

## 2023-07-09 ASSESSMENT — PAIN - FUNCTIONAL ASSESSMENT
PAIN_FUNCTIONAL_ASSESSMENT: ACTIVITIES ARE NOT PREVENTED

## 2023-07-09 ASSESSMENT — PAIN DESCRIPTION - FREQUENCY
FREQUENCY: INTERMITTENT
FREQUENCY: INTERMITTENT
FREQUENCY: CONTINUOUS

## 2023-07-09 ASSESSMENT — PAIN DESCRIPTION - ORIENTATION
ORIENTATION: LEFT
ORIENTATION: MID
ORIENTATION: MID

## 2023-07-09 ASSESSMENT — PAIN SCALES - GENERAL
PAINLEVEL_OUTOF10: 0
PAINLEVEL_OUTOF10: 7
PAINLEVEL_OUTOF10: 3
PAINLEVEL_OUTOF10: 0
PAINLEVEL_OUTOF10: 0
PAINLEVEL_OUTOF10: 6
PAINLEVEL_OUTOF10: 0
PAINLEVEL_OUTOF10: 0

## 2023-07-09 ASSESSMENT — PAIN DESCRIPTION - LOCATION
LOCATION: CHEST
LOCATION: CHEST
LOCATION: FOOT

## 2023-07-09 ASSESSMENT — PAIN DESCRIPTION - ONSET
ONSET: GRADUAL
ONSET: GRADUAL
ONSET: ON-GOING

## 2023-07-09 ASSESSMENT — PAIN DESCRIPTION - DESCRIPTORS
DESCRIPTORS: SORE

## 2023-07-09 ASSESSMENT — ENCOUNTER SYMPTOMS
ALLERGIC/IMMUNOLOGIC NEGATIVE: 1
SHORTNESS OF BREATH: 0
EYES NEGATIVE: 1
RESPIRATORY NEGATIVE: 1
GASTROINTESTINAL NEGATIVE: 1

## 2023-07-09 ASSESSMENT — PAIN DESCRIPTION - PAIN TYPE
TYPE: SURGICAL PAIN
TYPE: SURGICAL PAIN
TYPE: ACUTE PAIN

## 2023-07-10 ENCOUNTER — APPOINTMENT (OUTPATIENT)
Dept: INTERVENTIONAL RADIOLOGY/VASCULAR | Age: 63
DRG: 219 | End: 2023-07-10
Attending: THORACIC SURGERY (CARDIOTHORACIC VASCULAR SURGERY)
Payer: COMMERCIAL

## 2023-07-10 ENCOUNTER — TELEPHONE (OUTPATIENT)
Dept: CARDIOTHORACIC SURGERY | Age: 63
End: 2023-07-10

## 2023-07-10 ENCOUNTER — APPOINTMENT (OUTPATIENT)
Dept: GENERAL RADIOLOGY | Age: 63
DRG: 219 | End: 2023-07-10
Attending: THORACIC SURGERY (CARDIOTHORACIC VASCULAR SURGERY)
Payer: COMMERCIAL

## 2023-07-10 VITALS
HEART RATE: 101 BPM | HEIGHT: 71 IN | BODY MASS INDEX: 30.24 KG/M2 | RESPIRATION RATE: 17 BRPM | WEIGHT: 216 LBS | SYSTOLIC BLOOD PRESSURE: 106 MMHG | OXYGEN SATURATION: 93 % | TEMPERATURE: 98.3 F | DIASTOLIC BLOOD PRESSURE: 77 MMHG

## 2023-07-10 LAB
ANION GAP SERPL CALCULATED.3IONS-SCNC: 8 MMOL/L (ref 4–16)
APTT: 29.1 SECONDS (ref 25.1–37.1)
BUN SERPL-MCNC: 26 MG/DL (ref 6–23)
CALCIUM SERPL-MCNC: 9.1 MG/DL (ref 8.3–10.6)
CHLORIDE BLD-SCNC: 94 MMOL/L (ref 99–110)
CO2: 35 MMOL/L (ref 21–32)
CREAT SERPL-MCNC: 0.8 MG/DL (ref 0.9–1.3)
GFR SERPL CREATININE-BSD FRML MDRD: >60 ML/MIN/1.73M2
GLUCOSE SERPL-MCNC: 109 MG/DL (ref 70–99)
HCT VFR BLD CALC: 37.9 % (ref 42–52)
HEMOGLOBIN: 11.5 GM/DL (ref 13.5–18)
INR BLD: 1.1 INDEX
MCH RBC QN AUTO: 27.9 PG (ref 27–31)
MCHC RBC AUTO-ENTMCNC: 30.3 % (ref 32–36)
MCV RBC AUTO: 92 FL (ref 78–100)
PDW BLD-RTO: 12.7 % (ref 11.7–14.9)
PLATELET # BLD: 193 K/CU MM (ref 140–440)
PMV BLD AUTO: 10.5 FL (ref 7.5–11.1)
POTASSIUM SERPL-SCNC: 4.4 MMOL/L (ref 3.5–5.1)
PROTHROMBIN TIME: 14.2 SECONDS (ref 11.7–14.5)
RBC # BLD: 4.12 M/CU MM (ref 4.6–6.2)
SODIUM BLD-SCNC: 137 MMOL/L (ref 135–145)
WBC # BLD: 10.7 K/CU MM (ref 4–10.5)

## 2023-07-10 PROCEDURE — 94761 N-INVAS EAR/PLS OXIMETRY MLT: CPT

## 2023-07-10 PROCEDURE — 80048 BASIC METABOLIC PNL TOTAL CA: CPT

## 2023-07-10 PROCEDURE — 97110 THERAPEUTIC EXERCISES: CPT

## 2023-07-10 PROCEDURE — 99211 OFF/OP EST MAY X REQ PHY/QHP: CPT

## 2023-07-10 PROCEDURE — 2580000003 HC RX 258: Performed by: PHYSICIAN ASSISTANT

## 2023-07-10 PROCEDURE — 85027 COMPLETE CBC AUTOMATED: CPT

## 2023-07-10 PROCEDURE — 85610 PROTHROMBIN TIME: CPT

## 2023-07-10 PROCEDURE — 2700000000 HC OXYGEN THERAPY PER DAY

## 2023-07-10 PROCEDURE — 94640 AIRWAY INHALATION TREATMENT: CPT

## 2023-07-10 PROCEDURE — 71046 X-RAY EXAM CHEST 2 VIEWS: CPT

## 2023-07-10 PROCEDURE — 2709999900 IR GUIDED THORACENTESIS PLEURAL

## 2023-07-10 PROCEDURE — 85730 THROMBOPLASTIN TIME PARTIAL: CPT

## 2023-07-10 PROCEDURE — 6370000000 HC RX 637 (ALT 250 FOR IP): Performed by: PHYSICIAN ASSISTANT

## 2023-07-10 PROCEDURE — 6370000000 HC RX 637 (ALT 250 FOR IP): Performed by: SPECIALIST

## 2023-07-10 PROCEDURE — 94669 MECHANICAL CHEST WALL OSCILL: CPT

## 2023-07-10 PROCEDURE — 99222 1ST HOSP IP/OBS MODERATE 55: CPT | Performed by: INTERNAL MEDICINE

## 2023-07-10 PROCEDURE — 94150 VITAL CAPACITY TEST: CPT

## 2023-07-10 PROCEDURE — 0W9B3ZZ DRAINAGE OF LEFT PLEURAL CAVITY, PERCUTANEOUS APPROACH: ICD-10-PCS | Performed by: RADIOLOGY

## 2023-07-10 RX ORDER — FAMOTIDINE 20 MG/1
20 TABLET, FILM COATED ORAL 2 TIMES DAILY
Qty: 60 TABLET | Refills: 3 | Status: SHIPPED | OUTPATIENT
Start: 2023-07-10

## 2023-07-10 RX ORDER — ATORVASTATIN CALCIUM 40 MG/1
40 TABLET, FILM COATED ORAL NIGHTLY
Qty: 30 TABLET | Refills: 3 | Status: SHIPPED | OUTPATIENT
Start: 2023-07-10

## 2023-07-10 RX ORDER — OXYCODONE HYDROCHLORIDE AND ACETAMINOPHEN 5; 325 MG/1; MG/1
1 TABLET ORAL EVERY 4 HOURS PRN
Qty: 30 TABLET | Refills: 0 | Status: SHIPPED | OUTPATIENT
Start: 2023-07-10 | End: 2023-07-17

## 2023-07-10 RX ORDER — AMIODARONE HYDROCHLORIDE 200 MG/1
200 TABLET ORAL DAILY
Qty: 30 TABLET | Refills: 0 | Status: SHIPPED | OUTPATIENT
Start: 2023-07-11 | End: 2023-08-10

## 2023-07-10 RX ORDER — METOPROLOL TARTRATE 50 MG/1
50 TABLET, FILM COATED ORAL 2 TIMES DAILY
Qty: 60 TABLET | Refills: 3 | Status: SHIPPED | OUTPATIENT
Start: 2023-07-10

## 2023-07-10 RX ORDER — M-VIT,TX,IRON,MINS/CALC/FOLIC 27MG-0.4MG
1 TABLET ORAL
Qty: 30 TABLET | Refills: 0 | Status: SHIPPED | OUTPATIENT
Start: 2023-07-11 | End: 2023-08-10

## 2023-07-10 RX ORDER — POTASSIUM CHLORIDE 20 MEQ/1
20 TABLET, EXTENDED RELEASE ORAL DAILY
Qty: 10 TABLET | Refills: 0 | Status: SHIPPED | OUTPATIENT
Start: 2023-07-11 | End: 2023-07-21

## 2023-07-10 RX ORDER — ASPIRIN 81 MG/1
81 TABLET, CHEWABLE ORAL DAILY
Qty: 30 TABLET | Refills: 3 | Status: SHIPPED | OUTPATIENT
Start: 2023-07-11

## 2023-07-10 RX ORDER — BUMETANIDE 2 MG/1
2 TABLET ORAL DAILY
Qty: 10 TABLET | Refills: 0 | Status: SHIPPED | OUTPATIENT
Start: 2023-07-11 | End: 2023-07-21

## 2023-07-10 RX ORDER — TAMSULOSIN HYDROCHLORIDE 0.4 MG/1
0.4 CAPSULE ORAL DAILY
Qty: 30 CAPSULE | Refills: 3 | Status: SHIPPED | OUTPATIENT
Start: 2023-07-11

## 2023-07-10 RX ADMIN — SODIUM CHLORIDE, PRESERVATIVE FREE 10 ML: 5 INJECTION INTRAVENOUS at 09:17

## 2023-07-10 RX ADMIN — MULTIPLE VITAMINS W/ MINERALS TAB 1 TABLET: TAB at 09:17

## 2023-07-10 RX ADMIN — AMIODARONE HYDROCHLORIDE 200 MG: 200 TABLET ORAL at 09:17

## 2023-07-10 RX ADMIN — BUDESONIDE AND FORMOTEROL FUMARATE DIHYDRATE 2 PUFF: 160; 4.5 AEROSOL RESPIRATORY (INHALATION) at 08:25

## 2023-07-10 RX ADMIN — SENNOSIDES AND DOCUSATE SODIUM 1 TABLET: 50; 8.6 TABLET ORAL at 09:17

## 2023-07-10 RX ADMIN — APIXABAN 2.5 MG: 2.5 TABLET, FILM COATED ORAL at 15:36

## 2023-07-10 RX ADMIN — METOPROLOL TARTRATE 50 MG: 50 TABLET, FILM COATED ORAL at 09:17

## 2023-07-10 RX ADMIN — BUMETANIDE 2 MG: 0.5 TABLET ORAL at 09:17

## 2023-07-10 RX ADMIN — TAMSULOSIN HYDROCHLORIDE 0.4 MG: 0.4 CAPSULE ORAL at 09:17

## 2023-07-10 RX ADMIN — POTASSIUM CHLORIDE 20 MEQ: 1500 TABLET, EXTENDED RELEASE ORAL at 09:17

## 2023-07-10 RX ADMIN — FAMOTIDINE 20 MG: 20 TABLET, FILM COATED ORAL at 09:17

## 2023-07-10 RX ADMIN — IPRATROPIUM BROMIDE AND ALBUTEROL SULFATE 1 DOSE: 2.5; .5 SOLUTION RESPIRATORY (INHALATION) at 08:25

## 2023-07-10 RX ADMIN — ASPIRIN 81 MG: 81 TABLET, CHEWABLE ORAL at 09:17

## 2023-07-10 ASSESSMENT — ENCOUNTER SYMPTOMS
DIARRHEA: 0
CONSTIPATION: 0
COLOR CHANGE: 0
COUGH: 0
COUGH: 1
BACK PAIN: 0
PHOTOPHOBIA: 0
CHEST TIGHTNESS: 0
EYE PAIN: 0
EYES NEGATIVE: 1
SHORTNESS OF BREATH: 1
ABDOMINAL PAIN: 0
NAUSEA: 0
CHEST TIGHTNESS: 1
ALLERGIC/IMMUNOLOGIC NEGATIVE: 1
VOMITING: 0
BLOOD IN STOOL: 0
GASTROINTESTINAL NEGATIVE: 1
WHEEZING: 0

## 2023-07-10 NOTE — CONSULTS
[facility] Continence Nurse  Consult Note       Juan Alberto Rodriguez  AGE: 60 y.o. GENDER: male  : 1960  TODAY'S DATE:  7/10/2023    Subjective:     Reason for CWOCN Evaluation and Assessment: wound assessment      Juan Alberto Rodriguez is a 60 y.o. male referred by:   [x] Physician  [] Nursing  [] Other:     Wound Identification:  Wound Type:  friction  Contributing Factors: decreased mobility and anticoagulation therapy        PAST MEDICAL HISTORY        Diagnosis Date    Abnormal echocardiogram 2013    EF=45-50%;mod. AR,mild aortic valve calcif. Anesthesia complication     Ankle fracture, left     Aortic root dilation ([address]) 2021    Dilation of the aortic root (4.2cm)and the ascending aorta(4.0cm). 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 EF 55%. Renals patent. H/O cardiac catheterization 2021    NO SIG CAD    H/O cardiovascular stress test 2013    EF 49%, there is no scintigraphic evidence of inducible myocardial ischemia, the observed defect is consistent with diaphragmatic attenuation, clinical correlation recommenced, no previous study to compare with, no wall motionn abnormality seen, normal perfusion in all myocardial regions    H/O chest pain     H/O echocardiogram 2020    EF 50-55%, Mod LVH & AR,  Dilated aortic root (4.2cm) and ascending aorta (4.1cm).     Hemorrhoids 06/15/2015    HTN (hypertension)     Hx of echocardiogram 2021    Moderate left ventricular hypertrophy regurgitation noted The anterior leaflet of the mitral valve is prolapsed with mild Right side of the heart is enlarged Moderately
AVR  PVC  Hypertension  Hyperlipidemia    Patient has postop atrial fibrillation. Patient is on Eliquis and I agree with continuing with amiodarone for now. We may probably cardiovert him in a month or so. Uptitrate metoprolol as tolerated for rate control. We will follow the patient as outpatient. Patient can be discharged from my standpoint        Thanks again for allowing me to participate in care of this patient. Please call me if you have any questions. With best regards. Ammon Go MD, 7/10/2023   Please note this report has been partially produced using speech recognition software and may contain errors related to that system including errors in grammar, punctuation, and spelling, as well as words and phrases that may be inappropriate. If there are any questions or concerns please feel free to contact the dictating provider for clarification.

## 2023-07-10 NOTE — PROGRESS NOTES
Inpatient Progress Note 7/10/2023        Judy Trevino  1960  [id number]      Assessment/Plan:    Acute chronic respiratory failure with hypoxemia  Very advanced COPD (FEV1 45%, FEV1 FVC ratio 0.55, diffusion capacity 60% per PFTs September 2022), O2 reliant in home setting (reported nasal cannula flow rate 5 to 6 L/min)  Severe aortic stenosis, status post AVR 6/30/2023 disease bioprosthetic valve)  Pulmonary hypertension due to above plus advanced pulmonary disease  Eventration right hemidiaphragm  Paroxysmal A fib post op (currently NSR)  History of hypertension  History of hyperlipidemia  History of colonic AVMs     improvingO2 requirements, back to home requirements. (on 6L NC at home). Goal spO2 > 88%  Bronchodilators, pulmonary hygiene measures  Diuresis per CTS  Enteral amiodarone and beta-blocker Rx Atrial Fibrillation. Cardioverted at bedside 7/5. Eliquis  Ulcer prophylaxis  Adequate Postoperative glycemic control    It is my understanding the patient may be released to the home setting within the next 24 hours. Complex decisions required for evaluation and management, reviewed during critical care rounds. 32 minutes    [facility]  [phone]         Subjective: The patient denies any new specific complaints this morning. He has been reportedly ambulatory within the room setting as well as hallways. He notes mild chest congestion but denies significant cough or sputum production. The supplemental oxygen support has been titrated to approximately 5 L (5 to 6 L flow rate reportedly baseline in the home setting)        Review of Systems   Constitutional:  Positive for fatigue. HENT: Negative. Eyes: Negative. Respiratory:  Positive for cough and chest tightness. Cardiovascular:  Positive for leg swelling. Gastrointestinal: Negative. Endocrine: Negative. Genitourinary: Negative. Musculoskeletal: Negative. Skin: Negative. Allergic/Immunologic: Negative.
Ir Staff to Bedside. Pt alert and oriented x 4 consent obtained. Vital stable. Pt positioned sitting on side of the bed.  Visualized the pleural space and used ultrasound to obtained  US Images. Small amount of fluid on left. Pt declined wanting to proceed with procedure due to such little amount of fluid. IR completing consult.
Outpatient Pharmacy Progress Note for Meds-to-Beds    Total number of Prescriptions Filled: 10  Additional Documentation:  Patient picked-up the medication(s) in the OP Pharmacy  Patient had a paper Percocet prescription that he can take to his home pharmacy      Thank you for letting us serve your patients.   [address]    [address] [place] [address], [address] [place] [address]    Phone: [phone]    Fax: [phone]
Patient discharged with cohen draining to leg bag per Dr. Marco Antonio Barron. Pt to follow up with urology outpatient.
Physical Therapy    Physical Therapy Treatment Note  Name: Sherwood Sicard MRN: [id number] :   1960   Date:  7/10/2023   Admission Date: 2023 Room:     Restrictions/Precautions:        Sternal, no pushing, pulling or lifting more than 5 pounds for the first 2 weeks and then 10 pounds up to 3 months,   Arms are to support chest when changing position, coughing or sneezing. No lifting arms above 90 degrees except with the ex's  Communication with other providers:  Brunilda Espinosa RN states pt is ok to see for therapy  Subjective:  Patient states:  his L foot is not able to take his weight with walking  Pain:   Location, Type, Intensity (0/10 to 10/10):  7/10 L foot before session  Objective:    Observation:  pt was in the chair  Treatment, including education/measures:  Vital Signs  HR: 100, B/P 123/76, O2 94% on 6L  Education:  Pt was instructed in progression  of the Exercise Program,   Pt was instructed in Intermediate Cardiac ex program:standing  Overhead Side Stretch x 5  Ankle Pumps/ Heel Raises x 5  Marching Seated x 4 stopped d/t increase pain  Applied bio-freeze to his L foot and ankle  Forward Arm Raises x 5  Side Arm Raises x 5  Arm Crosses x 5  Arm Circles Forwards and Backwards x 5  SittingTrunkTwist x 5  Transfers with line management of tele and cohen  Scooting : Ind with good sternal precautions  Sit to stand : :Ind with good sternal precautions  Stand to sit : Ind with good sternal precautions  Safety: Patient left safely in the chair, with call light/phone in reach  Assessment / Impression:    Patient's tolerance of treatment:  fair to good d/t pain   Adverse Reaction: none  Significant change in status and impact:    Barriers to improvement:  pain with weightbearing  Plan for Next Session:    Will cont to progress ex's and gt per cardiac protocol and educate pt on sternal precautions, S & S of ex intolerance, purpose of ex's, progression of ex's and gt at home.    Time in:  830  Time out:
Pt meets all criteria for discharge at this time. Pt's spouse at bedside, reviewed in detail all discharge instructions, medications, and follow-up appointments. Stressed importance of low sodium diet and compliance with medications. Phone numbers given to call for any questions. Pt and family asking appropriate questions and all questions were answered to their satisfaction. Verbalizes understanding and agreeable to discharge. All prescriptions received by patient. PICC removed without difficulty, incisions assessed and documented, left open to air. All belongings taken with patient at discharge.
Attending Only

## 2023-07-10 NOTE — DISCHARGE SUMMARY
Cardiothoracic [facility]    Discharge Summary     Patient ID: Carson Gipson  :  1960   MRN: [id number]     ACCOUNT:  [de-identified]   Patient's PCP: Amadou Deluna MD  Admit Date: 2023   Discharge Date: 7/10/2023   Length of Stay: 10  Code Status:  Full Code  Admitting Physician: Martin Pradhan MD  Discharge Physician: ISHMAEL Ovalle     Active Discharge Diagnoses:     Hospital Problem Lists:  Principal Problem: Aortic valve defect  Active Problems: Aortic valve regurgitation  Resolved Problems:    * No resolved hospital problems. *      Admission Condition:  good     Discharged Condition: good    Hospital Stay:     HPI: Carson Gipson is a 60 y.o. male  with PMHx significant for HTN, HLD, COPD, Grade III diastolic dysfunction and large right diaphragmatic hernia presented to the office on 23 with increasing SOB and fatigue. CHERRI findings revealed: Calcified aortic valve with severe eccentric aortic regurgitation, vena contracta: 1.1cm. Procedure: Aortic valve replacement with a 29 mm Inspiris CE pericardial valve #[id number] using cardiopulmonary bypass, mild hypothermia, cold cardioplegia, Transesophageal echocardiogram on 23. Hospital Course:    7/10/23:  Dr. Frantz Daniels from Interventional Radiology performed a bedside Left sided US for possible thoracentesis. Minimal pleural fluid was seen. Dr. Frantz Daniels gave the patient the option to proceed with the procedure to see what will be drained and the patient refused. 23:   Currently in Afib with a stable BP. On 6 L NC saturating 91-94%.      No issues or events noted with the patient overnight     - Continue current care and meds  - Follow-up labs, CXR - P/A and Lateral daily   - Cont Amio 200 mg PO daily   - Cont Lopressor to 50 mg PO BID (Hold for HR less than 70 and or SBP less than 90)  - Cardiac diet with 1 L fluid restriction   - Cont

## 2023-07-10 NOTE — CARE COORDINATION
CM in to see pt. Pt is anticipating possible discharge home today. Plan remains home with [facility] and pt is in agreement. CM contacted [facility]. Romulo Puckett RN, re; pt anticipated discharge today and [facility]. order and info faxed to [address] Fax# [phone].

## 2023-07-10 NOTE — TELEPHONE ENCOUNTER
from [employer] called asking if pt had been in contact with office as she was not able to get ahold of pt. I informed her that pt was still in hospital and that may be the reason why she hasnt spoken to pt yet.

## 2023-07-10 NOTE — PLAN OF CARE
Problem: Chronic Conditions and Co-morbidities  Goal: Patient's chronic conditions and co-morbidity symptoms are monitored and maintained or improved  7/10/2023 1427 by Jolanta Vargas RN  Outcome: Completed  7/10/2023 1427 by Jolanta Vargas, RN  Outcome: Adequate for Discharge     Problem: Discharge Planning  Goal: Discharge to home or other facility with appropriate resources  7/10/2023 1427 by Jolanta Vargas RN  Outcome: Completed  7/10/2023 1427 by Jolanta Vargas, RN  Outcome: Adequate for Discharge     Problem: Pain  Goal: Verbalizes/displays adequate comfort level or baseline comfort level  7/10/2023 1427 by Jolanta Vargas RN  Outcome: Completed  7/10/2023 1427 by Jolanta Vargas RN  Outcome: Adequate for Discharge     Problem: Safety - Adult  Goal: Free from fall injury  7/10/2023 1427 by Jolanta Vargas RN  Outcome: Completed  7/10/2023 1427 by Jolanta Vargas, RN  Outcome: Adequate for Discharge     Problem: Nutrition Deficit:  Goal: Optimize nutritional status  7/10/2023 1427 by Jolanta Vargas RN  Outcome: Completed  7/10/2023 1427 by Jolanta Vargas RN  Outcome: Adequate for Discharge

## 2023-07-10 NOTE — DISCHARGE INSTRUCTIONS
***    Valve Care:          Notify any new doctors that you had valve surgery  They will order antibiotics if      Necessary before any procedures          Regular dental care is important  Notify your dentist of any valve surgery  wait 3       Months post op before any elective dental surgery            If you have an MRI let technician you have had valve surgery      ABSOLUTELY NO SMOKING:  If you have this habit it is VERY important that you quit! [facility] offers classes on smoking cessation and we can provide you with this information. [facility]: [phone]. NOTIFY YOUR DOCTOR:   If you have swelling in feet, ankles, and sometimes abdomen, an ongoing cough, fatigue, and/or loss of energy call your surgeon. Weigh yourself in the morning before eating breakfast and after urinating. Notify your surgeon if you have a weight gain of more than 3 (three) pounds within a day or two. If your bowel movements are sluggish or have stopped. If you have difficulty breathing. If you develop angina (chest pain)  Or the same type of discomfort you had before surgery. You may take nitroglycerine as prescribed, BUT you must still notify your cardiologist and your surgeon. Call 911 if pain is NOT relieved after taking 3 (three) nitroglycerine tablets, 5 minuets apart. DO NOT DRIVE YOURSELF AND DO NOT HAVE SOMEONE DRIVE YOU TO THE EMERGENCY ROM. CALL 911. Comments: Follow up care:    Call to make appointments today when you get home.     Referrals:  [facility]    Given to Patient (When applicable):  {Yes Incentive Spirometer yes Valuables given to patient    yes Pillow N/A Surgical Bra   yes CHF information N/A Valve Card   yes INOCENCIO hose N/A Surgical Binder    yes \"Straight from the Heart\" Book yes Nutritional Information   Comments:IF THERE ARE IMMEDIATE CONCERNS OR LIFE THREATENING EMERGENCY, CALL 911    For non-emergency questions-24 hours a day-please call   [facility]

## 2023-07-11 ENCOUNTER — CARE COORDINATION (OUTPATIENT)
Dept: CASE MANAGEMENT | Age: 63
End: 2023-07-11

## 2023-07-11 ENCOUNTER — TELEPHONE (OUTPATIENT)
Dept: FAMILY MEDICINE CLINIC | Age: 63
End: 2023-07-11

## 2023-07-11 DIAGNOSIS — I35.9 AORTIC VALVE DEFECT: Primary | ICD-10-CM

## 2023-07-11 NOTE — TELEPHONE ENCOUNTER
Care Transitions Initial Follow Up Call    Outreach made within 2 business days of discharge: Yes    Patient: Gab Settler Patient : 1960   MRN: [id number]  Reason for Admission: There are no discharge diagnoses documented for the most recent discharge. Discharge Date: 7/10/23       Spoke with: Patient    Discharge department/facility: [facility]    TCM Interactive Patient Contact:  Was patient able to fill all prescriptions: Yes  Was patient instructed to bring all medications to the follow-up visit: Yes  Is patient taking all medications as directed in the discharge summary?  Yes  Does patient understand their discharge instructions: Yes  Does patient have questions or concerns that need addressed prior to 7-14 day follow up office visit: no    Scheduled appointment with PCP within 7-14 days    Follow Up  Future Appointments   Date Time Provider [facility] [address]   2023  3:10 PM Chris Wilkinson MD [facility]   2023 11:30 AM Ahmet Hernandez MD [facility] CT SURG [facility]   2023  9:30 AM DONA Sanchez - NP [facility]   2023  8:15 AM SCHEDULE, [place] GEN SURG NURSE [facility] 1 [facility]   2023  9:15 AM Cesar Aburto MD [facility] GENSURG [facility]   2023  9:30 AM Kayli Erazo MD [facility]  [facility]   10/24/2023 11:00 AM Madhu Montoya MD [address] [facility],2Nd Floor,2Nd Floor [facility]   2023  9:15 AM Kayli Erazo MD [address] [place] [address]

## 2023-07-11 NOTE — TELEPHONE ENCOUNTER
Care Transitions Initial Follow Up Call    Outreach made within 2 business days of discharge: Yes    Patient: Vincent Magdaleno Patient : 1960   MRN: [id number]  Reason for Admission: There are no discharge diagnoses documented for the most recent discharge. Discharge Date: 7/10/23       Spoke with: left  for pt to call to schedule appt.      Discharge department/facility: [facility] Interactive Patient Contact:    Scheduled appointment with PCP within 7-14 days    Follow Up  Future Appointments   Date Time Provider [facility] [address]   2023  3:10 PM Satish Schmidt MD Critical access hospital [facility]   2023 11:30 AM Ignacio Love MD [facility] CT SURG [facility]   2023  8:15 AM KATHY, [place] GEN SURG NURSE [facility]   2023  9:15 AM Denisha Joseph MD [facility]   2023  9:30 AM Rojean Favre, MD [facility] [address] [facility]   10/24/2023 11:00 AM Lola Carcamo MD [address] [place] [address],2Nd Floor,2Nd Floor [facility]   2023  9:15 AM Rojean Favre, MD [facility] [address] [place], [place]

## 2023-07-11 NOTE — CARE COORDINATION
[facility] Care Transitions Initial Follow Up Call    Call within 2 business days of discharge: Yes    Patient Current Location:  Home: [address] [place] [address] Transition Nurse contacted the patient by telephone to perform post hospital discharge assessment. Verified name and  with patient as identifiers. Provided introduction to self, and explanation of the Care Transition Nurse role. Patient: Darius Barragan Patient : 1960   MRN: [id number]  Reason for Admission: Severe AS/Defect s/p Aortic Valve Replacement 23  Discharge Date: 7/10/23 RARS: Readmission Risk Score: 12  Facility: [facility] 23-7/10/23    Last Discharge [address] [facility],[address] [facility]       Date Complaint Diagnosis Description Type Department Provider    23  Nonrheumatic aortic valve insufficiency . .. Admission (Discharged) Rashaad Brown MD          Was this an external facility discharge? No     Challenges to be reviewed by the provider   Additional needs identified to be addressed with provider: No       Method of communication with provider: phone. Patient reports doing very well s/p recent aortic valve replacement on 23. Adds \"feel great, best Sue felt in over a year, my heart feels stronger! \". Denies chest pain/pressure, palpitations, sob, orthopnea, edema, ac distress. Noted to be speaking in complete, unlabored sentences. Reports using baseline O2 at 6L continuously [facility]) w/ SPO2 88-low 90s as per baseline. Patient w/ hx of copd. Reports sternal incision intact, no sternal movement or reviewed sx of infection. Confirmed using sternal pillow, sternal precautions. Advised IS. Advised heart healthy diet low in sodium and 1 L fluid restriction as per MD note. Denies need for RD referral. Patient is non-smoker, no 2nd hand smoke exposure. Reports todays weight 213. 8# which is down from 216# on 23. Discussed weight parameters, when to contact MD. Confirmed taking Eliquis, Asa, Amiodarone, Bumex.  Reports

## 2023-07-12 ENCOUNTER — OFFICE VISIT (OUTPATIENT)
Dept: CARDIOLOGY CLINIC | Age: 63
End: 2023-07-12
Payer: COMMERCIAL

## 2023-07-12 ENCOUNTER — TELEPHONE (OUTPATIENT)
Dept: SURGERY | Age: 63
End: 2023-07-12

## 2023-07-12 ENCOUNTER — PROCEDURE VISIT (OUTPATIENT)
Dept: CARDIOLOGY CLINIC | Age: 63
End: 2023-07-12
Payer: COMMERCIAL

## 2023-07-12 VITALS
BODY MASS INDEX: 30.6 KG/M2 | WEIGHT: 218.6 LBS | HEART RATE: 64 BPM | DIASTOLIC BLOOD PRESSURE: 70 MMHG | SYSTOLIC BLOOD PRESSURE: 120 MMHG | HEIGHT: 71 IN

## 2023-07-12 DIAGNOSIS — Z95.2 S/P AVR: Primary | ICD-10-CM

## 2023-07-12 DIAGNOSIS — Z95.2 S/P AVR: ICD-10-CM

## 2023-07-12 LAB
LV EF: 30 %
LVEF MODALITY: NORMAL

## 2023-07-12 PROCEDURE — 1036F TOBACCO NON-USER: CPT | Performed by: INTERNAL MEDICINE

## 2023-07-12 PROCEDURE — 93000 ELECTROCARDIOGRAM COMPLETE: CPT | Performed by: INTERNAL MEDICINE

## 2023-07-12 PROCEDURE — G8417 CALC BMI ABV UP PARAM F/U: HCPCS | Performed by: INTERNAL MEDICINE

## 2023-07-12 PROCEDURE — 3078F DIAST BP <80 MM HG: CPT | Performed by: INTERNAL MEDICINE

## 2023-07-12 PROCEDURE — 3074F SYST BP LT 130 MM HG: CPT | Performed by: INTERNAL MEDICINE

## 2023-07-12 PROCEDURE — 1111F DSCHRG MED/CURRENT MED MERGE: CPT | Performed by: INTERNAL MEDICINE

## 2023-07-12 PROCEDURE — 93306 TTE W/DOPPLER COMPLETE: CPT | Performed by: INTERNAL MEDICINE

## 2023-07-12 PROCEDURE — 99214 OFFICE O/P EST MOD 30 MIN: CPT | Performed by: INTERNAL MEDICINE

## 2023-07-12 PROCEDURE — G8427 DOCREV CUR MEDS BY ELIG CLIN: HCPCS | Performed by: INTERNAL MEDICINE

## 2023-07-12 PROCEDURE — 3017F COLORECTAL CA SCREEN DOC REV: CPT | Performed by: INTERNAL MEDICINE

## 2023-07-12 NOTE — TELEPHONE ENCOUNTER
CALLED (NO VM)  Jarocho Méndez REGARDING SURGERY ([address] [facility]) SCHEDULED @ [facility].  NOTIFIED OF DATES, TIMES AND LOCATION    PHONE ASSESSMENT   SURGERY - 8/15/23 @ 641  P/O - 8/21/23 @ 196    NPO AFTER MIDNIGHT  (ENTER OTHER PREP INFO)  HOLD BLOOD THINNERS - ELIQUIS HOLD 5 DAYS PRIOR, 81MG ASA DO NOT HOLD

## 2023-07-14 ENCOUNTER — TELEPHONE (OUTPATIENT)
Dept: CARDIOTHORACIC SURGERY | Age: 63
End: 2023-07-14

## 2023-07-14 NOTE — ADT AUTH CERT
shift  insert PICC line     PT/OT/SLP/CM ASSESSMENT OR NOTES:  ##PT NOTE:  AMPAC 6 Clicks Inpatient Mobility:  AM-PAC Inpatient Mobility Raw Score : 10  Pt is presenting with decreased endurance, impaired transfers, decreased strength, impaired balance, impaired gait

## 2023-07-15 ENCOUNTER — HOSPITAL ENCOUNTER (OUTPATIENT)
Dept: GENERAL RADIOLOGY | Age: 63
Discharge: HOME | End: 2023-07-15
Payer: COMMERCIAL

## 2023-07-15 ENCOUNTER — HOSPITAL ENCOUNTER (OUTPATIENT)
Age: 63
Discharge: HOME | End: 2023-07-15
Payer: COMMERCIAL

## 2023-07-15 DIAGNOSIS — I35.9 AORTIC VALVE DEFECT: ICD-10-CM

## 2023-07-15 LAB
ANION GAP SERPL CALCULATED.3IONS-SCNC: 13 MMOL/L (ref 4–16)
BUN SERPL-MCNC: 21 MG/DL (ref 6–23)
CALCIUM SERPL-MCNC: 9.4 MG/DL (ref 8.3–10.6)
CHLORIDE BLD-SCNC: 96 MMOL/L (ref 99–110)
CO2: 31 MMOL/L (ref 21–32)
CREAT SERPL-MCNC: 1 MG/DL (ref 0.9–1.3)
GFR SERPL CREATININE-BSD FRML MDRD: >60 ML/MIN/1.73M2
GLUCOSE SERPL-MCNC: 88 MG/DL (ref 70–99)
HCT VFR BLD CALC: 40.7 % (ref 42–52)
HEMOGLOBIN: 12.1 GM/DL (ref 13.5–18)
MCH RBC QN AUTO: 27.8 PG (ref 27–31)
MCHC RBC AUTO-ENTMCNC: 29.7 % (ref 32–36)
MCV RBC AUTO: 93.3 FL (ref 78–100)
PDW BLD-RTO: 12.9 % (ref 11.7–14.9)
PLATELET # BLD: 285 K/CU MM (ref 140–440)
PMV BLD AUTO: 11.5 FL (ref 7.5–11.1)
POTASSIUM SERPL-SCNC: 4.9 MMOL/L (ref 3.5–5.1)
RBC # BLD: 4.36 M/CU MM (ref 4.6–6.2)
SODIUM BLD-SCNC: 140 MMOL/L (ref 135–145)
WBC # BLD: 10.8 K/CU MM (ref 4–10.5)

## 2023-07-15 PROCEDURE — 36415 COLL VENOUS BLD VENIPUNCTURE: CPT

## 2023-07-15 PROCEDURE — 85027 COMPLETE CBC AUTOMATED: CPT

## 2023-07-15 PROCEDURE — 80048 BASIC METABOLIC PNL TOTAL CA: CPT

## 2023-07-15 PROCEDURE — 71046 X-RAY EXAM CHEST 2 VIEWS: CPT

## 2023-07-18 ENCOUNTER — OFFICE VISIT (OUTPATIENT)
Dept: CARDIOTHORACIC SURGERY | Age: 63
End: 2023-07-18

## 2023-07-18 ENCOUNTER — CARE COORDINATION (OUTPATIENT)
Dept: CASE MANAGEMENT | Age: 63
End: 2023-07-18

## 2023-07-18 VITALS
SYSTOLIC BLOOD PRESSURE: 120 MMHG | OXYGEN SATURATION: 92 % | WEIGHT: 214.4 LBS | DIASTOLIC BLOOD PRESSURE: 80 MMHG | BODY MASS INDEX: 30.02 KG/M2 | HEIGHT: 71 IN | HEART RATE: 88 BPM

## 2023-07-18 DIAGNOSIS — Z95.2 S/P AVR: Primary | ICD-10-CM

## 2023-07-18 PROCEDURE — 99024 POSTOP FOLLOW-UP VISIT: CPT | Performed by: NURSE PRACTITIONER

## 2023-07-18 NOTE — CARE COORDINATION
[facility] Care Transitions Follow Up Call      Patient: Jurgen Sy            Patient : 1960   MRN: [id number]     Reason for Admission: Severe AS/Defect s/p Aortic Valve Replacement 23  Discharge Date: 7/10/23       RARS: Readmission Risk Score: 12  Facility: [facility] 23-7/10/23    Spoke briefly w/ Wife who reports Patient sleeping but overall doing well. Was seen today by Xin Donis NP- Cardiolvascular Surgery as scheduled. Has appt tomorrow at PCP office. Will call Patient back later date.  [address] [facility] [place] [phone]

## 2023-07-19 ENCOUNTER — OFFICE VISIT (OUTPATIENT)
Dept: FAMILY MEDICINE CLINIC | Age: 63
End: 2023-07-19

## 2023-07-19 VITALS
OXYGEN SATURATION: 92 % | DIASTOLIC BLOOD PRESSURE: 78 MMHG | HEART RATE: 54 BPM | WEIGHT: 215.4 LBS | BODY MASS INDEX: 30.15 KG/M2 | SYSTOLIC BLOOD PRESSURE: 124 MMHG | HEIGHT: 71 IN

## 2023-07-19 DIAGNOSIS — Z09 HOSPITAL DISCHARGE FOLLOW-UP: ICD-10-CM

## 2023-07-19 DIAGNOSIS — R33.9 URINARY RETENTION: Primary | ICD-10-CM

## 2023-07-19 DIAGNOSIS — Z12.5 SCREENING FOR PROSTATE CANCER: ICD-10-CM

## 2023-07-20 ENCOUNTER — CARE COORDINATION (OUTPATIENT)
Dept: CASE MANAGEMENT | Age: 63
End: 2023-07-20

## 2023-07-20 NOTE — CARE COORDINATION
[facility] Care Transitions Follow Up Call      Patient: Chen Del Valle            Patient : 1960   MRN: [id number]     Reason for Admission: Severe AS/Defect s/p Aortic Valve Replacement 23  Discharge Date: 7/10/23       RARS: Readmission Risk Score: 12  [facility] 23-7/10/23     Spoke briefly w/ Wife who reports Patient who reports Patient not avaliable as he is awaiting video appt now. Will reattempt call later in week.  [address] [place] [address] [phone]

## 2023-07-21 ENCOUNTER — CARE COORDINATION (OUTPATIENT)
Dept: CASE MANAGEMENT | Age: 63
End: 2023-07-21

## 2023-07-21 NOTE — CARE COORDINATION
[facility] Care Transitions Follow Up Call    Patient Current Location:  Home: [address] [place] [address]    Left HIPPA compliant message regarding the nature of the call and a request for a return call with my contact information      JACKY Leigh, [phone]  [facility] / [address] [facility],[address] [facility] Transition Nurse     Pt with office visit yesterday. 8/15 is scheduled for hernia diaphragmatic repair.      Patient: Rocio Fernandes  Patient : 1960   MRN: [id number]  Reason for Admission: aortic valve repair  Discharge Date: 7/10/23 RARS: Readmission Risk Score: 12          Follow Up  Future Appointments   Date Time Provider [address]   2023  8:15 AM SCHEDULE, [place] GEN SURG NURSE [facility]   2023  9:15 AM Hernán Moran MD [facility]   2023  9:30 AM Judson Ojeda MD [facility]   10/24/2023 11:00 AM Dali De León MD [facility]   2023  9:15 AM Judson Ojeda MD [facility]          Care Transitions Subsequent and Final Call    Subsequent and Final Calls  Care Transitions Interventions  Other Interventions:             Plan for next call:  Review last office visit      JACKY Leigh, RN   [facility] [employer]/ [address] [facility],[address] Transition Nurse  [phone]

## 2023-07-26 ENCOUNTER — TELEPHONE (OUTPATIENT)
Dept: CARDIOLOGY CLINIC | Age: 63
End: 2023-07-26

## 2023-07-26 ENCOUNTER — CARE COORDINATION (OUTPATIENT)
Dept: CASE MANAGEMENT | Age: 63
End: 2023-07-26

## 2023-07-26 ENCOUNTER — OFFICE VISIT (OUTPATIENT)
Dept: CARDIOLOGY CLINIC | Age: 63
End: 2023-07-26
Payer: COMMERCIAL

## 2023-07-26 VITALS
BODY MASS INDEX: 31.22 KG/M2 | WEIGHT: 223 LBS | HEIGHT: 71 IN | SYSTOLIC BLOOD PRESSURE: 110 MMHG | DIASTOLIC BLOOD PRESSURE: 60 MMHG

## 2023-07-26 DIAGNOSIS — R00.2 PALPITATIONS: Primary | ICD-10-CM

## 2023-07-26 PROCEDURE — 93000 ELECTROCARDIOGRAM COMPLETE: CPT | Performed by: INTERNAL MEDICINE

## 2023-07-26 PROCEDURE — 99214 OFFICE O/P EST MOD 30 MIN: CPT | Performed by: INTERNAL MEDICINE

## 2023-07-26 PROCEDURE — 3017F COLORECTAL CA SCREEN DOC REV: CPT | Performed by: INTERNAL MEDICINE

## 2023-07-26 PROCEDURE — 3078F DIAST BP <80 MM HG: CPT | Performed by: INTERNAL MEDICINE

## 2023-07-26 PROCEDURE — 1111F DSCHRG MED/CURRENT MED MERGE: CPT | Performed by: INTERNAL MEDICINE

## 2023-07-26 PROCEDURE — G8427 DOCREV CUR MEDS BY ELIG CLIN: HCPCS | Performed by: INTERNAL MEDICINE

## 2023-07-26 PROCEDURE — 1036F TOBACCO NON-USER: CPT | Performed by: INTERNAL MEDICINE

## 2023-07-26 PROCEDURE — G8417 CALC BMI ABV UP PARAM F/U: HCPCS | Performed by: INTERNAL MEDICINE

## 2023-07-26 PROCEDURE — 3074F SYST BP LT 130 MM HG: CPT | Performed by: INTERNAL MEDICINE

## 2023-07-26 RX ORDER — ATORVASTATIN CALCIUM 40 MG/1
40 TABLET, FILM COATED ORAL NIGHTLY
Qty: 30 TABLET | Refills: 3 | Status: CANCELLED | OUTPATIENT
Start: 2023-07-26

## 2023-07-26 RX ORDER — METOPROLOL TARTRATE 50 MG/1
50 TABLET, FILM COATED ORAL 2 TIMES DAILY
Qty: 60 TABLET | Refills: 3 | Status: CANCELLED | OUTPATIENT
Start: 2023-07-26

## 2023-07-26 RX ORDER — AMIODARONE HYDROCHLORIDE 200 MG/1
200 TABLET ORAL DAILY
Qty: 30 TABLET | Refills: 0 | Status: CANCELLED | OUTPATIENT
Start: 2023-07-26 | End: 2023-08-25

## 2023-07-26 RX ORDER — ASPIRIN 81 MG/1
81 TABLET, CHEWABLE ORAL DAILY
Qty: 30 TABLET | Refills: 3 | Status: CANCELLED | OUTPATIENT
Start: 2023-07-26

## 2023-07-26 RX ORDER — POTASSIUM CHLORIDE 20 MEQ/1
20 TABLET, EXTENDED RELEASE ORAL DAILY
Qty: 10 TABLET | Refills: 0 | Status: CANCELLED | OUTPATIENT
Start: 2023-07-26 | End: 2023-08-05

## 2023-07-26 NOTE — PATIENT INSTRUCTIONS
**It is YOUR responsibilty to bring medication bottles and/or updated medication list to [address] [employer]. This will allow us to better serve you and all your healthcare needs**  [facility] Laboratory Locations - No appointment necessary. Sites open Monday to Friday. Call your preferred location for test preparation, business   hours and other information you need. [employer] accepts [employer]. [address] [place] [address].  JAMES Mijares. [facility], [facility]  Phone: [phone]     Please be informed that if you contact our office outside of normal business hours the physician on call cannot help with any scheduling or rescheduling issues, procedure instruction questions or any type of medication issue. We advise you for any urgent/emergency that you go to the nearest emergency room! PLEASE CALL OUR OFFICE DURING NORMAL BUSINESS HOURS    Monday - Friday   8 am to 5 pm    [place]: [address]: [phone]    [place]:  [phone]  Thank you for allowing us to care for you today! We want to ensure we can follow your treatment plan and we strive to give you the best outcomes and experience possible. If you ever have a life threatening emergency and call 911 - for an ambulance (EMS)   Our providers can only care for you at:   [facility] or [facility]. Even if you have someone take you or you drive yourself we can only care for you in a [facility]. Our providers are not setup at the other healthcare locations!

## 2023-07-26 NOTE — PROGRESS NOTES
Patient given instructions over telephone on CHERRI/Cv for Dx: Afib. Procedure is scheduled for 7/27/23 @ 8am, w/arrival @ 7am, @ [facility]. Patient advised to review instructions given. Patient was notified that procedure date or time could be changed due to an emergency. Patient voiced understanding.

## 2023-07-26 NOTE — PROGRESS NOTES
Daphne Wilkinson     Transesophageal Echocardiogram with Cardioversion    Patient Name: Gab Thornton   [facility]        R#[id number]    Date of Procedure: 23 Time: 8am Arrival Time: 7am  (Arrival time is scheduled for one (1) hour before procedure is scheduled.)    Hospital: [facility])       X   Please do not have anything by mouth after midnight prior to or 8 hours before the procedure. X   You may take your medication with a sip of water unless advised otherwise below. X Please continue to take Eliquis (apixaban) as directed. X If you are taking BUMEX (Bumetanide) please do not take it the morning before your procedure.

## 2023-07-26 NOTE — CARE COORDINATION
Care Transitions Outreach Attempt    Call within 2 business days of discharge: Yes   Attempted to reach patient for transitions of care follow up. Unable to reach patient. Left HIPAA compliant message w/ CTN # X2 with no response day #1. Patient: Baltazar Peraza Patient : 1960 MRN: [id number]    Last Discharge [address] [facility]6[address] [facility]       Date Complaint Diagnosis Description Type Department Provider    23  Nonrheumatic aortic valve insufficiency . .. Admission (Discharged) Rosalie Locke MD              Was this an external facility discharge?  No Discharge Facility:     Noted following upcoming appointments from discharge chart review:   [facility] follow up appointment(s):   Future Appointments   Date Time Provider [address]   2023  8:00 AM SCHEDULE, [facility] [address] [facility]   2023  8:15 AM SCHEDULE, [place] GEN SURG NURSE Darryl Fong [facility]   2023  8:40 AM Lesly Osman MD [facility]   2023  9:15 AM Salas Gutierrez MD [place] GENSURG [facility]   2023  9:30 AM Yandel Porter MD [facility] [address] [facility]   10/24/2023 11:00 AM Romina Hendrickson MD [facility]   2023  9:15 AM Yandel Porter MD [facility] [address] [facility]     [facility] follow up appointment(s):
not applicable (Male)

## 2023-07-26 NOTE — TELEPHONE ENCOUNTER
Will w/ Doug Skaggs called patient is SOB has gained 3 lbs , he requested we work patient in today I scheduled him today miguelangel Marin  post TAVR

## 2023-07-27 ENCOUNTER — HOSPITAL ENCOUNTER (OUTPATIENT)
Age: 63
Discharge: HOME | End: 2023-07-27
Payer: COMMERCIAL

## 2023-07-27 ENCOUNTER — CARE COORDINATION (OUTPATIENT)
Dept: CASE MANAGEMENT | Age: 63
End: 2023-07-27

## 2023-07-27 ENCOUNTER — HOSPITAL ENCOUNTER (OUTPATIENT)
Dept: NON INVASIVE DIAGNOSTICS | Age: 63
Discharge: HOME | End: 2023-07-27
Payer: COMMERCIAL

## 2023-07-27 VITALS
DIASTOLIC BLOOD PRESSURE: 70 MMHG | HEART RATE: 87 BPM | OXYGEN SATURATION: 94 % | SYSTOLIC BLOOD PRESSURE: 129 MMHG | RESPIRATION RATE: 20 BRPM

## 2023-07-27 DIAGNOSIS — I48.0 PAF (PAROXYSMAL ATRIAL FIBRILLATION) (HCC): Primary | ICD-10-CM

## 2023-07-27 PROBLEM — Z01.818 PREOPERATIVE CLEARANCE: Status: RESOLVED | Noted: 2023-06-27 | Resolved: 2023-07-27

## 2023-07-27 LAB
ALBUMIN SERPL-MCNC: 4 GM/DL (ref 3.4–5)
ALP BLD-CCNC: 127 IU/L (ref 40–129)
ALT SERPL-CCNC: 11 U/L (ref 10–40)
ANION GAP SERPL CALCULATED.3IONS-SCNC: 6 MMOL/L (ref 4–16)
AST SERPL-CCNC: 15 IU/L (ref 15–37)
BILIRUB SERPL-MCNC: 0.5 MG/DL (ref 0–1)
BUN SERPL-MCNC: 16 MG/DL (ref 6–23)
CALCIUM SERPL-MCNC: 9.3 MG/DL (ref 8.3–10.6)
CHLORIDE BLD-SCNC: 101 MMOL/L (ref 99–110)
CO2: 32 MMOL/L (ref 21–32)
CREAT SERPL-MCNC: 0.7 MG/DL (ref 0.9–1.3)
EKG ATRIAL RATE: 242 BPM
EKG ATRIAL RATE: 72 BPM
EKG DIAGNOSIS: NORMAL
EKG DIAGNOSIS: NORMAL
EKG P AXIS: 246 DEGREES
EKG P AXIS: 49 DEGREES
EKG P-R INTERVAL: 318 MS
EKG Q-T INTERVAL: 390 MS
EKG Q-T INTERVAL: 426 MS
EKG QRS DURATION: 104 MS
EKG QRS DURATION: 108 MS
EKG QTC CALCULATION (BAZETT): 466 MS
EKG QTC CALCULATION (BAZETT): 482 MS
EKG R AXIS: -26 DEGREES
EKG R AXIS: -27 DEGREES
EKG T AXIS: 105 DEGREES
EKG T AXIS: 142 DEGREES
EKG VENTRICULAR RATE: 72 BPM
EKG VENTRICULAR RATE: 92 BPM
GFR SERPL CREATININE-BSD FRML MDRD: >60 ML/MIN/1.73M2
GLUCOSE SERPL-MCNC: 103 MG/DL (ref 70–99)
POTASSIUM SERPL-SCNC: 4.5 MMOL/L (ref 3.5–5.1)
SODIUM BLD-SCNC: 139 MMOL/L (ref 135–145)
TOTAL PROTEIN: 6.5 GM/DL (ref 6.4–8.2)

## 2023-07-27 PROCEDURE — 7100000000 HC PACU RECOVERY - FIRST 15 MIN

## 2023-07-27 PROCEDURE — 7100000001 HC PACU RECOVERY - ADDTL 15 MIN

## 2023-07-27 PROCEDURE — 6360000002 HC RX W HCPCS: Performed by: INTERNAL MEDICINE

## 2023-07-27 PROCEDURE — 92960 CARDIOVERSION ELECTRIC EXT: CPT | Performed by: INTERNAL MEDICINE

## 2023-07-27 PROCEDURE — 93312 ECHO TRANSESOPHAGEAL: CPT

## 2023-07-27 PROCEDURE — 80053 COMPREHEN METABOLIC PANEL: CPT

## 2023-07-27 PROCEDURE — 92960 CARDIOVERSION ELECTRIC EXT: CPT

## 2023-07-27 PROCEDURE — 93312 ECHO TRANSESOPHAGEAL: CPT | Performed by: INTERNAL MEDICINE

## 2023-07-27 PROCEDURE — 93005 ELECTROCARDIOGRAM TRACING: CPT | Performed by: INTERNAL MEDICINE

## 2023-07-27 PROCEDURE — 93010 ELECTROCARDIOGRAM REPORT: CPT | Performed by: INTERNAL MEDICINE

## 2023-07-27 RX ORDER — FUROSEMIDE 20 MG/1
20 TABLET ORAL DAILY
Qty: 60 TABLET | Refills: 3 | Status: SHIPPED | OUTPATIENT
Start: 2023-07-27

## 2023-07-27 RX ORDER — POTASSIUM CHLORIDE 750 MG/1
10 TABLET, EXTENDED RELEASE ORAL DAILY
Qty: 30 TABLET | Refills: 5 | Status: SHIPPED | OUTPATIENT
Start: 2023-07-27

## 2023-07-27 RX ORDER — FUROSEMIDE 10 MG/ML
40 INJECTION INTRAMUSCULAR; INTRAVENOUS ONCE
Status: COMPLETED | OUTPATIENT
Start: 2023-07-27 | End: 2023-07-27

## 2023-07-27 RX ADMIN — FUROSEMIDE 40 MG: 10 INJECTION, SOLUTION INTRAMUSCULAR; INTRAVENOUS at 08:46

## 2023-07-27 NOTE — CARE COORDINATION
Care Transition follow up call deferred as Patient noted to be at [facility] for CHERRI/Cardioversion. Will attempt at later date.  [address] [facility] [phone]

## 2023-07-27 NOTE — PROGRESS NOTES
Procedure     Conscious sedation for CHERRI     ASA 2  Mallampati 2     After obtaining form consent patient was brought to the holding area and underwent conscious sedation with Versed, remained hemodynamically stable  Patient is hemodynamic status, neuro status was evaluated before conscious sedation  Patient's blood pressure, heart rate, pulse ox, neuro status was monitored for 30 minutes post procedure and she remained stable  The details of the conscious sedation is in the flowsheet in epic

## 2023-07-27 NOTE — OP NOTE
Operative Note      Patient: Opal Florez  YOB: 1960  MRN: [id number]                                                                                           [facility] S.Fadi Bonner MD  FAC                                                                                         Dipl CBNC, CBCCT, NBE, RPVI                                                                                                    CARDIOVESION      Indication: Atrial fibrillation      After obtaining the informed consent pt was sedated  With  versed    . A     200     J synchronised  shock was given. Pt converted to   sr       Pt remained clinically and hemodynamically stable. On eliquis        Cont with present medical therapy.       I:  Successful cardioversion    Plan:  Cont present therapy  F/U in office  Add diuretics

## 2023-07-27 NOTE — PROGRESS NOTES
Outpatient Pharmacy Progress Note for Meds-to-Beds    Total number of Prescriptions Filled: 2  The following medications were dispensed to the patient during the discharge process:  Potassium  Furosemide    Additional Documentation:  Patient's family member picked-up the medication(s) in the OP Pharmacy      Thank you for letting us serve your patients.   [address]    [address] [place] [address], [address] [place] [address]    Phone: [phone]    Fax: [phone]

## 2023-07-28 ENCOUNTER — CARE COORDINATION (OUTPATIENT)
Dept: CASE MANAGEMENT | Age: 63
End: 2023-07-28

## 2023-07-28 NOTE — CARE COORDINATION
[facility] Care Transitions Follow Up Call    Patient Current Location:  Home: [address] [place] [address] Transition Nurse contacted the patient by telephone to follow up after admission on 23-7/10/23. Verified name and  as identifiers. Patient: Jeromy Waller  Patient : 1960   MRN: [id number]  Reason for Admission: Severe AS/Defect s/p Aortic Valve Replacement 23  Discharge Date: 7/10/23 RARS: Readmission Risk Score: 12  Facility: [facility]    Needs to be reviewed by the provider   Additional needs identified to be addressed with provider: No     Method of communication with provider: none. Patient had cardioversion yesterday per Dr Sole Feliz and was started back on Lasix/Potassium as noted to have weight gain to 223# (was 213# home wt on 23). Confirmed taking Lasix/Potassium as directed. Patient still w/ cohen cath and is noting increased output since starting Lasix. Denies cp, sob, orthopnea, edema, acute distress. Reports adherence to low na diet and 1L fluid restriction. Advised daily monitoring of weight and to contact Dr Sole Feliz if noting wt gain. Reports per [employer] melisa his HR 68-70 and in SR. Denies palps, cp, dizziness. Reports mild chest wall tenderness s/p cardioversion. Reports sternal incision (AVR 23) healing well. Continues to wear O2at6L cont. Reports appetite, fluid intake good w/ b&b wnl. No resource needs voiced. Has urology appt 23 to discuss cohen cath and urinary retention.    Future Appointments   Date Time Provider [address]   2023  8:15 AM Garrett Reeves NURSE Sera Magaña [facility]   2023  8:40 AM Veronique Weber MD [facility]   2023  9:15 AM MD Sera Moffett [facility]   2023  9:30 AM Joe Hanson MD [facility]   10/24/2023 11:00 AM Shanelle Page MD [facility]   2023  9:15 AM Joe Hanson MD [facility] PULM [facility]     Care Transition Nurse reviewed medical

## 2023-08-03 ENCOUNTER — CARE COORDINATION (OUTPATIENT)
Dept: CASE MANAGEMENT | Age: 63
End: 2023-08-03

## 2023-08-03 NOTE — CARE COORDINATION
Care Transitions Outreach Attempt      Patient: Jules Tejada Patient : 1960 MRN: [id number]   Reason for Admission: Severe AS/Defect s/p Aortic Valve Replacement 23  Discharge Date: 7/10/23       RARS: Readmission Risk Score: 12  Facility: [facility]  Last Discharge [address] [facility]       Date Complaint Diagnosis Description Type Department Provider    23  Nonrheumatic aortic valve insufficiency . .. Admission (Discharged) Prasad Cha MD        Noted following upcoming appointments from discharge chart review:   [facility] follow up appointment(s):   Future Appointments   Date Time Provider [address]   2023  8:15 AM Jacki Lewis [place] GEN SURG NURSE [address] [facility] 1 [facility]   2023  8:40 AM Santana Toro MD [facility] Heart [facility]   2023  9:15 AM Cesar Arvizu MD [address] [place] [address]   2023  9:30 AM Usha Hood MD [facility]  [facility]   10/24/2023 11:00 AM Namrata Han MD [facility]   2023  9:15 AM Usha Hood MD [facility]     Attempt to reach for Care Transition follow up call unsuccessful. HIPAA compliant message left requesting call back. Plan for next call:  weight?, taking Lasix?, hr?, f/u on urology appt--f/c out?

## 2023-08-04 ENCOUNTER — CARE COORDINATION (OUTPATIENT)
Dept: CASE MANAGEMENT | Age: 63
End: 2023-08-04

## 2023-08-04 NOTE — CARE COORDINATION
Care Transitions Outreach Attempt=2nd attempt    Call within 2 business days of discharge: Yes   Attempted to reach patient for subsequent transitional call. Left HIPPA compliant VM to return call directly to [phone]. If no return call, CTN will sign off-2nd attempt. Unable to reach letter not sent, end of episode. Patient: Tevin Gray Patient : 1960 MRN: [id number]    Last Discharge [address]       Date Complaint Diagnosis Description Type Department Provider    23  Nonrheumatic aortic valve insufficiency . ..  Admission (Discharged) Dionna Carrera MD              Noted following upcoming appointments from discharge chart review:   [facility] follow up appointment(s):   Future Appointments   Date Time Provider [address]   2023  8:15 AM Sharee Valdez [place] GEN SURG NURSE [address] [place] [address] 1 [facility]   2023  8:40 AM Makenna Colón MD [facility] Heart [facility]   2023  9:15 AM Karly Partida MD [address] [place] [address]   2023  9:30 AM Marcia West MD [facility]  [facility]   10/24/2023 11:00 AM Minnie Schroeder MD [facility]   2023  9:15 AM Marcia West MD [facility] follow up appointment(s): na

## 2023-08-07 ENCOUNTER — TELEPHONE (OUTPATIENT)
Dept: FAMILY MEDICINE CLINIC | Age: 63
End: 2023-08-07

## 2023-08-07 DIAGNOSIS — G47.09 OTHER INSOMNIA: Primary | ICD-10-CM

## 2023-08-07 NOTE — TELEPHONE ENCOUNTER
Patient called and is requesting an appt he is not sleeping and would like his medication looked at please advise

## 2023-08-08 RX ORDER — TRAZODONE HYDROCHLORIDE 50 MG/1
TABLET ORAL
Qty: 30 TABLET | Refills: 5 | Status: SHIPPED | OUTPATIENT
Start: 2023-08-08 | End: 2023-08-08 | Stop reason: SDUPTHER

## 2023-08-08 RX ORDER — TRAZODONE HYDROCHLORIDE 50 MG/1
TABLET ORAL
Qty: 30 TABLET | Refills: 5 | Status: SHIPPED | OUTPATIENT
Start: 2023-08-08

## 2023-08-08 NOTE — TELEPHONE ENCOUNTER
He can trial trazodone [facility] and [address] both have Rx insomnia. Metoprolol can cause sleep disturbances but not a common reported side effects.     Pt aware of plan and can cancel his appointment tomorrow for insomnia discussion with [facility] if he wants

## 2023-08-09 NOTE — PROGRESS NOTES
8/9/23 - . LM with my call-back # concerning  surgery @ [facility] on  8/15/23. Please call the PAT Nurse for a phone assessment and surgery instructions. Dr. Swanson  office called and cardiac clearance requested.

## 2023-08-10 RX ORDER — AMIODARONE HYDROCHLORIDE 200 MG/1
200 TABLET ORAL DAILY
Qty: 90 TABLET | Refills: 3 | Status: SHIPPED | OUTPATIENT
Start: 2023-08-10

## 2023-08-21 ENCOUNTER — OFFICE VISIT (OUTPATIENT)
Dept: CARDIOLOGY CLINIC | Age: 63
End: 2023-08-21
Payer: COMMERCIAL

## 2023-08-21 VITALS
BODY MASS INDEX: 33.07 KG/M2 | WEIGHT: 236.2 LBS | SYSTOLIC BLOOD PRESSURE: 124 MMHG | HEIGHT: 71 IN | HEART RATE: 57 BPM | DIASTOLIC BLOOD PRESSURE: 82 MMHG

## 2023-08-21 DIAGNOSIS — Z95.2 S/P AVR: Primary | ICD-10-CM

## 2023-08-21 PROCEDURE — 93000 ELECTROCARDIOGRAM COMPLETE: CPT | Performed by: INTERNAL MEDICINE

## 2023-08-21 PROCEDURE — 3079F DIAST BP 80-89 MM HG: CPT | Performed by: INTERNAL MEDICINE

## 2023-08-21 PROCEDURE — 1036F TOBACCO NON-USER: CPT | Performed by: INTERNAL MEDICINE

## 2023-08-21 PROCEDURE — 3074F SYST BP LT 130 MM HG: CPT | Performed by: INTERNAL MEDICINE

## 2023-08-21 PROCEDURE — G8427 DOCREV CUR MEDS BY ELIG CLIN: HCPCS | Performed by: INTERNAL MEDICINE

## 2023-08-21 PROCEDURE — 3017F COLORECTAL CA SCREEN DOC REV: CPT | Performed by: INTERNAL MEDICINE

## 2023-08-21 PROCEDURE — G8417 CALC BMI ABV UP PARAM F/U: HCPCS | Performed by: INTERNAL MEDICINE

## 2023-08-21 PROCEDURE — 99214 OFFICE O/P EST MOD 30 MIN: CPT | Performed by: INTERNAL MEDICINE

## 2023-08-21 RX ORDER — FUROSEMIDE 20 MG/1
20 TABLET ORAL DAILY
Qty: 60 TABLET | Refills: 3 | Status: SHIPPED | OUTPATIENT
Start: 2023-08-21

## 2023-09-19 ENCOUNTER — OFFICE VISIT (OUTPATIENT)
Dept: PULMONOLOGY | Age: 63
End: 2023-09-19
Payer: COMMERCIAL

## 2023-09-19 ENCOUNTER — TELEPHONE (OUTPATIENT)
Dept: PULMONOLOGY | Age: 63
End: 2023-09-19

## 2023-09-19 VITALS
OXYGEN SATURATION: 81 % | HEART RATE: 56 BPM | WEIGHT: 236 LBS | BODY MASS INDEX: 33.04 KG/M2 | DIASTOLIC BLOOD PRESSURE: 62 MMHG | HEIGHT: 71 IN | SYSTOLIC BLOOD PRESSURE: 122 MMHG

## 2023-09-19 DIAGNOSIS — G47.33 OSA (OBSTRUCTIVE SLEEP APNEA): ICD-10-CM

## 2023-09-19 DIAGNOSIS — R06.02 SOBOE (SHORTNESS OF BREATH ON EXERTION): ICD-10-CM

## 2023-09-19 DIAGNOSIS — E66.9 OBESITY (BMI 30-39.9): ICD-10-CM

## 2023-09-19 DIAGNOSIS — F33.1 MAJOR DEPRESSIVE DISORDER, RECURRENT, MODERATE (HCC): ICD-10-CM

## 2023-09-19 DIAGNOSIS — G47.34 SLEEP RELATED HYPOXIA: ICD-10-CM

## 2023-09-19 DIAGNOSIS — G47.10 HYPERSOMNIA: ICD-10-CM

## 2023-09-19 DIAGNOSIS — R09.02 HYPOXIA: ICD-10-CM

## 2023-09-19 DIAGNOSIS — I27.20 PULMONARY HTN (HCC): ICD-10-CM

## 2023-09-19 DIAGNOSIS — J44.9 COPD, SEVERE (HCC): ICD-10-CM

## 2023-09-19 DIAGNOSIS — F33.0 MAJOR DEPRESSIVE DISORDER, RECURRENT, MILD (HCC): Primary | ICD-10-CM

## 2023-09-19 PROBLEM — F33.9 MAJOR DEPRESSIVE DISORDER, RECURRENT, UNSPECIFIED (HCC): Status: ACTIVE | Noted: 2023-09-19

## 2023-09-19 PROCEDURE — 3023F SPIROM DOC REV: CPT | Performed by: INTERNAL MEDICINE

## 2023-09-19 PROCEDURE — G8417 CALC BMI ABV UP PARAM F/U: HCPCS | Performed by: INTERNAL MEDICINE

## 2023-09-19 PROCEDURE — 1036F TOBACCO NON-USER: CPT | Performed by: INTERNAL MEDICINE

## 2023-09-19 PROCEDURE — 3017F COLORECTAL CA SCREEN DOC REV: CPT | Performed by: INTERNAL MEDICINE

## 2023-09-19 PROCEDURE — 3078F DIAST BP <80 MM HG: CPT | Performed by: INTERNAL MEDICINE

## 2023-09-19 PROCEDURE — G8427 DOCREV CUR MEDS BY ELIG CLIN: HCPCS | Performed by: INTERNAL MEDICINE

## 2023-09-19 PROCEDURE — 99215 OFFICE O/P EST HI 40 MIN: CPT | Performed by: INTERNAL MEDICINE

## 2023-09-19 PROCEDURE — 3074F SYST BP LT 130 MM HG: CPT | Performed by: INTERNAL MEDICINE

## 2023-09-19 ASSESSMENT — SLEEP AND FATIGUE QUESTIONNAIRES
HOW LIKELY ARE YOU TO NOD OFF OR FALL ASLEEP IN A CAR, WHILE STOPPED FOR A FEW MINUTES IN TRAFFIC: 0
HOW LIKELY ARE YOU TO NOD OFF OR FALL ASLEEP WHILE SITTING AND READING: 1
HOW LIKELY ARE YOU TO NOD OFF OR FALL ASLEEP WHILE LYING DOWN TO REST IN THE AFTERNOON WHEN CIRCUMSTANCES PERMIT: 2
HOW LIKELY ARE YOU TO NOD OFF OR FALL ASLEEP WHEN YOU ARE A PASSENGER IN A CAR FOR AN HOUR WITHOUT A BREAK: 0
HOW LIKELY ARE YOU TO NOD OFF OR FALL ASLEEP WHILE WATCHING TV: 0
NECK CIRCUMFERENCE (INCHES): 19
ESS TOTAL SCORE: 3
HOW LIKELY ARE YOU TO NOD OFF OR FALL ASLEEP WHILE SITTING INACTIVE IN A PUBLIC PLACE: 0
HOW LIKELY ARE YOU TO NOD OFF OR FALL ASLEEP WHILE SITTING QUIETLY AFTER LUNCH WITHOUT ALCOHOL: 0
HOW LIKELY ARE YOU TO NOD OFF OR FALL ASLEEP WHILE SITTING AND TALKING TO SOMEONE: 0

## 2023-09-19 ASSESSMENT — ENCOUNTER SYMPTOMS
COUGH: 1
ABDOMINAL PAIN: 0
EYE ITCHING: 0
SHORTNESS OF BREATH: 1
BACK PAIN: 0
EYE DISCHARGE: 0
ABDOMINAL DISTENTION: 0

## 2023-09-19 NOTE — PROGRESS NOTES
9/19/23 - . LM with my call-back # concerning  surgery @ [facility] on  9/26/23. Please call the PAT Nurse for a phone assessment and surgery instructions.

## 2023-09-19 NOTE — ASSESSMENT & PLAN NOTE
Appears to be sec to the severe COPD and Valvular disorder  C/w oxygen  C/w Inhalers  PFT in 1 year  ECHO in 1 year  Get yearly flu vaccine

## 2023-09-19 NOTE — PROGRESS NOTES
Makaylene Manning  1960  Referring Provider: Julio Nam MD    Subjective:     Chief Complaint   Patient presents with    Follow-up     Echo was not completed        HPI  Breezy Houston is a 58 y.o. male has come back as a follow up. He has severe COPD with second hand smoking exposure. He is 6 L.min of oxygen. He is on trelegy and Albuterol prn. He has little cough, little phlegm-clear, no hemoptysis, some SOBOE. He had his flu vaccine and COVID vaccine. His last CHERRI done on 07/27;/23 showed trace TR. He had no PFT's.      Current Outpatient Medications   Medication Sig Dispense Refill    furosemide (LASIX) 20 MG tablet Take 1 tablet by mouth daily 60 tablet 3    amiodarone (CORDARONE) 200 MG tablet Take 1 tablet by mouth daily 90 tablet 3    apixaban (ELIQUIS) 5 MG TABS tablet Take 1 tablet by mouth 2 times daily 90 tablet 3    Oxygen Concentrator 6 L/min continuous      traZODone (DESYREL) 50 MG tablet 1-2 tablets nightly as needed for insomnia 30 tablet 5    potassium chloride (KLOR-CON M) 10 MEQ extended release tablet Take 1 tablet by mouth daily 30 tablet 5    aspirin 81 MG chewable tablet Take 1 tablet by mouth daily 30 tablet 3    atorvastatin (LIPITOR) 40 MG tablet Take 1 tablet by mouth nightly 30 tablet 3    metoprolol tartrate (LOPRESSOR) 50 MG tablet Take 1 tablet by mouth 2 times daily 60 tablet 3    tamsulosin (FLOMAX) 0.4 MG capsule Take 1 capsule by mouth daily 30 capsule 3    famotidine (PEPCID) 20 MG tablet Take 1 tablet by mouth 2 times daily 60 tablet 3    Handicap Placard MISC by Does not apply route EXP 6/19/2028 1 each 0    loratadine (CLARITIN) 10 MG tablet TAKE 1 TABLET BY MOUTH EVERY DAY 30 tablet 11    empagliflozin (JARDIANCE) 10 MG tablet Take 1 tablet by mouth daily 30 tablet 5    fluticasone-umeclidin-vilant (TRELEGY ELLIPTA) 100-62.5-25 MCG/ACT AEPB inhaler TAKE 1 PUFF BY MOUTH EVERY DAY 1 each 5    budesonide-formoterol (SYMBICORT) 160-4.5 MCG/ACT AERO Inhale 2 puffs into the

## 2023-09-21 ENCOUNTER — TELEPHONE (OUTPATIENT)
Dept: PULMONOLOGY | Age: 63
End: 2023-09-21

## 2023-09-25 ENCOUNTER — TELEPHONE (OUTPATIENT)
Dept: PULMONOLOGY | Age: 63
End: 2023-09-25

## 2023-09-25 ENCOUNTER — ANESTHESIA EVENT (OUTPATIENT)
Dept: OPERATING ROOM | Age: 63
End: 2023-09-25
Payer: COMMERCIAL

## 2023-09-25 ASSESSMENT — LIFESTYLE VARIABLES: SMOKING_STATUS: 0

## 2023-09-25 ASSESSMENT — ENCOUNTER SYMPTOMS: SHORTNESS OF BREATH: 1

## 2023-09-25 NOTE — ANESTHESIA PRE PROCEDURE
Applicable):  No results found for: \"LABABO\", \"LABRH\"    Drug/Infectious Status (If Applicable):  No results found for: \"HIV\", \"HEPCAB\"    COVID-19 Screening (If Applicable):   Lab Results   Component Value Date/Time    COVID19 NOT DETECTED 03/21/2023 02:55 PM           Anesthesia Evaluation  Patient summary reviewed  Airway:           Dental:          Pulmonary:   (+) COPD:  shortness of breath:  sleep apnea:      (-) not a current smoker                           Cardiovascular:    (+) hypertension:, angina:, dysrhythmias: atrial fibrillation, CHF:, pulmonary hypertension:,                ROS comment:  Summary   Patient scanned in the supine position. Left ventricular systolic function appears to be severely depressed,   possibly underestimated due to atrial fibrillation. Dr. Danica Cadena was notified. Ejection fraction is visually estimated at 30%. Left ventricular size is dilated. Mild concentric left ventricular hypertrophy. Moderately dilated left atrium. S/P AVR: Aortic valve replacement with a 29 mm Loaiza Inspiris Resilia   Tissue Aortic Valve on 6/30/2023. Normally functioning bioprosthetic valve in aortic position, with a mean   pressure gradient of 7 mmHg. Mild mitral and tricuspid regurgitation is present. Mildly elevated pulmonary artery pressure, RVSP 40 mmHg. Dilated aortic root (4.5cm). No evidence of pericardial effusion. Neuro/Psych:   (+) psychiatric history:            GI/Hepatic/Renal:             Endo/Other:    (+) DiabetesType II DM, , .                 Abdominal:             Vascular: Other Findings:           Anesthesia Plan      general     ASA 4     ( Pre Anesthesia Assessment complete.  Chart reviewed on 9/25/2023)                              DONA Urena CRNA   9/25/2023

## 2023-09-25 NOTE — PROGRESS NOTES
9/25/23 - Notified patient surgery @ [facility] on  9/26/23 @ 0730, arrival 0600. NPO status and morning medications reviewed. Understanding verbalized.

## 2023-09-25 NOTE — TELEPHONE ENCOUNTER
Called pt to let him know his October appointment was cancelled and to keep his December 5th appointment. Gave him central scheduling's number to schedule his PFT and 6MWT.  Pt stated he will call back to let us know when they are scheduled

## 2023-09-25 NOTE — H&P
General Surgery - H&P  Dr. Moraima Baxter, PA-C      SUBJECTIVE:  HPI: Patient presents with a large diaphragmatic hernia on the right side. Symptoms were first noted several years ago. Pain is progressive and worsening . Lump is not reducible. Pt denies nausea vomiting. Pt with no previoushistory of abdominal surgery. Patient was admitted for respiratory related condition earlier this year. During his hospital stay a CT scan demonstrated right-sided diaphragmatic hernia. Patient admitted that he had known about this 3 years ago. I have reviewed the patient's(pertinent information to this visit) medical history, family history(scanned in  theMedia tab under \"patient questioner\"), social history and review of systems with the patient in the office. Past Surgical History         Past Surgical History:   Procedure Laterality Date    CARDIAC CATHETERIZATION   01/01/1991    COLONOSCOPY   06/15/2015    COLONOSCOPY N/A 9/7/2022     COLONOSCOPY POLYPECTOMY SNARE/COLD BIOPSY performed by Alma Patino MD at [address] [place] [address] Bilateral 01/01/1992    INGUINAL HERNIA REPAIR Right 09/19/2013     with mesh    OTHER SURGICAL HISTORY   09/05/2013     hernia repair aborted, blood pressure spiked. TYMPANOSTOMY TUBE PLACEMENT   01/01/2009     right ear    VASECTOMY   01/01/1988         Past Medical History        Past Medical History:   Diagnosis Date    Abnormal echocardiogram 03/29/2013     EF=45-50%;mod. AR,mild aortic valve calcif. Anesthesia complication 70/21/4257    Ankle fracture, left 1985    Aortic root dilation ([address]) 12/16/2021     Dilation of the aortic root (4.2cm)and the ascending aorta(4.0cm).     Arm fracture, right 1966    AVM (arteriovenous malformation) 06/15/2015     transverse colon    COPD (chronic obstructive pulmonary disease) ([address])       Has worked around [place] and worked at [employer] x 6 weeks    Diastolic dysfunction 25/36/6186     Grade III diastolic

## 2023-09-26 ENCOUNTER — APPOINTMENT (OUTPATIENT)
Dept: GENERAL RADIOLOGY | Age: 63
End: 2023-09-26
Attending: SURGERY
Payer: COMMERCIAL

## 2023-09-26 ENCOUNTER — HOSPITAL ENCOUNTER (INPATIENT)
Age: 63
LOS: 10 days | Discharge: HOME | End: 2023-10-06
Attending: SURGERY | Admitting: SURGERY
Payer: COMMERCIAL

## 2023-09-26 ENCOUNTER — ANESTHESIA (OUTPATIENT)
Dept: OPERATING ROOM | Age: 63
End: 2023-09-26
Payer: COMMERCIAL

## 2023-09-26 DIAGNOSIS — G89.18 ACUTE POSTOPERATIVE ABDOMINAL PAIN: ICD-10-CM

## 2023-09-26 DIAGNOSIS — K44.0: Primary | ICD-10-CM

## 2023-09-26 DIAGNOSIS — R10.9 ACUTE POSTOPERATIVE ABDOMINAL PAIN: ICD-10-CM

## 2023-09-26 LAB
ANION GAP SERPL CALCULATED.3IONS-SCNC: 5 MMOL/L (ref 4–16)
BUN SERPL-MCNC: 14 MG/DL (ref 6–23)
CALCIUM SERPL-MCNC: 9.2 MG/DL (ref 8.3–10.6)
CHLORIDE BLD-SCNC: 101 MMOL/L (ref 99–110)
CO2: 33 MMOL/L (ref 21–32)
CREAT SERPL-MCNC: 0.7 MG/DL (ref 0.9–1.3)
GFR SERPL CREATININE-BSD FRML MDRD: >60 ML/MIN/1.73M2
GLUCOSE BLD-MCNC: 121 MG/DL (ref 70–99)
GLUCOSE BLD-MCNC: 137 MG/DL (ref 70–99)
GLUCOSE BLD-MCNC: 163 MG/DL (ref 70–99)
GLUCOSE BLD-MCNC: 90 MG/DL (ref 70–99)
GLUCOSE SERPL-MCNC: 95 MG/DL (ref 70–99)
POTASSIUM SERPL-SCNC: 4.9 MMOL/L (ref 3.5–5.1)
SODIUM BLD-SCNC: 139 MMOL/L (ref 135–145)

## 2023-09-26 PROCEDURE — 82962 GLUCOSE BLOOD TEST: CPT

## 2023-09-26 PROCEDURE — 6370000000 HC RX 637 (ALT 250 FOR IP): Performed by: NURSE ANESTHETIST, CERTIFIED REGISTERED

## 2023-09-26 PROCEDURE — 6360000002 HC RX W HCPCS: Performed by: PHYSICIAN ASSISTANT

## 2023-09-26 PROCEDURE — 94761 N-INVAS EAR/PLS OXIMETRY MLT: CPT

## 2023-09-26 PROCEDURE — 0BUT4JZ SUPPLEMENT DIAPHRAGM WITH SYNTHETIC SUBSTITUTE, PERCUTANEOUS ENDOSCOPIC APPROACH: ICD-10-PCS | Performed by: SURGERY

## 2023-09-26 PROCEDURE — 3600000019 HC SURGERY ROBOT ADDTL 15MIN: Performed by: SURGERY

## 2023-09-26 PROCEDURE — 80048 BASIC METABOLIC PNL TOTAL CA: CPT

## 2023-09-26 PROCEDURE — 43282 LAP PARAESOPH HER RPR W/MESH: CPT | Performed by: PHYSICIAN ASSISTANT

## 2023-09-26 PROCEDURE — 99222 1ST HOSP IP/OBS MODERATE 55: CPT | Performed by: INTERNAL MEDICINE

## 2023-09-26 PROCEDURE — APPNB180 APP NON BILLABLE TIME > 60 MINS: Performed by: PHYSICIAN ASSISTANT

## 2023-09-26 PROCEDURE — 6370000000 HC RX 637 (ALT 250 FOR IP): Performed by: PHYSICIAN ASSISTANT

## 2023-09-26 PROCEDURE — 2580000003 HC RX 258: Performed by: PHYSICIAN ASSISTANT

## 2023-09-26 PROCEDURE — 7100000001 HC PACU RECOVERY - ADDTL 15 MIN: Performed by: SURGERY

## 2023-09-26 PROCEDURE — 6360000002 HC RX W HCPCS: Performed by: NURSE ANESTHETIST, CERTIFIED REGISTERED

## 2023-09-26 PROCEDURE — 8E0W4CZ ROBOTIC ASSISTED PROCEDURE OF TRUNK REGION, PERCUTANEOUS ENDOSCOPIC APPROACH: ICD-10-PCS | Performed by: SURGERY

## 2023-09-26 PROCEDURE — 3700000000 HC ANESTHESIA ATTENDED CARE: Performed by: SURGERY

## 2023-09-26 PROCEDURE — 6360000002 HC RX W HCPCS: Performed by: ANESTHESIOLOGY

## 2023-09-26 PROCEDURE — 94640 AIRWAY INHALATION TREATMENT: CPT

## 2023-09-26 PROCEDURE — 94150 VITAL CAPACITY TEST: CPT

## 2023-09-26 PROCEDURE — 6370000000 HC RX 637 (ALT 250 FOR IP): Performed by: INTERNAL MEDICINE

## 2023-09-26 PROCEDURE — 6360000002 HC RX W HCPCS: Performed by: INTERNAL MEDICINE

## 2023-09-26 PROCEDURE — 2709999900 HC NON-CHARGEABLE SUPPLY: Performed by: SURGERY

## 2023-09-26 PROCEDURE — 6360000002 HC RX W HCPCS: Performed by: SURGERY

## 2023-09-26 PROCEDURE — 2060000000 HC ICU INTERMEDIATE R&B

## 2023-09-26 PROCEDURE — 94664 DEMO&/EVAL PT USE INHALER: CPT

## 2023-09-26 PROCEDURE — 71045 X-RAY EXAM CHEST 1 VIEW: CPT

## 2023-09-26 PROCEDURE — 2500000003 HC RX 250 WO HCPCS: Performed by: NURSE ANESTHETIST, CERTIFIED REGISTERED

## 2023-09-26 PROCEDURE — 2700000000 HC OXYGEN THERAPY PER DAY

## 2023-09-26 PROCEDURE — C1781 MESH (IMPLANTABLE): HCPCS | Performed by: SURGERY

## 2023-09-26 PROCEDURE — S2900 ROBOTIC SURGICAL SYSTEM: HCPCS | Performed by: SURGERY

## 2023-09-26 PROCEDURE — 43282 LAP PARAESOPH HER RPR W/MESH: CPT | Performed by: SURGERY

## 2023-09-26 PROCEDURE — 3600000009 HC SURGERY ROBOT BASE: Performed by: SURGERY

## 2023-09-26 PROCEDURE — 7100000000 HC PACU RECOVERY - FIRST 15 MIN: Performed by: SURGERY

## 2023-09-26 PROCEDURE — 2580000003 HC RX 258: Performed by: SURGERY

## 2023-09-26 PROCEDURE — 3700000001 HC ADD 15 MINUTES (ANESTHESIA): Performed by: SURGERY

## 2023-09-26 DEVICE — MESH HERN W15XH15CM POLYPR NONABSORBABLE SYN SQ PROL: Type: IMPLANTABLE DEVICE | Site: ABDOMEN | Status: FUNCTIONAL

## 2023-09-26 RX ORDER — SODIUM CHLORIDE 0.9 % (FLUSH) 0.9 %
5-40 SYRINGE (ML) INJECTION EVERY 12 HOURS SCHEDULED
Status: DISCONTINUED | OUTPATIENT
Start: 2023-09-26 | End: 2023-09-26 | Stop reason: HOSPADM

## 2023-09-26 RX ORDER — OXYCODONE HYDROCHLORIDE 5 MG/1
5 TABLET ORAL EVERY 4 HOURS PRN
Status: DISCONTINUED | OUTPATIENT
Start: 2023-09-26 | End: 2023-10-06 | Stop reason: HOSPADM

## 2023-09-26 RX ORDER — INSULIN LISPRO 100 [IU]/ML
0-4 INJECTION, SOLUTION INTRAVENOUS; SUBCUTANEOUS NIGHTLY
Status: DISCONTINUED | OUTPATIENT
Start: 2023-09-26 | End: 2023-10-06 | Stop reason: HOSPADM

## 2023-09-26 RX ORDER — TAMSULOSIN HYDROCHLORIDE 0.4 MG/1
0.4 CAPSULE ORAL DAILY
Status: DISCONTINUED | OUTPATIENT
Start: 2023-09-26 | End: 2023-10-06 | Stop reason: HOSPADM

## 2023-09-26 RX ORDER — MEPERIDINE HYDROCHLORIDE 25 MG/ML
12.5 INJECTION INTRAMUSCULAR; INTRAVENOUS; SUBCUTANEOUS EVERY 5 MIN PRN
Status: DISCONTINUED | OUTPATIENT
Start: 2023-09-26 | End: 2023-09-26 | Stop reason: HOSPADM

## 2023-09-26 RX ORDER — IPRATROPIUM BROMIDE AND ALBUTEROL SULFATE 2.5; .5 MG/3ML; MG/3ML
1 SOLUTION RESPIRATORY (INHALATION) ONCE
Status: COMPLETED | OUTPATIENT
Start: 2023-09-26 | End: 2023-09-26

## 2023-09-26 RX ORDER — SODIUM CHLORIDE 0.9 % (FLUSH) 0.9 %
5-40 SYRINGE (ML) INJECTION EVERY 12 HOURS SCHEDULED
Status: DISCONTINUED | OUTPATIENT
Start: 2023-09-26 | End: 2023-10-06 | Stop reason: HOSPADM

## 2023-09-26 RX ORDER — IPRATROPIUM BROMIDE AND ALBUTEROL SULFATE 2.5; .5 MG/3ML; MG/3ML
1 SOLUTION RESPIRATORY (INHALATION)
Status: DISCONTINUED | OUTPATIENT
Start: 2023-09-26 | End: 2023-10-06 | Stop reason: HOSPADM

## 2023-09-26 RX ORDER — OXYCODONE HYDROCHLORIDE 5 MG/1
5 TABLET ORAL
Status: DISCONTINUED | OUTPATIENT
Start: 2023-09-26 | End: 2023-09-26 | Stop reason: HOSPADM

## 2023-09-26 RX ORDER — ONDANSETRON 2 MG/ML
INJECTION INTRAMUSCULAR; INTRAVENOUS PRN
Status: DISCONTINUED | OUTPATIENT
Start: 2023-09-26 | End: 2023-09-26 | Stop reason: SDUPTHER

## 2023-09-26 RX ORDER — METHYLPREDNISOLONE SODIUM SUCCINATE 125 MG/2ML
60 INJECTION, POWDER, LYOPHILIZED, FOR SOLUTION INTRAMUSCULAR; INTRAVENOUS ONCE
Status: COMPLETED | OUTPATIENT
Start: 2023-09-26 | End: 2023-09-26

## 2023-09-26 RX ORDER — EPHEDRINE SULFATE 50 MG/ML
INJECTION, SOLUTION INTRAVENOUS PRN
Status: DISCONTINUED | OUTPATIENT
Start: 2023-09-26 | End: 2023-09-26 | Stop reason: SDUPTHER

## 2023-09-26 RX ORDER — FENTANYL CITRATE 50 UG/ML
25 INJECTION, SOLUTION INTRAMUSCULAR; INTRAVENOUS EVERY 5 MIN PRN
Status: DISCONTINUED | OUTPATIENT
Start: 2023-09-26 | End: 2023-09-26

## 2023-09-26 RX ORDER — KETAMINE HCL 50MG/ML(1)
SYRINGE (ML) INTRAVENOUS PRN
Status: DISCONTINUED | OUTPATIENT
Start: 2023-09-26 | End: 2023-09-26 | Stop reason: SDUPTHER

## 2023-09-26 RX ORDER — ONDANSETRON 2 MG/ML
4 INJECTION INTRAMUSCULAR; INTRAVENOUS EVERY 6 HOURS PRN
Status: DISCONTINUED | OUTPATIENT
Start: 2023-09-26 | End: 2023-10-06 | Stop reason: HOSPADM

## 2023-09-26 RX ORDER — SODIUM CHLORIDE 9 MG/ML
INJECTION, SOLUTION INTRAVENOUS PRN
Status: DISCONTINUED | OUTPATIENT
Start: 2023-09-26 | End: 2023-09-26 | Stop reason: HOSPADM

## 2023-09-26 RX ORDER — FUROSEMIDE 20 MG/1
20 TABLET ORAL DAILY
Status: DISCONTINUED | OUTPATIENT
Start: 2023-09-26 | End: 2023-09-28

## 2023-09-26 RX ORDER — ETOMIDATE 2 MG/ML
INJECTION INTRAVENOUS PRN
Status: DISCONTINUED | OUTPATIENT
Start: 2023-09-26 | End: 2023-09-26 | Stop reason: SDUPTHER

## 2023-09-26 RX ORDER — BUDESONIDE AND FORMOTEROL FUMARATE DIHYDRATE 160; 4.5 UG/1; UG/1
2 AEROSOL RESPIRATORY (INHALATION)
Status: DISCONTINUED | OUTPATIENT
Start: 2023-09-26 | End: 2023-10-06 | Stop reason: HOSPADM

## 2023-09-26 RX ORDER — SODIUM CHLORIDE 9 MG/ML
INJECTION, SOLUTION INTRAVENOUS CONTINUOUS
Status: DISCONTINUED | OUTPATIENT
Start: 2023-09-26 | End: 2023-09-27

## 2023-09-26 RX ORDER — METHYLPREDNISOLONE SODIUM SUCCINATE 40 MG/ML
60 INJECTION, POWDER, LYOPHILIZED, FOR SOLUTION INTRAMUSCULAR; INTRAVENOUS ONCE
Status: DISCONTINUED | OUTPATIENT
Start: 2023-09-26 | End: 2023-09-26 | Stop reason: SDUPTHER

## 2023-09-26 RX ORDER — ONDANSETRON 4 MG/1
4 TABLET, ORALLY DISINTEGRATING ORAL EVERY 8 HOURS PRN
Status: DISCONTINUED | OUTPATIENT
Start: 2023-09-26 | End: 2023-10-06 | Stop reason: HOSPADM

## 2023-09-26 RX ORDER — GLUCAGON 1 MG/ML
1 KIT INJECTION PRN
Status: DISCONTINUED | OUTPATIENT
Start: 2023-09-26 | End: 2023-10-06 | Stop reason: HOSPADM

## 2023-09-26 RX ORDER — INSULIN LISPRO 100 [IU]/ML
0-8 INJECTION, SOLUTION INTRAVENOUS; SUBCUTANEOUS
Status: DISCONTINUED | OUTPATIENT
Start: 2023-09-26 | End: 2023-10-06 | Stop reason: HOSPADM

## 2023-09-26 RX ORDER — DEXTROSE MONOHYDRATE 100 MG/ML
INJECTION, SOLUTION INTRAVENOUS CONTINUOUS PRN
Status: DISCONTINUED | OUTPATIENT
Start: 2023-09-26 | End: 2023-10-06 | Stop reason: HOSPADM

## 2023-09-26 RX ORDER — METOPROLOL TARTRATE 50 MG/1
50 TABLET, FILM COATED ORAL 2 TIMES DAILY
Status: DISCONTINUED | OUTPATIENT
Start: 2023-09-26 | End: 2023-10-06 | Stop reason: HOSPADM

## 2023-09-26 RX ORDER — POTASSIUM CHLORIDE 750 MG/1
10 TABLET, FILM COATED, EXTENDED RELEASE ORAL DAILY
Status: DISCONTINUED | OUTPATIENT
Start: 2023-09-26 | End: 2023-10-06 | Stop reason: HOSPADM

## 2023-09-26 RX ORDER — DROPERIDOL 2.5 MG/ML
0.62 INJECTION, SOLUTION INTRAMUSCULAR; INTRAVENOUS
Status: DISCONTINUED | OUTPATIENT
Start: 2023-09-26 | End: 2023-09-26 | Stop reason: HOSPADM

## 2023-09-26 RX ORDER — TRAZODONE HYDROCHLORIDE 50 MG/1
50 TABLET ORAL NIGHTLY PRN
Status: DISCONTINUED | OUTPATIENT
Start: 2023-09-26 | End: 2023-10-06 | Stop reason: HOSPADM

## 2023-09-26 RX ORDER — SODIUM CHLORIDE, SODIUM LACTATE, POTASSIUM CHLORIDE, CALCIUM CHLORIDE 600; 310; 30; 20 MG/100ML; MG/100ML; MG/100ML; MG/100ML
INJECTION, SOLUTION INTRAVENOUS CONTINUOUS
Status: DISCONTINUED | OUTPATIENT
Start: 2023-09-26 | End: 2023-09-26 | Stop reason: HOSPADM

## 2023-09-26 RX ORDER — ROCURONIUM BROMIDE 10 MG/ML
INJECTION, SOLUTION INTRAVENOUS PRN
Status: DISCONTINUED | OUTPATIENT
Start: 2023-09-26 | End: 2023-09-26 | Stop reason: SDUPTHER

## 2023-09-26 RX ORDER — BUDESONIDE AND FORMOTEROL FUMARATE DIHYDRATE 160; 4.5 UG/1; UG/1
2 AEROSOL RESPIRATORY (INHALATION)
Status: DISCONTINUED | OUTPATIENT
Start: 2023-09-26 | End: 2023-09-26

## 2023-09-26 RX ORDER — ALBUTEROL SULFATE 90 UG/1
2 AEROSOL, METERED RESPIRATORY (INHALATION) EVERY 4 HOURS PRN
Status: DISCONTINUED | OUTPATIENT
Start: 2023-09-26 | End: 2023-10-06 | Stop reason: HOSPADM

## 2023-09-26 RX ORDER — DEXAMETHASONE SODIUM PHOSPHATE 4 MG/ML
INJECTION, SOLUTION INTRA-ARTICULAR; INTRALESIONAL; INTRAMUSCULAR; INTRAVENOUS; SOFT TISSUE PRN
Status: DISCONTINUED | OUTPATIENT
Start: 2023-09-26 | End: 2023-09-26 | Stop reason: SDUPTHER

## 2023-09-26 RX ORDER — AMIODARONE HYDROCHLORIDE 200 MG/1
200 TABLET ORAL DAILY
Status: DISCONTINUED | OUTPATIENT
Start: 2023-09-26 | End: 2023-10-06 | Stop reason: HOSPADM

## 2023-09-26 RX ORDER — HYDRALAZINE HYDROCHLORIDE 20 MG/ML
10 INJECTION INTRAMUSCULAR; INTRAVENOUS
Status: DISCONTINUED | OUTPATIENT
Start: 2023-09-26 | End: 2023-09-26 | Stop reason: HOSPADM

## 2023-09-26 RX ORDER — LABETALOL HYDROCHLORIDE 5 MG/ML
10 INJECTION, SOLUTION INTRAVENOUS
Status: DISCONTINUED | OUTPATIENT
Start: 2023-09-26 | End: 2023-09-26 | Stop reason: HOSPADM

## 2023-09-26 RX ORDER — SODIUM CHLORIDE 0.9 % (FLUSH) 0.9 %
5-40 SYRINGE (ML) INJECTION PRN
Status: DISCONTINUED | OUTPATIENT
Start: 2023-09-26 | End: 2023-09-26 | Stop reason: HOSPADM

## 2023-09-26 RX ORDER — SODIUM CHLORIDE 9 MG/ML
INJECTION, SOLUTION INTRAVENOUS PRN
Status: DISCONTINUED | OUTPATIENT
Start: 2023-09-26 | End: 2023-10-06 | Stop reason: HOSPADM

## 2023-09-26 RX ORDER — FENTANYL CITRATE 50 UG/ML
50 INJECTION, SOLUTION INTRAMUSCULAR; INTRAVENOUS EVERY 5 MIN PRN
Status: DISCONTINUED | OUTPATIENT
Start: 2023-09-26 | End: 2023-09-26 | Stop reason: HOSPADM

## 2023-09-26 RX ORDER — SODIUM CHLORIDE 9 MG/ML
INJECTION, SOLUTION INTRAVENOUS CONTINUOUS
Status: DISCONTINUED | OUTPATIENT
Start: 2023-09-26 | End: 2023-09-26

## 2023-09-26 RX ORDER — BUPIVACAINE HYDROCHLORIDE 5 MG/ML
INJECTION, SOLUTION EPIDURAL; INTRACAUDAL
Status: COMPLETED | OUTPATIENT
Start: 2023-09-26 | End: 2023-09-26

## 2023-09-26 RX ORDER — ONDANSETRON 2 MG/ML
4 INJECTION INTRAMUSCULAR; INTRAVENOUS
Status: DISCONTINUED | OUTPATIENT
Start: 2023-09-26 | End: 2023-09-26 | Stop reason: HOSPADM

## 2023-09-26 RX ORDER — SODIUM CHLORIDE 0.9 % (FLUSH) 0.9 %
5-40 SYRINGE (ML) INJECTION PRN
Status: DISCONTINUED | OUTPATIENT
Start: 2023-09-26 | End: 2023-10-06 | Stop reason: HOSPADM

## 2023-09-26 RX ORDER — ENOXAPARIN SODIUM 100 MG/ML
30 INJECTION SUBCUTANEOUS 2 TIMES DAILY
Status: DISCONTINUED | OUTPATIENT
Start: 2023-09-26 | End: 2023-09-29 | Stop reason: SDUPTHER

## 2023-09-26 RX ADMIN — Medication 25 MG: at 08:24

## 2023-09-26 RX ADMIN — EPHEDRINE SULFATE 20 MG: 50 INJECTION, SOLUTION INTRAMUSCULAR; INTRAVENOUS; SUBCUTANEOUS at 08:19

## 2023-09-26 RX ADMIN — CEFAZOLIN 2000 MG: 2 INJECTION, POWDER, FOR SOLUTION INTRAMUSCULAR; INTRAVENOUS at 07:43

## 2023-09-26 RX ADMIN — AMIODARONE HYDROCHLORIDE 200 MG: 200 TABLET ORAL at 14:57

## 2023-09-26 RX ADMIN — ROCURONIUM BROMIDE 100 MG: 10 INJECTION, SOLUTION INTRAVENOUS at 07:58

## 2023-09-26 RX ADMIN — SODIUM CHLORIDE, PRESERVATIVE FREE 10 ML: 5 INJECTION INTRAVENOUS at 20:02

## 2023-09-26 RX ADMIN — FENTANYL CITRATE 25 MCG: 50 INJECTION INTRAMUSCULAR; INTRAVENOUS at 10:30

## 2023-09-26 RX ADMIN — FENTANYL CITRATE 25 MCG: 50 INJECTION INTRAMUSCULAR; INTRAVENOUS at 11:30

## 2023-09-26 RX ADMIN — BUDESONIDE AND FORMOTEROL FUMARATE DIHYDRATE 2 PUFF: 160; 4.5 AEROSOL RESPIRATORY (INHALATION) at 21:15

## 2023-09-26 RX ADMIN — HYDROMORPHONE HYDROCHLORIDE 1 MG: 1 INJECTION, SOLUTION INTRAMUSCULAR; INTRAVENOUS; SUBCUTANEOUS at 18:47

## 2023-09-26 RX ADMIN — IPRATROPIUM BROMIDE AND ALBUTEROL SULFATE 1 DOSE: 2.5; .5 SOLUTION RESPIRATORY (INHALATION) at 10:17

## 2023-09-26 RX ADMIN — Medication 25 MG: at 09:14

## 2023-09-26 RX ADMIN — METHYLPREDNISOLONE SODIUM SUCCINATE 60 MG: 125 INJECTION INTRAMUSCULAR; INTRAVENOUS at 15:22

## 2023-09-26 RX ADMIN — TAMSULOSIN HYDROCHLORIDE 0.4 MG: 0.4 CAPSULE ORAL at 14:57

## 2023-09-26 RX ADMIN — SODIUM CHLORIDE, POTASSIUM CHLORIDE, SODIUM LACTATE AND CALCIUM CHLORIDE: 600; 310; 30; 20 INJECTION, SOLUTION INTRAVENOUS at 06:47

## 2023-09-26 RX ADMIN — SUGAMMADEX 400 MG: 100 INJECTION, SOLUTION INTRAVENOUS at 09:50

## 2023-09-26 RX ADMIN — ENOXAPARIN SODIUM 30 MG: 100 INJECTION SUBCUTANEOUS at 20:01

## 2023-09-26 RX ADMIN — SODIUM CHLORIDE: 9 INJECTION, SOLUTION INTRAVENOUS at 11:22

## 2023-09-26 RX ADMIN — METOPROLOL TARTRATE 50 MG: 50 TABLET, FILM COATED ORAL at 14:57

## 2023-09-26 RX ADMIN — FENTANYL CITRATE 25 MCG: 50 INJECTION INTRAMUSCULAR; INTRAVENOUS at 13:34

## 2023-09-26 RX ADMIN — IPRATROPIUM BROMIDE AND ALBUTEROL SULFATE 1 DOSE: .5; 2.5 SOLUTION RESPIRATORY (INHALATION) at 15:09

## 2023-09-26 RX ADMIN — ONDANSETRON 4 MG: 2 INJECTION INTRAMUSCULAR; INTRAVENOUS at 08:41

## 2023-09-26 RX ADMIN — EPHEDRINE SULFATE 10 MG: 50 INJECTION, SOLUTION INTRAMUSCULAR; INTRAVENOUS; SUBCUTANEOUS at 08:06

## 2023-09-26 RX ADMIN — HYDROMORPHONE HYDROCHLORIDE 1 MG: 1 INJECTION, SOLUTION INTRAMUSCULAR; INTRAVENOUS; SUBCUTANEOUS at 22:56

## 2023-09-26 RX ADMIN — IPRATROPIUM BROMIDE AND ALBUTEROL SULFATE 1 DOSE: 2.5; .5 SOLUTION RESPIRATORY (INHALATION) at 07:28

## 2023-09-26 RX ADMIN — METOPROLOL TARTRATE 50 MG: 50 TABLET, FILM COATED ORAL at 20:01

## 2023-09-26 RX ADMIN — DEXAMETHASONE SODIUM PHOSPHATE 8 MG: 4 INJECTION, SOLUTION INTRAMUSCULAR; INTRAVENOUS at 08:32

## 2023-09-26 RX ADMIN — POTASSIUM CHLORIDE 10 MEQ: 750 TABLET, FILM COATED, EXTENDED RELEASE ORAL at 14:57

## 2023-09-26 RX ADMIN — EMPAGLIFLOZIN 10 MG: 10 TABLET, FILM COATED ORAL at 14:57

## 2023-09-26 RX ADMIN — EPHEDRINE SULFATE 20 MG: 50 INJECTION, SOLUTION INTRAMUSCULAR; INTRAVENOUS; SUBCUTANEOUS at 09:45

## 2023-09-26 RX ADMIN — ETOMIDATE INJECTION 20 MG: 2 SOLUTION INTRAVENOUS at 07:58

## 2023-09-26 RX ADMIN — IPRATROPIUM BROMIDE AND ALBUTEROL SULFATE 1 DOSE: .5; 2.5 SOLUTION RESPIRATORY (INHALATION) at 21:06

## 2023-09-26 ASSESSMENT — PAIN SCALES - GENERAL
PAINLEVEL_OUTOF10: 7
PAINLEVEL_OUTOF10: 6
PAINLEVEL_OUTOF10: 5
PAINLEVEL_OUTOF10: 6
PAINLEVEL_OUTOF10: 7
PAINLEVEL_OUTOF10: 4
PAINLEVEL_OUTOF10: 3
PAINLEVEL_OUTOF10: 5
PAINLEVEL_OUTOF10: 8
PAINLEVEL_OUTOF10: 6

## 2023-09-26 ASSESSMENT — PAIN DESCRIPTION - FREQUENCY
FREQUENCY: CONTINUOUS

## 2023-09-26 ASSESSMENT — PAIN DESCRIPTION - ORIENTATION
ORIENTATION: MID

## 2023-09-26 ASSESSMENT — PAIN DESCRIPTION - DESCRIPTORS
DESCRIPTORS: DISCOMFORT
DESCRIPTORS: ACHING;DISCOMFORT
DESCRIPTORS: DISCOMFORT
DESCRIPTORS: ACHING;DISCOMFORT
DESCRIPTORS: ACHING;DISCOMFORT
DESCRIPTORS: ACHING
DESCRIPTORS: DISCOMFORT
DESCRIPTORS: ACHING;DISCOMFORT

## 2023-09-26 ASSESSMENT — ENCOUNTER SYMPTOMS: SHORTNESS OF BREATH: 1

## 2023-09-26 ASSESSMENT — PAIN DESCRIPTION - ONSET
ONSET: ON-GOING

## 2023-09-26 ASSESSMENT — PAIN - FUNCTIONAL ASSESSMENT
PAIN_FUNCTIONAL_ASSESSMENT: ACTIVITIES ARE NOT PREVENTED
PAIN_FUNCTIONAL_ASSESSMENT: PREVENTS OR INTERFERES SOME ACTIVE ACTIVITIES AND ADLS
PAIN_FUNCTIONAL_ASSESSMENT: PREVENTS OR INTERFERES SOME ACTIVE ACTIVITIES AND ADLS
PAIN_FUNCTIONAL_ASSESSMENT: 0-10
PAIN_FUNCTIONAL_ASSESSMENT: PREVENTS OR INTERFERES SOME ACTIVE ACTIVITIES AND ADLS

## 2023-09-26 ASSESSMENT — PAIN DESCRIPTION - LOCATION
LOCATION: ABDOMEN
LOCATION: ABDOMEN;BACK
LOCATION: ABDOMEN
LOCATION: ABDOMEN

## 2023-09-26 ASSESSMENT — PAIN DESCRIPTION - PAIN TYPE
TYPE: SURGICAL PAIN

## 2023-09-26 ASSESSMENT — LIFESTYLE VARIABLES: SMOKING_STATUS: 0

## 2023-09-26 NOTE — ANESTHESIA PRE PROCEDURE
atrial fibrillation. Dr. Kelly Brantley was notified. Ejection fraction is visually estimated at 30%. Left ventricular size is dilated. Mild concentric left ventricular hypertrophy. Moderately dilated left atrium. S/P AVR: Aortic valve replacement with a 29 mm Loaiza Inspiris Resilia   Tissue Aortic Valve on 6/30/2023. Normally functioning bioprosthetic valve in aortic position, with a mean   pressure gradient of 7 mmHg. Mild mitral and tricuspid regurgitation is present. Mildly elevated pulmonary artery pressure, RVSP 40 mmHg. Dilated aortic root (4.5cm). No evidence of pericardial effusion. Neuro/Psych:   (+) psychiatric history:depression/anxiety             GI/Hepatic/Renal:             Endo/Other:    (+) DiabetesType II DM, , .                 Abdominal:   (+) obese,           Vascular: Other Findings:             Anesthesia Plan      general     ASA 4     (Pt made aware R:B of procedure to include prolonged intubation, ICU, tracheostomy, sz, MI, stroke, and death. Pt wishes to proceed. )  Induction: intravenous. Anesthetic plan and risks discussed with patient. Use of blood products discussed with patient whom consented to blood products. Plan discussed with CRNA.     Attending anesthesiologist reviewed and agrees with Prabhakar Gonzalez MD   9/26/2023

## 2023-09-26 NOTE — OP NOTE
Procedure Note:      Patient ID:  Yolette Deluca  [id number]  58 y.o.  1960      Pre-operative Diagnosis: incarcerated diaphragmatic Hernia without obstruction    Post-operative Diagnosis: same    Procedure:  Robotic/laparoscopic assisted incarcerated preperitoneal diaphragmatic hernia repair with mesh 10 cm     Surgeon: Chip Russo MD    First Assistant: Chapin Hooper PA-C  The use of a first assistant was necessary for the proper positioning, prepping, and draping of the patient, intraoperative retraction, passing sutures and implants(like mesh), stapling bowel and vessels using  devises when necessary, and suction using laparoscopic instruments, exchanging DaVinci robotic instruments, passing sutures and closure of skin and subcutaneous tissues. Findings:  same    Estimated Blood Loss:  Minimal           Total IV Fluids: 1000 ml            Complications:  None; patient tolerated the procedure well. Disposition: PACU - hemodynamically stable. Condition: stable        Indication:  Right sided diaphragmatic hernia with transverse colon and omentum within the hernia sac. Procedure Details: The patient was seen again in the Holding Room. The risks, benefits, complications, treatment options, and expected outcomes were discussed with the patient. The possibilities of reaction to medication, pulmonary aspiration, perforation of viscus, bleeding, recurrent infection, the need for additional procedures, and development of a complication requiring transfusion or further operation were discussed with the patient and/or family. There was concurrence with the proposed plan, and informed consent was obtained. The site of surgery was properly noted/marked. The patient was taken to the Operating Room, identified as Yolette Deluca, and the procedure verified. A Time Out was held and the above information confirmed. The patient was brought into the operating room and placed supine.  The Janna Gomez MD

## 2023-09-26 NOTE — PROGRESS NOTES
4 Eyes Skin Assessment     NAME:  Nuvia Martin OF BIRTH:  1960  MEDICAL RECORD NUMBER:  [id number]    The patient is being assessed for  Admission    I agree that at least one RN has performed a thorough Head to Toe Skin Assessment on the patient. ALL assessment sites listed below have been assessed. Areas assessed by both nurses:    Head, Face, Ears, Shoulders, Back, Chest, Arms, Elbows, Hands, Sacrum. Buttock, Coccyx, Ischium, Legs. Feet and Heels, and Under Medical Devices         Does the Patient have a Wound?  No noted wound(s)       Robbi Prevention initiated by RN: Yes  Wound Care Orders initiated by RN: No    Pressure Injury (Stage 3,4, Unstageable, DTI, NWPT, and Complex wounds) if present, place Wound referral order by RN under : No    New Ostomies, if present place, Ostomy referral order under : No     Nurse 1 eSignature: Electronically signed by Wilton Chow RN on 9/26/23 at 4:30 PM EDT    **SHARE this note so that the co-signing nurse can place an eSignature**    Nurse 2 eSignature: {Esignature:[id number]}

## 2023-09-26 NOTE — PROGRESS NOTES
09/26/23 0709   Encounter Summary   Encounter Overview/Reason  Pre-Op   Service Provided For: Family   Referral/Consult From: [facility]   Support System Spouse   Last Encounter  09/26/23  (Wife, [address], offers good support; time of prayer empowering angel, hope and healing.)   Complexity of Encounter Low   Begin Time 0605   End Time  0610   Total Time Calculated 5 min   Spiritual/Emotional needs   Type Spiritual Support   Assessment/Intervention/Outcome   Assessment Calm;Coping   Intervention Active listening;Empowerment; Discussed relationship with God;Nurtured Hope;Prayer (assurance of)/[place];Sustaining Presence/Ministry of presence   Outcome Comfort;Encouraged;Engaged in conversation;Expressed feelings, needs, and concerns;Expressed feelings of Treasure, Peace and/or Love;Expressed Gratitude   Plan and Referrals   Plan/Referrals Continue Support (comment)  (As needed.)

## 2023-09-26 NOTE — ANESTHESIA POSTPROCEDURE EVALUATION
Department of Anesthesiology  Postprocedure Note    Patient: Celia Her  MRN: [id number]  YOB: 1960  Date of evaluation: 9/26/2023      Procedure Summary     Date: 09/26/23 Room / Location: [address] [place] [address]    Anesthesia Start: 1969 Anesthesia Stop:     Procedure: HERNIA DIAPHRAGMATIC REPAIR ROBOTIC (Right) Diagnosis:       Diaphragmatic hernia without obstruction and without gangrene      (Diaphragmatic hernia without obstruction and without gangrene [K44.9])    Surgeons: Yamila Leal MD Responsible Provider: Jayesh Oviedo MD    Anesthesia Type: general ASA Status: 4          Anesthesia Type: No value filed.     [place] Phase I:      [place] Phase II:        Anesthesia Post Evaluation    Patient location during evaluation: PACU  Patient participation: complete - patient participated  Level of consciousness: awake  Pain score: 0  Airway patency: patent  Nausea & Vomiting: no nausea and no vomiting  Complications: no  Cardiovascular status: hemodynamically stable  Respiratory status: acceptable and nasal cannula  Hydration status: stable  Pain management: adequate

## 2023-09-26 NOTE — BRIEF OP NOTE
Brief Postoperative Note      Patient: Vazquez Lundy  YOB: 1960  MRN: [id number]    Date of Procedure: 9/26/2023    Pre-Op Diagnosis Codes:     * Diaphragmatic hernia without obstruction and without gangrene [K44.9]    Post-Op Diagnosis:  Diaphragmatic hernia with obstruction without gangrene       Procedure(s): HERNIA RIGHT DIAPHRAGMATIC REPAIR ROBOTIC    Surgeon(s):  Dona Elizalde MD    Assistant:  First Assistant: Israel Rock PA-C    Anesthesia: General    Estimated Blood Loss (mL): 026CY    Complications: None    Specimens:   * No specimens in log *    Implants:  Implant Name Type Inv. Item Serial No.  Lot No. LRB No. Used Action   MESH CHASITY [id number] POLYPR NONABSORBABLE SYN SQ PROL - [id number]  MESH CHASITY [id number] POLYPR NONABSORBABLE SYN SQ PROL  [id number] [id number] 1 Implanted         Drains:   Urinary Catheter 09/26/23 Ambrose (Active)   Urine Color Pink 09/26/23 0957   Output (mL) 475 mL 09/26/23 0957       [REMOVED] Urinary Catheter 07/08/23 Ambrose (Removed)       Findings: As Above.        Electronically signed by Israel Rock PA-C on 9/26/2023 at 10:01 AM

## 2023-09-26 NOTE — PROGRESS NOTES
1008: Arrived to PACU from OR. Monitors applied, alarms on. Report obtained from [facility] and Dr. Cici Smalls. Patient with significant lung history and on 6L nc preop. Dr. Cici Smalls aware sao2 mid 80's. Patient starting to arouse and taking deep breaths with encouragement. When asked if he feels he is getting his breath OK states yes. 1017: Duo neb started as ordered. Breath sounds clear, diminished. 1025: Treatment complete NC at 6L. 1030: Medicated for abdominal pain. Coughs and deep breathes with encouragement. Highest his Sao2 during treatment was 90. Runs 84-87 when dozing. 1100: Dr. Lyles at bedside speaking with patient. When asked if pain med helping states yes, then dozes. Dr. Lyles aware of sao2 mid to upper 80's when sleeping. 1125: Message sent to Dr. Rock Leung regarding consult. Wife at bedside for brief visit. Aware we are waiting for a room for him. She is going to go home and I will update. 1131: Medicated for abdominal discomfort. Ice chips given. 1140: Using IS. Highest patient got to was 1500.  1250: Dr. Rock Leung at bedside. 1255: Wife updated with room number. Patient denies need for further pain med. 1257: Transported to room 2028. Belongings with patient.

## 2023-09-26 NOTE — CONSULTS
AORTIC VALVE REPLACEMENT N/A 6/30/2023    AORTIC VALVE REPLACEMENT UTILIZING A 29 MM MELTON INSPIRIS RESILIA  TISSUE AORTIC VALVE; CHERRI. performed by Abhi Saunders MD at [facility]  01/01/1991    COLONOSCOPY  06/15/2015    COLONOSCOPY N/A 9/7/2022    COLONOSCOPY POLYPECTOMY SNARE/COLD BIOPSY performed by Robbi Martin MD at [address] [place] [address] Rd Bilateral 01/01/1992    INGUINAL HERNIA REPAIR Right 09/19/2013    with mesh    OTHER SURGICAL HISTORY  09/05/2013    hernia repair aborted, blood pressure spiked. TYMPANOSTOMY TUBE PLACEMENT  01/01/2009    right ear    VASECTOMY  01/01/1988     Current Medications:     sodium chloride flush  5-40 mL IntraVENous 2 times per day    budesonide-formoterol  2 puff Inhalation BID RT    furosemide  20 mg Oral Daily    budesonide-formoterol  2 puff Inhalation BID RT    And    tiotropium  2 puff Inhalation Daily RT     Allergies:    Social History:    TOBACCO:   reports that he has never smoked. He has never used smokeless tobacco.  ETOH:   reports that he does not currently use alcohol. Patient currently lives independently    Family History:       Problem Relation Age of Onset    Cancer Mother         colon    Diabetes Mother     High Blood Pressure Mother     Migraines Mother     High Blood Pressure Father     COPD Father     Cancer Father         lung    Heart Disease Maternal Grandmother     Heart Disease Maternal Grandfather        REVIEW OF SYSTEMS:    CONSTITUTIONAL:  negative for fevers, chills, diaphoresis, activity change, appetite change, fatigue, night sweats and unexpected weight change.    EYES:  negative for blurred vision, eye discharge, visual disturbance and icterus  HEENT:  negative for hearing loss, tinnitus, ear drainage, sinus pressure, nasal congestion, epistaxis and snoring  RESPIRATORY:  See HPI  CARDIOVASCULAR:  negative for chest pain, palpitations, exertional chest pressure/discomfort, edema, 04/21/2021    ASCVD (arteriosclerotic cardiovascular disease)     Hypoxia 01/15/2021    Nocturia 12/09/2020    Murmur, heart 12/09/2020    On home oxygen therapy 12/09/2020     Incarcerated diaphragmatic Hernia without obstruction  Robotic/laparoscopic assisted incarcerated preperitoneal diaphragmatic hernia repair with mesh 10 cm   Post op Hypoxia  S/p Hernia repair  Severe COPD  Acute on chronic Hypoxic resp failure  Systolic CHF with EF of 23%  Obesity    PLAN    CXR now  Solumedrol 60 mg x 1  Inhalers  Keep sats > 92%  Pain control  ICS  BIPAP qhs and prn  DVT and GI Prophylaxis  CHF optimization  C/w present management                  Total time spent for this encounter: 55 mins    Electronically signed by Milo Pereyra MD on 9/26/2023 at 12:27 PM

## 2023-09-27 ENCOUNTER — APPOINTMENT (OUTPATIENT)
Dept: GENERAL RADIOLOGY | Age: 63
End: 2023-09-27
Attending: SURGERY
Payer: COMMERCIAL

## 2023-09-27 LAB
ALBUMIN SERPL-MCNC: 3.9 GM/DL (ref 3.4–5)
ALP BLD-CCNC: 135 IU/L (ref 40–128)
ALT SERPL-CCNC: 16 U/L (ref 10–40)
ANION GAP SERPL CALCULATED.3IONS-SCNC: 13 MMOL/L (ref 4–16)
ANION GAP SERPL CALCULATED.3IONS-SCNC: 5 MMOL/L (ref 4–16)
AST SERPL-CCNC: 22 IU/L (ref 15–37)
BASE EXCESS MIXED: 10.6 (ref 0–1.2)
BASOPHILS ABSOLUTE: 0 K/CU MM
BASOPHILS ABSOLUTE: 0 K/CU MM
BASOPHILS RELATIVE PERCENT: 0.1 % (ref 0–1)
BASOPHILS RELATIVE PERCENT: 0.1 % (ref 0–1)
BILIRUB SERPL-MCNC: 0.4 MG/DL (ref 0–1)
BUN SERPL-MCNC: 22 MG/DL (ref 6–23)
BUN SERPL-MCNC: 24 MG/DL (ref 6–23)
CALCIUM SERPL-MCNC: 8.8 MG/DL (ref 8.3–10.6)
CALCIUM SERPL-MCNC: 9.2 MG/DL (ref 8.3–10.6)
CARBON MONOXIDE, BLOOD: 2.8 % (ref 0–5)
CHLORIDE BLD-SCNC: 97 MMOL/L (ref 99–110)
CHLORIDE BLD-SCNC: 99 MMOL/L (ref 99–110)
CO2 CONTENT: 41.4 MMOL/L (ref 19–24)
CO2: 31 MMOL/L (ref 21–32)
CO2: 33 MMOL/L (ref 21–32)
COMMENT: ABNORMAL
CREAT SERPL-MCNC: 0.7 MG/DL (ref 0.9–1.3)
CREAT SERPL-MCNC: 1 MG/DL (ref 0.9–1.3)
DIFFERENTIAL TYPE: ABNORMAL
DIFFERENTIAL TYPE: ABNORMAL
EOSINOPHILS ABSOLUTE: 0 K/CU MM
EOSINOPHILS ABSOLUTE: 0 K/CU MM
EOSINOPHILS RELATIVE PERCENT: 0 % (ref 0–3)
EOSINOPHILS RELATIVE PERCENT: 0 % (ref 0–3)
GFR SERPL CREATININE-BSD FRML MDRD: >60 ML/MIN/1.73M2
GFR SERPL CREATININE-BSD FRML MDRD: >60 ML/MIN/1.73M2
GLUCOSE BLD-MCNC: 108 MG/DL (ref 70–99)
GLUCOSE BLD-MCNC: 110 MG/DL (ref 70–99)
GLUCOSE BLD-MCNC: 153 MG/DL (ref 70–99)
GLUCOSE SERPL-MCNC: 106 MG/DL (ref 70–99)
GLUCOSE SERPL-MCNC: 152 MG/DL (ref 70–99)
HCO3 ARTERIAL: 39.2 MMOL/L (ref 18–23)
HCT VFR BLD CALC: 41.6 % (ref 42–52)
HCT VFR BLD CALC: 49.2 % (ref 42–52)
HEMOGLOBIN: 11.7 GM/DL (ref 13.5–18)
HEMOGLOBIN: 13.7 GM/DL (ref 13.5–18)
IMMATURE NEUTROPHIL %: 0.4 % (ref 0–0.43)
IMMATURE NEUTROPHIL %: 0.4 % (ref 0–0.43)
LYMPHOCYTES ABSOLUTE: 0.6 K/CU MM
LYMPHOCYTES ABSOLUTE: 2 K/CU MM
LYMPHOCYTES RELATIVE PERCENT: 13.9 % (ref 24–44)
LYMPHOCYTES RELATIVE PERCENT: 4.2 % (ref 24–44)
MCH RBC QN AUTO: 24.2 PG (ref 27–31)
MCH RBC QN AUTO: 24.6 PG (ref 27–31)
MCHC RBC AUTO-ENTMCNC: 27.8 % (ref 32–36)
MCHC RBC AUTO-ENTMCNC: 28.1 % (ref 32–36)
MCV RBC AUTO: 86 FL (ref 78–100)
MCV RBC AUTO: 88.5 FL (ref 78–100)
METHEMOGLOBIN ARTERIAL: 1.4 %
MONOCYTES ABSOLUTE: 0.9 K/CU MM
MONOCYTES ABSOLUTE: 1.3 K/CU MM
MONOCYTES RELATIVE PERCENT: 6.9 % (ref 0–4)
MONOCYTES RELATIVE PERCENT: 8.6 % (ref 0–4)
NUCLEATED RBC %: 0 %
NUCLEATED RBC %: 0 %
O2 SATURATION: 87.5 % (ref 96–97)
PCO2 ARTERIAL: 71 MMHG (ref 32–45)
PDW BLD-RTO: 13.4 % (ref 11.7–14.9)
PDW BLD-RTO: 13.5 % (ref 11.7–14.9)
PH BLOOD: 7.35 (ref 7.34–7.45)
PLATELET # BLD: 175 K/CU MM (ref 140–440)
PLATELET # BLD: 207 K/CU MM (ref 140–440)
PMV BLD AUTO: 10.4 FL (ref 7.5–11.1)
PMV BLD AUTO: 10.9 FL (ref 7.5–11.1)
PO2 ARTERIAL: 56 MMHG (ref 75–100)
POTASSIUM SERPL-SCNC: 4.9 MMOL/L (ref 3.5–5.1)
POTASSIUM SERPL-SCNC: 5 MMOL/L (ref 3.5–5.1)
PRO-BNP: 2614 PG/ML
RBC # BLD: 4.84 M/CU MM (ref 4.6–6.2)
RBC # BLD: 5.56 M/CU MM (ref 4.6–6.2)
SEGMENTED NEUTROPHILS ABSOLUTE COUNT: 11.2 K/CU MM
SEGMENTED NEUTROPHILS ABSOLUTE COUNT: 12 K/CU MM
SEGMENTED NEUTROPHILS RELATIVE PERCENT: 77 % (ref 36–66)
SEGMENTED NEUTROPHILS RELATIVE PERCENT: 88.4 % (ref 36–66)
SODIUM BLD-SCNC: 135 MMOL/L (ref 135–145)
SODIUM BLD-SCNC: 143 MMOL/L (ref 135–145)
TOTAL IMMATURE NEUTOROPHIL: 0.05 K/CU MM
TOTAL IMMATURE NEUTOROPHIL: 0.06 K/CU MM
TOTAL NUCLEATED RBC: 0 K/CU MM
TOTAL NUCLEATED RBC: 0 K/CU MM
TOTAL PROTEIN: 7.1 GM/DL (ref 6.4–8.2)
WBC # BLD: 13.5 K/CU MM (ref 4–10.5)
WBC # BLD: 14.6 K/CU MM (ref 4–10.5)

## 2023-09-27 PROCEDURE — 36415 COLL VENOUS BLD VENIPUNCTURE: CPT

## 2023-09-27 PROCEDURE — 71045 X-RAY EXAM CHEST 1 VIEW: CPT

## 2023-09-27 PROCEDURE — APPNB30 APP NON BILLABLE TIME 0-30 MINS: Performed by: PHYSICIAN ASSISTANT

## 2023-09-27 PROCEDURE — 2700000000 HC OXYGEN THERAPY PER DAY

## 2023-09-27 PROCEDURE — 80048 BASIC METABOLIC PNL TOTAL CA: CPT

## 2023-09-27 PROCEDURE — 94640 AIRWAY INHALATION TREATMENT: CPT

## 2023-09-27 PROCEDURE — 99232 SBSQ HOSP IP/OBS MODERATE 35: CPT | Performed by: INTERNAL MEDICINE

## 2023-09-27 PROCEDURE — 6360000002 HC RX W HCPCS: Performed by: PHYSICIAN ASSISTANT

## 2023-09-27 PROCEDURE — 99024 POSTOP FOLLOW-UP VISIT: CPT | Performed by: PHYSICIAN ASSISTANT

## 2023-09-27 PROCEDURE — 2060000000 HC ICU INTERMEDIATE R&B

## 2023-09-27 PROCEDURE — 6370000000 HC RX 637 (ALT 250 FOR IP): Performed by: INTERNAL MEDICINE

## 2023-09-27 PROCEDURE — 94761 N-INVAS EAR/PLS OXIMETRY MLT: CPT

## 2023-09-27 PROCEDURE — 85025 COMPLETE CBC W/AUTO DIFF WBC: CPT

## 2023-09-27 PROCEDURE — 2580000003 HC RX 258: Performed by: PHYSICIAN ASSISTANT

## 2023-09-27 PROCEDURE — 80053 COMPREHEN METABOLIC PANEL: CPT

## 2023-09-27 PROCEDURE — 82803 BLOOD GASES ANY COMBINATION: CPT

## 2023-09-27 PROCEDURE — 82962 GLUCOSE BLOOD TEST: CPT

## 2023-09-27 PROCEDURE — 83880 ASSAY OF NATRIURETIC PEPTIDE: CPT

## 2023-09-27 PROCEDURE — 36600 WITHDRAWAL OF ARTERIAL BLOOD: CPT

## 2023-09-27 PROCEDURE — 6370000000 HC RX 637 (ALT 250 FOR IP): Performed by: PHYSICIAN ASSISTANT

## 2023-09-27 RX ADMIN — POTASSIUM CHLORIDE 10 MEQ: 750 TABLET, FILM COATED, EXTENDED RELEASE ORAL at 09:36

## 2023-09-27 RX ADMIN — IPRATROPIUM BROMIDE AND ALBUTEROL SULFATE 1 DOSE: .5; 2.5 SOLUTION RESPIRATORY (INHALATION) at 20:46

## 2023-09-27 RX ADMIN — BUDESONIDE AND FORMOTEROL FUMARATE DIHYDRATE 2 PUFF: 160; 4.5 AEROSOL RESPIRATORY (INHALATION) at 20:46

## 2023-09-27 RX ADMIN — EMPAGLIFLOZIN 10 MG: 10 TABLET, FILM COATED ORAL at 09:35

## 2023-09-27 RX ADMIN — OXYCODONE HYDROCHLORIDE 5 MG: 5 TABLET ORAL at 05:37

## 2023-09-27 RX ADMIN — ENOXAPARIN SODIUM 30 MG: 100 INJECTION SUBCUTANEOUS at 09:35

## 2023-09-27 RX ADMIN — IPRATROPIUM BROMIDE AND ALBUTEROL SULFATE 1 DOSE: .5; 2.5 SOLUTION RESPIRATORY (INHALATION) at 09:04

## 2023-09-27 RX ADMIN — AMIODARONE HYDROCHLORIDE 200 MG: 200 TABLET ORAL at 09:36

## 2023-09-27 RX ADMIN — SODIUM CHLORIDE, PRESERVATIVE FREE 10 ML: 5 INJECTION INTRAVENOUS at 21:12

## 2023-09-27 RX ADMIN — ENOXAPARIN SODIUM 30 MG: 100 INJECTION SUBCUTANEOUS at 21:11

## 2023-09-27 RX ADMIN — BUDESONIDE AND FORMOTEROL FUMARATE DIHYDRATE 2 PUFF: 160; 4.5 AEROSOL RESPIRATORY (INHALATION) at 09:00

## 2023-09-27 RX ADMIN — FUROSEMIDE 20 MG: 20 TABLET ORAL at 09:36

## 2023-09-27 RX ADMIN — TAMSULOSIN HYDROCHLORIDE 0.4 MG: 0.4 CAPSULE ORAL at 09:35

## 2023-09-27 RX ADMIN — IPRATROPIUM BROMIDE AND ALBUTEROL SULFATE 1 DOSE: .5; 2.5 SOLUTION RESPIRATORY (INHALATION) at 12:39

## 2023-09-27 RX ADMIN — SODIUM CHLORIDE, PRESERVATIVE FREE 10 ML: 5 INJECTION INTRAVENOUS at 09:37

## 2023-09-27 RX ADMIN — METOPROLOL TARTRATE 50 MG: 50 TABLET, FILM COATED ORAL at 21:12

## 2023-09-27 RX ADMIN — METOPROLOL TARTRATE 50 MG: 50 TABLET, FILM COATED ORAL at 09:37

## 2023-09-27 RX ADMIN — IPRATROPIUM BROMIDE AND ALBUTEROL SULFATE 1 DOSE: .5; 2.5 SOLUTION RESPIRATORY (INHALATION) at 16:13

## 2023-09-27 RX ADMIN — OXYCODONE HYDROCHLORIDE 5 MG: 5 TABLET ORAL at 21:12

## 2023-09-27 ASSESSMENT — ENCOUNTER SYMPTOMS
PHOTOPHOBIA: 0
SORE THROAT: 0
SHORTNESS OF BREATH: 1
CHOKING: 0
ABDOMINAL PAIN: 1
BACK PAIN: 0
EYE REDNESS: 0
RECTAL PAIN: 0
NAUSEA: 0
CONSTIPATION: 0
VOMITING: 0
EYE ITCHING: 0
STRIDOR: 0
COLOR CHANGE: 0
APNEA: 0
ANAL BLEEDING: 0

## 2023-09-27 ASSESSMENT — PAIN SCALES - GENERAL
PAINLEVEL_OUTOF10: 2
PAINLEVEL_OUTOF10: 5
PAINLEVEL_OUTOF10: 8
PAINLEVEL_OUTOF10: 3
PAINLEVEL_OUTOF10: 5

## 2023-09-27 ASSESSMENT — PAIN DESCRIPTION - LOCATION
LOCATION: ABDOMEN
LOCATION: ABDOMEN
LOCATION: ABDOMEN;GENERALIZED

## 2023-09-27 ASSESSMENT — PAIN - FUNCTIONAL ASSESSMENT
PAIN_FUNCTIONAL_ASSESSMENT: ACTIVITIES ARE NOT PREVENTED
PAIN_FUNCTIONAL_ASSESSMENT: ACTIVITIES ARE NOT PREVENTED

## 2023-09-27 ASSESSMENT — PAIN DESCRIPTION - ORIENTATION
ORIENTATION: INNER
ORIENTATION: RIGHT
ORIENTATION: INNER

## 2023-09-27 ASSESSMENT — PAIN DESCRIPTION - DESCRIPTORS
DESCRIPTORS: ACHING;DISCOMFORT
DESCRIPTORS: ACHING;DISCOMFORT

## 2023-09-27 ASSESSMENT — PAIN DESCRIPTION - PAIN TYPE: TYPE: SURGICAL PAIN

## 2023-09-27 NOTE — CARE COORDINATION
09/27/23 1622   Service Assessment   Patient Orientation Alert and Oriented   Cognition Alert   History Provided By Patient   Primary Caregiver Self   Support Systems Spouse/Significant Other   PCP Verified by CM Yes   Last Visit to PCP Within last 3 months   Prior Functional Level Independent in ADLs/IADLs   Current Functional Level Independent in ADLs/IADLs   Can patient return to prior living arrangement Yes   Ability to make needs known: Good   Family able to assist with home care needs: Yes   Would you like for me to discuss the discharge plan with any other family members/significant others, and if so, who? No   Financial Resources Medicare; Other (Comment)   Community Resources ECF/Home Care   Social/Functional History   Lives With Spouse   Type of [address] [place] [address]  One level; Laundry in basement   Home Access Stairs to enter without rails   Entrance Stairs - Number of Steps 1   Bathroom Shower/Tub Walk-in shower   Bathroom Toilet Handicap height   Bathroom Equipment Grab bars in shower; Shower chair   [address] Responsibilities Yes   Ambulation Assistance Independent   Transfer Assistance Independent   Active  Yes   Occupation Retired   Discharge Planning   Type of [facility] Prior To Admission [address] [place]   Type of [address]   Patient expects to be discharged to: [address] Discharge   [facility] has insurance and a PCP. Pt from home with spouse. Pt & spouse drive. Pt is independent with ADLs, assists with light housekeeping & cooking. Pt has [facility] O[facility] at 6L [facility]. Pt does not use DME. Pt is currently active with [facility].  Will need order at

## 2023-09-28 LAB
GLUCOSE BLD-MCNC: 105 MG/DL (ref 70–99)
GLUCOSE BLD-MCNC: 113 MG/DL (ref 70–99)

## 2023-09-28 PROCEDURE — 99232 SBSQ HOSP IP/OBS MODERATE 35: CPT | Performed by: INTERNAL MEDICINE

## 2023-09-28 PROCEDURE — 82962 GLUCOSE BLOOD TEST: CPT

## 2023-09-28 PROCEDURE — 6360000002 HC RX W HCPCS: Performed by: STUDENT IN AN ORGANIZED HEALTH CARE EDUCATION/TRAINING PROGRAM

## 2023-09-28 PROCEDURE — 6370000000 HC RX 637 (ALT 250 FOR IP): Performed by: PHYSICIAN ASSISTANT

## 2023-09-28 PROCEDURE — 2060000000 HC ICU INTERMEDIATE R&B

## 2023-09-28 PROCEDURE — 2700000000 HC OXYGEN THERAPY PER DAY

## 2023-09-28 PROCEDURE — 94761 N-INVAS EAR/PLS OXIMETRY MLT: CPT

## 2023-09-28 PROCEDURE — 94640 AIRWAY INHALATION TREATMENT: CPT

## 2023-09-28 PROCEDURE — 2580000003 HC RX 258: Performed by: PHYSICIAN ASSISTANT

## 2023-09-28 PROCEDURE — 94150 VITAL CAPACITY TEST: CPT

## 2023-09-28 PROCEDURE — 6360000002 HC RX W HCPCS: Performed by: PHYSICIAN ASSISTANT

## 2023-09-28 PROCEDURE — 6370000000 HC RX 637 (ALT 250 FOR IP): Performed by: INTERNAL MEDICINE

## 2023-09-28 RX ORDER — FUROSEMIDE 10 MG/ML
40 INJECTION INTRAMUSCULAR; INTRAVENOUS ONCE
Status: COMPLETED | OUTPATIENT
Start: 2023-09-28 | End: 2023-09-28

## 2023-09-28 RX ORDER — FUROSEMIDE 40 MG/1
40 TABLET ORAL DAILY
Status: DISCONTINUED | OUTPATIENT
Start: 2023-09-28 | End: 2023-09-29

## 2023-09-28 RX ADMIN — METOPROLOL TARTRATE 50 MG: 50 TABLET, FILM COATED ORAL at 09:18

## 2023-09-28 RX ADMIN — HYDROMORPHONE HYDROCHLORIDE 1 MG: 1 INJECTION, SOLUTION INTRAMUSCULAR; INTRAVENOUS; SUBCUTANEOUS at 20:12

## 2023-09-28 RX ADMIN — IPRATROPIUM BROMIDE AND ALBUTEROL SULFATE 1 DOSE: .5; 2.5 SOLUTION RESPIRATORY (INHALATION) at 20:18

## 2023-09-28 RX ADMIN — HYDROMORPHONE HYDROCHLORIDE 1 MG: 1 INJECTION, SOLUTION INTRAMUSCULAR; INTRAVENOUS; SUBCUTANEOUS at 12:52

## 2023-09-28 RX ADMIN — ENOXAPARIN SODIUM 30 MG: 100 INJECTION SUBCUTANEOUS at 09:19

## 2023-09-28 RX ADMIN — AMIODARONE HYDROCHLORIDE 200 MG: 200 TABLET ORAL at 09:18

## 2023-09-28 RX ADMIN — SODIUM CHLORIDE, PRESERVATIVE FREE 10 ML: 5 INJECTION INTRAVENOUS at 09:19

## 2023-09-28 RX ADMIN — IPRATROPIUM BROMIDE AND ALBUTEROL SULFATE 1 DOSE: .5; 2.5 SOLUTION RESPIRATORY (INHALATION) at 15:30

## 2023-09-28 RX ADMIN — BUDESONIDE AND FORMOTEROL FUMARATE DIHYDRATE 2 PUFF: 160; 4.5 AEROSOL RESPIRATORY (INHALATION) at 20:19

## 2023-09-28 RX ADMIN — FUROSEMIDE 40 MG: 40 TABLET ORAL at 09:22

## 2023-09-28 RX ADMIN — TAMSULOSIN HYDROCHLORIDE 0.4 MG: 0.4 CAPSULE ORAL at 09:18

## 2023-09-28 RX ADMIN — ENOXAPARIN SODIUM 30 MG: 100 INJECTION SUBCUTANEOUS at 20:12

## 2023-09-28 RX ADMIN — BUDESONIDE AND FORMOTEROL FUMARATE DIHYDRATE 2 PUFF: 160; 4.5 AEROSOL RESPIRATORY (INHALATION) at 07:40

## 2023-09-28 RX ADMIN — FUROSEMIDE 40 MG: 10 INJECTION, SOLUTION INTRAMUSCULAR; INTRAVENOUS at 02:00

## 2023-09-28 RX ADMIN — EMPAGLIFLOZIN 10 MG: 10 TABLET, FILM COATED ORAL at 09:18

## 2023-09-28 RX ADMIN — IPRATROPIUM BROMIDE AND ALBUTEROL SULFATE 1 DOSE: .5; 2.5 SOLUTION RESPIRATORY (INHALATION) at 11:26

## 2023-09-28 RX ADMIN — METOPROLOL TARTRATE 50 MG: 50 TABLET, FILM COATED ORAL at 20:11

## 2023-09-28 RX ADMIN — POTASSIUM CHLORIDE 10 MEQ: 750 TABLET, FILM COATED, EXTENDED RELEASE ORAL at 09:19

## 2023-09-28 RX ADMIN — IPRATROPIUM BROMIDE AND ALBUTEROL SULFATE 1 DOSE: .5; 2.5 SOLUTION RESPIRATORY (INHALATION) at 07:42

## 2023-09-28 RX ADMIN — OXYCODONE HYDROCHLORIDE 5 MG: 5 TABLET ORAL at 09:22

## 2023-09-28 ASSESSMENT — PAIN - FUNCTIONAL ASSESSMENT
PAIN_FUNCTIONAL_ASSESSMENT: ACTIVITIES ARE NOT PREVENTED
PAIN_FUNCTIONAL_ASSESSMENT: ACTIVITIES ARE NOT PREVENTED
PAIN_FUNCTIONAL_ASSESSMENT: PREVENTS OR INTERFERES SOME ACTIVE ACTIVITIES AND ADLS

## 2023-09-28 ASSESSMENT — PAIN SCALES - GENERAL
PAINLEVEL_OUTOF10: 0
PAINLEVEL_OUTOF10: 0
PAINLEVEL_OUTOF10: 8
PAINLEVEL_OUTOF10: 2
PAINLEVEL_OUTOF10: 6
PAINLEVEL_OUTOF10: 7

## 2023-09-28 ASSESSMENT — PAIN DESCRIPTION - LOCATION
LOCATION: ABDOMEN

## 2023-09-28 ASSESSMENT — PAIN DESCRIPTION - FREQUENCY: FREQUENCY: INTERMITTENT

## 2023-09-28 ASSESSMENT — PAIN DESCRIPTION - DESCRIPTORS
DESCRIPTORS: ACHING

## 2023-09-28 ASSESSMENT — PAIN DESCRIPTION - PAIN TYPE: TYPE: SURGICAL PAIN

## 2023-09-28 ASSESSMENT — PAIN DESCRIPTION - ORIENTATION
ORIENTATION: MID

## 2023-09-28 ASSESSMENT — PAIN DESCRIPTION - DIRECTION: RADIATING_TOWARDS: NO

## 2023-09-28 ASSESSMENT — PAIN DESCRIPTION - ONSET: ONSET: GRADUAL

## 2023-09-28 NOTE — PROGRESS NOTES
Attempted to contact attending Dr. Markell Gibbs to inform of pts increased O2 intake stating 86-87% on 13L High Flow as well as right chest pain  No answer, will attempt again

## 2023-09-28 NOTE — PROGRESS NOTES
Resource RN rounding on pt per request from Khoi Lee NP d/t increased O2 demand 1 day S/P hernia repair. Pt is alert , Respirations unlabored on 13L HFNC , denies SOB at rest. Noted small amount of bloody sputum in cup. Pt stated he has not been able to breathe as deep d/t pain but just received pain medication which is effective. He stated he has been using his incentive spirometer, He was able to get to 1250 while I was at bedside. Lungs are clear diminished. Staci Sundar has been to bedside to evaluate pt and new orders placed. RT at bedside now changing pt over to vapotherm as his sats are maintaining in the high 80's -90% on the 13L HFNC. Pt denies any concerns and no signs or symptoms of distress noted.  Updated bedside RN,charge RN and Khoi Lee NP.

## 2023-09-28 NOTE — PROGRESS NOTES
Re-rounded on pt . Noted SP02 to be 94-97% on vapo therm 25L/min 95% FIO2. Pt denies SOB , using Incentive spirometer. Updated Lavern Jeffery 9/27/23 @0900  FINDINGS:  Prior sternal splitting procedure. The heart is moderately enlarged and  unchanged. Prior sternal splitting procedure. There is bilateral perihilar  and lower lung airspace disease with small right larger than left bilateral  pleural effusions. There are few bands of bibasilar atelectasis. There is no  acute bone finding. IMPRESSION:  Findings most consistent with mild congestive heart failure.   Underlying  basilar pneumonia not excluded

## 2023-09-28 NOTE — PLAN OF CARE
Problem: Chronic Conditions and Co-morbidities  Goal: Patient's chronic conditions and co-morbidity symptoms are monitored and maintained or improved  9/28/2023 0947 by Darlin Jaime RN  Outcome: Progressing  9/28/2023 0421 by Piero Rondon RN  Outcome: Progressing     Problem: Discharge Planning  Goal: Discharge to home or other facility with appropriate resources  Outcome: Progressing     Problem: Pain  Goal: Verbalizes/displays adequate comfort level or baseline comfort level  9/28/2023 0947 by Darlin Jaime RN  Outcome: Progressing  9/28/2023 0421 by Piero Rondon RN  Outcome: Progressing     Problem: Safety - Adult  Goal: Free from fall injury  Outcome: Progressing     Problem: Respiratory - Adult  Goal: Achieves optimal ventilation and oxygenation  9/28/2023 0947 by Darlin Jaime RN  Outcome: Progressing  9/28/2023 0421 by Piero Rondon RN  Outcome: Progressing     Problem: ABCDS Injury Assessment  Goal: Absence of physical injury  Outcome: Progressing

## 2023-09-28 NOTE — PLAN OF CARE
Problem: Respiratory - Adult  Goal: Achieves optimal ventilation and oxygenation  Outcome: Progressing       Problem: Pain  Goal: Verbalizes/displays adequate comfort level or baseline comfort level  Outcome: Progressing       Problem: Chronic Conditions and Co-morbidities  Goal: Patient's chronic conditions and co-morbidity symptoms are monitored and maintained or improved  Outcome: Progressing

## 2023-09-29 ENCOUNTER — APPOINTMENT (OUTPATIENT)
Dept: GENERAL RADIOLOGY | Age: 63
End: 2023-09-29
Attending: SURGERY
Payer: COMMERCIAL

## 2023-09-29 LAB
ANION GAP SERPL CALCULATED.3IONS-SCNC: 13 MMOL/L (ref 4–16)
BASOPHILS ABSOLUTE: 0 K/CU MM
BASOPHILS RELATIVE PERCENT: 0.3 % (ref 0–1)
BUN SERPL-MCNC: 21 MG/DL (ref 6–23)
CALCIUM SERPL-MCNC: 8.8 MG/DL (ref 8.3–10.6)
CHLORIDE BLD-SCNC: 93 MMOL/L (ref 99–110)
CO2: 30 MMOL/L (ref 21–32)
CREAT SERPL-MCNC: 0.6 MG/DL (ref 0.9–1.3)
DIFFERENTIAL TYPE: ABNORMAL
EOSINOPHILS ABSOLUTE: 0.1 K/CU MM
EOSINOPHILS RELATIVE PERCENT: 0.7 % (ref 0–3)
GFR SERPL CREATININE-BSD FRML MDRD: >60 ML/MIN/1.73M2
GLUCOSE BLD-MCNC: 112 MG/DL (ref 70–99)
GLUCOSE BLD-MCNC: 116 MG/DL (ref 70–99)
GLUCOSE BLD-MCNC: 148 MG/DL (ref 70–99)
GLUCOSE BLD-MCNC: 93 MG/DL (ref 70–99)
GLUCOSE SERPL-MCNC: 99 MG/DL (ref 70–99)
HCT VFR BLD CALC: 42.7 % (ref 42–52)
HEMOGLOBIN: 12.2 GM/DL (ref 13.5–18)
IMMATURE NEUTROPHIL %: 0.5 % (ref 0–0.43)
L PNEUMO AG UR QL IA: NEGATIVE
LYMPHOCYTES ABSOLUTE: 2 K/CU MM
LYMPHOCYTES RELATIVE PERCENT: 19.8 % (ref 24–44)
MCH RBC QN AUTO: 24 PG (ref 27–31)
MCHC RBC AUTO-ENTMCNC: 28.6 % (ref 32–36)
MCV RBC AUTO: 83.9 FL (ref 78–100)
MONOCYTES ABSOLUTE: 1.2 K/CU MM
MONOCYTES RELATIVE PERCENT: 11.7 % (ref 0–4)
NUCLEATED RBC %: 0 %
PDW BLD-RTO: 13.5 % (ref 11.7–14.9)
PLATELET # BLD: 182 K/CU MM (ref 140–440)
PMV BLD AUTO: 10.8 FL (ref 7.5–11.1)
POTASSIUM SERPL-SCNC: 4.4 MMOL/L (ref 3.5–5.1)
PRO-BNP: 1248 PG/ML
PROCALCITONIN SERPL-MCNC: 0.05 NG/ML
RBC # BLD: 5.09 M/CU MM (ref 4.6–6.2)
S PNEUM AG CSF QL: NORMAL
SEGMENTED NEUTROPHILS ABSOLUTE COUNT: 6.9 K/CU MM
SEGMENTED NEUTROPHILS RELATIVE PERCENT: 67 % (ref 36–66)
SODIUM BLD-SCNC: 136 MMOL/L (ref 135–145)
TOTAL IMMATURE NEUTOROPHIL: 0.05 K/CU MM
TOTAL NUCLEATED RBC: 0 K/CU MM
WBC # BLD: 10.3 K/CU MM (ref 4–10.5)

## 2023-09-29 PROCEDURE — 94761 N-INVAS EAR/PLS OXIMETRY MLT: CPT

## 2023-09-29 PROCEDURE — 82962 GLUCOSE BLOOD TEST: CPT

## 2023-09-29 PROCEDURE — 6360000002 HC RX W HCPCS: Performed by: PHYSICIAN ASSISTANT

## 2023-09-29 PROCEDURE — 97166 OT EVAL MOD COMPLEX 45 MIN: CPT

## 2023-09-29 PROCEDURE — 97162 PT EVAL MOD COMPLEX 30 MIN: CPT

## 2023-09-29 PROCEDURE — 2700000000 HC OXYGEN THERAPY PER DAY

## 2023-09-29 PROCEDURE — 6360000002 HC RX W HCPCS: Performed by: INTERNAL MEDICINE

## 2023-09-29 PROCEDURE — 84145 PROCALCITONIN (PCT): CPT

## 2023-09-29 PROCEDURE — 99024 POSTOP FOLLOW-UP VISIT: CPT | Performed by: PHYSICIAN ASSISTANT

## 2023-09-29 PROCEDURE — 85025 COMPLETE CBC W/AUTO DIFF WBC: CPT

## 2023-09-29 PROCEDURE — 6370000000 HC RX 637 (ALT 250 FOR IP): Performed by: PHYSICIAN ASSISTANT

## 2023-09-29 PROCEDURE — 97116 GAIT TRAINING THERAPY: CPT

## 2023-09-29 PROCEDURE — APPNB30 APP NON BILLABLE TIME 0-30 MINS: Performed by: PHYSICIAN ASSISTANT

## 2023-09-29 PROCEDURE — 71045 X-RAY EXAM CHEST 1 VIEW: CPT

## 2023-09-29 PROCEDURE — 6370000000 HC RX 637 (ALT 250 FOR IP): Performed by: INTERNAL MEDICINE

## 2023-09-29 PROCEDURE — 6370000000 HC RX 637 (ALT 250 FOR IP): Performed by: STUDENT IN AN ORGANIZED HEALTH CARE EDUCATION/TRAINING PROGRAM

## 2023-09-29 PROCEDURE — 2580000003 HC RX 258: Performed by: PHYSICIAN ASSISTANT

## 2023-09-29 PROCEDURE — 83880 ASSAY OF NATRIURETIC PEPTIDE: CPT

## 2023-09-29 PROCEDURE — 2060000000 HC ICU INTERMEDIATE R&B

## 2023-09-29 PROCEDURE — 80048 BASIC METABOLIC PNL TOTAL CA: CPT

## 2023-09-29 PROCEDURE — 87449 NOS EACH ORGANISM AG IA: CPT

## 2023-09-29 PROCEDURE — 87899 AGENT NOS ASSAY W/OPTIC: CPT

## 2023-09-29 PROCEDURE — 36415 COLL VENOUS BLD VENIPUNCTURE: CPT

## 2023-09-29 PROCEDURE — 94150 VITAL CAPACITY TEST: CPT

## 2023-09-29 PROCEDURE — 97530 THERAPEUTIC ACTIVITIES: CPT

## 2023-09-29 PROCEDURE — 94640 AIRWAY INHALATION TREATMENT: CPT

## 2023-09-29 PROCEDURE — 99232 SBSQ HOSP IP/OBS MODERATE 35: CPT | Performed by: INTERNAL MEDICINE

## 2023-09-29 RX ORDER — ASPIRIN 81 MG/1
81 TABLET, CHEWABLE ORAL DAILY
Status: DISCONTINUED | OUTPATIENT
Start: 2023-09-30 | End: 2023-10-06 | Stop reason: HOSPADM

## 2023-09-29 RX ORDER — ATORVASTATIN CALCIUM 40 MG/1
40 TABLET, FILM COATED ORAL NIGHTLY
Status: DISCONTINUED | OUTPATIENT
Start: 2023-09-29 | End: 2023-10-06 | Stop reason: HOSPADM

## 2023-09-29 RX ORDER — OXYMETAZOLINE HYDROCHLORIDE 0.05 G/100ML
2 SPRAY NASAL EVERY 12 HOURS
Status: DISPENSED | OUTPATIENT
Start: 2023-09-29 | End: 2023-10-02

## 2023-09-29 RX ORDER — FUROSEMIDE 10 MG/ML
40 INJECTION INTRAMUSCULAR; INTRAVENOUS 2 TIMES DAILY
Status: DISCONTINUED | OUTPATIENT
Start: 2023-09-29 | End: 2023-09-30

## 2023-09-29 RX ADMIN — HYDROMORPHONE HYDROCHLORIDE 1 MG: 1 INJECTION, SOLUTION INTRAMUSCULAR; INTRAVENOUS; SUBCUTANEOUS at 00:15

## 2023-09-29 RX ADMIN — BUDESONIDE AND FORMOTEROL FUMARATE DIHYDRATE 2 PUFF: 160; 4.5 AEROSOL RESPIRATORY (INHALATION) at 07:16

## 2023-09-29 RX ADMIN — APIXABAN 5 MG: 5 TABLET, FILM COATED ORAL at 20:32

## 2023-09-29 RX ADMIN — POTASSIUM CHLORIDE 10 MEQ: 750 TABLET, FILM COATED, EXTENDED RELEASE ORAL at 08:30

## 2023-09-29 RX ADMIN — METHYLPREDNISOLONE SODIUM SUCCINATE 40 MG: 40 INJECTION, POWDER, FOR SOLUTION INTRAMUSCULAR; INTRAVENOUS at 13:41

## 2023-09-29 RX ADMIN — OXYCODONE HYDROCHLORIDE 5 MG: 5 TABLET ORAL at 15:58

## 2023-09-29 RX ADMIN — AMIODARONE HYDROCHLORIDE 200 MG: 200 TABLET ORAL at 08:30

## 2023-09-29 RX ADMIN — ENOXAPARIN SODIUM 30 MG: 100 INJECTION SUBCUTANEOUS at 08:30

## 2023-09-29 RX ADMIN — SODIUM CHLORIDE, PRESERVATIVE FREE 10 ML: 5 INJECTION INTRAVENOUS at 20:32

## 2023-09-29 RX ADMIN — BUDESONIDE AND FORMOTEROL FUMARATE DIHYDRATE 2 PUFF: 160; 4.5 AEROSOL RESPIRATORY (INHALATION) at 20:55

## 2023-09-29 RX ADMIN — METOPROLOL TARTRATE 50 MG: 50 TABLET, FILM COATED ORAL at 08:30

## 2023-09-29 RX ADMIN — ATORVASTATIN CALCIUM 40 MG: 40 TABLET, FILM COATED ORAL at 20:32

## 2023-09-29 RX ADMIN — HYDROMORPHONE HYDROCHLORIDE 1 MG: 1 INJECTION, SOLUTION INTRAMUSCULAR; INTRAVENOUS; SUBCUTANEOUS at 04:28

## 2023-09-29 RX ADMIN — ALBUTEROL SULFATE 2 PUFF: 90 AEROSOL, METERED RESPIRATORY (INHALATION) at 20:55

## 2023-09-29 RX ADMIN — HYDROMORPHONE HYDROCHLORIDE 1 MG: 1 INJECTION, SOLUTION INTRAMUSCULAR; INTRAVENOUS; SUBCUTANEOUS at 23:58

## 2023-09-29 RX ADMIN — HYDROMORPHONE HYDROCHLORIDE 1 MG: 1 INJECTION, SOLUTION INTRAMUSCULAR; INTRAVENOUS; SUBCUTANEOUS at 17:42

## 2023-09-29 RX ADMIN — SODIUM CHLORIDE, PRESERVATIVE FREE 10 ML: 5 INJECTION INTRAVENOUS at 08:31

## 2023-09-29 RX ADMIN — FUROSEMIDE 40 MG: 40 TABLET ORAL at 08:30

## 2023-09-29 RX ADMIN — HYDROMORPHONE HYDROCHLORIDE 1 MG: 1 INJECTION, SOLUTION INTRAMUSCULAR; INTRAVENOUS; SUBCUTANEOUS at 08:29

## 2023-09-29 RX ADMIN — ALBUTEROL SULFATE 2 PUFF: 90 AEROSOL, METERED RESPIRATORY (INHALATION) at 16:58

## 2023-09-29 RX ADMIN — IPRATROPIUM BROMIDE AND ALBUTEROL SULFATE 1 DOSE: .5; 2.5 SOLUTION RESPIRATORY (INHALATION) at 11:44

## 2023-09-29 RX ADMIN — OXYMETAZOLINE HYDROCHLORIDE 2 SPRAY: 0.05 SPRAY NASAL at 20:34

## 2023-09-29 RX ADMIN — IPRATROPIUM BROMIDE AND ALBUTEROL SULFATE 1 DOSE: .5; 2.5 SOLUTION RESPIRATORY (INHALATION) at 07:16

## 2023-09-29 RX ADMIN — OXYCODONE HYDROCHLORIDE 5 MG: 5 TABLET ORAL at 20:33

## 2023-09-29 RX ADMIN — IPRATROPIUM BROMIDE AND ALBUTEROL SULFATE 1 DOSE: .5; 2.5 SOLUTION RESPIRATORY (INHALATION) at 15:08

## 2023-09-29 RX ADMIN — METOPROLOL TARTRATE 50 MG: 50 TABLET, FILM COATED ORAL at 20:32

## 2023-09-29 RX ADMIN — EMPAGLIFLOZIN 10 MG: 10 TABLET, FILM COATED ORAL at 08:30

## 2023-09-29 RX ADMIN — FUROSEMIDE 40 MG: 10 INJECTION, SOLUTION INTRAMUSCULAR; INTRAVENOUS at 17:42

## 2023-09-29 RX ADMIN — HYDROMORPHONE HYDROCHLORIDE 1 MG: 1 INJECTION, SOLUTION INTRAMUSCULAR; INTRAVENOUS; SUBCUTANEOUS at 13:41

## 2023-09-29 RX ADMIN — TAMSULOSIN HYDROCHLORIDE 0.4 MG: 0.4 CAPSULE ORAL at 08:30

## 2023-09-29 ASSESSMENT — PAIN DESCRIPTION - FREQUENCY
FREQUENCY: CONTINUOUS
FREQUENCY: CONTINUOUS

## 2023-09-29 ASSESSMENT — ENCOUNTER SYMPTOMS
EYE ITCHING: 0
APNEA: 0
ANAL BLEEDING: 0
ABDOMINAL PAIN: 1
NAUSEA: 0
VOMITING: 0
PHOTOPHOBIA: 0
CONSTIPATION: 0
SORE THROAT: 0
STRIDOR: 0
CHOKING: 0
SHORTNESS OF BREATH: 1
BACK PAIN: 0
RECTAL PAIN: 0
EYE REDNESS: 0
COLOR CHANGE: 0

## 2023-09-29 ASSESSMENT — PAIN DESCRIPTION - LOCATION
LOCATION: ABDOMEN
LOCATION: ABDOMEN;HIP
LOCATION: ABDOMEN
LOCATION: ABDOMEN;BACK
LOCATION: ABDOMEN

## 2023-09-29 ASSESSMENT — PAIN SCALES - GENERAL
PAINLEVEL_OUTOF10: 7
PAINLEVEL_OUTOF10: 7
PAINLEVEL_OUTOF10: 2
PAINLEVEL_OUTOF10: 1
PAINLEVEL_OUTOF10: 7
PAINLEVEL_OUTOF10: 8
PAINLEVEL_OUTOF10: 8
PAINLEVEL_OUTOF10: 7
PAINLEVEL_OUTOF10: 0
PAINLEVEL_OUTOF10: 7
PAINLEVEL_OUTOF10: 0
PAINLEVEL_OUTOF10: 0

## 2023-09-29 ASSESSMENT — PAIN DESCRIPTION - ORIENTATION
ORIENTATION: RIGHT
ORIENTATION: MID

## 2023-09-29 ASSESSMENT — PAIN DESCRIPTION - DESCRIPTORS
DESCRIPTORS: ACHING
DESCRIPTORS: ACHING;TENDER;SORE
DESCRIPTORS: SORE;TENDER
DESCRIPTORS: SORE;TENDER
DESCRIPTORS: ACHING
DESCRIPTORS: ACHING
DESCRIPTORS: ACHING;TENDER;SORE
DESCRIPTORS: ACHING;SORE;TENDER
DESCRIPTORS: ACHING

## 2023-09-29 ASSESSMENT — PAIN DESCRIPTION - DIRECTION
RADIATING_TOWARDS: NO
RADIATING_TOWARDS: NO

## 2023-09-29 ASSESSMENT — PAIN DESCRIPTION - PAIN TYPE
TYPE: SURGICAL PAIN
TYPE: SURGICAL PAIN

## 2023-09-29 ASSESSMENT — PAIN DESCRIPTION - ONSET
ONSET: GRADUAL
ONSET: GRADUAL

## 2023-09-29 NOTE — PLAN OF CARE
Problem: Chronic Conditions and Co-morbidities  Goal: Patient's chronic conditions and co-morbidity symptoms are monitored and maintained or improved  Outcome: Progressing     Problem: Discharge Planning  Goal: Discharge to home or other facility with appropriate resources  Outcome: Progressing     Problem: Pain  Goal: Verbalizes/displays adequate comfort level or baseline comfort level  Outcome: Progressing     Problem: Safety - Adult  Goal: Free from fall injury  Outcome: Progressing     Problem: Respiratory - Adult  Goal: Achieves optimal ventilation and oxygenation  Outcome: Progressing     Problem: ABCDS Injury Assessment  Goal: Absence of physical injury  Outcome: Progressing

## 2023-09-29 NOTE — PROGRESS NOTES
Physical Therapy  Facility/Department: [facility] ICU STEPDOWN  Physical Therapy Initial Assessment    Name: Reina Blanco  : 1960  MRN: [id number]  Date of Service: 2023    Discharge Recommendations:  24 hour supervision or assist, Home with Home health PT        Functional Outcome Measure:    Acute Care Index of Function (ACIF):    Score: 0.94 (0.71 or greater = appropriate for home with home PT and 24 hour supervision/assist)        Patient Diagnosis(es): incarcerated diaphragmatic Hernia without obstruction and s/p  Robotic/laparoscopic assisted incarcerated preperitoneal diaphragmatic hernia repai  Past Medical History:  has a past medical history of A-fib ([address]), Abnormal echocardiogram, Anesthesia complication, Ankle fracture, left, Aortic root dilation (HCC), Arm fracture, right, AVM (arteriovenous malformation), CHF (congestive heart failure) ([address]), COPD (chronic obstructive pulmonary disease) ([address]), Diastolic dysfunction, Epididymal cyst, H/O cardiac catheterization, H/O cardiac catheterization, H/O cardiovascular stress test, H/O chest pain, H/O echocardiogram, Hemorrhoids, HTN (hypertension), Hx of echocardiogram, Hydrocele, Hyperlipemia, Irregular heart rhythm, Low left ventricular ejection fraction, Mitral valve prolapse, Palpitations, Pneumonia, Pulmonary HTN ([address]), Right inguinal hernia, Severe aortic regurgitation by prior echocardiogram, SOB (shortness of breath), and Tricuspid regurgitation. Past Surgical History:  has a past surgical history that includes Cardiac catheterization (1991); Vasectomy (1988); Tympanostomy tube placement (2009); other surgical history (2013); Inguinal hernia repair (Bilateral, 1992); Inguinal hernia repair (Right, 2013); Colonoscopy (06/15/2015); Colonoscopy (N/A, 2022); Aortic valve replacement (N/A, 2023); and diaphragmatic hernia repair (Right, 2023).     Assessment   Body Structures, Functions, Activity Education  Patient Education  Education Given To: Patient  Education Provided: Role of Therapy; Energy Conservation;Plan of Care;IADL Safety; ADL Adaptive Strategies; Equipment;Transfer Training  Education Method: Demonstration;Verbal  Education Outcome: Verbalized understanding;Demonstrated understanding      Time In: 1155  Time Out: 1851  Total Treatment Time: 20  Timed Code Treatment Minutes: [facility] Janett Frausto DPT  License #: [id number]

## 2023-09-29 NOTE — H&P
V2.0  History and Physical      Name:  Yolette Deluca /Age/Sex: 1960  (58 y.o. male)   MRN & CSN:  [id number] & [id number] Encounter Date/Time: 2023 3:16 PM EDT   Location:  6106/9890-L PCP: Slim Rai MD       Hospital Day: 4      History of Present Illness:     Chief Complaint: SOB    Yolette Deluca is a 58 y.o. male with PMH of large diaphragmatic hernia on the right side, systolic CHF, AR s/p aortic valve replacement, atrial fibrillation who underwent Post-Op robotic assisted right diaphragmatic hernia repair  for  incarcerated diaphragmatic Hernia without obstruction. Pt tolerated procedure well but has been having increased oxygen requirement. Pt is currently on vapotherm. Pulm was consulted by general surgery. Received consult by general surgery to take over care given pt's persistent hypoxia. Patient seen and examined at bedside. States he feels better, dry cough. Pt reports high urine amount since was started on lasix IV. Pt's states pain is controlled with current pain regimen. Denies n/v. Assessment and Plan:   Acute hypoxic respiratory failure suspect 2/2 COPD excarbation, recent surgery and possibly volume overload. O2 coming down now on vapotherm 35L 80%. Pulm following pt. Continue lasix 40 mg IV BID, steroids per pulm, breathing tx, IS, wean O2 as tolerated, await tomorrow CXR, if no improvement, recommend getting CT CHEST, resume home meds    Atrial fibrillation s/p DCCV on amiodarone, Eliquis. Continue amiodarone, resume Eliquis, discussed with general surgery. Hx of aortic regurgitation. S/p aortic valve replacement,. Continue home metoprolol 50 mg BID. Personally reviewed lab studies and imaging. EKG interpreted personally and results as above. Imaging that was interpreted personally and results as above.   Drugs that require monitoring for toxicity include eliquis and the method of monitoring was cbc    Disposition:   Current Living situation: [facility] 135*     Lipids:   Lab Results   Component Value Date/Time    CHOL 193 03/10/2021 09:34 AM    CHOL 200 02/25/2019 01:05 PM    HDL 39 03/10/2021 09:34 AM    TRIG 62 03/10/2021 09:34 AM     Hemoglobin A1C:   Lab Results   Component Value Date/Time    LABA1C 5.4 06/15/2023 03:45 PM     TSH: No results found for: \"TSH\"  Troponin:   Lab Results   Component Value Date/Time    TROPONINT <0.010 03/21/2023 09:35 AM     Lactic Acid: No results for input(s): \"LACTA\" in the last 72 hours. BNP:   Recent Labs     09/27/23  2235   PROBNP 2,614*     UA:  Lab Results   Component Value Date/Time    NITRU NEGATIVE 06/15/2023 03:45 PM    COLORU YELLOW 06/15/2023 03:45 PM    CLARITYU CLEAR 06/15/2023 03:45 PM    SPECGRAV 1.015 06/15/2023 03:45 PM    LEUKOCYTESUR NEGATIVE 06/15/2023 03:45 PM    UROBILINOGEN 0.2 06/15/2023 03:45 PM    BILIRUBINUR NEGATIVE 06/15/2023 03:45 PM    BLOODU NEGATIVE 06/15/2023 03:45 PM    KETUA NEGATIVE 06/15/2023 03:45 PM     Urine Cultures: No results found for: \"LABURIN\"  Blood Cultures: No results found for: \"BC\"  No results found for: \"BLOODCULT2\"  Organism: No results found for: \"ORG\"    Imaging/Diagnostics Last 24 Hours   XR CHEST PORTABLE    Result Date: 9/29/2023  EXAMINATION: ONE XRAY VIEW OF THE CHEST 9/29/2023 10:32 am COMPARISON: 09/27/2023, 09/26/2023 HISTORY: ORDERING SYSTEM PROVIDED HISTORY: Hypoxia TECHNOLOGIST PROVIDED HISTORY: Reason for exam:->Hypoxia Reason for Exam: Hypoxia FINDINGS: The cardiomediastinal silhouette is unchanged. Similar mild central vascular congestion. Bibasilar opacities and suspected trace bilateral pleural effusions/scarring are unchanged. No discrete pneumothorax on this limited portable study. Prior sternotomy. Similar appearance of the chest including bibasilar atelectasis versus infiltrates, small bilateral pleural effusions, and central vascular congestion.      XR CHEST PORTABLE    Result Date: 9/28/2023  EXAMINATION: ONE XRAY VIEW OF THE CHEST

## 2023-09-29 NOTE — PROGRESS NOTES
Pt has increased oxygenation requirements this afternoon. Pt o2 sat maintaining 87-89%. Pt encouraged to do cough deep breathing, prn albuterol treatment given by RT. RN asked pt if he would like to be on a bipap. Pt stated emphatically no. Pt stated that he was starting to feel tired. RN requested that pt let rn know if he gets to tired out as he does not want to do bipap intubation would be the next step. He said he did not want to pursue that. RN notified Dr Forrest Perkins and Dr Patricia Peraza of current oxygenation issues. RN requested nose spray from Dr Forrest Perkins. Dr Forrest Perkins to place order.

## 2023-09-29 NOTE — PROGRESS NOTES
is estimated at 20-25% Global hypokinesis noted.  Left ventricle size is normal.    Hydrocele 10/2011    U/S    Hyperlipemia     Irregular heart rhythm     after induction for surgery 9/5/2013 - pt developed irreg rhythm and surgery cancelled\"had another echo and stress test and everything checked out ok, they do not know the reason it happened\"- cardiac clearance obtained for surgery 9/19/2013    Low left ventricular ejection fraction 12/16/2021    EF is estimated at 20-25%    Mitral valve prolapse 12/16/2021    anterior leaflet of the mitral valve is prolapsed    Palpitations     PVC's per patient    Pneumonia 2004    not since    Pulmonary HTN ([address]) 12/16/2021    Severe Pulmonary hypertension noted with RVSP of 69mmHg    Right inguinal hernia 08/22/2013    Severe aortic regurgitation by prior echocardiogram 12/16/2021    Severe aortic regurgitation; PHT: 194msec    SOB (shortness of breath) 03/27/2013    Nuclear Stress Normal, EF 49% (Dr. Lynne Valencia- 4/2/2013)     Tricuspid regurgitation 12/16/2021    moderate to severe tricuspid regurgitation       Subjective:  Patient states: \"I seem to heal pretty quickly\"  Pain:  denies  Communication with other providers: co-eval w/ PT, handoff to RN  Restrictions: General Precautions, Fall Risk    Home Setup/Prior level of function:  Social/Functional History  Lives With: Spouse  Type of Home: House  Home Layout: One level, Laundry in basement  Home Access: Stairs to enter without rails  Entrance Stairs - Number of Steps: 1  Bathroom Shower/Tub: Walk-in shower  Bathroom Toilet: Handicap height  Bathroom Equipment: Grab bars in shower, Shower chair  Bathroom Accessibility: Accessible  Home Equipment: Oxygen, Cane  ADL Assistance: Independent  Homemaking Assistance: Independent  Homemaking Responsibilities: Yes  Ambulation Assistance: Independent  Transfer Assistance: Independent  Active : Yes  Occupation: Retired     Examination:  Observation: Seated in [address] upon arrival, agreeable to therapy eval.  Vision: WFL  Hearing: WFL  Vitals: Stable vitals throughout session on 10L HFNC. SpO2 93% after ambulation      Body Systems and functions:  ROM: WFL   Strength: B UE grossly 4+/5 across all major joints   Sensation: WFL  Tone: Normal  Coordination: WFL  Perception: WNL      Cognitive and Psychosocial Functioning:  Overall cognitive status: alert and oriented, WFL  Affect: Normal       Functional Mobility:  Bed mobility:  NT - presented seated in recliner  Sitting balance:  SBA    Transfers: STS to/from recliner w/ SBA  Standing balance:  SBA w/o AD  Functional Mobility: ambulated functional household distance w/ SBA w/o AD. Limited in distance d/t HFNC  Toilet/Shower Transfers: NT        Activities of Daily Living (ADLs):  Feeding: set up A  Grooming: set up A  UB bathing: SBA  LB bathing: SBA  UB dressing: supervision  LB dressing: supervision  Toileting: SBA    *ADL determined per observation of functional mobility, balance, activity tolerance, cognition, or actual ADL performance. AM-PAC 6 click short form for inpatient daily activity:   How much help from another person does the patient currently need. .. Unable  Dep A Lot  Max A A Lot   Mod A A Little  Min A A Little   CGA  SBA None   Mod I  Indep  Sup   1. Putting on and taking off regular lower body clothing? [] 1    [] 2   [] 2   [] 3   [] 3   [x] 4      2. Bathing (including washing, rinsing, drying)? [] 1   [] 2   [] 2 [] 3 [x] 3 [] 4   3. Toileting, which includes using toilet, bedpan, or urinal? [] 1    [] 2   [] 2   [] 3   [x] 3   [] 4     4. Putting on and taking off regular upper body clothing? [] 1   [] 2   [] 2   [] 3   [] 3    [x] 4      5. Taking care of personal grooming such as brushing teeth? [] 1   [] 2    [] 2 [] 3    [] 3   [x] 4      6. Eating meals?    [] 1   [] 2   [] 2   [] 3   [] 3   [x] 4        Raw Score:  22     [24=0% impaired(CH), 23=1-19%(CI), 20-22=20-39%(CJ), 15-19=40-59%(CK),

## 2023-09-29 NOTE — PROGRESS NOTES
Pt ambulated to chair. Sitting up in chair at this time. Tolerated well. O2 sats maintaining in 90's as of 1130. Call light in reach.

## 2023-09-29 NOTE — CARE COORDINATION
CM reviewed chart and discussed in IDR. Pt is not medically ready at this time. Possible discharge in a few days.

## 2023-09-30 ENCOUNTER — APPOINTMENT (OUTPATIENT)
Dept: GENERAL RADIOLOGY | Age: 63
End: 2023-09-30
Attending: SURGERY
Payer: COMMERCIAL

## 2023-09-30 LAB
ANION GAP SERPL CALCULATED.3IONS-SCNC: 14 MMOL/L (ref 4–16)
BASE EXCESS MIXED: 16.5 (ref 0–1.2)
BASOPHILS ABSOLUTE: 0 K/CU MM
BASOPHILS RELATIVE PERCENT: 0.1 % (ref 0–1)
BUN SERPL-MCNC: 23 MG/DL (ref 6–23)
CALCIUM SERPL-MCNC: 9.5 MG/DL (ref 8.3–10.6)
CARBON MONOXIDE, BLOOD: 2.4 % (ref 0–5)
CHLORIDE BLD-SCNC: 92 MMOL/L (ref 99–110)
CO2 CONTENT: 46.1 MMOL/L (ref 19–24)
CO2: 32 MMOL/L (ref 21–32)
COMMENT: ABNORMAL
CREAT SERPL-MCNC: 0.7 MG/DL (ref 0.9–1.3)
DIFFERENTIAL TYPE: ABNORMAL
EOSINOPHILS ABSOLUTE: 0 K/CU MM
EOSINOPHILS RELATIVE PERCENT: 0 % (ref 0–3)
GFR SERPL CREATININE-BSD FRML MDRD: >60 ML/MIN/1.73M2
GLUCOSE BLD-MCNC: 118 MG/DL (ref 70–99)
GLUCOSE BLD-MCNC: 120 MG/DL (ref 70–99)
GLUCOSE BLD-MCNC: 128 MG/DL (ref 70–99)
GLUCOSE BLD-MCNC: 215 MG/DL (ref 70–99)
GLUCOSE SERPL-MCNC: 128 MG/DL (ref 70–99)
HCO3 ARTERIAL: 44.1 MMOL/L (ref 18–23)
HCT VFR BLD CALC: 44 % (ref 42–52)
HEMOGLOBIN: 12.8 GM/DL (ref 13.5–18)
IMMATURE NEUTROPHIL %: 0.3 % (ref 0–0.43)
LYMPHOCYTES ABSOLUTE: 1.5 K/CU MM
LYMPHOCYTES RELATIVE PERCENT: 15 % (ref 24–44)
MCH RBC QN AUTO: 23.9 PG (ref 27–31)
MCHC RBC AUTO-ENTMCNC: 29.1 % (ref 32–36)
MCV RBC AUTO: 82.2 FL (ref 78–100)
METHEMOGLOBIN ARTERIAL: 1.1 %
MONOCYTES ABSOLUTE: 0.7 K/CU MM
MONOCYTES RELATIVE PERCENT: 7.2 % (ref 0–4)
NUCLEATED RBC %: 0 %
O2 SATURATION: 89.8 % (ref 96–97)
PCO2 ARTERIAL: 65 MMHG (ref 32–45)
PDW BLD-RTO: 13.5 % (ref 11.7–14.9)
PH BLOOD: 7.44 (ref 7.34–7.45)
PLATELET # BLD: 192 K/CU MM (ref 140–440)
PMV BLD AUTO: 10.4 FL (ref 7.5–11.1)
PO2 ARTERIAL: 60 MMHG (ref 75–100)
POTASSIUM SERPL-SCNC: 4.4 MMOL/L (ref 3.5–5.1)
RBC # BLD: 5.35 M/CU MM (ref 4.6–6.2)
SEGMENTED NEUTROPHILS ABSOLUTE COUNT: 7.9 K/CU MM
SEGMENTED NEUTROPHILS RELATIVE PERCENT: 77.4 % (ref 36–66)
SODIUM BLD-SCNC: 138 MMOL/L (ref 135–145)
TOTAL IMMATURE NEUTOROPHIL: 0.03 K/CU MM
TOTAL NUCLEATED RBC: 0 K/CU MM
WBC # BLD: 10.2 K/CU MM (ref 4–10.5)

## 2023-09-30 PROCEDURE — 6360000002 HC RX W HCPCS: Performed by: PHYSICIAN ASSISTANT

## 2023-09-30 PROCEDURE — 87205 SMEAR GRAM STAIN: CPT

## 2023-09-30 PROCEDURE — 85025 COMPLETE CBC W/AUTO DIFF WBC: CPT

## 2023-09-30 PROCEDURE — 94761 N-INVAS EAR/PLS OXIMETRY MLT: CPT

## 2023-09-30 PROCEDURE — 6370000000 HC RX 637 (ALT 250 FOR IP): Performed by: PHYSICIAN ASSISTANT

## 2023-09-30 PROCEDURE — 71045 X-RAY EXAM CHEST 1 VIEW: CPT

## 2023-09-30 PROCEDURE — 6360000002 HC RX W HCPCS: Performed by: INTERNAL MEDICINE

## 2023-09-30 PROCEDURE — 99024 POSTOP FOLLOW-UP VISIT: CPT | Performed by: NURSE PRACTITIONER

## 2023-09-30 PROCEDURE — 6370000000 HC RX 637 (ALT 250 FOR IP): Performed by: STUDENT IN AN ORGANIZED HEALTH CARE EDUCATION/TRAINING PROGRAM

## 2023-09-30 PROCEDURE — 36600 WITHDRAWAL OF ARTERIAL BLOOD: CPT

## 2023-09-30 PROCEDURE — 2060000000 HC ICU INTERMEDIATE R&B

## 2023-09-30 PROCEDURE — P9047 ALBUMIN (HUMAN), 25%, 50ML: HCPCS | Performed by: INTERNAL MEDICINE

## 2023-09-30 PROCEDURE — 99232 SBSQ HOSP IP/OBS MODERATE 35: CPT | Performed by: INTERNAL MEDICINE

## 2023-09-30 PROCEDURE — 94640 AIRWAY INHALATION TREATMENT: CPT

## 2023-09-30 PROCEDURE — 82962 GLUCOSE BLOOD TEST: CPT

## 2023-09-30 PROCEDURE — 87070 CULTURE OTHR SPECIMN AEROBIC: CPT

## 2023-09-30 PROCEDURE — 97530 THERAPEUTIC ACTIVITIES: CPT

## 2023-09-30 PROCEDURE — 82803 BLOOD GASES ANY COMBINATION: CPT

## 2023-09-30 PROCEDURE — 97116 GAIT TRAINING THERAPY: CPT

## 2023-09-30 PROCEDURE — 6370000000 HC RX 637 (ALT 250 FOR IP): Performed by: INTERNAL MEDICINE

## 2023-09-30 PROCEDURE — 2580000003 HC RX 258: Performed by: PHYSICIAN ASSISTANT

## 2023-09-30 PROCEDURE — APPNB15 APP NON BILLABLE TIME 0-15 MINS: Performed by: NURSE PRACTITIONER

## 2023-09-30 PROCEDURE — 80048 BASIC METABOLIC PNL TOTAL CA: CPT

## 2023-09-30 PROCEDURE — 36415 COLL VENOUS BLD VENIPUNCTURE: CPT

## 2023-09-30 PROCEDURE — 2700000000 HC OXYGEN THERAPY PER DAY

## 2023-09-30 RX ORDER — FUROSEMIDE 10 MG/ML
40 INJECTION INTRAMUSCULAR; INTRAVENOUS EVERY 8 HOURS
Status: DISCONTINUED | OUTPATIENT
Start: 2023-09-30 | End: 2023-10-02

## 2023-09-30 RX ORDER — ALBUMIN (HUMAN) 12.5 G/50ML
25 SOLUTION INTRAVENOUS EVERY 8 HOURS
Status: COMPLETED | OUTPATIENT
Start: 2023-09-30 | End: 2023-10-01

## 2023-09-30 RX ADMIN — ASPIRIN 81 MG CHEWABLE TABLET 81 MG: 81 TABLET CHEWABLE at 08:54

## 2023-09-30 RX ADMIN — EMPAGLIFLOZIN 10 MG: 10 TABLET, FILM COATED ORAL at 08:54

## 2023-09-30 RX ADMIN — TAMSULOSIN HYDROCHLORIDE 0.4 MG: 0.4 CAPSULE ORAL at 08:54

## 2023-09-30 RX ADMIN — ATORVASTATIN CALCIUM 40 MG: 40 TABLET, FILM COATED ORAL at 20:43

## 2023-09-30 RX ADMIN — BUDESONIDE AND FORMOTEROL FUMARATE DIHYDRATE 2 PUFF: 160; 4.5 AEROSOL RESPIRATORY (INHALATION) at 07:44

## 2023-09-30 RX ADMIN — FUROSEMIDE 40 MG: 10 INJECTION, SOLUTION INTRAMUSCULAR; INTRAVENOUS at 18:18

## 2023-09-30 RX ADMIN — ALBUMIN (HUMAN) 25 G: 0.25 INJECTION, SOLUTION INTRAVENOUS at 14:03

## 2023-09-30 RX ADMIN — APIXABAN 5 MG: 5 TABLET, FILM COATED ORAL at 08:54

## 2023-09-30 RX ADMIN — METOPROLOL TARTRATE 50 MG: 50 TABLET, FILM COATED ORAL at 20:43

## 2023-09-30 RX ADMIN — IPRATROPIUM BROMIDE AND ALBUTEROL SULFATE 1 DOSE: .5; 2.5 SOLUTION RESPIRATORY (INHALATION) at 23:31

## 2023-09-30 RX ADMIN — HYDROMORPHONE HYDROCHLORIDE 1 MG: 1 INJECTION, SOLUTION INTRAMUSCULAR; INTRAVENOUS; SUBCUTANEOUS at 05:00

## 2023-09-30 RX ADMIN — SODIUM CHLORIDE, PRESERVATIVE FREE 10 ML: 5 INJECTION INTRAVENOUS at 08:55

## 2023-09-30 RX ADMIN — SODIUM CHLORIDE, PRESERVATIVE FREE 10 ML: 5 INJECTION INTRAVENOUS at 05:00

## 2023-09-30 RX ADMIN — OXYMETAZOLINE HYDROCHLORIDE 2 SPRAY: 0.05 SPRAY NASAL at 08:55

## 2023-09-30 RX ADMIN — METHYLPREDNISOLONE SODIUM SUCCINATE 40 MG: 40 INJECTION, POWDER, FOR SOLUTION INTRAMUSCULAR; INTRAVENOUS at 08:54

## 2023-09-30 RX ADMIN — APIXABAN 5 MG: 5 TABLET, FILM COATED ORAL at 20:43

## 2023-09-30 RX ADMIN — OXYCODONE HYDROCHLORIDE 5 MG: 5 TABLET ORAL at 14:14

## 2023-09-30 RX ADMIN — BUDESONIDE AND FORMOTEROL FUMARATE DIHYDRATE 2 PUFF: 160; 4.5 AEROSOL RESPIRATORY (INHALATION) at 23:31

## 2023-09-30 RX ADMIN — SODIUM CHLORIDE, PRESERVATIVE FREE 10 ML: 5 INJECTION INTRAVENOUS at 20:42

## 2023-09-30 RX ADMIN — HYDROMORPHONE HYDROCHLORIDE 1 MG: 1 INJECTION, SOLUTION INTRAMUSCULAR; INTRAVENOUS; SUBCUTANEOUS at 09:05

## 2023-09-30 RX ADMIN — IPRATROPIUM BROMIDE AND ALBUTEROL SULFATE 1 DOSE: .5; 2.5 SOLUTION RESPIRATORY (INHALATION) at 15:21

## 2023-09-30 RX ADMIN — ALBUMIN (HUMAN) 25 G: 0.25 INJECTION, SOLUTION INTRAVENOUS at 20:52

## 2023-09-30 RX ADMIN — FUROSEMIDE 40 MG: 10 INJECTION, SOLUTION INTRAMUSCULAR; INTRAVENOUS at 08:54

## 2023-09-30 RX ADMIN — METOPROLOL TARTRATE 50 MG: 50 TABLET, FILM COATED ORAL at 08:53

## 2023-09-30 RX ADMIN — HYDROMORPHONE HYDROCHLORIDE 1 MG: 1 INJECTION, SOLUTION INTRAMUSCULAR; INTRAVENOUS; SUBCUTANEOUS at 18:26

## 2023-09-30 RX ADMIN — AMIODARONE HYDROCHLORIDE 200 MG: 200 TABLET ORAL at 08:54

## 2023-09-30 RX ADMIN — POTASSIUM CHLORIDE 10 MEQ: 750 TABLET, FILM COATED, EXTENDED RELEASE ORAL at 08:54

## 2023-09-30 RX ADMIN — IPRATROPIUM BROMIDE AND ALBUTEROL SULFATE 1 DOSE: .5; 2.5 SOLUTION RESPIRATORY (INHALATION) at 11:57

## 2023-09-30 RX ADMIN — HYDROMORPHONE HYDROCHLORIDE 1 MG: 1 INJECTION, SOLUTION INTRAMUSCULAR; INTRAVENOUS; SUBCUTANEOUS at 23:10

## 2023-09-30 RX ADMIN — IPRATROPIUM BROMIDE AND ALBUTEROL SULFATE 1 DOSE: .5; 2.5 SOLUTION RESPIRATORY (INHALATION) at 07:43

## 2023-09-30 ASSESSMENT — PAIN DESCRIPTION - ORIENTATION
ORIENTATION: MID

## 2023-09-30 ASSESSMENT — PAIN DESCRIPTION - DESCRIPTORS
DESCRIPTORS: ACHING
DESCRIPTORS: SORE;ACHING
DESCRIPTORS: ACHING

## 2023-09-30 ASSESSMENT — ENCOUNTER SYMPTOMS
APNEA: 0
EYE ITCHING: 0
STRIDOR: 0
SHORTNESS OF BREATH: 1
CONSTIPATION: 0
SORE THROAT: 0
PHOTOPHOBIA: 0
VOMITING: 0
ABDOMINAL PAIN: 1
BACK PAIN: 0
RECTAL PAIN: 0
ANAL BLEEDING: 0
COLOR CHANGE: 0
EYE REDNESS: 0
NAUSEA: 0
CHOKING: 0

## 2023-09-30 ASSESSMENT — PAIN DESCRIPTION - ONSET
ONSET: ON-GOING

## 2023-09-30 ASSESSMENT — PAIN DESCRIPTION - LOCATION
LOCATION: ABDOMEN;BACK
LOCATION: ABDOMEN
LOCATION: ABDOMEN
LOCATION: ABDOMEN;BACK
LOCATION: HIP;BACK;ABDOMEN

## 2023-09-30 ASSESSMENT — PAIN SCALES - GENERAL
PAINLEVEL_OUTOF10: 6
PAINLEVEL_OUTOF10: 6
PAINLEVEL_OUTOF10: 0
PAINLEVEL_OUTOF10: 8
PAINLEVEL_OUTOF10: 8
PAINLEVEL_OUTOF10: 7
PAINLEVEL_OUTOF10: 7

## 2023-09-30 ASSESSMENT — PAIN - FUNCTIONAL ASSESSMENT
PAIN_FUNCTIONAL_ASSESSMENT: ACTIVITIES ARE NOT PREVENTED

## 2023-09-30 ASSESSMENT — PAIN DESCRIPTION - PAIN TYPE
TYPE: SURGICAL PAIN;CHRONIC PAIN
TYPE: SURGICAL PAIN;CHRONIC PAIN
TYPE: SURGICAL PAIN

## 2023-09-30 ASSESSMENT — PAIN DESCRIPTION - FREQUENCY
FREQUENCY: CONTINUOUS

## 2023-09-30 NOTE — PLAN OF CARE
Problem: Chronic Conditions and Co-morbidities  Goal: Patient's chronic conditions and co-morbidity symptoms are monitored and maintained or improved  Outcome: Progressing  Flowsheets (Taken 9/30/2023 0800)  Care Plan - Patient's Chronic Conditions and Co-Morbidity Symptoms are Monitored and Maintained or Improved:   Monitor and assess patient's chronic conditions and comorbid symptoms for stability, deterioration, or improvement   Collaborate with multidisciplinary team to address chronic and comorbid conditions and prevent exacerbation or deterioration   Update acute care plan with appropriate goals if chronic or comorbid symptoms are exacerbated and prevent overall improvement and discharge     Problem: Discharge Planning  Goal: Discharge to home or other facility with appropriate resources  Outcome: Progressing  Flowsheets (Taken 9/30/2023 0800)  Discharge to home or other facility with appropriate resources:   Identify barriers to discharge with patient and caregiver   Arrange for needed discharge resources and transportation as appropriate   Identify discharge learning needs (meds, wound care, etc)   Refer to discharge planning if patient needs post-hospital services based on physician order or complex needs related to functional status, cognitive ability or social support system     Problem: Pain  Goal: Verbalizes/displays adequate comfort level or baseline comfort level  Outcome: Progressing  Flowsheets (Taken 9/30/2023 0850)  Verbalizes/displays adequate comfort level or baseline comfort level:   Encourage patient to monitor pain and request assistance   Assess pain using appropriate pain scale   Administer analgesics based on type and severity of pain and evaluate response   Implement non-pharmacological measures as appropriate and evaluate response   Consider cultural and social influences on pain and pain management   Notify Licensed Independent Practitioner if interventions unsuccessful or patient reports new pain     Problem: Safety - Adult  Goal: Free from fall injury  Outcome: Progressing     Problem: Respiratory - Adult  Goal: Achieves optimal ventilation and oxygenation  Outcome: Progressing  Flowsheets (Taken 9/30/2023 0800)  Achieves optimal ventilation and oxygenation:   Assess for changes in respiratory status   Assess for changes in mentation and behavior   Position to facilitate oxygenation and minimize respiratory effort   Oxygen supplementation based on oxygen saturation or arterial blood gases   Initiate smoking cessation protocol as indicated   Encourage broncho-pulmonary hygiene including cough, deep breathe, incentive spirometry   Assess the need for suctioning and aspirate as needed   Assess and instruct to report shortness of breath or any respiratory difficulty   Respiratory therapy support as indicated     Problem: ABCDS Injury Assessment  Goal: Absence of physical injury  Outcome: Progressing

## 2023-10-01 ENCOUNTER — APPOINTMENT (OUTPATIENT)
Dept: GENERAL RADIOLOGY | Age: 63
End: 2023-10-01
Attending: SURGERY
Payer: COMMERCIAL

## 2023-10-01 LAB
ANION GAP SERPL CALCULATED.3IONS-SCNC: 13 MMOL/L (ref 4–16)
BASOPHILS ABSOLUTE: 0 K/CU MM
BASOPHILS RELATIVE PERCENT: 0.1 % (ref 0–1)
BUN SERPL-MCNC: 33 MG/DL (ref 6–23)
CALCIUM SERPL-MCNC: 9.7 MG/DL (ref 8.3–10.6)
CHLORIDE BLD-SCNC: 94 MMOL/L (ref 99–110)
CO2: 34 MMOL/L (ref 21–32)
CREAT SERPL-MCNC: 0.8 MG/DL (ref 0.9–1.3)
DIFFERENTIAL TYPE: ABNORMAL
EOSINOPHILS ABSOLUTE: 0 K/CU MM
EOSINOPHILS RELATIVE PERCENT: 0 % (ref 0–3)
GFR SERPL CREATININE-BSD FRML MDRD: >60 ML/MIN/1.73M2
GLUCOSE BLD-MCNC: 124 MG/DL (ref 70–99)
GLUCOSE BLD-MCNC: 127 MG/DL (ref 70–99)
GLUCOSE BLD-MCNC: 148 MG/DL (ref 70–99)
GLUCOSE BLD-MCNC: 155 MG/DL (ref 70–99)
GLUCOSE SERPL-MCNC: 111 MG/DL (ref 70–99)
HCT VFR BLD CALC: 42.8 % (ref 42–52)
HEMOGLOBIN: 12.5 GM/DL (ref 13.5–18)
IMMATURE NEUTROPHIL %: 0.4 % (ref 0–0.43)
LYMPHOCYTES ABSOLUTE: 1.8 K/CU MM
LYMPHOCYTES RELATIVE PERCENT: 14.1 % (ref 24–44)
MCH RBC QN AUTO: 23.9 PG (ref 27–31)
MCHC RBC AUTO-ENTMCNC: 29.2 % (ref 32–36)
MCV RBC AUTO: 82 FL (ref 78–100)
MONOCYTES ABSOLUTE: 1 K/CU MM
MONOCYTES RELATIVE PERCENT: 7.8 % (ref 0–4)
NUCLEATED RBC %: 0 %
PDW BLD-RTO: 13.6 % (ref 11.7–14.9)
PLATELET # BLD: 217 K/CU MM (ref 140–440)
PMV BLD AUTO: 11 FL (ref 7.5–11.1)
POTASSIUM SERPL-SCNC: 4.4 MMOL/L (ref 3.5–5.1)
RBC # BLD: 5.22 M/CU MM (ref 4.6–6.2)
SEGMENTED NEUTROPHILS ABSOLUTE COUNT: 10.2 K/CU MM
SEGMENTED NEUTROPHILS RELATIVE PERCENT: 77.6 % (ref 36–66)
SODIUM BLD-SCNC: 141 MMOL/L (ref 135–145)
TOTAL IMMATURE NEUTOROPHIL: 0.05 K/CU MM
TOTAL NUCLEATED RBC: 0 K/CU MM
WBC # BLD: 13.1 K/CU MM (ref 4–10.5)

## 2023-10-01 PROCEDURE — 6360000002 HC RX W HCPCS: Performed by: PHYSICIAN ASSISTANT

## 2023-10-01 PROCEDURE — 6370000000 HC RX 637 (ALT 250 FOR IP): Performed by: PHYSICIAN ASSISTANT

## 2023-10-01 PROCEDURE — 94640 AIRWAY INHALATION TREATMENT: CPT

## 2023-10-01 PROCEDURE — 6370000000 HC RX 637 (ALT 250 FOR IP): Performed by: INTERNAL MEDICINE

## 2023-10-01 PROCEDURE — P9047 ALBUMIN (HUMAN), 25%, 50ML: HCPCS | Performed by: INTERNAL MEDICINE

## 2023-10-01 PROCEDURE — 71045 X-RAY EXAM CHEST 1 VIEW: CPT

## 2023-10-01 PROCEDURE — 6370000000 HC RX 637 (ALT 250 FOR IP): Performed by: STUDENT IN AN ORGANIZED HEALTH CARE EDUCATION/TRAINING PROGRAM

## 2023-10-01 PROCEDURE — 2060000000 HC ICU INTERMEDIATE R&B

## 2023-10-01 PROCEDURE — 2580000003 HC RX 258: Performed by: PHYSICIAN ASSISTANT

## 2023-10-01 PROCEDURE — 80048 BASIC METABOLIC PNL TOTAL CA: CPT

## 2023-10-01 PROCEDURE — 2700000000 HC OXYGEN THERAPY PER DAY

## 2023-10-01 PROCEDURE — 6360000002 HC RX W HCPCS: Performed by: INTERNAL MEDICINE

## 2023-10-01 PROCEDURE — 94761 N-INVAS EAR/PLS OXIMETRY MLT: CPT

## 2023-10-01 PROCEDURE — 85025 COMPLETE CBC W/AUTO DIFF WBC: CPT

## 2023-10-01 PROCEDURE — 36415 COLL VENOUS BLD VENIPUNCTURE: CPT

## 2023-10-01 PROCEDURE — 82962 GLUCOSE BLOOD TEST: CPT

## 2023-10-01 RX ADMIN — METHYLPREDNISOLONE SODIUM SUCCINATE 40 MG: 40 INJECTION, POWDER, FOR SOLUTION INTRAMUSCULAR; INTRAVENOUS at 08:29

## 2023-10-01 RX ADMIN — ASPIRIN 81 MG CHEWABLE TABLET 81 MG: 81 TABLET CHEWABLE at 08:26

## 2023-10-01 RX ADMIN — BUDESONIDE AND FORMOTEROL FUMARATE DIHYDRATE 2 PUFF: 160; 4.5 AEROSOL RESPIRATORY (INHALATION) at 23:17

## 2023-10-01 RX ADMIN — IPRATROPIUM BROMIDE AND ALBUTEROL SULFATE 1 DOSE: .5; 2.5 SOLUTION RESPIRATORY (INHALATION) at 23:16

## 2023-10-01 RX ADMIN — SODIUM CHLORIDE, PRESERVATIVE FREE 10 ML: 5 INJECTION INTRAVENOUS at 14:59

## 2023-10-01 RX ADMIN — FUROSEMIDE 40 MG: 10 INJECTION, SOLUTION INTRAMUSCULAR; INTRAVENOUS at 00:31

## 2023-10-01 RX ADMIN — EMPAGLIFLOZIN 10 MG: 10 TABLET, FILM COATED ORAL at 08:26

## 2023-10-01 RX ADMIN — HYDROMORPHONE HYDROCHLORIDE 1 MG: 1 INJECTION, SOLUTION INTRAMUSCULAR; INTRAVENOUS; SUBCUTANEOUS at 18:58

## 2023-10-01 RX ADMIN — FUROSEMIDE 40 MG: 10 INJECTION, SOLUTION INTRAMUSCULAR; INTRAVENOUS at 08:29

## 2023-10-01 RX ADMIN — ALBUMIN (HUMAN) 25 G: 0.25 INJECTION, SOLUTION INTRAVENOUS at 05:53

## 2023-10-01 RX ADMIN — HYDROMORPHONE HYDROCHLORIDE 1 MG: 1 INJECTION, SOLUTION INTRAMUSCULAR; INTRAVENOUS; SUBCUTANEOUS at 03:38

## 2023-10-01 RX ADMIN — FUROSEMIDE 40 MG: 10 INJECTION, SOLUTION INTRAMUSCULAR; INTRAVENOUS at 16:38

## 2023-10-01 RX ADMIN — OXYCODONE HYDROCHLORIDE 5 MG: 5 TABLET ORAL at 06:13

## 2023-10-01 RX ADMIN — OXYCODONE HYDROCHLORIDE 5 MG: 5 TABLET ORAL at 16:38

## 2023-10-01 RX ADMIN — SODIUM CHLORIDE, PRESERVATIVE FREE 10 ML: 5 INJECTION INTRAVENOUS at 18:58

## 2023-10-01 RX ADMIN — OXYCODONE HYDROCHLORIDE 5 MG: 5 TABLET ORAL at 00:22

## 2023-10-01 RX ADMIN — SODIUM CHLORIDE, PRESERVATIVE FREE 10 ML: 5 INJECTION INTRAVENOUS at 08:29

## 2023-10-01 RX ADMIN — SODIUM CHLORIDE, PRESERVATIVE FREE 10 ML: 5 INJECTION INTRAVENOUS at 10:39

## 2023-10-01 RX ADMIN — METOPROLOL TARTRATE 50 MG: 50 TABLET, FILM COATED ORAL at 20:37

## 2023-10-01 RX ADMIN — HYDROMORPHONE HYDROCHLORIDE 1 MG: 1 INJECTION, SOLUTION INTRAMUSCULAR; INTRAVENOUS; SUBCUTANEOUS at 10:39

## 2023-10-01 RX ADMIN — OXYCODONE HYDROCHLORIDE 5 MG: 5 TABLET ORAL at 20:37

## 2023-10-01 RX ADMIN — AMIODARONE HYDROCHLORIDE 200 MG: 200 TABLET ORAL at 08:28

## 2023-10-01 RX ADMIN — IPRATROPIUM BROMIDE AND ALBUTEROL SULFATE 1 DOSE: .5; 2.5 SOLUTION RESPIRATORY (INHALATION) at 11:17

## 2023-10-01 RX ADMIN — ATORVASTATIN CALCIUM 40 MG: 40 TABLET, FILM COATED ORAL at 20:36

## 2023-10-01 RX ADMIN — POTASSIUM CHLORIDE 10 MEQ: 750 TABLET, FILM COATED, EXTENDED RELEASE ORAL at 08:29

## 2023-10-01 RX ADMIN — OXYMETAZOLINE HYDROCHLORIDE 2 SPRAY: 0.05 SPRAY NASAL at 08:30

## 2023-10-01 RX ADMIN — APIXABAN 5 MG: 5 TABLET, FILM COATED ORAL at 08:28

## 2023-10-01 RX ADMIN — SODIUM CHLORIDE, PRESERVATIVE FREE 10 ML: 5 INJECTION INTRAVENOUS at 21:33

## 2023-10-01 RX ADMIN — BUDESONIDE AND FORMOTEROL FUMARATE DIHYDRATE 2 PUFF: 160; 4.5 AEROSOL RESPIRATORY (INHALATION) at 07:12

## 2023-10-01 RX ADMIN — HYDROMORPHONE HYDROCHLORIDE 1 MG: 1 INJECTION, SOLUTION INTRAMUSCULAR; INTRAVENOUS; SUBCUTANEOUS at 14:59

## 2023-10-01 RX ADMIN — SODIUM CHLORIDE, PRESERVATIVE FREE 10 ML: 5 INJECTION INTRAVENOUS at 16:38

## 2023-10-01 RX ADMIN — TAMSULOSIN HYDROCHLORIDE 0.4 MG: 0.4 CAPSULE ORAL at 08:29

## 2023-10-01 RX ADMIN — IPRATROPIUM BROMIDE AND ALBUTEROL SULFATE 1 DOSE: .5; 2.5 SOLUTION RESPIRATORY (INHALATION) at 07:09

## 2023-10-01 RX ADMIN — OXYCODONE HYDROCHLORIDE 5 MG: 5 TABLET ORAL at 12:10

## 2023-10-01 RX ADMIN — APIXABAN 5 MG: 5 TABLET, FILM COATED ORAL at 20:37

## 2023-10-01 RX ADMIN — METOPROLOL TARTRATE 50 MG: 50 TABLET, FILM COATED ORAL at 10:39

## 2023-10-01 RX ADMIN — IPRATROPIUM BROMIDE AND ALBUTEROL SULFATE 1 DOSE: .5; 2.5 SOLUTION RESPIRATORY (INHALATION) at 15:19

## 2023-10-01 RX ADMIN — MAGNESIUM HYDROXIDE 30 ML: 400 SUSPENSION ORAL at 15:05

## 2023-10-01 ASSESSMENT — PAIN DESCRIPTION - ONSET
ONSET: ON-GOING

## 2023-10-01 ASSESSMENT — PAIN DESCRIPTION - FREQUENCY
FREQUENCY: CONTINUOUS

## 2023-10-01 ASSESSMENT — PAIN DESCRIPTION - DESCRIPTORS
DESCRIPTORS: ACHING;SORE
DESCRIPTORS: ACHING
DESCRIPTORS: ACHING;SORE
DESCRIPTORS: ACHING;SORE
DESCRIPTORS: SORE;ACHING
DESCRIPTORS: ACHING;SORE
DESCRIPTORS: ACHING;SORE

## 2023-10-01 ASSESSMENT — PAIN DESCRIPTION - LOCATION
LOCATION: ABDOMEN;BACK
LOCATION: ABDOMEN;BACK;LEG
LOCATION: ABDOMEN
LOCATION: ABDOMEN;BACK
LOCATION: ABDOMEN;BACK
LOCATION: ABDOMEN;BACK;HIP
LOCATION: ABDOMEN;BACK

## 2023-10-01 ASSESSMENT — PAIN DESCRIPTION - ORIENTATION
ORIENTATION: MID

## 2023-10-01 ASSESSMENT — PAIN - FUNCTIONAL ASSESSMENT
PAIN_FUNCTIONAL_ASSESSMENT: ACTIVITIES ARE NOT PREVENTED

## 2023-10-01 ASSESSMENT — PAIN SCALES - GENERAL
PAINLEVEL_OUTOF10: 8
PAINLEVEL_OUTOF10: 4
PAINLEVEL_OUTOF10: 7
PAINLEVEL_OUTOF10: 8
PAINLEVEL_OUTOF10: 8
PAINLEVEL_OUTOF10: 6
PAINLEVEL_OUTOF10: 4
PAINLEVEL_OUTOF10: 7
PAINLEVEL_OUTOF10: 8
PAINLEVEL_OUTOF10: 5
PAINLEVEL_OUTOF10: 7
PAINLEVEL_OUTOF10: 8

## 2023-10-01 ASSESSMENT — PAIN DESCRIPTION - PAIN TYPE
TYPE: SURGICAL PAIN
TYPE: SURGICAL PAIN;CHRONIC PAIN

## 2023-10-01 NOTE — PLAN OF CARE
Problem: Chronic Conditions and Co-morbidities  Goal: Patient's chronic conditions and co-morbidity symptoms are monitored and maintained or improved  Outcome: Progressing  Flowsheets (Taken 10/1/2023 0800)  Care Plan - Patient's Chronic Conditions and Co-Morbidity Symptoms are Monitored and Maintained or Improved:   Monitor and assess patient's chronic conditions and comorbid symptoms for stability, deterioration, or improvement   Collaborate with multidisciplinary team to address chronic and comorbid conditions and prevent exacerbation or deterioration   Update acute care plan with appropriate goals if chronic or comorbid symptoms are exacerbated and prevent overall improvement and discharge     Problem: Discharge Planning  Goal: Discharge to home or other facility with appropriate resources  Outcome: Progressing  Flowsheets (Taken 10/1/2023 0800)  Discharge to home or other facility with appropriate resources:   Identify barriers to discharge with patient and caregiver   Arrange for needed discharge resources and transportation as appropriate   Identify discharge learning needs (meds, wound care, etc)   Refer to discharge planning if patient needs post-hospital services based on physician order or complex needs related to functional status, cognitive ability or social support system     Problem: Pain  Goal: Verbalizes/displays adequate comfort level or baseline comfort level  Outcome: Progressing  Flowsheets (Taken 10/1/2023 0800)  Verbalizes/displays adequate comfort level or baseline comfort level:   Encourage patient to monitor pain and request assistance   Assess pain using appropriate pain scale   Administer analgesics based on type and severity of pain and evaluate response   Implement non-pharmacological measures as appropriate and evaluate response   Consider cultural and social influences on pain and pain management   Notify Licensed Independent Practitioner if interventions unsuccessful or patient

## 2023-10-01 NOTE — PROGRESS NOTES
V2.0    [facility] Progress Note      Name:  Minh Tinsley /Age/Sex: 1960  (58 y.o. male)   MRN & CSN:  [id number] & [id number] Encounter Date/Time: 10/1/2023 12:00 PM EDT   Location:  -A PCP: Emily Montoya MD     Attending:Leoncio Borden, [address] [place] [address] Day: 6    Assessment and Recommendations   Minh Tinsley is a 58 y.o. male  who presents with Diaphragmatic hernia with obstruction but no gangrene    Acute on chronic hypoxic respiratory failure  -Longstanding history of COPD on 6 L of home oxygen. Baseline saturation between 88 and 90  -Requiring increased oxygen requirements status post surgery for diaphragmatic hernia  -On Vapotherm. Wean down as tolerated  -Continue diuresis/breathing treatments/steroids  -Pulmonology following   -Continue lasix 40 mg IV BID, steroids per pulm,  -Repeat ABG showed pH of 7.44, PCO2 65, PO2 60     Atrial fibrillation  - s/p DCCV on amiodarone, Eliquis. Hx of aortic regurgitation. -S/p aortic valve replacement,. Continue home metoprolol 50 mg BID. Diet ADULT DIET; Regular; 4 carb choices (60 gm/meal)   DVT Prophylaxis [] Lovenox, []  Heparin, [] SCDs, [] Ambulation,  [] Eliquis, [] Xarelto  [] Coumadin   Code Status Full Code   Disposition From: Home  Expected Disposition: Home  Estimated Date of Discharge: TBD  Patient requires continued admission due to acute on chronic respiratory failure   Surrogate Decision Maker/ POA       Personally reviewed Lab Studies and Imaging           Subjective:     Chief Complaint:     Patient seen and examined at bedside this morning. Denies chest pain, nausea or vomiting, fever or chills. Breathing improving  Review of Systems:      Pertinent positives and negatives discussed in HPI    Objective:      Intake/Output Summary (Last 24 hours) at 10/1/2023 1052  Last data filed at 10/1/2023 1043  Gross per 24 hour   Intake 250 ml   Output 3375 ml   Net -3125 ml      Vitals:   Vitals:    10/01/23

## 2023-10-01 NOTE — PROGRESS NOTES
Pulmonary and Critical Care  Progress Note    Subjective: The patient is comfortable in bed. On HFNC  Shortness of breath has slightly improved  Chest pain none  Addressing respiratory complaints Patient is negative for  hemoptysis and cyanosis  CONSTITUTIONAL:  negative for fevers and chills      Past Medical History:     has a past medical history of A-fib ([address]), Abnormal echocardiogram, Anesthesia complication, Ankle fracture, left, Aortic root dilation (HCC), Arm fracture, right, AVM (arteriovenous malformation), CHF (congestive heart failure) ([address]), COPD (chronic obstructive pulmonary disease) ([address]), Diastolic dysfunction, Epididymal cyst, H/O cardiac catheterization, H/O cardiac catheterization, H/O cardiovascular stress test, H/O chest pain, H/O echocardiogram, Hemorrhoids, HTN (hypertension), Hx of echocardiogram, Hydrocele, Hyperlipemia, Irregular heart rhythm, Low left ventricular ejection fraction, Mitral valve prolapse, Palpitations, Pneumonia, Pulmonary HTN ([address]), Right inguinal hernia, Severe aortic regurgitation by prior echocardiogram, SOB (shortness of breath), and Tricuspid regurgitation. has a past surgical history that includes Cardiac catheterization (01/01/1991); Vasectomy (01/01/1988); Tympanostomy tube placement (01/01/2009); other surgical history (09/05/2013); Inguinal hernia repair (Bilateral, 01/01/1992); Inguinal hernia repair (Right, 09/19/2013); Colonoscopy (06/15/2015); Colonoscopy (N/A, 9/7/2022); Aortic valve replacement (N/A, 6/30/2023); and diaphragmatic hernia repair (Right, 9/26/2023). reports that he has never smoked. He has never used smokeless tobacco. He reports that he does not currently use alcohol. He reports that he does not use drugs.   Family history:  family history includes COPD in his father; Cancer in his father and mother; Diabetes in his mother; Heart Disease in his maternal grandfather and maternal grandmother; High Blood Pressure in his father and mother;

## 2023-10-01 NOTE — PROGRESS NOTES
Physical Therapy    Physical Therapy Treatment Note  Name: Vincent Magdaleno MRN: [id number] :   1960   Date:  2023   Admission Date: 2023 Room:  -A   Restrictions/Precautions:  Restrictions/Precautions  Restrictions/Precautions: General Precautions           Subjective:  Patient states:  \"I would like to get up to the chair\"  Pain:   Location, Type, Intensity (0/10 to 10/10):  denies; 0/10    Objective:    Observation:  pt supine in bed upon entry    Treatment, including education/measures:  Pt agreeable to participating in therapy at this time. Therapeutic Activity Training:   Therapeutic activity training was instructed today. Cues were given for safety, sequence, UE/LE placement, awareness, and balance. Activities performed today included bed mobility training, sup-sit, sit-stand, SPT. Pt completed supine <> sit transfers with SBA/supervision. Pt completed sit <> stand transfers from bed and chair with SBA/supervision. Gait Training:  Cues were given for safety, sequence, device management, balance, posture, awareness, path. Pt ambulated 15 feet x 6 trials with SBAx1 with a decreased gait speed and a decreased step length bilaterally. Pt provided with verbal cues to practice pursed lip breathing throughout. Pt provided with verbal and tactile cues to maintain upright posture in order to avoid COM shifting outside of [place]. Pt provided with verbal cues to take rest breaks as needed. Safety  Patient left safely in the chair, with call light/phone in reach with alarm applied. Gait belt was used for transfers and gait.       Assessment / Impression:    Patient's tolerance of treatment:  good; patient tolerated OOB mobility today   Adverse Reaction: none  Significant change in status and impact:  none  Barriers to improvement:  n/a; vapotherm/high flow oxygen limits ambulation distance/ambulation out of room    Plan for Next Session:    Continue progressing toward goals per

## 2023-10-01 NOTE — PROGRESS NOTES
notified: Pt's HR staying 56-58 right now,  would you like metoprolol given this morning? HR does jump to low 60's at times.   Thanks

## 2023-10-02 LAB
ANION GAP SERPL CALCULATED.3IONS-SCNC: 13 MMOL/L (ref 4–16)
BASE EXCESS MIXED: 23.5 (ref 0–1.2)
BUN SERPL-MCNC: 38 MG/DL (ref 6–23)
CALCIUM SERPL-MCNC: 10.2 MG/DL (ref 8.3–10.6)
CHLORIDE BLD-SCNC: 89 MMOL/L (ref 99–110)
CO2: 36 MMOL/L (ref 21–32)
COMMENT: ABNORMAL
CREAT SERPL-MCNC: 0.9 MG/DL (ref 0.9–1.3)
CULTURE: NORMAL
GFR SERPL CREATININE-BSD FRML MDRD: >60 ML/MIN/1.73M2
GLUCOSE BLD-MCNC: 101 MG/DL (ref 70–99)
GLUCOSE BLD-MCNC: 136 MG/DL (ref 70–99)
GLUCOSE BLD-MCNC: 140 MG/DL (ref 70–99)
GLUCOSE BLD-MCNC: 150 MG/DL (ref 70–99)
GLUCOSE SERPL-MCNC: 137 MG/DL (ref 70–99)
HCO3 VENOUS: 50.6 MMOL/L (ref 19–25)
HCT VFR BLD CALC: 46.5 % (ref 42–52)
HEMOGLOBIN: 13.8 GM/DL (ref 13.5–18)
Lab: NORMAL
MCH RBC QN AUTO: 24 PG (ref 27–31)
MCHC RBC AUTO-ENTMCNC: 29.7 % (ref 32–36)
MCV RBC AUTO: 80.9 FL (ref 78–100)
O2 SAT, VEN: 91.2 % (ref 50–70)
PCO2, VEN: 62 MMHG (ref 38–52)
PDW BLD-RTO: 13.8 % (ref 11.7–14.9)
PH VENOUS: 7.52 (ref 7.32–7.42)
PLATELET # BLD: 231 K/CU MM (ref 140–440)
PMV BLD AUTO: 10.7 FL (ref 7.5–11.1)
PO2, VEN: 69 MMHG (ref 28–48)
POTASSIUM SERPL-SCNC: 3.7 MMOL/L (ref 3.5–5.1)
RBC # BLD: 5.75 M/CU MM (ref 4.6–6.2)
SODIUM BLD-SCNC: 138 MMOL/L (ref 135–145)
SPECIMEN: NORMAL
WBC # BLD: 14.5 K/CU MM (ref 4–10.5)

## 2023-10-02 PROCEDURE — 2700000000 HC OXYGEN THERAPY PER DAY

## 2023-10-02 PROCEDURE — APPNB30 APP NON BILLABLE TIME 0-30 MINS: Performed by: PHYSICIAN ASSISTANT

## 2023-10-02 PROCEDURE — 6370000000 HC RX 637 (ALT 250 FOR IP): Performed by: STUDENT IN AN ORGANIZED HEALTH CARE EDUCATION/TRAINING PROGRAM

## 2023-10-02 PROCEDURE — 6370000000 HC RX 637 (ALT 250 FOR IP): Performed by: INTERNAL MEDICINE

## 2023-10-02 PROCEDURE — 6360000002 HC RX W HCPCS: Performed by: INTERNAL MEDICINE

## 2023-10-02 PROCEDURE — 2580000003 HC RX 258: Performed by: PHYSICIAN ASSISTANT

## 2023-10-02 PROCEDURE — 94761 N-INVAS EAR/PLS OXIMETRY MLT: CPT

## 2023-10-02 PROCEDURE — 99024 POSTOP FOLLOW-UP VISIT: CPT | Performed by: PHYSICIAN ASSISTANT

## 2023-10-02 PROCEDURE — 82962 GLUCOSE BLOOD TEST: CPT

## 2023-10-02 PROCEDURE — 6360000002 HC RX W HCPCS: Performed by: PHYSICIAN ASSISTANT

## 2023-10-02 PROCEDURE — 6370000000 HC RX 637 (ALT 250 FOR IP): Performed by: PHYSICIAN ASSISTANT

## 2023-10-02 PROCEDURE — 82805 BLOOD GASES W/O2 SATURATION: CPT

## 2023-10-02 PROCEDURE — 99232 SBSQ HOSP IP/OBS MODERATE 35: CPT | Performed by: INTERNAL MEDICINE

## 2023-10-02 PROCEDURE — 85027 COMPLETE CBC AUTOMATED: CPT

## 2023-10-02 PROCEDURE — 80048 BASIC METABOLIC PNL TOTAL CA: CPT

## 2023-10-02 PROCEDURE — 94640 AIRWAY INHALATION TREATMENT: CPT

## 2023-10-02 PROCEDURE — 94010 BREATHING CAPACITY TEST: CPT

## 2023-10-02 PROCEDURE — 36415 COLL VENOUS BLD VENIPUNCTURE: CPT

## 2023-10-02 PROCEDURE — 2060000000 HC ICU INTERMEDIATE R&B

## 2023-10-02 RX ORDER — FUROSEMIDE 10 MG/ML
40 INJECTION INTRAMUSCULAR; INTRAVENOUS DAILY
Status: DISCONTINUED | OUTPATIENT
Start: 2023-10-03 | End: 2023-10-06 | Stop reason: HOSPADM

## 2023-10-02 RX ADMIN — ASPIRIN 81 MG CHEWABLE TABLET 81 MG: 81 TABLET CHEWABLE at 08:07

## 2023-10-02 RX ADMIN — HYDROMORPHONE HYDROCHLORIDE 1 MG: 1 INJECTION, SOLUTION INTRAMUSCULAR; INTRAVENOUS; SUBCUTANEOUS at 13:06

## 2023-10-02 RX ADMIN — TIOTROPIUM BROMIDE INHALATION SPRAY 2 PUFF: 3.12 SPRAY, METERED RESPIRATORY (INHALATION) at 07:53

## 2023-10-02 RX ADMIN — HYDROMORPHONE HYDROCHLORIDE 1 MG: 1 INJECTION, SOLUTION INTRAMUSCULAR; INTRAVENOUS; SUBCUTANEOUS at 08:07

## 2023-10-02 RX ADMIN — METHYLPREDNISOLONE SODIUM SUCCINATE 40 MG: 40 INJECTION, POWDER, FOR SOLUTION INTRAMUSCULAR; INTRAVENOUS at 08:18

## 2023-10-02 RX ADMIN — BUDESONIDE AND FORMOTEROL FUMARATE DIHYDRATE 2 PUFF: 160; 4.5 AEROSOL RESPIRATORY (INHALATION) at 22:03

## 2023-10-02 RX ADMIN — IPRATROPIUM BROMIDE AND ALBUTEROL SULFATE 1 DOSE: .5; 2.5 SOLUTION RESPIRATORY (INHALATION) at 11:49

## 2023-10-02 RX ADMIN — OXYCODONE HYDROCHLORIDE 5 MG: 5 TABLET ORAL at 05:20

## 2023-10-02 RX ADMIN — IPRATROPIUM BROMIDE AND ALBUTEROL SULFATE 1 DOSE: .5; 2.5 SOLUTION RESPIRATORY (INHALATION) at 22:02

## 2023-10-02 RX ADMIN — FUROSEMIDE 40 MG: 10 INJECTION, SOLUTION INTRAMUSCULAR; INTRAVENOUS at 00:10

## 2023-10-02 RX ADMIN — HYDROMORPHONE HYDROCHLORIDE 1 MG: 1 INJECTION, SOLUTION INTRAMUSCULAR; INTRAVENOUS; SUBCUTANEOUS at 04:17

## 2023-10-02 RX ADMIN — EMPAGLIFLOZIN 10 MG: 10 TABLET, FILM COATED ORAL at 08:06

## 2023-10-02 RX ADMIN — OXYCODONE HYDROCHLORIDE 5 MG: 5 TABLET ORAL at 01:10

## 2023-10-02 RX ADMIN — OXYCODONE HYDROCHLORIDE 5 MG: 5 TABLET ORAL at 09:26

## 2023-10-02 RX ADMIN — HYDROMORPHONE HYDROCHLORIDE 1 MG: 1 INJECTION, SOLUTION INTRAMUSCULAR; INTRAVENOUS; SUBCUTANEOUS at 21:33

## 2023-10-02 RX ADMIN — SODIUM CHLORIDE, PRESERVATIVE FREE 10 ML: 5 INJECTION INTRAVENOUS at 20:57

## 2023-10-02 RX ADMIN — SODIUM CHLORIDE, PRESERVATIVE FREE 10 ML: 5 INJECTION INTRAVENOUS at 08:18

## 2023-10-02 RX ADMIN — METOPROLOL TARTRATE 50 MG: 50 TABLET, FILM COATED ORAL at 08:22

## 2023-10-02 RX ADMIN — BUDESONIDE AND FORMOTEROL FUMARATE DIHYDRATE 2 PUFF: 160; 4.5 AEROSOL RESPIRATORY (INHALATION) at 07:53

## 2023-10-02 RX ADMIN — TAMSULOSIN HYDROCHLORIDE 0.4 MG: 0.4 CAPSULE ORAL at 08:18

## 2023-10-02 RX ADMIN — POTASSIUM CHLORIDE 10 MEQ: 750 TABLET, FILM COATED, EXTENDED RELEASE ORAL at 08:18

## 2023-10-02 RX ADMIN — APIXABAN 5 MG: 5 TABLET, FILM COATED ORAL at 08:06

## 2023-10-02 RX ADMIN — ATORVASTATIN CALCIUM 40 MG: 40 TABLET, FILM COATED ORAL at 20:56

## 2023-10-02 RX ADMIN — FUROSEMIDE 40 MG: 10 INJECTION, SOLUTION INTRAMUSCULAR; INTRAVENOUS at 08:07

## 2023-10-02 RX ADMIN — MAGNESIUM HYDROXIDE 30 ML: 400 SUSPENSION ORAL at 08:05

## 2023-10-02 RX ADMIN — OXYCODONE HYDROCHLORIDE 5 MG: 5 TABLET ORAL at 19:01

## 2023-10-02 RX ADMIN — HYDROMORPHONE HYDROCHLORIDE 1 MG: 1 INJECTION, SOLUTION INTRAMUSCULAR; INTRAVENOUS; SUBCUTANEOUS at 00:10

## 2023-10-02 RX ADMIN — OXYCODONE HYDROCHLORIDE 5 MG: 5 TABLET ORAL at 14:58

## 2023-10-02 RX ADMIN — AMIODARONE HYDROCHLORIDE 200 MG: 200 TABLET ORAL at 08:06

## 2023-10-02 RX ADMIN — IPRATROPIUM BROMIDE AND ALBUTEROL SULFATE 1 DOSE: .5; 2.5 SOLUTION RESPIRATORY (INHALATION) at 07:53

## 2023-10-02 RX ADMIN — APIXABAN 5 MG: 5 TABLET, FILM COATED ORAL at 20:56

## 2023-10-02 RX ADMIN — METOPROLOL TARTRATE 50 MG: 50 TABLET, FILM COATED ORAL at 20:56

## 2023-10-02 RX ADMIN — HYDROMORPHONE HYDROCHLORIDE 1 MG: 1 INJECTION, SOLUTION INTRAMUSCULAR; INTRAVENOUS; SUBCUTANEOUS at 17:24

## 2023-10-02 RX ADMIN — OXYCODONE HYDROCHLORIDE 5 MG: 5 TABLET ORAL at 23:26

## 2023-10-02 RX ADMIN — IPRATROPIUM BROMIDE AND ALBUTEROL SULFATE 1 DOSE: .5; 2.5 SOLUTION RESPIRATORY (INHALATION) at 15:03

## 2023-10-02 ASSESSMENT — ENCOUNTER SYMPTOMS
SORE THROAT: 0
VOMITING: 0
COLOR CHANGE: 0
CONSTIPATION: 0
STRIDOR: 0
APNEA: 0
RECTAL PAIN: 0
BACK PAIN: 0
ABDOMINAL PAIN: 1
NAUSEA: 0
CHOKING: 0
SHORTNESS OF BREATH: 1
ANAL BLEEDING: 0
EYE ITCHING: 0
EYE REDNESS: 0
PHOTOPHOBIA: 0

## 2023-10-02 ASSESSMENT — COPD QUESTIONNAIRES
QUESTION1_COUGHFREQUENCY: 1
QUESTION8_ENERGYLEVEL: 1
QUESTION7_SLEEPQUALITY: 1
TOTAL_EXACERBATIONS_PASTYEAR: 1
QUESTION2_CHESTPHLEGM: 0
QUESTION4_WALKINCLINE: 2
CAT_TOTALSCORE: 7
QUESTION5_HOMEACTIVITIES: 1
QUESTION3_CHESTTIGHTNESS: 1
QUESTION6_LEAVINGHOUSE: 0
GOLD_GRADE: 3
GOLD_GROUP: GROUP B

## 2023-10-02 ASSESSMENT — PAIN SCALES - GENERAL
PAINLEVEL_OUTOF10: 5
PAINLEVEL_OUTOF10: 5
PAINLEVEL_OUTOF10: 7
PAINLEVEL_OUTOF10: 8
PAINLEVEL_OUTOF10: 7
PAINLEVEL_OUTOF10: 7
PAINLEVEL_OUTOF10: 6
PAINLEVEL_OUTOF10: 7
PAINLEVEL_OUTOF10: 5
PAINLEVEL_OUTOF10: 6
PAINLEVEL_OUTOF10: 7
PAINLEVEL_OUTOF10: 5

## 2023-10-02 ASSESSMENT — PAIN DESCRIPTION - LOCATION
LOCATION: ABDOMEN;BACK;HIP
LOCATION: ABDOMEN
LOCATION: BACK;ABDOMEN
LOCATION: ABDOMEN
LOCATION: BACK;ABDOMEN
LOCATION: ABDOMEN;BACK
LOCATION: BACK;ABDOMEN
LOCATION: ABDOMEN
LOCATION: ABDOMEN
LOCATION: ABDOMEN;BACK;HIP
LOCATION: BACK;ABDOMEN

## 2023-10-02 ASSESSMENT — PAIN DESCRIPTION - DESCRIPTORS
DESCRIPTORS: ACHING;CRAMPING;DISCOMFORT
DESCRIPTORS: DISCOMFORT
DESCRIPTORS: ACHING
DESCRIPTORS: ACHING;SORE
DESCRIPTORS: ACHING;DULL;SORE
DESCRIPTORS: ACHING;CRAMPING;SORE
DESCRIPTORS: ACHING;STABBING
DESCRIPTORS: ACHING;DISCOMFORT
DESCRIPTORS: ACHING;SORE
DESCRIPTORS: ACHING;CRAMPING;DISCOMFORT
DESCRIPTORS: CRAMPING;DISCOMFORT

## 2023-10-02 ASSESSMENT — PAIN DESCRIPTION - PAIN TYPE
TYPE: SURGICAL PAIN
TYPE: SURGICAL PAIN;CHRONIC PAIN
TYPE: CHRONIC PAIN;ACUTE PAIN
TYPE: ACUTE PAIN;CHRONIC PAIN
TYPE: CHRONIC PAIN;ACUTE PAIN

## 2023-10-02 ASSESSMENT — PAIN - FUNCTIONAL ASSESSMENT
PAIN_FUNCTIONAL_ASSESSMENT: ACTIVITIES ARE NOT PREVENTED
PAIN_FUNCTIONAL_ASSESSMENT: PREVENTS OR INTERFERES SOME ACTIVE ACTIVITIES AND ADLS
PAIN_FUNCTIONAL_ASSESSMENT: ACTIVITIES ARE NOT PREVENTED

## 2023-10-02 ASSESSMENT — PAIN DESCRIPTION - FREQUENCY
FREQUENCY: CONTINUOUS

## 2023-10-02 ASSESSMENT — PAIN DESCRIPTION - ORIENTATION
ORIENTATION: MID
ORIENTATION: MID;LOWER
ORIENTATION: MID;LOWER
ORIENTATION: MID
ORIENTATION: MID;LOWER
ORIENTATION: MID
ORIENTATION: MID

## 2023-10-02 ASSESSMENT — PAIN DESCRIPTION - ONSET
ONSET: ON-GOING

## 2023-10-02 ASSESSMENT — PULMONARY FUNCTION TESTS
PIF_VALUE: 120
FEV1 (%PREDICTED): 37
POST BRONCHODILATOR FEV1/FVC: 4

## 2023-10-02 NOTE — PROGRESS NOTES
30-39.9) 12/06/2022     Priority: Medium    Family history of colon cancer 09/07/2022     Priority: Medium    Benign neoplasm of transverse colon 09/07/2022     Priority: Medium    Benign neoplasm of sigmoid colon 09/07/2022     Priority: Medium    Benign neoplasm of rectosigmoid junction 09/07/2022     Priority: Medium    SOBOE (shortness of breath on exertion) 05/10/2022     Priority: Medium    Pure hypercholesterolemia      Priority: Medium     Overview Note:     The 10-year ASCVD risk score (Radha Res., et al., 2013) is: 19.2%    Values used to calculate the score:      Age: 61 years      Sex: Male      Is Non- : No      Diabetic: No      Tobacco smoker: No      Systolic Blood Pressure: 662 mmHg      Is BP treated: Yes      HDL Cholesterol: 32 mg/dL      Total Cholesterol: 223 mg/dL        Chronic neck pain 09/04/2012     Priority: Low    Seasonal and perennial allergic rhinitis 09/04/2012     Priority: Low    Diaphragmatic hernia with obstruction but no gangrene 09/26/2023    Major depressive disorder, recurrent, mild 09/19/2023    Major depressive disorder, recurrent, moderate 09/19/2023    Major depressive disorder, recurrent, unspecified 09/19/2023    THIERRY (obstructive sleep apnea) 09/19/2023    Hypersomnia 09/19/2023    PAF (paroxysmal atrial fibrillation) ([address]) 07/27/2023    Aortic valve regurgitation 07/09/2023    Aortic valve defect 06/30/2023    Type 2 diabetes mellitus 04/14/2023    Dilated aortic root ([address]) 03/28/2023    Other forms of angina pectoris 03/28/2023    Combined systolic and diastolic congestive heart failure ([address]) 01/20/2022    VHD (valvular heart disease) 01/20/2022    Pulmonary HTN ([address]) 01/20/2022    Chronic respiratory failure with hypoxia ([address]) 01/01/2022    PVC (premature ventricular contraction) 04/21/2021    ASCVD (arteriosclerotic cardiovascular disease)     Hypoxia 01/15/2021    Nocturia 12/09/2020    Murmur, heart 12/09/2020    On home oxygen therapy 12/09/2020     Incarcerated diaphragmatic Hernia without obstruction  Robotic/laparoscopic assisted incarcerated preperitoneal diaphragmatic hernia repair with mesh 10 cm   Post op Hypoxia- improving  S/p Hernia repair  Severe COPD  Acute on chronic Hypoxic resp failure- improving  Systolic CHF with EF of 39%  Obesity  Pulmonary congestion- improving  Small Bilateral Pleural effusion          C/w Lasix, will reassess in am  I/O chart  Keep sats > 88%  ICS  OOB  PT/OT  CHF optimization  Check Prealbumin  DVT and GI Prophylaxis  C.w present management  LTAC evaluation  Prognosis guarded  BIPAP qhs and prn    Electronically signed by Gwenevere Favre, MD on 10/2/2023 at 12:54 PM

## 2023-10-02 NOTE — CARE COORDINATION
CM reviewed chart and discussed in IDR. Pt is not medically ready at this time. Plan home with spouse and [facility].

## 2023-10-02 NOTE — PROGRESS NOTES
V2.0    [facility] Progress Note      Name:  Celia Her /Age/Sex: 1960  (58 y.o. male)   MRN & CSN:  [id number] & [id number] Encounter Date/Time: 10/2/2023 12:00 PM EDT   Location:  -A PCP: Margaux Rey MD     Attending:Leoncio Conte MD       Hospital Day: 7    Assessment and Recommendations   Celia Her is a 58 y.o. male  who presents with Diaphragmatic hernia with obstruction but no gangrene    Acute on chronic hypoxic respiratory failure  -Longstanding history of COPD on 6 L of home oxygen. Baseline saturation between 88 and 90  -Requiring increased oxygen requirements status post surgery for diaphragmatic hernia  -On Vapotherm. Wean down as tolerated  -Continue diuresis/breathing treatments/steroids  -Pulmonology following,  -Repeat ABG   pH  7.44, PCO2 65, PO2 60  -VBG today showed pH of 7.52, PCO2 62, PO2 69  --Decrease Lasix to Lasix 40 mg daily, steroids per pulm     Atrial fibrillation  - s/p DCCV on amiodarone, Eliquis. Hx of aortic regurgitation. -S/p aortic valve replacement,. Continue home metoprolol 50 mg BID. Diet ADULT DIET; Regular; 4 carb choices (60 gm/meal)   DVT Prophylaxis [] Lovenox, []  Heparin, [] SCDs, [] Ambulation,  [] Eliquis, [] Xarelto  [] Coumadin   Code Status Full Code   Disposition From: Home  Expected Disposition: Home  Estimated Date of Discharge: TBD  Patient requires continued admission due to acute on chronic respiratory failure. Surrogate Decision Maker/ POA       Personally reviewed Lab Studies and Imaging           Subjective:     Chief Complaint:   Patient seen and examined at bedside this morning. He denies chest pain, nausea vomiting, fever or chills. Improving on Vapotherm. Continue to wean    Review of Systems:      Pertinent positives and negatives discussed in HPI    Objective:      Intake/Output Summary (Last 24 hours) at 10/2/2023 1150  Last data filed at 10/2/2023 0984  Gross per 24 hour   Intake

## 2023-10-02 NOTE — PROGRESS NOTES
10/02/23 1628   Spirometry Assessment   FEV1 (%PRED) 37   Post Bronchodilator FEV/FVC 4   COPD Exacerbations in last year 1    L/min   COPD Assessment (CAT Score)   Cough Assessment 1   Phlegm Assessment 0   Chest tightness 1   Walking on an incline 2   Home Activities 1   Confident Leaving The Home 0   Sleeping Soundly 1   Have Energy 1   Assessment Score 7   GOLD Staging   Gold Grade 3   Group Group B     Current GOLD classification for He Villalta      GOLD Stage:  Gold thGthrthathdtheth:th th4th Group: Group B  Recorded domestic exacerbations past 12 months: 1  Current recorded COPD Assessment Tool (CAT) score of 7  Current eosinophil count: 0     Inhaler Device   Acceptable for Use   Respimat  Not Breath Actuated Yes   MDI  Not Breath Actuated Yes           DPI  Observed PIF   using  In-Check Meter   Optimal PIF   Acceptable for Use   HANDIHALER 120 >30 YES   Pressair 120 >45 YES   NEOHALER 120 >50 YES   Diskus 120 >60 YES   ELLIPTA 120 >60 YES     Records show He Villalta was using Trelegy and Symbicort as maintenance therapy prior to admission.           LONG-ACTING (LABA)   Arformoterol (Brovana) NEBULIZER   Indacaterol (Arcapta) NEOHALER   Olodaterol (Striverdi) Respimat   Salmeterol (Serevent) MDI, DISKUS   LONG-ACTING (LAMA)   Aclidinium bromide (Tudorza) PRESSAIR   Glycopyrronium bromide [place]) NEOHALER   Tiotropium (Spiriva) Respimat, HANDIHALER   Umeclidinium (Incruse) ELLIPTA   (LABA/LAMA)   Formoterol/glycopyrronium (Bevespi) MDI   Indacaterol/glycopyrronium (Utibron) NEOHALER   Vilanterol/umeclidinium (Anoro) ELLIPTA   Olodaterol/tiotropium (Stiolto) Respimat   (LABA/ICS)   Formoterol/budesomide (Symbicort) MDI   Formoterol/mometasone (Dulera) MDI   Salmeterol/fluticasone (Advair) MDI, DISKUS   Vilanterol/fluticasone (Breo) ELLIPTA   (LABA/LAMA/ICS)   Fluticasone/umeclidinium/vilanterol (Trelegy) ELLIPTA   Budesonide/glycopyrrolate/formoterol fumarate (Beztri) aerosphere     Electronically signed by

## 2023-10-03 LAB
GLUCOSE BLD-MCNC: 107 MG/DL (ref 70–99)
GLUCOSE BLD-MCNC: 113 MG/DL (ref 70–99)
GLUCOSE BLD-MCNC: 142 MG/DL (ref 70–99)
GLUCOSE BLD-MCNC: 148 MG/DL (ref 70–99)

## 2023-10-03 PROCEDURE — 6360000002 HC RX W HCPCS: Performed by: INTERNAL MEDICINE

## 2023-10-03 PROCEDURE — 99232 SBSQ HOSP IP/OBS MODERATE 35: CPT | Performed by: INTERNAL MEDICINE

## 2023-10-03 PROCEDURE — 6370000000 HC RX 637 (ALT 250 FOR IP): Performed by: INTERNAL MEDICINE

## 2023-10-03 PROCEDURE — 94640 AIRWAY INHALATION TREATMENT: CPT

## 2023-10-03 PROCEDURE — 82962 GLUCOSE BLOOD TEST: CPT

## 2023-10-03 PROCEDURE — 6370000000 HC RX 637 (ALT 250 FOR IP): Performed by: PHYSICIAN ASSISTANT

## 2023-10-03 PROCEDURE — 94761 N-INVAS EAR/PLS OXIMETRY MLT: CPT

## 2023-10-03 PROCEDURE — 2700000000 HC OXYGEN THERAPY PER DAY

## 2023-10-03 PROCEDURE — 6360000002 HC RX W HCPCS: Performed by: STUDENT IN AN ORGANIZED HEALTH CARE EDUCATION/TRAINING PROGRAM

## 2023-10-03 PROCEDURE — 2060000000 HC ICU INTERMEDIATE R&B

## 2023-10-03 PROCEDURE — 6370000000 HC RX 637 (ALT 250 FOR IP): Performed by: STUDENT IN AN ORGANIZED HEALTH CARE EDUCATION/TRAINING PROGRAM

## 2023-10-03 PROCEDURE — 6360000002 HC RX W HCPCS: Performed by: PHYSICIAN ASSISTANT

## 2023-10-03 PROCEDURE — 2580000003 HC RX 258: Performed by: PHYSICIAN ASSISTANT

## 2023-10-03 PROCEDURE — 94150 VITAL CAPACITY TEST: CPT

## 2023-10-03 RX ADMIN — METOPROLOL TARTRATE 50 MG: 50 TABLET, FILM COATED ORAL at 11:20

## 2023-10-03 RX ADMIN — APIXABAN 5 MG: 5 TABLET, FILM COATED ORAL at 20:03

## 2023-10-03 RX ADMIN — HYDROMORPHONE HYDROCHLORIDE 1 MG: 1 INJECTION, SOLUTION INTRAMUSCULAR; INTRAVENOUS; SUBCUTANEOUS at 11:14

## 2023-10-03 RX ADMIN — AMIODARONE HYDROCHLORIDE 200 MG: 200 TABLET ORAL at 08:30

## 2023-10-03 RX ADMIN — SODIUM CHLORIDE, PRESERVATIVE FREE 10 ML: 5 INJECTION INTRAVENOUS at 08:31

## 2023-10-03 RX ADMIN — IPRATROPIUM BROMIDE AND ALBUTEROL SULFATE 1 DOSE: .5; 2.5 SOLUTION RESPIRATORY (INHALATION) at 21:07

## 2023-10-03 RX ADMIN — OXYCODONE HYDROCHLORIDE 5 MG: 5 TABLET ORAL at 23:33

## 2023-10-03 RX ADMIN — BUDESONIDE AND FORMOTEROL FUMARATE DIHYDRATE 2 PUFF: 160; 4.5 AEROSOL RESPIRATORY (INHALATION) at 21:08

## 2023-10-03 RX ADMIN — METOPROLOL TARTRATE 50 MG: 50 TABLET, FILM COATED ORAL at 20:03

## 2023-10-03 RX ADMIN — IPRATROPIUM BROMIDE AND ALBUTEROL SULFATE 1 DOSE: .5; 2.5 SOLUTION RESPIRATORY (INHALATION) at 16:11

## 2023-10-03 RX ADMIN — METHYLPREDNISOLONE SODIUM SUCCINATE 40 MG: 40 INJECTION, POWDER, FOR SOLUTION INTRAMUSCULAR; INTRAVENOUS at 11:14

## 2023-10-03 RX ADMIN — BUDESONIDE AND FORMOTEROL FUMARATE DIHYDRATE 2 PUFF: 160; 4.5 AEROSOL RESPIRATORY (INHALATION) at 08:29

## 2023-10-03 RX ADMIN — OXYCODONE HYDROCHLORIDE 5 MG: 5 TABLET ORAL at 03:56

## 2023-10-03 RX ADMIN — OXYCODONE HYDROCHLORIDE 5 MG: 5 TABLET ORAL at 08:30

## 2023-10-03 RX ADMIN — TAMSULOSIN HYDROCHLORIDE 0.4 MG: 0.4 CAPSULE ORAL at 11:14

## 2023-10-03 RX ADMIN — IPRATROPIUM BROMIDE AND ALBUTEROL SULFATE 1 DOSE: .5; 2.5 SOLUTION RESPIRATORY (INHALATION) at 08:29

## 2023-10-03 RX ADMIN — HYDROMORPHONE HYDROCHLORIDE 1 MG: 1 INJECTION, SOLUTION INTRAMUSCULAR; INTRAVENOUS; SUBCUTANEOUS at 06:44

## 2023-10-03 RX ADMIN — HYDROMORPHONE HYDROCHLORIDE 1 MG: 1 INJECTION, SOLUTION INTRAMUSCULAR; INTRAVENOUS; SUBCUTANEOUS at 01:28

## 2023-10-03 RX ADMIN — TIOTROPIUM BROMIDE INHALATION SPRAY 2 PUFF: 3.12 SPRAY, METERED RESPIRATORY (INHALATION) at 08:29

## 2023-10-03 RX ADMIN — ASPIRIN 81 MG CHEWABLE TABLET 81 MG: 81 TABLET CHEWABLE at 08:31

## 2023-10-03 RX ADMIN — ATORVASTATIN CALCIUM 40 MG: 40 TABLET, FILM COATED ORAL at 20:03

## 2023-10-03 RX ADMIN — HYDROMORPHONE HYDROCHLORIDE 1 MG: 1 INJECTION, SOLUTION INTRAMUSCULAR; INTRAVENOUS; SUBCUTANEOUS at 21:19

## 2023-10-03 RX ADMIN — SODIUM CHLORIDE, PRESERVATIVE FREE 10 ML: 5 INJECTION INTRAVENOUS at 20:03

## 2023-10-03 RX ADMIN — IPRATROPIUM BROMIDE AND ALBUTEROL SULFATE 1 DOSE: .5; 2.5 SOLUTION RESPIRATORY (INHALATION) at 11:59

## 2023-10-03 RX ADMIN — OXYCODONE HYDROCHLORIDE 5 MG: 5 TABLET ORAL at 19:34

## 2023-10-03 RX ADMIN — EMPAGLIFLOZIN 10 MG: 10 TABLET, FILM COATED ORAL at 08:30

## 2023-10-03 RX ADMIN — OXYCODONE HYDROCHLORIDE 5 MG: 5 TABLET ORAL at 12:57

## 2023-10-03 RX ADMIN — HYDROMORPHONE HYDROCHLORIDE 1 MG: 1 INJECTION, SOLUTION INTRAMUSCULAR; INTRAVENOUS; SUBCUTANEOUS at 16:28

## 2023-10-03 RX ADMIN — POTASSIUM CHLORIDE 10 MEQ: 750 TABLET, FILM COATED, EXTENDED RELEASE ORAL at 11:15

## 2023-10-03 RX ADMIN — FUROSEMIDE 40 MG: 10 INJECTION, SOLUTION INTRAMUSCULAR; INTRAVENOUS at 08:31

## 2023-10-03 RX ADMIN — APIXABAN 5 MG: 5 TABLET, FILM COATED ORAL at 08:30

## 2023-10-03 ASSESSMENT — PAIN SCALES - GENERAL
PAINLEVEL_OUTOF10: 6
PAINLEVEL_OUTOF10: 6
PAINLEVEL_OUTOF10: 5
PAINLEVEL_OUTOF10: 6
PAINLEVEL_OUTOF10: 9
PAINLEVEL_OUTOF10: 7
PAINLEVEL_OUTOF10: 6
PAINLEVEL_OUTOF10: 5
PAINLEVEL_OUTOF10: 4
PAINLEVEL_OUTOF10: 6
PAINLEVEL_OUTOF10: 6
PAINLEVEL_OUTOF10: 4
PAINLEVEL_OUTOF10: 5
PAINLEVEL_OUTOF10: 7
PAINLEVEL_OUTOF10: 6

## 2023-10-03 ASSESSMENT — PAIN DESCRIPTION - ONSET
ONSET: ON-GOING

## 2023-10-03 ASSESSMENT — PAIN DESCRIPTION - FREQUENCY
FREQUENCY: CONTINUOUS

## 2023-10-03 ASSESSMENT — PAIN DESCRIPTION - LOCATION
LOCATION: ABDOMEN;BACK
LOCATION: ABDOMEN
LOCATION: BACK;ABDOMEN
LOCATION: BACK;ABDOMEN
LOCATION: ABDOMEN;BACK
LOCATION: ABDOMEN
LOCATION: BACK;ABDOMEN
LOCATION: ABDOMEN;BACK
LOCATION: ABDOMEN
LOCATION: ABDOMEN;BACK

## 2023-10-03 ASSESSMENT — PAIN DESCRIPTION - DESCRIPTORS
DESCRIPTORS: DULL;ACHING
DESCRIPTORS: ACHING;DULL
DESCRIPTORS: ACHING;SORE
DESCRIPTORS: ACHING;DULL;SORE
DESCRIPTORS: ACHING;SORE
DESCRIPTORS: SHARP
DESCRIPTORS: DULL
DESCRIPTORS: ACHING;SORE
DESCRIPTORS: ACHING;DULL;SORE
DESCRIPTORS: ACHING;DULL
DESCRIPTORS: ACHING;DULL;SORE
DESCRIPTORS: ACHING;DULL
DESCRIPTORS: ACHING;SORE

## 2023-10-03 ASSESSMENT — PAIN DESCRIPTION - PAIN TYPE
TYPE: ACUTE PAIN;SURGICAL PAIN
TYPE: CHRONIC PAIN
TYPE: CHRONIC PAIN
TYPE: ACUTE PAIN;CHRONIC PAIN
TYPE: CHRONIC PAIN
TYPE: CHRONIC PAIN
TYPE: ACUTE PAIN;CHRONIC PAIN
TYPE: CHRONIC PAIN

## 2023-10-03 ASSESSMENT — PAIN DESCRIPTION - ORIENTATION
ORIENTATION: MID

## 2023-10-03 ASSESSMENT — PAIN - FUNCTIONAL ASSESSMENT
PAIN_FUNCTIONAL_ASSESSMENT: PREVENTS OR INTERFERES SOME ACTIVE ACTIVITIES AND ADLS
PAIN_FUNCTIONAL_ASSESSMENT: ACTIVITIES ARE NOT PREVENTED

## 2023-10-03 NOTE — PROGRESS NOTES
V2.0    [facility] Progress Note      Name:  Opal Florez /Age/Sex: 1960  (58 y.o. male)   MRN & CSN:  [id number] & [id number] Encounter Date/Time: 10/3/2023 12:00 PM EDT   Location:  -A PCP: Andreia Ingram MD     Attending:Franca Castelan MD       Hospital Day: 8    Assessment and Recommendations   Opal Florez is a 58 y.o. male  who presents with Diaphragmatic hernia with obstruction but no gangrene    Acute on chronic hypoxic respiratory failure  -Longstanding history of COPD on 6 L of home oxygen. Baseline saturation between 88 and 90  -Requiring increased oxygen requirements status post surgery for diaphragmatic hernia  -On Vapotherm. Weaning down as tolerated  -Continue diuresis/breathing treatments/steroids  -Pulmonology following  -Continue lasix 40 mg IV BID, steroids per pulm,  -Repeat ABG showed pH of 7.44, PCO2 65, PO2 60  -Patient down to 20 L at 60%  -Discussed with CM team - to start working on [facility]      Atrial fibrillation  - s/p DCCV on amiodarone, Eliquis. Hx of aortic regurgitation. -S/p aortic valve replacement,. Continue home metoprolol 50 mg BID. Diet ADULT DIET; Regular; 4 carb choices (60 gm/meal)   DVT Prophylaxis [] Lovenox, []  Heparin, [] SCDs, [] Ambulation,  [] Eliquis, [] Xarelto  [] Coumadin   Code Status Full Code   Disposition From: Home  Expected Disposition: Home  Estimated Date of Discharge: TBD (wean O2 vs discharge to ltach)  Patient requires continued admission due to acute on chronic respiratory failure   Surrogate Decision Maker/ POA       Personally reviewed Lab Studies and Imaging           Subjective:     Chief Complaint:     Patient seen and examined at bedside this morning. Denies chest pain, nausea or vomiting, fever or chills. Breathing improving  Review of Systems:      Pertinent positives and negatives discussed in HPI    Objective:      Intake/Output Summary (Last 24 hours) at 10/3/2023 1227  Last data filed at status post sternotomy. Decreased inspiration as compared to prior examination with bilateral basal atelectasis/infiltrates. CBC:   Recent Labs     10/01/23  0001 10/02/23  0856   WBC 13.1* 14.5*   HGB 12.5* 13.8    231       BMP:    Recent Labs     10/01/23  0001 10/02/23  0856    138   K 4.4 3.7   CL 94* 89*   CO2 34* 36*   BUN 33* 38*   CREATININE 0.8* 0.9   GLUCOSE 111* 137*       Hepatic: No results for input(s): \"AST\", \"ALT\", \"ALB\", \"BILITOT\", \"ALKPHOS\" in the last 72 hours. Lipids:   Lab Results   Component Value Date/Time    CHOL 193 03/10/2021 09:34 AM    CHOL 200 02/25/2019 01:05 PM    HDL 39 03/10/2021 09:34 AM    TRIG 62 03/10/2021 09:34 AM     Hemoglobin A1C:   Lab Results   Component Value Date/Time    LABA1C 5.4 06/15/2023 03:45 PM     TSH: No results found for: \"TSH\"  Troponin:   Lab Results   Component Value Date/Time    TROPONINT <0.010 03/21/2023 09:35 AM     Lactic Acid: No results for input(s): \"LACTA\" in the last 72 hours. BNP:   No results for input(s): \"PROBNP\" in the last 72 hours.     UA:  Lab Results   Component Value Date/Time    NITRU NEGATIVE 06/15/2023 03:45 PM    COLORU YELLOW 06/15/2023 03:45 PM    CLARITYU CLEAR 06/15/2023 03:45 PM    SPECGRAV 1.015 06/15/2023 03:45 PM    LEUKOCYTESUR NEGATIVE 06/15/2023 03:45 PM    UROBILINOGEN 0.2 06/15/2023 03:45 PM    BILIRUBINUR NEGATIVE 06/15/2023 03:45 PM    BLOODU NEGATIVE 06/15/2023 03:45 PM    KETUA NEGATIVE 06/15/2023 03:45 PM     Urine Cultures: No results found for: \"LABURIN\"  Blood Cultures: No results found for: \"BC\"  No results found for: \"BLOODCULT2\"  Organism: No results found for: \"ORG\"      Electronically signed by Shannan Lopez MD on 10/3/2023 at 12:27 PM

## 2023-10-03 NOTE — CARE COORDINATION
CM reviewed chart and discussed in IDR. Pt is not medically ready at this time due to increased O2 needs. LTACH was discussed in [address] LTACH reviewing chart.

## 2023-10-03 NOTE — PROGRESS NOTES
Comprehensive Nutrition Assessment    Type and Reason for Visit:  Initial (LOS)    Nutrition Recommendations/Plan:   Modify diet to 5 CHO choices to better meet needs  May offer low calorie/high protein oral supplement during stay if patient desires  Monitor weights, po intakes, respiratory status, POC       Nutrition Assessment:    Admitted s/p robotic assisted right diaphragmatic hernia repair, PMH of systolic CHF, AR s/p aortic valve replacement, atrial fibrillation. Pt known to dept w/ prior admit, had AVR 6/30/23. Pt noted to be consuming % of all meals since admit. Weight hx w/ some variation but on average, stable. Will modify diet to 5 CHO choices to better meet estimated needs. No significant wounds. Follow at low nutrition risk. Nutrition Related Findings:    +KCl, solu-medrol, lasix, jardiance; glucose 107-150 Wound Type: Surgical Incision       Current Nutrition Intake & Therapies:    Average Meal Intake: %  Average Supplements Intake: None Ordered  ADULT DIET; Regular; 4 carb choices (60 gm/meal)    Anthropometric Measures:  Height: 5' 11\" (180.3 cm)  Ideal Body Weight (IBW): 172 lbs (78 kg)    Admission Body Weight: 233 lb (105.7 kg) (stated)  Current Body Weight: 225 lb 5 oz (102.2 kg),   IBW. Weight Source: Bed Scale  Current BMI (kg/m2): 31.4  Usual Body Weight: 222 lb 14.2 oz (101.1 kg) (7/5/23)  % Weight Change (Calculated): 1.1  Weight Adjustment For: No Adjustment                 BMI Categories: Obese Class 1 (BMI 30.0-34. 9)    Estimated Daily Nutrient Needs:  Energy Requirements Based On: Formula  Weight Used for Energy Requirements: Current  Energy (kcal/day): 3271-3071 (MSJ)  Weight Used for Protein Requirements: Ideal  Protein (g/day):  (1.1-1.3g/kg IBW)  Method Used for Fluid Requirements: 1 ml/kcal  Fluid (ml/day): 2200    Nutrition Diagnosis:   No nutrition diagnosis at this time     Nutrition Interventions:   Food and/or Nutrient Delivery: Modify Current

## 2023-10-03 NOTE — PROGRESS NOTES
Physical Therapy  Attempted to see pt at 1525, pt was in the bed resting, pt states he just did his ex in the bed and was up in the morning. Pt said he was worn out and unable to do more at the moment d/t being sob and weaned off vapo-therm. Pt does know his limitations. Will cont. Estela Petit.  Rufina Lorenzana PTA

## 2023-10-04 LAB
GLUCOSE BLD-MCNC: 104 MG/DL (ref 70–99)
GLUCOSE BLD-MCNC: 138 MG/DL (ref 70–99)
GLUCOSE BLD-MCNC: 139 MG/DL (ref 70–99)
GLUCOSE BLD-MCNC: 147 MG/DL (ref 70–99)

## 2023-10-04 PROCEDURE — 2060000000 HC ICU INTERMEDIATE R&B

## 2023-10-04 PROCEDURE — 6360000002 HC RX W HCPCS: Performed by: INTERNAL MEDICINE

## 2023-10-04 PROCEDURE — 2700000000 HC OXYGEN THERAPY PER DAY

## 2023-10-04 PROCEDURE — APPNB30 APP NON BILLABLE TIME 0-30 MINS: Performed by: PHYSICIAN ASSISTANT

## 2023-10-04 PROCEDURE — 99232 SBSQ HOSP IP/OBS MODERATE 35: CPT | Performed by: INTERNAL MEDICINE

## 2023-10-04 PROCEDURE — 6360000002 HC RX W HCPCS: Performed by: STUDENT IN AN ORGANIZED HEALTH CARE EDUCATION/TRAINING PROGRAM

## 2023-10-04 PROCEDURE — 6370000000 HC RX 637 (ALT 250 FOR IP): Performed by: PHYSICIAN ASSISTANT

## 2023-10-04 PROCEDURE — 6360000002 HC RX W HCPCS: Performed by: PHYSICIAN ASSISTANT

## 2023-10-04 PROCEDURE — 2580000003 HC RX 258: Performed by: PHYSICIAN ASSISTANT

## 2023-10-04 PROCEDURE — 6370000000 HC RX 637 (ALT 250 FOR IP): Performed by: INTERNAL MEDICINE

## 2023-10-04 PROCEDURE — 94640 AIRWAY INHALATION TREATMENT: CPT

## 2023-10-04 PROCEDURE — 82962 GLUCOSE BLOOD TEST: CPT

## 2023-10-04 PROCEDURE — 6370000000 HC RX 637 (ALT 250 FOR IP): Performed by: STUDENT IN AN ORGANIZED HEALTH CARE EDUCATION/TRAINING PROGRAM

## 2023-10-04 PROCEDURE — 99024 POSTOP FOLLOW-UP VISIT: CPT | Performed by: PHYSICIAN ASSISTANT

## 2023-10-04 PROCEDURE — 94150 VITAL CAPACITY TEST: CPT

## 2023-10-04 PROCEDURE — 94761 N-INVAS EAR/PLS OXIMETRY MLT: CPT

## 2023-10-04 RX ADMIN — AMIODARONE HYDROCHLORIDE 200 MG: 200 TABLET ORAL at 08:13

## 2023-10-04 RX ADMIN — IPRATROPIUM BROMIDE AND ALBUTEROL SULFATE 1 DOSE: .5; 2.5 SOLUTION RESPIRATORY (INHALATION) at 11:13

## 2023-10-04 RX ADMIN — BUDESONIDE AND FORMOTEROL FUMARATE DIHYDRATE 2 PUFF: 160; 4.5 AEROSOL RESPIRATORY (INHALATION) at 07:17

## 2023-10-04 RX ADMIN — ASPIRIN 81 MG CHEWABLE TABLET 81 MG: 81 TABLET CHEWABLE at 08:13

## 2023-10-04 RX ADMIN — TAMSULOSIN HYDROCHLORIDE 0.4 MG: 0.4 CAPSULE ORAL at 08:13

## 2023-10-04 RX ADMIN — OXYCODONE HYDROCHLORIDE 5 MG: 5 TABLET ORAL at 18:48

## 2023-10-04 RX ADMIN — METOPROLOL TARTRATE 50 MG: 50 TABLET, FILM COATED ORAL at 08:13

## 2023-10-04 RX ADMIN — IPRATROPIUM BROMIDE AND ALBUTEROL SULFATE 1 DOSE: .5; 2.5 SOLUTION RESPIRATORY (INHALATION) at 07:16

## 2023-10-04 RX ADMIN — APIXABAN 5 MG: 5 TABLET, FILM COATED ORAL at 21:49

## 2023-10-04 RX ADMIN — HYDROMORPHONE HYDROCHLORIDE 1 MG: 1 INJECTION, SOLUTION INTRAMUSCULAR; INTRAVENOUS; SUBCUTANEOUS at 12:46

## 2023-10-04 RX ADMIN — HYDROMORPHONE HYDROCHLORIDE 1 MG: 1 INJECTION, SOLUTION INTRAMUSCULAR; INTRAVENOUS; SUBCUTANEOUS at 17:39

## 2023-10-04 RX ADMIN — POTASSIUM CHLORIDE 10 MEQ: 750 TABLET, FILM COATED, EXTENDED RELEASE ORAL at 08:13

## 2023-10-04 RX ADMIN — OXYCODONE HYDROCHLORIDE 5 MG: 5 TABLET ORAL at 13:57

## 2023-10-04 RX ADMIN — BUDESONIDE AND FORMOTEROL FUMARATE DIHYDRATE 2 PUFF: 160; 4.5 AEROSOL RESPIRATORY (INHALATION) at 22:18

## 2023-10-04 RX ADMIN — ATORVASTATIN CALCIUM 40 MG: 40 TABLET, FILM COATED ORAL at 21:49

## 2023-10-04 RX ADMIN — OXYCODONE HYDROCHLORIDE 5 MG: 5 TABLET ORAL at 23:02

## 2023-10-04 RX ADMIN — IPRATROPIUM BROMIDE AND ALBUTEROL SULFATE 1 DOSE: .5; 2.5 SOLUTION RESPIRATORY (INHALATION) at 16:02

## 2023-10-04 RX ADMIN — APIXABAN 5 MG: 5 TABLET, FILM COATED ORAL at 08:13

## 2023-10-04 RX ADMIN — OXYCODONE HYDROCHLORIDE 5 MG: 5 TABLET ORAL at 09:20

## 2023-10-04 RX ADMIN — HYDROMORPHONE HYDROCHLORIDE 1 MG: 1 INJECTION, SOLUTION INTRAMUSCULAR; INTRAVENOUS; SUBCUTANEOUS at 21:49

## 2023-10-04 RX ADMIN — OXYCODONE HYDROCHLORIDE 5 MG: 5 TABLET ORAL at 04:18

## 2023-10-04 RX ADMIN — HYDROMORPHONE HYDROCHLORIDE 1 MG: 1 INJECTION, SOLUTION INTRAMUSCULAR; INTRAVENOUS; SUBCUTANEOUS at 08:14

## 2023-10-04 RX ADMIN — HYDROMORPHONE HYDROCHLORIDE 1 MG: 1 INJECTION, SOLUTION INTRAMUSCULAR; INTRAVENOUS; SUBCUTANEOUS at 03:38

## 2023-10-04 RX ADMIN — EMPAGLIFLOZIN 10 MG: 10 TABLET, FILM COATED ORAL at 08:13

## 2023-10-04 RX ADMIN — SODIUM CHLORIDE, PRESERVATIVE FREE 10 ML: 5 INJECTION INTRAVENOUS at 08:13

## 2023-10-04 RX ADMIN — SODIUM CHLORIDE, PRESERVATIVE FREE 10 ML: 5 INJECTION INTRAVENOUS at 21:50

## 2023-10-04 RX ADMIN — IPRATROPIUM BROMIDE AND ALBUTEROL SULFATE 1 DOSE: .5; 2.5 SOLUTION RESPIRATORY (INHALATION) at 22:17

## 2023-10-04 RX ADMIN — METOPROLOL TARTRATE 50 MG: 50 TABLET, FILM COATED ORAL at 21:49

## 2023-10-04 RX ADMIN — FUROSEMIDE 40 MG: 10 INJECTION, SOLUTION INTRAMUSCULAR; INTRAVENOUS at 08:15

## 2023-10-04 RX ADMIN — METHYLPREDNISOLONE SODIUM SUCCINATE 40 MG: 40 INJECTION, POWDER, FOR SOLUTION INTRAMUSCULAR; INTRAVENOUS at 08:13

## 2023-10-04 ASSESSMENT — PAIN - FUNCTIONAL ASSESSMENT

## 2023-10-04 ASSESSMENT — ENCOUNTER SYMPTOMS
STRIDOR: 0
APNEA: 0
EYE REDNESS: 0
RECTAL PAIN: 0
ANAL BLEEDING: 0
BACK PAIN: 0
CONSTIPATION: 0
COLOR CHANGE: 0
SHORTNESS OF BREATH: 1
ABDOMINAL PAIN: 1
NAUSEA: 0
CHOKING: 0
EYE ITCHING: 0
VOMITING: 0
SORE THROAT: 0
PHOTOPHOBIA: 0

## 2023-10-04 ASSESSMENT — PAIN DESCRIPTION - PAIN TYPE
TYPE: CHRONIC PAIN

## 2023-10-04 ASSESSMENT — PAIN DESCRIPTION - FREQUENCY
FREQUENCY: CONTINUOUS

## 2023-10-04 ASSESSMENT — PAIN SCALES - WONG BAKER
WONGBAKER_NUMERICALRESPONSE: 2
WONGBAKER_NUMERICALRESPONSE: 0
WONGBAKER_NUMERICALRESPONSE: 0
WONGBAKER_NUMERICALRESPONSE: 2

## 2023-10-04 ASSESSMENT — PAIN SCALES - GENERAL
PAINLEVEL_OUTOF10: 0
PAINLEVEL_OUTOF10: 6
PAINLEVEL_OUTOF10: 0
PAINLEVEL_OUTOF10: 5
PAINLEVEL_OUTOF10: 7
PAINLEVEL_OUTOF10: 5
PAINLEVEL_OUTOF10: 6
PAINLEVEL_OUTOF10: 6
PAINLEVEL_OUTOF10: 7
PAINLEVEL_OUTOF10: 3
PAINLEVEL_OUTOF10: 6
PAINLEVEL_OUTOF10: 5
PAINLEVEL_OUTOF10: 3
PAINLEVEL_OUTOF10: 7
PAINLEVEL_OUTOF10: 6
PAINLEVEL_OUTOF10: 4
PAINLEVEL_OUTOF10: 6

## 2023-10-04 ASSESSMENT — PAIN DESCRIPTION - DESCRIPTORS
DESCRIPTORS: ACHING;SORE;DULL
DESCRIPTORS: ACHING
DESCRIPTORS: ACHING;DULL;SORE
DESCRIPTORS: ACHING;CRAMPING
DESCRIPTORS: ACHING;CRAMPING
DESCRIPTORS: ACHING;SORE;DULL
DESCRIPTORS: ACHING;CRAMPING
DESCRIPTORS: ACHING;DISCOMFORT
DESCRIPTORS: ACHING;DULL;SORE
DESCRIPTORS: ACHING;DISCOMFORT
DESCRIPTORS: ACHING;DULL;SORE
DESCRIPTORS: ACHING;CRAMPING

## 2023-10-04 ASSESSMENT — PAIN DESCRIPTION - ORIENTATION
ORIENTATION: MID;LOWER
ORIENTATION: LOWER
ORIENTATION: MID;LOWER
ORIENTATION: LOWER
ORIENTATION: INNER;LOWER
ORIENTATION: LOWER
ORIENTATION: LOWER
ORIENTATION: INNER;LOWER

## 2023-10-04 ASSESSMENT — PAIN DESCRIPTION - LOCATION
LOCATION: ABDOMEN;BACK
LOCATION: ABDOMEN;BACK
LOCATION: BACK
LOCATION: BACK;ABDOMEN
LOCATION: BACK
LOCATION: ABDOMEN;BACK
LOCATION: ABDOMEN;BACK
LOCATION: BACK;ABDOMEN
LOCATION: BACK
LOCATION: BACK;HIP

## 2023-10-04 ASSESSMENT — PAIN DESCRIPTION - ONSET
ONSET: ON-GOING

## 2023-10-04 NOTE — PROGRESS NOTES
V2.0    [facility] [place] Progress Note      Name:  Abi Gagnon /Age/Sex: 1960  (58 y.o. male)   MRN & CSN:  [id number] & [id number] Encounter Date/Time: 10/4/2023 12:00 PM EDT   Location:  -A PCP: Chen Banegas MD     Attending:Kunwar, Herlene Lesch, [address] Day: 9    Assessment and Recommendations   Abi Gagnon is a 58 y.o. male  who presents with Diaphragmatic hernia with obstruction but no gangrene    Acute on chronic hypoxic respiratory failure  -Longstanding history of COPD on 6 L of home oxygen. Baseline saturation between 88 and 90  -Requiring increased oxygen requirements status post surgery for diaphragmatic hernia  -On Vapotherm. Weaning down as tolerated  -Continue diuresis/breathing treatments/steroids  -Pulmonology following  -Continue lasix 40 mg IV BID, steroids per pulm,  -Repeat ABG showed pH of 7.44, PCO2 65, PO2 60  -Patient down to 7 L/min  -ambulate in gomez  -plan to discharge home with home health tomorrow if able     Atrial fibrillation  - s/p DCCV on amiodarone, Eliquis. Hx of aortic regurgitation. -S/p aortic valve replacement,. Continue home metoprolol 50 mg BID. Diet ADULT ORAL NUTRITION SUPPLEMENT; Breakfast; Low Calorie/High Protein Oral Supplement  ADULT DIET; Regular; 5 carb choices (75 gm/meal)   DVT Prophylaxis [] Lovenox, []  Heparin, [] SCDs, [] Ambulation,  [] Eliquis, [] Xarelto  [] Coumadin   Code Status Full Code   Disposition From: Home  Expected Disposition: Home  Estimated Date of Discharge: tomorrow if able  Patient requires continued admission due to acute on chronic respiratory failure   Surrogate Decision Maker/ POA       Personally reviewed Lab Studies and Imaging           Subjective:     Chief Complaint:     Patient seen and examined at bedside this morning. Denies chest pain, nausea or vomiting, fever or chills.   Breathing improving  Review of Systems:      Pertinent positives and negatives discussed in

## 2023-10-04 NOTE — PROGRESS NOTES
Physical Therapy  Attempted to see pt after lunch but pt was in pain and just given pain meds. Pt asked me to return after 3pm. Returned pt was resting in the bed. Will cont. Chandrika Márquez.  Karly Perry PTA

## 2023-10-04 NOTE — PLAN OF CARE
Problem: Chronic Conditions and Co-morbidities  Goal: Patient's chronic conditions and co-morbidity symptoms are monitored and maintained or improved  Outcome: Progressing  Flowsheets (Taken 10/3/2023 1933)  Care Plan - Patient's Chronic Conditions and Co-Morbidity Symptoms are Monitored and Maintained or Improved:   Monitor and assess patient's chronic conditions and comorbid symptoms for stability, deterioration, or improvement   Collaborate with multidisciplinary team to address chronic and comorbid conditions and prevent exacerbation or deterioration   Update acute care plan with appropriate goals if chronic or comorbid symptoms are exacerbated and prevent overall improvement and discharge     Problem: Discharge Planning  Goal: Discharge to home or other facility with appropriate resources  Outcome: Progressing  Flowsheets (Taken 10/3/2023 1933)  Discharge to home or other facility with appropriate resources:   Identify barriers to discharge with patient and caregiver   Arrange for needed discharge resources and transportation as appropriate   Identify discharge learning needs (meds, wound care, etc)     Problem: Pain  Goal: Verbalizes/displays adequate comfort level or baseline comfort level  Outcome: Progressing     Problem: Safety - Adult  Goal: Free from fall injury  Outcome: Progressing  Flowsheets (Taken 10/3/2023 1934)  Free From Fall Injury: Instruct family/caregiver on patient safety     Problem: Respiratory - Adult  Goal: Achieves optimal ventilation and oxygenation  Outcome: Progressing  Flowsheets (Taken 10/3/2023 1933)  Achieves optimal ventilation and oxygenation:   Assess for changes in respiratory status   Assess for changes in mentation and behavior   Position to facilitate oxygenation and minimize respiratory effort     Problem: Cardiovascular - Adult  Goal: Maintains optimal cardiac output and hemodynamic stability  Outcome: Progressing     Problem: ABCDS Injury Assessment  Goal: Absence of physical injury  Outcome: Progressing  Flowsheets (Taken 10/3/2023 1934)  Absence of Physical Injury: Implement safety measures based on patient assessment     Problem: Nutrition Deficit:  Goal: Optimize nutritional status  Outcome: Progressing

## 2023-10-05 PROBLEM — K44.0 DIAPHRAGMATIC HERNIA WITH OBSTRUCTION BUT NO GANGRENE: Status: RESOLVED | Noted: 2023-09-26 | Resolved: 2023-10-05

## 2023-10-05 LAB
GLUCOSE BLD-MCNC: 129 MG/DL (ref 70–99)
GLUCOSE BLD-MCNC: 141 MG/DL (ref 70–99)
GLUCOSE BLD-MCNC: 146 MG/DL (ref 70–99)
GLUCOSE BLD-MCNC: 97 MG/DL (ref 70–99)

## 2023-10-05 PROCEDURE — 97535 SELF CARE MNGMENT TRAINING: CPT

## 2023-10-05 PROCEDURE — 97530 THERAPEUTIC ACTIVITIES: CPT

## 2023-10-05 PROCEDURE — 6370000000 HC RX 637 (ALT 250 FOR IP): Performed by: STUDENT IN AN ORGANIZED HEALTH CARE EDUCATION/TRAINING PROGRAM

## 2023-10-05 PROCEDURE — 2700000000 HC OXYGEN THERAPY PER DAY

## 2023-10-05 PROCEDURE — 97116 GAIT TRAINING THERAPY: CPT

## 2023-10-05 PROCEDURE — 6370000000 HC RX 637 (ALT 250 FOR IP): Performed by: PHYSICIAN ASSISTANT

## 2023-10-05 PROCEDURE — 2580000003 HC RX 258: Performed by: PHYSICIAN ASSISTANT

## 2023-10-05 PROCEDURE — 6360000002 HC RX W HCPCS: Performed by: STUDENT IN AN ORGANIZED HEALTH CARE EDUCATION/TRAINING PROGRAM

## 2023-10-05 PROCEDURE — 2060000000 HC ICU INTERMEDIATE R&B

## 2023-10-05 PROCEDURE — 99232 SBSQ HOSP IP/OBS MODERATE 35: CPT | Performed by: INTERNAL MEDICINE

## 2023-10-05 PROCEDURE — 6360000002 HC RX W HCPCS: Performed by: INTERNAL MEDICINE

## 2023-10-05 PROCEDURE — 97112 NEUROMUSCULAR REEDUCATION: CPT

## 2023-10-05 PROCEDURE — 82962 GLUCOSE BLOOD TEST: CPT

## 2023-10-05 PROCEDURE — 94150 VITAL CAPACITY TEST: CPT

## 2023-10-05 PROCEDURE — 6360000002 HC RX W HCPCS: Performed by: PHYSICIAN ASSISTANT

## 2023-10-05 PROCEDURE — 94761 N-INVAS EAR/PLS OXIMETRY MLT: CPT

## 2023-10-05 PROCEDURE — 94640 AIRWAY INHALATION TREATMENT: CPT

## 2023-10-05 PROCEDURE — 6370000000 HC RX 637 (ALT 250 FOR IP): Performed by: INTERNAL MEDICINE

## 2023-10-05 RX ADMIN — OXYCODONE HYDROCHLORIDE 5 MG: 5 TABLET ORAL at 08:23

## 2023-10-05 RX ADMIN — OXYCODONE HYDROCHLORIDE 5 MG: 5 TABLET ORAL at 18:53

## 2023-10-05 RX ADMIN — HYDROMORPHONE HYDROCHLORIDE 1 MG: 1 INJECTION, SOLUTION INTRAMUSCULAR; INTRAVENOUS; SUBCUTANEOUS at 11:11

## 2023-10-05 RX ADMIN — OXYCODONE HYDROCHLORIDE 5 MG: 5 TABLET ORAL at 04:20

## 2023-10-05 RX ADMIN — ASPIRIN 81 MG CHEWABLE TABLET 81 MG: 81 TABLET CHEWABLE at 08:03

## 2023-10-05 RX ADMIN — METHYLPREDNISOLONE SODIUM SUCCINATE 40 MG: 40 INJECTION, POWDER, FOR SOLUTION INTRAMUSCULAR; INTRAVENOUS at 08:02

## 2023-10-05 RX ADMIN — HYDROMORPHONE HYDROCHLORIDE 1 MG: 1 INJECTION, SOLUTION INTRAMUSCULAR; INTRAVENOUS; SUBCUTANEOUS at 06:04

## 2023-10-05 RX ADMIN — HYDROMORPHONE HYDROCHLORIDE 1 MG: 1 INJECTION, SOLUTION INTRAMUSCULAR; INTRAVENOUS; SUBCUTANEOUS at 16:32

## 2023-10-05 RX ADMIN — EMPAGLIFLOZIN 10 MG: 10 TABLET, FILM COATED ORAL at 08:03

## 2023-10-05 RX ADMIN — SODIUM CHLORIDE, PRESERVATIVE FREE 10 ML: 5 INJECTION INTRAVENOUS at 16:32

## 2023-10-05 RX ADMIN — OXYCODONE HYDROCHLORIDE 5 MG: 5 TABLET ORAL at 13:14

## 2023-10-05 RX ADMIN — APIXABAN 5 MG: 5 TABLET, FILM COATED ORAL at 20:10

## 2023-10-05 RX ADMIN — APIXABAN 5 MG: 5 TABLET, FILM COATED ORAL at 08:03

## 2023-10-05 RX ADMIN — IPRATROPIUM BROMIDE AND ALBUTEROL SULFATE 1 DOSE: .5; 2.5 SOLUTION RESPIRATORY (INHALATION) at 11:48

## 2023-10-05 RX ADMIN — SODIUM CHLORIDE, PRESERVATIVE FREE 20 ML: 5 INJECTION INTRAVENOUS at 08:04

## 2023-10-05 RX ADMIN — BUDESONIDE AND FORMOTEROL FUMARATE DIHYDRATE 2 PUFF: 160; 4.5 AEROSOL RESPIRATORY (INHALATION) at 08:45

## 2023-10-05 RX ADMIN — HYDROMORPHONE HYDROCHLORIDE 1 MG: 1 INJECTION, SOLUTION INTRAMUSCULAR; INTRAVENOUS; SUBCUTANEOUS at 21:32

## 2023-10-05 RX ADMIN — IPRATROPIUM BROMIDE AND ALBUTEROL SULFATE 1 DOSE: .5; 2.5 SOLUTION RESPIRATORY (INHALATION) at 15:56

## 2023-10-05 RX ADMIN — IPRATROPIUM BROMIDE AND ALBUTEROL SULFATE 1 DOSE: .5; 2.5 SOLUTION RESPIRATORY (INHALATION) at 08:43

## 2023-10-05 RX ADMIN — MAGNESIUM HYDROXIDE 30 ML: 400 SUSPENSION ORAL at 11:11

## 2023-10-05 RX ADMIN — IPRATROPIUM BROMIDE AND ALBUTEROL SULFATE 1 DOSE: .5; 2.5 SOLUTION RESPIRATORY (INHALATION) at 20:13

## 2023-10-05 RX ADMIN — BUDESONIDE AND FORMOTEROL FUMARATE DIHYDRATE 2 PUFF: 160; 4.5 AEROSOL RESPIRATORY (INHALATION) at 20:13

## 2023-10-05 RX ADMIN — ATORVASTATIN CALCIUM 40 MG: 40 TABLET, FILM COATED ORAL at 20:10

## 2023-10-05 RX ADMIN — HYDROMORPHONE HYDROCHLORIDE 1 MG: 1 INJECTION, SOLUTION INTRAMUSCULAR; INTRAVENOUS; SUBCUTANEOUS at 01:52

## 2023-10-05 RX ADMIN — OXYCODONE HYDROCHLORIDE 5 MG: 5 TABLET ORAL at 23:31

## 2023-10-05 RX ADMIN — POTASSIUM CHLORIDE 10 MEQ: 750 TABLET, FILM COATED, EXTENDED RELEASE ORAL at 08:03

## 2023-10-05 RX ADMIN — FUROSEMIDE 40 MG: 10 INJECTION, SOLUTION INTRAMUSCULAR; INTRAVENOUS at 08:02

## 2023-10-05 RX ADMIN — AMIODARONE HYDROCHLORIDE 200 MG: 200 TABLET ORAL at 08:03

## 2023-10-05 RX ADMIN — METOPROLOL TARTRATE 50 MG: 50 TABLET, FILM COATED ORAL at 20:10

## 2023-10-05 RX ADMIN — TAMSULOSIN HYDROCHLORIDE 0.4 MG: 0.4 CAPSULE ORAL at 08:03

## 2023-10-05 RX ADMIN — SODIUM CHLORIDE, PRESERVATIVE FREE 10 ML: 5 INJECTION INTRAVENOUS at 20:11

## 2023-10-05 RX ADMIN — METOPROLOL TARTRATE 50 MG: 50 TABLET, FILM COATED ORAL at 08:03

## 2023-10-05 ASSESSMENT — PAIN DESCRIPTION - ONSET: ONSET: ON-GOING

## 2023-10-05 ASSESSMENT — PAIN - FUNCTIONAL ASSESSMENT
PAIN_FUNCTIONAL_ASSESSMENT: ACTIVITIES ARE NOT PREVENTED
PAIN_FUNCTIONAL_ASSESSMENT: PREVENTS OR INTERFERES SOME ACTIVE ACTIVITIES AND ADLS
PAIN_FUNCTIONAL_ASSESSMENT: ACTIVITIES ARE NOT PREVENTED
PAIN_FUNCTIONAL_ASSESSMENT: PREVENTS OR INTERFERES SOME ACTIVE ACTIVITIES AND ADLS
PAIN_FUNCTIONAL_ASSESSMENT: ACTIVITIES ARE NOT PREVENTED
PAIN_FUNCTIONAL_ASSESSMENT: PREVENTS OR INTERFERES SOME ACTIVE ACTIVITIES AND ADLS

## 2023-10-05 ASSESSMENT — PAIN DESCRIPTION - FREQUENCY: FREQUENCY: CONTINUOUS

## 2023-10-05 ASSESSMENT — PAIN DESCRIPTION - ORIENTATION
ORIENTATION: MID;LOWER
ORIENTATION: RIGHT;LEFT;LOWER;UPPER
ORIENTATION: UPPER;LOWER
ORIENTATION: LOWER;MID
ORIENTATION: MID;LOWER
ORIENTATION: LOWER;MID
ORIENTATION: LOWER;UPPER
ORIENTATION: LOWER

## 2023-10-05 ASSESSMENT — PAIN DESCRIPTION - LOCATION
LOCATION: ABDOMEN;BACK
LOCATION: BACK
LOCATION: BACK
LOCATION: BACK;ABDOMEN
LOCATION: BACK
LOCATION: HIP;BACK
LOCATION: BACK;ABDOMEN
LOCATION: BACK;HIP
LOCATION: ABDOMEN
LOCATION: SACRUM;BACK

## 2023-10-05 ASSESSMENT — PAIN SCALES - GENERAL
PAINLEVEL_OUTOF10: 5
PAINLEVEL_OUTOF10: 4
PAINLEVEL_OUTOF10: 7
PAINLEVEL_OUTOF10: 5
PAINLEVEL_OUTOF10: 7
PAINLEVEL_OUTOF10: 7
PAINLEVEL_OUTOF10: 6
PAINLEVEL_OUTOF10: 4
PAINLEVEL_OUTOF10: 7
PAINLEVEL_OUTOF10: 6
PAINLEVEL_OUTOF10: 7
PAINLEVEL_OUTOF10: 4
PAINLEVEL_OUTOF10: 7
PAINLEVEL_OUTOF10: 4
PAINLEVEL_OUTOF10: 5

## 2023-10-05 ASSESSMENT — PAIN DESCRIPTION - DESCRIPTORS
DESCRIPTORS: ACHING;DISCOMFORT
DESCRIPTORS: ACHING;SORE
DESCRIPTORS: ACHING;SORE
DESCRIPTORS: ACHING;DISCOMFORT
DESCRIPTORS: ACHING;SORE
DESCRIPTORS: ACHING;DISCOMFORT
DESCRIPTORS: ACHING;DISCOMFORT
DESCRIPTORS: ACHING;SORE
DESCRIPTORS: ACHING;SORE
DESCRIPTORS: ACHING

## 2023-10-05 ASSESSMENT — PAIN SCALES - WONG BAKER
WONGBAKER_NUMERICALRESPONSE: 2
WONGBAKER_NUMERICALRESPONSE: 2

## 2023-10-05 ASSESSMENT — PAIN DESCRIPTION - PAIN TYPE: TYPE: CHRONIC PAIN

## 2023-10-05 NOTE — PROGRESS NOTES
Physical Therapy  Name: Juan Szymanski MRN: [id number] :   1960   Date:  10/5/2023   Admission Date: 2023 Room:  -A   Restrictions/Precautions:  Restrictions/Precautions  Restrictions/Precautions: General Precautions         Communication with other providers:  Abdoulaye Ya RN states pt is ok to see for therapy and provides chair follow and adjusts and assesses NC O2 during ambulation    Subjective:  Patient states:  \"I'm usually at 88%\"  Pain:   Location, Type, Intensity (0/10 to 10/10): Does not c/o pain    Objective:    Observation:  Patient is seated in recliner upon arrival.    Vitals : Patient is on 6L NC O2, increased to 8 L during ambulation and RN places NC O2 back to 6L for COPD precaution  HR - 100  BP - 157/93 (109) before activities  SpO2 - 88-92% on 6-8L Nc O2 during ambulation    Treatment, including education/measures:    Transfers with line management of NC O2, BP Cuff, Pulse Ox, Tele Monitor  Seated balance: Patient requires SBA for static sitting   Neuro-oliva: Verbal and tactile cues for seated upright posture. Manual assist needed for trunk adjustment to midline. Sit to supine :Min A for trunk and BLE management   Scooting :Seated scooting into bed SBA  Sit to stand :SBA from recliner   Stand to sit :CGA to EOB, and recliner. Patient needs cues for BUE effort for eccentric control  SPT:CGA-Min with no AD, Assist needed d/t lateral instability with turns  Gait:  Pt amb with no AD for 50 ft + 100 ft + 50 ft with CGA-Min assist. He demo's decrease trunk rotation and forward flexed posture, step through gait pattern, and Sob during ambulation. Patient initially needed increased to 8L with ambulation to improves SpO2 from 88 to 90%, patient gradually improves to 95% SpO2 with 8L and RN decreases SpO2 to 6L  Dynamic Gait:  Patient amb at Min-Mod A with No AD for 15 ft for tandem walking and walking with head turns. Cues for posture and sequence with activities.  Assist level needed to

## 2023-10-05 NOTE — PLAN OF CARE
Problem: Chronic Conditions and Co-morbidities  Goal: Patient's chronic conditions and co-morbidity symptoms are monitored and maintained or improved  10/5/2023 1014 by Arpan Dunlap RN  Outcome: Progressing  10/4/2023 2346 by Jennie Garcia RN  Outcome: Progressing     Problem: Discharge Planning  Goal: Discharge to home or other facility with appropriate resources  10/5/2023 1014 by Arpan Dunlap RN  Outcome: Progressing  10/4/2023 2346 by Jennie Garcia RN  Outcome: Progressing     Problem: Pain  Goal: Verbalizes/displays adequate comfort level or baseline comfort level  10/5/2023 1014 by Arpan Dunlap RN  Outcome: Progressing  10/4/2023 2346 by Jennie Garcia RN  Outcome: Progressing     Problem: Safety - Adult  Goal: Free from fall injury  10/5/2023 1014 by Arpan Dunlap RN  Outcome: Progressing  10/4/2023 2346 by Jennie Garcia RN  Outcome: Progressing     Problem: Respiratory - Adult  Goal: Achieves optimal ventilation and oxygenation  10/5/2023 1014 by Arpan Dunlap RN  Outcome: Progressing  10/4/2023 2346 by Jennie Garcia RN  Outcome: Progressing     Problem: ABCDS Injury Assessment  Goal: Absence of physical injury  Outcome: Progressing     Problem: Nutrition Deficit:  Goal: Optimize nutritional status  Outcome: Progressing     Problem: Cardiovascular - Adult  Goal: Maintains optimal cardiac output and hemodynamic stability  10/5/2023 1014 by Arpan Dunlap RN  Outcome: Progressing  10/4/2023 2346 by Jennie Garcia RN  Outcome: Progressing

## 2023-10-05 NOTE — CARE COORDINATION
[address] [facility] and pt is agreeable to [facility] at discharge.  Verified address and numbers with pt and SO.

## 2023-10-05 NOTE — PROGRESS NOTES
Occupational Therapy    Occupational Therapy Treatment Note    Name: Jarocho Méndez MRN: [id number] :   1960   Date:  10/5/2023   Admission Date: 2023 Room:  -A     Primary Problem:  Diaphragmatic hernia with obstruction but no gangrene     Restrictions/Precautions:  Restrictions/Precautions  Restrictions/Precautions: General Precautions       Fall Risk     Communication with other providers: YVETTE Reyes and Merissa العراقي approved session, updated at end of session     Subjective:  Patient states:  \"I could go to the bathroom. \"   Pain:   Location, Type, Intensity (0/10 to 10/10):  denies     Objective:    Observation: Pt presented in semi-ruano's, agreeable to session. Objective Measures:  Tele, Pulse Ox, 6L o2 NC, o2 86% with activity, recovers to 90-92% at rest.     Treatment, including education:  Therapeutic Activity Training:   Therapeutic activity training was instructed today. Cues were given for safety, sequence, UE/LE placement, awareness, and balance. Activities performed today included bed mobility training, sup-sit, sit-stand, SPT. Supine to Sit with SUP with HOB partially raised. Sit to Sand transfer from EOB and later from recliner with SBA. Functional Mobility for X2 short HH distances within room with SBA w/o AD with cues for PLB throughout. Pt required seated RB after each trial for o2 to recover. Stand to Sit with SBA. Self Care Training:   Cues were given for safety, sequence, UE/LE placement, visual cues, and balance. Activities performed today included: Toileting:  SBA for toilet t/f with use of grab bar  MI for seated hygiene. Pt required increased time to attempt to have BM and was unsuccessful, requested stool softener, RN made aware. Hygiene:  Pt stood at sink for ~1.5 min for hand hygiene with SBA. Dressing:  Pt donned socks with Set-up A in long sitting in bed.      Activities performed today included bed mobility training, transfers,

## 2023-10-05 NOTE — PROGRESS NOTES
V2.0    [facility] [place]      Name:  He Villalta /Age/Sex: 1960  (58 y.o. male)   MRN & CSN:  [id number] & [id number] Encounter Date/Time: 10/5/2023 12:00 PM EDT   Location:  -A PCP: Hanna Guerin MD     Attending:Kunwar, Claudell Mania, [address] [place] [address] Day: 10    Assessment and Recommendations   He Villalta is a 58 y.o. male  who presents with Diaphragmatic hernia with obstruction but no gangrene    Acute on chronic hypoxic respiratory failure  -Longstanding history of COPD on 6 L of home oxygen. Baseline saturation between 88 and 90  -Requiring increased oxygen requirements status post surgery for diaphragmatic hernia  -weaned off vapotherm down to 4L at rest - desaturates to 86% when ambulating at 6L   -Continue diuresis/breathing treatments/steroids  -Pulmonology following  -Continue lasix 40 mg daily, stop steroid  -ambulate in gomez  -plan to discharge home with home health tomorrow morning     Atrial fibrillation  - s/p DCCV on amiodarone, Eliquis. Hx of aortic regurgitation. -S/p aortic valve replacement,. Continue home metoprolol 50 mg BID. Diet ADULT ORAL NUTRITION SUPPLEMENT; Breakfast; Low Calorie/High Protein Oral Supplement  ADULT DIET; Regular; 5 carb choices (75 gm/meal)   DVT Prophylaxis [] Lovenox, []  Heparin, [] SCDs, [] Ambulation,  [] Eliquis, [] Xarelto  [] Coumadin   Code Status Full Code   Disposition From: Home  Expected Disposition: Home  Estimated Date of Discharge: tomorrow   Patient requires continued admission due desaturation to 85-86% while ambulating on 6L   Surrogate Decision Maker/ POA       Personally reviewed Lab Studies and Imaging           Subjective:     Chief Complaint:     Patient seen and examined at bedside this morning. Denies chest pain, nausea or vomiting, fever or chills. Breathing improving  Review of Systems:      Pertinent positives and negatives discussed in HPI    Objective:      Intake/Output Summary (Last Date: 9/29/2023  EXAMINATION: ONE XRAY VIEW OF THE CHEST 9/29/2023 10:32 am COMPARISON: 09/27/2023, 09/26/2023 HISTORY: ORDERING SYSTEM PROVIDED HISTORY: Hypoxia TECHNOLOGIST PROVIDED HISTORY: Reason for exam:->Hypoxia Reason for Exam: Hypoxia FINDINGS: The cardiomediastinal silhouette is unchanged. Similar mild central vascular congestion. Bibasilar opacities and suspected trace bilateral pleural effusions/scarring are unchanged. No discrete pneumothorax on this limited portable study. Prior sternotomy. Similar appearance of the chest including bibasilar atelectasis versus infiltrates, small bilateral pleural effusions, and central vascular congestion. XR CHEST PORTABLE    Result Date: 9/28/2023  EXAMINATION: ONE XRAY VIEW OF THE CHEST 9/27/2023 10:46 pm COMPARISON: 09/26/2023 HISTORY: ORDERING SYSTEM PROVIDED HISTORY: increased oxygen demand TECHNOLOGIST PROVIDED HISTORY: Reason for exam:->increased oxygen demand Reason for Exam: increased oxygen demand FINDINGS: Prior sternal splitting procedure. The heart is moderately enlarged and unchanged. Prior sternal splitting procedure. There is bilateral perihilar and lower lung airspace disease with small right larger than left bilateral pleural effusions. There are few bands of bibasilar atelectasis. There is no acute bone finding. Findings most consistent with mild congestive heart failure. Underlying basilar pneumonia not excluded. XR CHEST PORTABLE    Result Date: 9/26/2023  EXAMINATION: ONE XRAY VIEW OF THE CHEST 9/26/2023 1:30 pm COMPARISON: 07/15/2023 HISTORY: ORDERING SYSTEM PROVIDED HISTORY: Hypoxia TECHNOLOGIST PROVIDED HISTORY: Reason for exam:->Hypoxia Reason for Exam: Hypoxia Additional signs and symptoms: Hypoxia Relevant Medical/Surgical History: Hypoxia FINDINGS: There is decreased inspiration as compared to prior examination with atelectatic changes/infiltrates both lung bases. Upper lung zones appear clear.   The patient is

## 2023-10-06 VITALS
BODY MASS INDEX: 32.19 KG/M2 | HEART RATE: 53 BPM | RESPIRATION RATE: 14 BRPM | HEIGHT: 71 IN | SYSTOLIC BLOOD PRESSURE: 128 MMHG | TEMPERATURE: 98.1 F | DIASTOLIC BLOOD PRESSURE: 75 MMHG | WEIGHT: 229.94 LBS | OXYGEN SATURATION: 88 %

## 2023-10-06 PROBLEM — G89.18 ACUTE POSTOPERATIVE ABDOMINAL PAIN: Status: ACTIVE | Noted: 2023-10-06

## 2023-10-06 PROBLEM — R10.9 ACUTE POSTOPERATIVE ABDOMINAL PAIN: Status: ACTIVE | Noted: 2023-10-06

## 2023-10-06 LAB
GLUCOSE BLD-MCNC: 110 MG/DL (ref 70–99)
GLUCOSE BLD-MCNC: 95 MG/DL (ref 70–99)

## 2023-10-06 PROCEDURE — 6370000000 HC RX 637 (ALT 250 FOR IP): Performed by: INTERNAL MEDICINE

## 2023-10-06 PROCEDURE — 6370000000 HC RX 637 (ALT 250 FOR IP): Performed by: STUDENT IN AN ORGANIZED HEALTH CARE EDUCATION/TRAINING PROGRAM

## 2023-10-06 PROCEDURE — 97116 GAIT TRAINING THERAPY: CPT

## 2023-10-06 PROCEDURE — 94761 N-INVAS EAR/PLS OXIMETRY MLT: CPT

## 2023-10-06 PROCEDURE — 2700000000 HC OXYGEN THERAPY PER DAY

## 2023-10-06 PROCEDURE — 6370000000 HC RX 637 (ALT 250 FOR IP): Performed by: PHYSICIAN ASSISTANT

## 2023-10-06 PROCEDURE — 82962 GLUCOSE BLOOD TEST: CPT

## 2023-10-06 PROCEDURE — 99232 SBSQ HOSP IP/OBS MODERATE 35: CPT | Performed by: INTERNAL MEDICINE

## 2023-10-06 PROCEDURE — 6360000002 HC RX W HCPCS: Performed by: PHYSICIAN ASSISTANT

## 2023-10-06 PROCEDURE — 97530 THERAPEUTIC ACTIVITIES: CPT

## 2023-10-06 PROCEDURE — 6360000002 HC RX W HCPCS: Performed by: STUDENT IN AN ORGANIZED HEALTH CARE EDUCATION/TRAINING PROGRAM

## 2023-10-06 PROCEDURE — 94640 AIRWAY INHALATION TREATMENT: CPT

## 2023-10-06 PROCEDURE — 2580000003 HC RX 258: Performed by: PHYSICIAN ASSISTANT

## 2023-10-06 RX ORDER — HYDROMORPHONE HYDROCHLORIDE 2 MG/1
2 TABLET ORAL EVERY 6 HOURS PRN
Qty: 20 TABLET | Refills: 0 | Status: SHIPPED | OUTPATIENT
Start: 2023-10-06 | End: 2023-10-11

## 2023-10-06 RX ADMIN — OXYCODONE HYDROCHLORIDE 5 MG: 5 TABLET ORAL at 05:28

## 2023-10-06 RX ADMIN — APIXABAN 5 MG: 5 TABLET, FILM COATED ORAL at 09:31

## 2023-10-06 RX ADMIN — BUDESONIDE AND FORMOTEROL FUMARATE DIHYDRATE 2 PUFF: 160; 4.5 AEROSOL RESPIRATORY (INHALATION) at 07:32

## 2023-10-06 RX ADMIN — SODIUM CHLORIDE, PRESERVATIVE FREE 10 ML: 5 INJECTION INTRAVENOUS at 09:33

## 2023-10-06 RX ADMIN — ASPIRIN 81 MG CHEWABLE TABLET 81 MG: 81 TABLET CHEWABLE at 09:31

## 2023-10-06 RX ADMIN — TAMSULOSIN HYDROCHLORIDE 0.4 MG: 0.4 CAPSULE ORAL at 09:31

## 2023-10-06 RX ADMIN — METOPROLOL TARTRATE 50 MG: 50 TABLET, FILM COATED ORAL at 09:31

## 2023-10-06 RX ADMIN — POTASSIUM CHLORIDE 10 MEQ: 750 TABLET, FILM COATED, EXTENDED RELEASE ORAL at 09:31

## 2023-10-06 RX ADMIN — IPRATROPIUM BROMIDE AND ALBUTEROL SULFATE 1 DOSE: .5; 2.5 SOLUTION RESPIRATORY (INHALATION) at 11:26

## 2023-10-06 RX ADMIN — HYDROMORPHONE HYDROCHLORIDE 1 MG: 1 INJECTION, SOLUTION INTRAMUSCULAR; INTRAVENOUS; SUBCUTANEOUS at 03:44

## 2023-10-06 RX ADMIN — HYDROMORPHONE HYDROCHLORIDE 1 MG: 1 INJECTION, SOLUTION INTRAMUSCULAR; INTRAVENOUS; SUBCUTANEOUS at 09:31

## 2023-10-06 RX ADMIN — EMPAGLIFLOZIN 10 MG: 10 TABLET, FILM COATED ORAL at 09:31

## 2023-10-06 RX ADMIN — IPRATROPIUM BROMIDE AND ALBUTEROL SULFATE 1 DOSE: .5; 2.5 SOLUTION RESPIRATORY (INHALATION) at 07:32

## 2023-10-06 RX ADMIN — FUROSEMIDE 40 MG: 10 INJECTION, SOLUTION INTRAMUSCULAR; INTRAVENOUS at 09:31

## 2023-10-06 RX ADMIN — AMIODARONE HYDROCHLORIDE 200 MG: 200 TABLET ORAL at 09:31

## 2023-10-06 ASSESSMENT — PAIN DESCRIPTION - DESCRIPTORS
DESCRIPTORS: SORE;ACHING;DISCOMFORT
DESCRIPTORS: DISCOMFORT;SORE;ACHING
DESCRIPTORS: ACHING;DISCOMFORT
DESCRIPTORS: SORE;DISCOMFORT
DESCRIPTORS: ACHING;DISCOMFORT;SORE
DESCRIPTORS: SORE;DISCOMFORT;ACHING
DESCRIPTORS: SORE;DISCOMFORT

## 2023-10-06 ASSESSMENT — PAIN DESCRIPTION - LOCATION
LOCATION: BACK

## 2023-10-06 ASSESSMENT — PAIN SCALES - GENERAL
PAINLEVEL_OUTOF10: 5
PAINLEVEL_OUTOF10: 4
PAINLEVEL_OUTOF10: 6
PAINLEVEL_OUTOF10: 6
PAINLEVEL_OUTOF10: 3
PAINLEVEL_OUTOF10: 4

## 2023-10-06 ASSESSMENT — PAIN DESCRIPTION - ORIENTATION
ORIENTATION: LOWER
ORIENTATION: MID;LOWER;RIGHT
ORIENTATION: LOWER

## 2023-10-06 ASSESSMENT — PAIN - FUNCTIONAL ASSESSMENT: PAIN_FUNCTIONAL_ASSESSMENT: PREVENTS OR INTERFERES SOME ACTIVE ACTIVITIES AND ADLS

## 2023-10-06 NOTE — PROGRESS NOTES
Physical Therapy    Physical Therapy Treatment Note  Name: Fransico Yanes MRN: [id number] :   1960   Date:  10/6/2023   Admission Date: 2023 Room:  -A   Restrictions/Precautions:  Restrictions/Precautions  Restrictions/Precautions: General Precautions            Subjective:  Patient states:  \"I am feeling better\"  Pain:    Location, Type, Intensity (0/10 to 10/10):  denies; 0/10     Objective:    Observation:  pt supine in bed upon entry     Treatment, including education/measures:  Pt agreeable to participating in therapy at this time. Therapeutic Activity Training:   Therapeutic activity training was instructed today. Cues were given for safety, sequence, UE/LE placement, awareness, and balance. Activities performed today included bed mobility training, sup-sit, sit-stand, SPT. Pt completed supine <> sit transfers with SBA/supervision. Pt completed sit <> stand transfers from bed and chair with SBA/supervision. Gait Training:  Cues were given for safety, sequence, device management, balance, posture, awareness, path. Pt ambulated 100 feet + 100 feet with SBAx1 with a decreased gait speed and a decreased step length bilaterally. Pt provided with verbal cues to practice pursed lip breathing throughout. Pt provided with verbal and tactile cues to maintain upright posture in order to avoid COM shifting outside of HANNAH. Pt provided with verbal cues to take rest breaks as needed. Safety  Patient left safely in the bed, with call light/phone in reach with alarm applied. Gait belt was used for transfers and gait. Assessment / Impression:    Patient's tolerance of treatment:  good; patient tolerated OOB mobility/ambulation today            Adverse Reaction: none  Significant change in status and impact:  none  Barriers to improvement:  n/a     Plan for Next Session:    Continue progressing toward goals per plan of care.   Progress independence with transfers and ambulation

## 2023-10-06 NOTE — PROGRESS NOTES
Outpatient Pharmacy Progress Note for Meds-to-Beds    Total number of Prescriptions Filled: 1  The following medications were dispensed to the patient during the discharge process:  hydromorphone    Additional Documentation:  Medication(s) were delivered to the patient's room prior to discharge      Thank you for letting us serve your patients.   [address]    [address] [place] [address], [address] [place] [address]    Phone: [phone]    Fax: [phone]

## 2023-10-09 ENCOUNTER — CARE COORDINATION (OUTPATIENT)
Dept: CASE MANAGEMENT | Age: 63
End: 2023-10-09

## 2023-10-09 ENCOUNTER — OFFICE VISIT (OUTPATIENT)
Dept: SURGERY | Age: 63
End: 2023-10-09

## 2023-10-09 VITALS
SYSTOLIC BLOOD PRESSURE: 118 MMHG | HEIGHT: 71 IN | WEIGHT: 219.5 LBS | DIASTOLIC BLOOD PRESSURE: 70 MMHG | BODY MASS INDEX: 30.73 KG/M2

## 2023-10-09 DIAGNOSIS — K44.9 DIAPHRAGMATIC HERNIA WITHOUT OBSTRUCTION AND WITHOUT GANGRENE: Primary | ICD-10-CM

## 2023-10-09 DIAGNOSIS — J44.9 COPD, SEVERE (HCC): Primary | ICD-10-CM

## 2023-10-09 PROCEDURE — 99024 POSTOP FOLLOW-UP VISIT: CPT | Performed by: SURGERY

## 2023-10-09 NOTE — PROGRESS NOTES
Diabetes Mother     High Blood Pressure Mother     Migraines Mother     High Blood Pressure Father     COPD Father     Cancer Father         lung    Heart Disease Maternal Grandmother     Heart Disease Maternal Grandfather      Social History     Socioeconomic History    Marital status:      Spouse name: Not on file    Number of children: Not on file    Years of education: Not on file    Highest education level: Not on file   Occupational History    Not on file   Tobacco Use    Smoking status: Never    Smokeless tobacco: Never   Vaping Use    Vaping Use: Never used   Substance and Sexual Activity    Alcohol use: Not Currently     Comment: No use past 24 months    Drug use: No    Sexual activity: Yes     Partners: Female     Comment:    Other Topics Concern    Not on file   Social History Narrative    s/p [employer] (Sept 2012), s/p  at [facility],  at [facility] (Aug 2014) and  quality at [address] [place] [address] Road Strain: Low Risk  (3/28/2023)    Overall Financial Resource Strain (CARDIA)     Difficulty of Paying Living Expenses: Not hard at all   Food Insecurity: No Food Insecurity (3/28/2023)    Hunger Vital Sign     Worried About Running Out of Food in the Last Year: Never true     [address] in the Last Year: Never true   Transportation Needs: Unknown (3/28/2023)    PRAPARE - Transportation     Lack of Transportation (Medical): Not on file     Lack of Transportation (Non-Medical):  No   Physical Activity: Not on file   Stress: Not on file   Social Connections: Not on file   Intimate Partner Violence: Not on file   Housing Stability: Unknown (3/28/2023)    Housing Stability Vital Sign     Unable to Pay for Housing in the Last Year: Not on file     Number of Places Lived in the Last Year: Not on file     Unstable Housing in the Last Year: No       OBJECTIVE:   Physical Exam    Wound well healed

## 2023-10-09 NOTE — CARE COORDINATION
Care Transitions Initial Follow Up Call    Call within 2 business days of discharge: Yes    Patient Current Location:  Home: [address] [place] [address] Transition Nurse contacted the patient by telephone to perform post hospital discharge assessment. Verified name and  with patient as identifiers. Provided introduction to self, and explanation of the Care Transition Nurse role. Patient: Carson Gipson Patient : 1960   MRN: [id number]  Reason for Admission: Diaphragmatic Hernia s/p Repair 5, Copd Exac  Discharge Date: 10/6/23 RARS: Readmission Risk Score: 16.7  Facility: [facility] 23-10/6/23    Last Discharge Facility       Date Complaint Diagnosis Description Type Department Provider    23  Diaphragmatic hernia with obstruction but no gangrene . .. Admission (Discharged) [facility] 2E Annika Nobles MD          Was this an external facility discharge? No     Challenges to be reviewed by the provider   Additional needs identified to be addressed with provider: No       Method of communication with provider: none. Patient reports doing well s/p 23 hernia repair. Was seen by Dr Lyles this morning for post op appt, no new rx. Reports Dr Lyles pleased w/ recovery. Reports incision healing nicely. Reports minimal post op pain, well controlled w/ prn Dilaudid. Reviewed post-op instructions. Reports appetite, fluid intake good w/ + bm. [address] did have to insert cohen cath over weekend d/t inability to void. Has appt this afternoon 120pm w/ Dr J Carlos Bueno, Urology. Reports clear yellow urine, qs. Reports copd sx at baseline. Denies ac resp distress, sob, cough, wheezing. Noted to be speaking in complete, unlabored sentences. Reports using O2 at 6L cont. w/ SPO2 90-92%. Discussed SPO2 parameters. Confirmed IS--discussed usage. Discussed copd triggers. Patient is non-smoker, denies 2nd hand smoke exposure. On Trelegy, Albuterol prn. Active w/ [facility]. Denies additional resource needs.      Care

## 2023-10-10 ENCOUNTER — TELEPHONE (OUTPATIENT)
Dept: FAMILY MEDICINE CLINIC | Age: 63
End: 2023-10-10

## 2023-10-10 NOTE — ADT AUTH CERT
Patient Demographics    Name Patient ID SSN Gender Identity Birth Date   Mario Arango [id number]  Male 58 (63 yrs)     Address Phone Email Employer    [address] [place] [address]  [address] [phone] (I)   [phone] (G)   [phone] (OTHER PHONE) [email or web address]   1      [place] Race Occupation Emp Status    [employer] [place] Native -- Retired      Reg Status PCP Date Last Verified Next Review Date    Verified Deacon [place]  [phone] 09/18/23 10/18/23      Admission Date Discharge Date Admitting Provider     09/26/23 10/06/23 Benny Taveras MD       Marital Status Episcopal       None        Emergency Contact Davon Owens [id number]   [address] [place] [address]   [phone] (E)   [phone] (S)   [phone] Stormkelley Umanzor)     [address] Account [de-identified]  [address] [facility] Berrienella Savage Name/Sex/Relation Subscriber  Subscriber Address/Phone Subscriber Emp/Emp Phone   1. [facility]   [id number] Felipe Joya - Female   (Spouse) 1962 [address] [facility]   [phone](X) OTHER    2.  MEDICARE   [id number] Mario Arango - Male   (Self) 1960 [address] [place] [address] , [address] [place] [address]   [phone](D) OTHER      Utilization Reviews       10/5 by Rashaad Elizabeth RN  Last Updated by Rashaad Elizabeth RN on 10/9/2023 1221     Review Status Created By   In Austin Joya RN       Review Type   Continued Stay      Criteria Review   DATE: 10/5        RELEVANT BASELINES: (lab values, vitals, o2 amount/delivery, etc.)  Pt uses 5-6 liter nc O2 at home     PERTINENT UPDATES:  Per Pulmonary:  Incarcerated diaphragmatic Hernia without obstruction  Robotic/laparoscopic assisted incarcerated preperitoneal diaphragmatic hernia repair with mesh 10 cm   Post op Hypoxia- improving  S/p Hernia repair  Severe COPD  Acute on chronic Hypoxic resp failure- improving  Systolic CHF with EF of

## 2023-10-10 NOTE — TELEPHONE ENCOUNTER
Care Transitions Initial Follow Up Call    Outreach made within 2 business days of discharge: Yes    Patient: Gab Settler Patient : 1960   MRN: [id number]  Reason for Admission: There are no discharge diagnoses documented for the most recent discharge.   Discharge Date: 10/6/23       Spoke with: unable to contact pt at this time     Discharge department/facility: [facility] Interactive Patient Contact:  Scheduled appointment with PCP within 7-14 days    Follow Up  Future Appointments   Date Time Provider [facility] [address]   10/23/2023  9:15 AM Marcelo Hernandez PA-C [address] [place] [address] 1 [facility]   10/24/2023 11:00 AM Ole Salazar MD [facility]   2023  9:00 AM DONA Herrera CNP [facility] Heart [facility]   2023  9:15 AM Kayli Erazo MD [address] [place] [address]

## 2023-10-12 ENCOUNTER — CARE COORDINATION (OUTPATIENT)
Dept: CASE MANAGEMENT | Age: 63
End: 2023-10-12

## 2023-10-12 NOTE — CARE COORDINATION
Care Transitions Outreach Attempt-1st attempt    Call within 2 business days of discharge: Yes   Attempted to reach patient for subsequent transitional call. Left HIPPA compliant VM to return call directly to [phone]. Patient: Sherwood Sicard Patient : 1960 MRN: [id number]    Last Discharge Facility       Date Complaint Diagnosis Description Type Department Provider    23  Diaphragmatic hernia with obstruction but no gangrene . ..  Admission (Discharged) [address] Nitin Vasquez MD              Noted following upcoming appointments from discharge chart review:   [facility] follow up appointment(s):   Future Appointments   Date Time Provider [address]   10/23/2023  9:15 AM Doreen Berrios PA-C [address] [place] [address] 1 [facility]   10/24/2023 11:00 AM Inez Jaime MD [facility]   2023  9:00 AM Jayson Carrasquillo APRN - CNP [facility]   2023  9:15 AM Alexandre Gray MD [facility]  follow up appointment(s): na

## 2023-10-13 ENCOUNTER — CARE COORDINATION (OUTPATIENT)
Dept: CASE MANAGEMENT | Age: 63
End: 2023-10-13

## 2023-10-13 ENCOUNTER — TELEPHONE (OUTPATIENT)
Dept: CARDIOLOGY CLINIC | Age: 63
End: 2023-10-13

## 2023-10-13 NOTE — TELEPHONE ENCOUNTER
Cardiologist: Dr. Sierra Grajeda  Surgeon: Dr. Emily Golden   Surgery: Indira Valdez   Anesthesia: no  Date: pending clearance  FAX# [phone]    Ph# [phone]    Last OV 8/21/23 w/Tania LIZARRAGA fib  Hypertension  Hyperlipidemia      Ekg 8/21/23  Echo 7/27/23  Cardiac cath 6/20/23  Nuc med 6/24/2020  Cardioversion 7/27/23  Aortic valve replacement 6/30/23        Eliquis  Asa 81mg

## 2023-10-13 NOTE — CARE COORDINATION
Care Transitions Follow Up Call    Patient Current Location:  Home: [address] [place] [address] Transition Nurse contacted the patient by telephone to follow up after admission on 23-10/6/23. Verified name and  with patient as identifiers. Patient: He Villalta  Patient : 1960   MRN: [id number]  Reason for Admission: Diaphragmatic Hernia s/p Repair 5, Copd Exac  Discharge Date: 10/6/23 RARS: Readmission Risk Score: 16.7  Facility: [facility]    Needs to be reviewed by the provider   Additional needs identified to be addressed with provider: No     Method of communication with provider: none. Patient reports doing well post hernia repair. States \"that's all going great\". Denies c/o. Reports incision healing well. Has been seen for post-op appt w/ f/u scheduled for 10/23/23. Was seen by Dr Sarath Nguyen as scheduled w/ cohen cath removed however now has to self cath. Reports TURP planned for early 2023. Discussed importance of hand washing before self cath, using sterile gloves. Denies questions; reports [facility] has guided him on process. Denies urinary c/o, fever, chills, malaise. Reports urine dk yellow. Advised increased water intake (within CHF fluid restriction). Advised avoiding caffeine, carbonated beverages as may irritate bladder. Discussed sx whch require MD/[facility] notification for early outpt intervention, v/u.    Denies sx of COPD exacerbation. Reports chronic sx at baseline. Reports using O2 at 5L cont. w/ SPO2 90%. Discussed SPO2 parameters. Discussed copd triggers. Patient is non-smoker, denies 2nd hand smoke exposure. On Trelegy, Albuterol prn. Active w/ [facility]. Denies additional resource needs.       Future Appointments   Date Time Provider [address]   10/23/2023  9:15 AM Estelle Baldwin [address] [place] [address] 1 [facility]   10/24/2023 11:00 AM Hanna Guerin MD [facility]   2023  9:00 AM Marva Gay APRN - CNP [facility]

## 2023-10-18 ENCOUNTER — TELEPHONE (OUTPATIENT)
Dept: FAMILY MEDICINE CLINIC | Age: 63
End: 2023-10-18

## 2023-10-18 DIAGNOSIS — B37.0 ORAL THRUSH: Primary | ICD-10-CM

## 2023-10-18 NOTE — TELEPHONE ENCOUNTER
Recieved call from Jose Pacheco with [facility] [phone] stating pt is starting to get thrush. She is requesting pcp to call in nystatin or something to help pt with his mouth. [facility]. Please advise.

## 2023-10-20 ENCOUNTER — OFFICE VISIT (OUTPATIENT)
Dept: CARDIOLOGY CLINIC | Age: 63
End: 2023-10-20
Payer: COMMERCIAL

## 2023-10-20 ENCOUNTER — CARE COORDINATION (OUTPATIENT)
Dept: CASE MANAGEMENT | Age: 63
End: 2023-10-20

## 2023-10-20 VITALS
WEIGHT: 225 LBS | HEART RATE: 54 BPM | OXYGEN SATURATION: 87 % | DIASTOLIC BLOOD PRESSURE: 70 MMHG | SYSTOLIC BLOOD PRESSURE: 112 MMHG | HEIGHT: 71 IN | BODY MASS INDEX: 31.5 KG/M2

## 2023-10-20 DIAGNOSIS — I77.810 DILATED AORTIC ROOT (HCC): ICD-10-CM

## 2023-10-20 DIAGNOSIS — I10 ESSENTIAL HYPERTENSION: ICD-10-CM

## 2023-10-20 DIAGNOSIS — I48.0 PAF (PAROXYSMAL ATRIAL FIBRILLATION) (HCC): Primary | ICD-10-CM

## 2023-10-20 DIAGNOSIS — I38 VHD (VALVULAR HEART DISEASE): ICD-10-CM

## 2023-10-20 PROCEDURE — G8484 FLU IMMUNIZE NO ADMIN: HCPCS | Performed by: NURSE PRACTITIONER

## 2023-10-20 PROCEDURE — 1036F TOBACCO NON-USER: CPT | Performed by: NURSE PRACTITIONER

## 2023-10-20 PROCEDURE — 99214 OFFICE O/P EST MOD 30 MIN: CPT | Performed by: NURSE PRACTITIONER

## 2023-10-20 PROCEDURE — G8417 CALC BMI ABV UP PARAM F/U: HCPCS | Performed by: NURSE PRACTITIONER

## 2023-10-20 PROCEDURE — 3017F COLORECTAL CA SCREEN DOC REV: CPT | Performed by: NURSE PRACTITIONER

## 2023-10-20 PROCEDURE — 3074F SYST BP LT 130 MM HG: CPT | Performed by: NURSE PRACTITIONER

## 2023-10-20 PROCEDURE — 1111F DSCHRG MED/CURRENT MED MERGE: CPT | Performed by: NURSE PRACTITIONER

## 2023-10-20 PROCEDURE — 3078F DIAST BP <80 MM HG: CPT | Performed by: NURSE PRACTITIONER

## 2023-10-20 PROCEDURE — 93000 ELECTROCARDIOGRAM COMPLETE: CPT | Performed by: NURSE PRACTITIONER

## 2023-10-20 PROCEDURE — G8427 DOCREV CUR MEDS BY ELIG CLIN: HCPCS | Performed by: NURSE PRACTITIONER

## 2023-10-20 NOTE — CARE COORDINATION
Care Transitions Follow Up Call    Patient Current Location:  Home: [address] [facility] Transition Nurse contacted the patient by telephone to follow up after admission on 23-10/6/23. Verified name and  as identifiers. Patient: Fransico Yanes  Patient : 1960   MRN: [id number]  Reason for Admission: Diaphragmatic Hernia s/p Repair 5, Copd Exac  Discharge Date: 10/6/23 RARS: Readmission Risk Score: 16.7  Facility: [facility]    Needs to be reviewed by the provider   Additional needs identified to be addressed with provider: no as md aware, addressing       Method of communication with provider: none. Patient reports doing well post hernia repair. Denies c/o. Reports incision healing well. Has been seen for post-op appt w/ f/u scheduled for 10/23/23. Reviewed CHF Zone Mgt:   Daily weights in a.m. before breakfast, after voiding  Notify MD immediately of weight gain/loss 3# or more in 1-7days  Take all rx as prescribed, keep scheduled MD appts  Low sodium diet  Do not add salt to food  Adhere to MD recommended fluid restriction  Notify MD immediately if experiencing increased sob, orthopnea, edema, weight gain, feeling of uneasiness, chest pain, increased cough, confusion, wheezing or chest tightness. Call 911 if noting acute distress    Denies sx of chf exac. Reports weight 223#. Reports taking Lasix, Metoprolol, Jardiance. Was [facility] today, note pending. Reports he is non-smoker. Reports [address] had to reinsert cohen cath as noting discomfort and blood clots w/ self caths. Reports f/c patent, blood tinged urine w/ sediment. Reports sense of urgency and occasional leakage. Advised to contact [address] to check/possibly reposition/irrigate f/c. Reports he was started on Nitrofurantoin 10/19/23 per Urology. Advised to not skip doses and complete entire course unless otherwise directed by MD. Advised water (within CHF fluid restriction).  Awaiting scheduling of TURP

## 2023-10-21 ASSESSMENT — ENCOUNTER SYMPTOMS
SHORTNESS OF BREATH: 0
ORTHOPNEA: 0

## 2023-10-23 ENCOUNTER — OFFICE VISIT (OUTPATIENT)
Dept: SURGERY | Age: 63
End: 2023-10-23

## 2023-10-23 VITALS
SYSTOLIC BLOOD PRESSURE: 120 MMHG | OXYGEN SATURATION: 78 % | WEIGHT: 231.4 LBS | HEART RATE: 60 BPM | BODY MASS INDEX: 32.27 KG/M2 | DIASTOLIC BLOOD PRESSURE: 82 MMHG

## 2023-10-23 DIAGNOSIS — Z48.89 ENCOUNTER FOR POSTOPERATIVE CARE: Primary | ICD-10-CM

## 2023-10-23 PROCEDURE — APPNB30 APP NON BILLABLE TIME 0-30 MINS: Performed by: PHYSICIAN ASSISTANT

## 2023-10-23 PROCEDURE — 99024 POSTOP FOLLOW-UP VISIT: CPT | Performed by: PHYSICIAN ASSISTANT

## 2023-10-23 NOTE — PROGRESS NOTES
Chief Complaint   Patient presents with    Post-Op Check     2nd P/O Diaphagmic Hernia Repair @ [facility] 9/26/2023         SUBJECTIVE:  Patient presents today for his 2nd post op visit following robotic assisted diaphragmatic hernia repair. Pt reports that pain is minimal and intermittent. Pt is  tolerating a regular diet. BMs are WNL. Incisions: well healed    Past Surgical History:   Procedure Laterality Date    AORTIC VALVE REPLACEMENT N/A 6/30/2023    AORTIC VALVE REPLACEMENT UTILIZING A 29 MM MELTON INSPIRIS RESILIA  TISSUE AORTIC VALVE; CHERRI. performed by Leatha Byrd MD at [facility]  01/01/1991    COLONOSCOPY  06/15/2015    COLONOSCOPY N/A 9/7/2022    COLONOSCOPY POLYPECTOMY SNARE/COLD BIOPSY performed by Gilmar Rondon MD at [address] [place] [address] Right 9/26/2023    HERNIA DIAPHRAGMATIC REPAIR ROBOTIC performed by Roula Gibson MD at [address] [place] [address] Bilateral 01/01/1992    INGUINAL HERNIA REPAIR Right 09/19/2013    with mesh    OTHER SURGICAL HISTORY  09/05/2013    hernia repair aborted, blood pressure spiked. TYMPANOSTOMY TUBE PLACEMENT  01/01/2009    right ear    VASECTOMY  01/01/1988     Past Medical History:   Diagnosis Date    A-fib ([address]) 07/27/2023    cardioversion 7/27/23    Abnormal echocardiogram 03/29/2013    EF=45-50%;mod. AR,mild aortic valve calcif. Anesthesia complication 96/18/0391    Ankle fracture, left 1985    Aortic root dilation ([address]) 12/16/2021    Dilation of the aortic root (4.2cm)and the ascending aorta(4.0cm).     Arm fracture, right 1966    AVM (arteriovenous malformation) 06/15/2015    transverse colon    CHF (congestive heart failure) (HCC)     COPD (chronic obstructive pulmonary disease) ([address])     Has worked around [facility] [employer] and worked at [employer] x 6 weeks    Diastolic dysfunction 30/60/4316    Grade III diastolic dysfunction    Epididymal cyst 10/2011    U/S    H/O cardiac catheterization 04/12/2013

## 2023-10-24 ENCOUNTER — TELEMEDICINE (OUTPATIENT)
Dept: FAMILY MEDICINE CLINIC | Age: 63
End: 2023-10-24
Payer: COMMERCIAL

## 2023-10-24 DIAGNOSIS — I50.42 CHRONIC COMBINED SYSTOLIC AND DIASTOLIC CONGESTIVE HEART FAILURE (HCC): ICD-10-CM

## 2023-10-24 DIAGNOSIS — B37.0 ORAL THRUSH: Primary | ICD-10-CM

## 2023-10-24 PROBLEM — I20.8 OTHER FORMS OF ANGINA PECTORIS: Status: RESOLVED | Noted: 2023-03-28 | Resolved: 2023-10-24

## 2023-10-24 PROBLEM — E11.9 TYPE 2 DIABETES MELLITUS (HCC): Status: RESOLVED | Noted: 2023-04-14 | Resolved: 2023-10-24

## 2023-10-24 PROBLEM — I20.89 OTHER FORMS OF ANGINA PECTORIS: Status: RESOLVED | Noted: 2023-03-28 | Resolved: 2023-10-24

## 2023-10-24 PROBLEM — N40.1 BENIGN PROSTATIC HYPERPLASIA WITH URINARY OBSTRUCTION: Status: ACTIVE | Noted: 2023-10-24

## 2023-10-24 PROBLEM — G89.18 ACUTE POSTOPERATIVE ABDOMINAL PAIN: Status: RESOLVED | Noted: 2023-10-06 | Resolved: 2023-10-24

## 2023-10-24 PROBLEM — Z95.2 AORTIC VALVE REPLACED: Status: ACTIVE | Noted: 2023-06-30

## 2023-10-24 PROBLEM — I49.3 PVC (PREMATURE VENTRICULAR CONTRACTION): Status: RESOLVED | Noted: 2021-04-21 | Resolved: 2023-10-24

## 2023-10-24 PROBLEM — R10.9 ACUTE POSTOPERATIVE ABDOMINAL PAIN: Status: RESOLVED | Noted: 2023-10-06 | Resolved: 2023-10-24

## 2023-10-24 PROBLEM — N13.8 BENIGN PROSTATIC HYPERPLASIA WITH URINARY OBSTRUCTION: Status: ACTIVE | Noted: 2023-10-24

## 2023-10-24 PROBLEM — J96.21 ACUTE ON CHRONIC RESPIRATORY FAILURE WITH HYPOXIA (HCC): Status: RESOLVED | Noted: 2023-03-21 | Resolved: 2023-10-24

## 2023-10-24 PROCEDURE — 99441 PR PHYS/QHP TELEPHONE EVALUATION 5-10 MIN: CPT | Performed by: FAMILY MEDICINE

## 2023-10-24 NOTE — PROGRESS NOTES
Sherwood Sicard is a 58 y.o. male evaluated via telephone on 10/24/2023. Consent:  He and/or health care decision maker is aware that that he may receive a bill for this telephone service, depending on his insurance coverage, and has provided verbal consent to proceed: Yes    Documentation:    Patient has the following concerns:     Chief Complaint   Patient presents with    6 Month Follow-Up     Doing okay. Planning for  prostate surgery with spinal anesthesia with Dr. Abbi Garay (s/p cystoscopy) at [facility] due to severe BPH and still has cohen bag . S/p aortic valve. Seeing Dr. Latha Avila due to a history of afib and PVC's s/p cardioversion. Now released from recent hernia surgery team.  Doing well from eating and drinking. But still getting thrush and on nystatin with some improvement. I communicated with the patient and/or health care decision maker about the following which includes  details of this discussion including any medical advice provided:     1. Oral thrush  2. Chronic combined systolic and diastolic congestive heart failure (HCC)    Nystatin and rinsing after inhaler use has been helpful for current thrush. Consider diflucan tablets if needed. He is on chronic inhaled steroids for his COPD. He is status post aortic valve replacement and is following closely with cardiology. He is on SGLT2 medication for his heart failure condition. Previous for review of his lab work does not indicate that this patient ever had diabetes. Will continue to check A1c periodically. No follow-ups on file. Sherwood Sicard is a 58 y.o. male evaluated via telephone on 10/24/2023 for 6 Month Follow-Up  . Documentation:  I communicated with the patient and/or health care decision maker about above. Details of this discussion including any medical advice provided: above    Total Time: minutes: 5-10 minutes    Sherwood Sicard was evaluated through a synchronous (real-time) audio encounter.  Patient

## 2023-10-25 ENCOUNTER — CARE COORDINATION (OUTPATIENT)
Dept: CARE COORDINATION | Age: 63
End: 2023-10-25

## 2023-10-25 RX ORDER — FUROSEMIDE 20 MG/1
20 TABLET ORAL 2 TIMES DAILY
Qty: 60 TABLET | Refills: 3 | Status: SHIPPED | OUTPATIENT
Start: 2023-10-25

## 2023-10-25 NOTE — TELEPHONE ENCOUNTER
Mary Chow patient's telehealth nurse called stating that patient is up 5 pounds since 10/20/23. He is having no other symptoms. She stated that if there are any concerns, please call the patient.

## 2023-10-25 NOTE — CARE COORDINATION
Care Transitions Follow Up Call    Patient Current Location:  Home: [address] Coordinator contacted the patient by telephone to follow up after admission on 2023. Verified name and  with patient as identifiers. Patient: Keira Rodriguez  Patient : 1960   MRN: [id number]  Reason for Admission: Diaphragmatic Hernia s/p Repair 5, Copd Exac  Discharge Date: 10/6/23 RARS: Readmission Risk Score: 16.7      Needs to be reviewed by the provider   Additional needs identified to be addressed with provider: No  none             Method of communication with provider: none. Patient reports he is feeling pretty good. He has some discomfort from cohen catheter. Urine ranges from jiménez to red. He is awaiting TURP in November. Denies sob, cough, cp, palpitations, swelling, fever or chills. /61, wt 227 lbs. Appetite and fluid intake is good. No problems with bowel. Taking medication as prescribed. No questions or needs. Will continue to follow. Addressed changes since last contact:  none  Discussed follow-up appointments. If no appointment was previously scheduled, appointment scheduling offered: No.   Is follow up appointment scheduled within 7 days of discharge? Yes. Follow Up  Future Appointments   Date Time Provider [address]   2023  2:15 PM Paddy Gaffney MD [facility]   2023  9:15 AM Solomon Berrios MD [facility]   2024 10:00 AM DONA Medrano - CNP [facility]   2024  8:30 AM Daniel Mari MD [facility]     External follow up appointment(s):     LPN Care Coordinator reviewed medical action plan and red flags with patient and discussed any barriers to care and/or understanding of plan of care after discharge. Discussed appropriate site of care based on symptoms and resources available to patient including: PCP  Specialist  Urgent care clinics  When to call 911.  The patient

## 2023-11-01 ENCOUNTER — CARE COORDINATION (OUTPATIENT)
Dept: CASE MANAGEMENT | Age: 63
End: 2023-11-01

## 2023-11-01 NOTE — CARE COORDINATION
Care Transitions Follow Up Call      Patient: Yolette Deluca  Patient : 1960   MRN: <[id number]>  Reason for Admission: Diaphragmatic Hernia s/p Repair 5, Copd Exac  Discharge Date: 10/6/23 RARS: Readmission Risk Score: 16.7      Attempted to contact patient for follow up transition call. Left voicemail message to return call with an update on condition since discharge. Contact information provided. Will continue to follow up.       Follow Up  Future Appointments   Date Time Provider [address] [place] [address]   2023  2:15 PM Pankaj Armendariz MD [facility]   2023  9:15 AM Narda Cross MD [facility]   2024 10:00 AM DONA Lira - CNP [facility]   2024  8:30 AM Ayala Salazar MD [facility]       Care Transitions Subsequent and Final Call    Subsequent and Final Calls  Are you currently active with any services?: Home Health  Care Transitions Interventions   Home Care Waiver: Declined  Other Services: Declined (Comment: RPM)      Transportation Support: Declined      DME Assistance: Declined     Senior Services: Declined                      Other Interventions:                Irene Serrato LPN  Care Coordinator  [phone]

## 2023-11-08 ENCOUNTER — CARE COORDINATION (OUTPATIENT)
Dept: CASE MANAGEMENT | Age: 63
End: 2023-11-08

## 2023-11-08 ENCOUNTER — OFFICE VISIT (OUTPATIENT)
Dept: CARDIOLOGY CLINIC | Age: 63
End: 2023-11-08
Payer: COMMERCIAL

## 2023-11-08 VITALS
BODY MASS INDEX: 32.65 KG/M2 | HEIGHT: 71 IN | HEART RATE: 61 BPM | DIASTOLIC BLOOD PRESSURE: 80 MMHG | SYSTOLIC BLOOD PRESSURE: 124 MMHG | RESPIRATION RATE: 20 BRPM | OXYGEN SATURATION: 75 % | WEIGHT: 233.2 LBS

## 2023-11-08 DIAGNOSIS — R00.2 PALPITATIONS: Primary | ICD-10-CM

## 2023-11-08 PROCEDURE — 1036F TOBACCO NON-USER: CPT | Performed by: INTERNAL MEDICINE

## 2023-11-08 PROCEDURE — G8427 DOCREV CUR MEDS BY ELIG CLIN: HCPCS | Performed by: INTERNAL MEDICINE

## 2023-11-08 PROCEDURE — G8417 CALC BMI ABV UP PARAM F/U: HCPCS | Performed by: INTERNAL MEDICINE

## 2023-11-08 PROCEDURE — 93000 ELECTROCARDIOGRAM COMPLETE: CPT | Performed by: INTERNAL MEDICINE

## 2023-11-08 PROCEDURE — 3017F COLORECTAL CA SCREEN DOC REV: CPT | Performed by: INTERNAL MEDICINE

## 2023-11-08 PROCEDURE — 3074F SYST BP LT 130 MM HG: CPT | Performed by: INTERNAL MEDICINE

## 2023-11-08 PROCEDURE — G8484 FLU IMMUNIZE NO ADMIN: HCPCS | Performed by: INTERNAL MEDICINE

## 2023-11-08 PROCEDURE — 99213 OFFICE O/P EST LOW 20 MIN: CPT | Performed by: INTERNAL MEDICINE

## 2023-11-08 PROCEDURE — 3078F DIAST BP <80 MM HG: CPT | Performed by: INTERNAL MEDICINE

## 2023-11-08 ASSESSMENT — ENCOUNTER SYMPTOMS
NAUSEA: 0
PHOTOPHOBIA: 0
BACK PAIN: 0
COUGH: 0
SHORTNESS OF BREATH: 1
DIARRHEA: 0
ABDOMINAL PAIN: 0
BLOOD IN STOOL: 0
CONSTIPATION: 0
WHEEZING: 0
EYE PAIN: 0
COLOR CHANGE: 0
VOMITING: 0
CHEST TIGHTNESS: 0

## 2023-11-08 NOTE — CARE COORDINATION
Steph Osei MD [address] [facility] [facility]     Care Transition Nurse reviewed medical action plan and red flags with patient and discussed any barriers to care and/or understanding of plan of care after discharge. Discussed appropriate site of care based on symptoms and resources available to patient including: PCP  [facility]. The patient agrees to contact PCP office for questions related to healthcare. Offered patient enrollment in the Remote Patient Monitoring (RPM) program for in-home monitoring: N/A as enrolled in similar program w/ [id number] [facility] Transitions Subsequent and Final Call    Schedule Follow Up Appointment with PCP: Completed  Subsequent and Final Calls  Do you have any ongoing symptoms?: Yes  Onset of Patient-reported symptoms: Other  Patient-reported symptoms: Other  Interventions for patient-reported symptoms: Other  Have your medications changed?: No  Do you have any questions related to your medications?: No  Do you currently have any active services?: Yes  Are you currently active with any services?: Home Health  Do you have any needs or concerns that I can assist you with?: No  Identified Barriers: Other, Medication Side Effects  Care Transitions Interventions   Home Care Waiver: Declined  Other Services: Declined (Comment: RPM)      Transportation Support: Declined      DME Assistance: Declined     Senior Services: Declined                      Other Interventions:             Care Transition Nurse provided contact information for future needs. No further follow-up call indicated based on severity of symptoms and risk factors. CT program closed.        Argenis Dalton RN

## 2023-11-08 NOTE — PATIENT INSTRUCTIONS
[address] [facility] Locations - No appointment necessary. Sites open Monday to Friday. Call your preferred location for test preparation, business   hours and other information you need. [employer] accepts [employer]. [address] [facility] [place], [address] [facility]  Phone: [phone]     **It is YOUR responsibilty to bring medication bottles and/or updated medication list to [address]. This will allow us to better serve you and all your healthcare needs**  Please be informed that if you contact our office outside of normal business hours the physician on call cannot help with any scheduling or rescheduling issues, procedure instruction questions or any type of medication issue. We advise you for any urgent/emergency that you go to the nearest emergency room! PLEASE CALL OUR OFFICE DURING NORMAL BUSINESS HOURS    Monday - Friday   8 am to 5 pm    [place]: [address] [place]: [phone]    [place]:  [phone]  Thank you for allowing us to care for you today! We want to ensure we can follow your treatment plan and we strive to give you the best outcomes and experience possible. If you ever have a life threatening emergency and call 911 - for an ambulance (EMS)   Our providers can only care for you at:   [facility] or [facility]. Even if you have someone take you or you drive yourself we can only care for you in a [facility] facility. Our providers are not setup at the other healthcare locations! We are committed to providing you the best care possible. If you receive a survey after visiting one of our offices, please take time to share your experience concerning your physician office visit. These surveys are confidential and no health information about you is shared. We are eager to improve for you and we are counting on your feedback to help make that happen.

## 2023-11-08 NOTE — PROGRESS NOTES
are normal. There is no distension. Palpations: Abdomen is soft. Tenderness: There is no abdominal tenderness. Musculoskeletal:         General: No tenderness. Cervical back: Normal range of motion. No tenderness. Right lower leg: No edema. Left lower leg: No edema. Skin:     General: Skin is warm. Neurological:      General: No focal deficit present. Mental Status: He is alert and oriented to person, place, and time. Cranial Nerves: No cranial nerve deficit. Psychiatric:         Mood and Affect: Mood normal.               CBC:   Lab Results   Component Value Date/Time    WBC 14.5 10/02/2023 08:56 AM    HGB 13.8 10/02/2023 08:56 AM    HCT 46.5 10/02/2023 08:56 AM     10/02/2023 08:56 AM     Lipids:  Lab Results   Component Value Date    CHOL 193 03/10/2021    TRIG 62 03/10/2021    HDL 39 (L) 03/10/2021    LDLCALC 155 (H) 06/09/2020    LDLDIRECT 148 (H) 03/10/2021     PT/INR:   Lab Results   Component Value Date/Time    INR 1.1 07/10/2023 07:58 AM        BMP:    Lab Results   Component Value Date     10/02/2023    K 3.7 10/02/2023    CL 89 (L) 10/02/2023    CO2 36 (H) 10/02/2023    BUN 38 (H) 10/02/2023     CMP:   Lab Results   Component Value Date    AST 22 09/27/2023    ALT 16 09/27/2023    PROT 7.1 09/27/2023    BILITOT 0.4 09/27/2023    ALKPHOS 135 (H) 09/27/2023     TSH:  No results found for: \"TSH\", \"T4\"    EKGINTERPRETATION - EKG Interpretation:  sinus bradycardia, first degree AV block      Vitals:    11/08/23 1447   BP: 124/80   Site: Left Upper Arm   Position: Sitting   Cuff Size: Medium Adult   Pulse: 61   Resp: 20   SpO2: (!) 75%   Weight: 105.8 kg (233 lb 3.2 oz)   Height: 1.803 m (5' 11\")        IMPRESSION / RECOMMENDATIONS:     Atrial fibrillation paroxysmal - post op? S/p AVR  PVC  Hypertension  Hyperlipidemia      Patient is getting a prostate surgery he still has Ambrose catheter. Patient is not sure about the date.    I would like to continue

## 2023-11-19 DIAGNOSIS — G47.09 OTHER INSOMNIA: ICD-10-CM

## 2023-11-20 RX ORDER — ATORVASTATIN CALCIUM 40 MG/1
40 TABLET, FILM COATED ORAL NIGHTLY
Qty: 30 TABLET | Refills: 5 | Status: SHIPPED | OUTPATIENT
Start: 2023-11-20

## 2023-11-20 RX ORDER — TAMSULOSIN HYDROCHLORIDE 0.4 MG/1
0.4 CAPSULE ORAL DAILY
Qty: 30 CAPSULE | Refills: 5 | OUTPATIENT
Start: 2023-11-20

## 2023-11-20 RX ORDER — FAMOTIDINE 20 MG/1
20 TABLET, FILM COATED ORAL 2 TIMES DAILY
Qty: 60 TABLET | Refills: 5 | OUTPATIENT
Start: 2023-11-20

## 2023-11-20 RX ORDER — METOPROLOL TARTRATE 50 MG/1
50 TABLET, FILM COATED ORAL 2 TIMES DAILY
Qty: 60 TABLET | Refills: 5 | Status: SHIPPED | OUTPATIENT
Start: 2023-11-20 | End: 2024-01-04

## 2023-11-21 RX ORDER — TRAZODONE HYDROCHLORIDE 50 MG/1
TABLET ORAL
Qty: 60 TABLET | Refills: 5 | Status: SHIPPED | OUTPATIENT
Start: 2023-11-21

## 2023-12-05 ENCOUNTER — HOSPITAL ENCOUNTER (OUTPATIENT)
Dept: PULMONOLOGY | Age: 63
Discharge: HOME | End: 2023-12-05

## 2024-01-03 ENCOUNTER — TELEPHONE (OUTPATIENT)
Dept: CARDIOLOGY CLINIC | Age: 64
End: 2024-01-03

## 2024-01-03 ENCOUNTER — TELEPHONE (OUTPATIENT)
Dept: CARDIOTHORACIC SURGERY | Age: 64
End: 2024-01-03

## 2024-01-03 NOTE — TELEPHONE ENCOUNTER
Liz means/ [facility] called patients   BP 98/64 HR 50 took a second time  102/60 , just had CABG she stated that he   Is Having some bleeding at the incision  Site and will call Dr Steen as well.

## 2024-01-03 NOTE — TELEPHONE ENCOUNTER
Liz from [facility] called to inform office that patient has blood blister 1 inch from incision. Patient has been discharged from our office and has been sent to cardiology for further care.

## 2024-01-04 ENCOUNTER — TELEPHONE (OUTPATIENT)
Dept: CARDIOLOGY CLINIC | Age: 64
End: 2024-01-04

## 2024-01-08 RX ORDER — ALBUTEROL SULFATE 90 UG/1
AEROSOL, METERED RESPIRATORY (INHALATION)
Qty: 6.7 EACH | Refills: 11 | Status: SHIPPED | OUTPATIENT
Start: 2024-01-08

## 2024-01-25 ENCOUNTER — TELEPHONE (OUTPATIENT)
Dept: PULMONOLOGY | Age: 64
End: 2024-01-25

## 2024-01-25 ENCOUNTER — HOSPITAL ENCOUNTER (EMERGENCY)
Age: 64
Discharge: HOME | End: 2024-01-25
Attending: EMERGENCY MEDICINE
Payer: COMMERCIAL

## 2024-01-25 ENCOUNTER — OFFICE VISIT (OUTPATIENT)
Dept: FAMILY MEDICINE CLINIC | Age: 64
End: 2024-01-25
Payer: MEDICARE

## 2024-01-25 ENCOUNTER — APPOINTMENT (OUTPATIENT)
Dept: GENERAL RADIOLOGY | Age: 64
End: 2024-01-25
Payer: COMMERCIAL

## 2024-01-25 VITALS
HEIGHT: 71 IN | HEART RATE: 70 BPM | DIASTOLIC BLOOD PRESSURE: 73 MMHG | SYSTOLIC BLOOD PRESSURE: 133 MMHG | WEIGHT: 221.2 LBS | BODY MASS INDEX: 30.97 KG/M2 | OXYGEN SATURATION: 83 %

## 2024-01-25 VITALS
OXYGEN SATURATION: 91 % | SYSTOLIC BLOOD PRESSURE: 122 MMHG | WEIGHT: 220 LBS | RESPIRATION RATE: 18 BRPM | TEMPERATURE: 98.9 F | BODY MASS INDEX: 30.68 KG/M2 | DIASTOLIC BLOOD PRESSURE: 73 MMHG

## 2024-01-25 DIAGNOSIS — R25.1 TREMOR: ICD-10-CM

## 2024-01-25 DIAGNOSIS — J96.11 CHRONIC RESPIRATORY FAILURE WITH HYPOXIA (HCC): ICD-10-CM

## 2024-01-25 DIAGNOSIS — J44.1 COPD EXACERBATION (HCC): Primary | ICD-10-CM

## 2024-01-25 DIAGNOSIS — Z99.81 ON HOME OXYGEN THERAPY: ICD-10-CM

## 2024-01-25 DIAGNOSIS — R41.89 BRAIN FOG: ICD-10-CM

## 2024-01-25 DIAGNOSIS — I50.42 CHRONIC COMBINED SYSTOLIC AND DIASTOLIC CONGESTIVE HEART FAILURE (HCC): ICD-10-CM

## 2024-01-25 DIAGNOSIS — R06.02 SOBOE (SHORTNESS OF BREATH ON EXERTION): ICD-10-CM

## 2024-01-25 DIAGNOSIS — J44.9 COPD, SEVERE (HCC): Primary | ICD-10-CM

## 2024-01-25 LAB
ALBUMIN SERPL-MCNC: 3.3 GM/DL (ref 3.4–5)
ALP BLD-CCNC: 123 IU/L (ref 40–128)
ALT SERPL-CCNC: 213 U/L (ref 10–40)
ANION GAP SERPL CALCULATED.3IONS-SCNC: 3 MMOL/L (ref 7–16)
AST SERPL-CCNC: 36 IU/L (ref 15–37)
BASOPHILS ABSOLUTE: 0 K/CU MM
BASOPHILS RELATIVE PERCENT: 0.2 % (ref 0–1)
BILIRUB SERPL-MCNC: 0.6 MG/DL (ref 0–1)
BUN SERPL-MCNC: 12 MG/DL (ref 6–23)
CALCIUM SERPL-MCNC: 8.7 MG/DL (ref 8.3–10.6)
CHLORIDE BLD-SCNC: 94 MMOL/L (ref 99–110)
CO2: 42 MMOL/L (ref 21–32)
CREAT SERPL-MCNC: 0.6 MG/DL (ref 0.9–1.3)
DIFFERENTIAL TYPE: ABNORMAL
EOSINOPHILS ABSOLUTE: 0.1 K/CU MM
EOSINOPHILS RELATIVE PERCENT: 0.7 % (ref 0–3)
GFR SERPL CREATININE-BSD FRML MDRD: >60 ML/MIN/1.73M2
GLUCOSE SERPL-MCNC: 98 MG/DL (ref 70–99)
HCT VFR BLD CALC: 44.1 % (ref 42–52)
HEMOGLOBIN: 12 GM/DL (ref 13.5–18)
IMMATURE NEUTROPHIL %: 0.5 % (ref 0–0.43)
LYMPHOCYTES ABSOLUTE: 2.1 K/CU MM
LYMPHOCYTES RELATIVE PERCENT: 21.6 % (ref 24–44)
MCH RBC QN AUTO: 23.3 PG (ref 27–31)
MCHC RBC AUTO-ENTMCNC: 27.2 % (ref 32–36)
MCV RBC AUTO: 85.6 FL (ref 78–100)
MONOCYTES ABSOLUTE: 0.9 K/CU MM
MONOCYTES RELATIVE PERCENT: 9.5 % (ref 0–4)
NUCLEATED RBC %: 0 %
PDW BLD-RTO: 15.2 % (ref 11.7–14.9)
PLATELET # BLD: 199 K/CU MM (ref 140–440)
PMV BLD AUTO: 10.3 FL (ref 7.5–11.1)
POTASSIUM SERPL-SCNC: 4.4 MMOL/L (ref 3.5–5.1)
PRO-BNP: 1482 PG/ML
RBC # BLD: 5.15 M/CU MM (ref 4.6–6.2)
SEGMENTED NEUTROPHILS ABSOLUTE COUNT: 6.4 K/CU MM
SEGMENTED NEUTROPHILS RELATIVE PERCENT: 67.5 % (ref 36–66)
SODIUM BLD-SCNC: 139 MMOL/L (ref 135–145)
TOTAL IMMATURE NEUTOROPHIL: 0.05 K/CU MM
TOTAL NUCLEATED RBC: 0 K/CU MM
TOTAL PROTEIN: 6.6 GM/DL (ref 6.4–8.2)
TROPONIN, HIGH SENSITIVITY: 58 NG/L (ref 0–22)
TROPONIN, HIGH SENSITIVITY: 59 NG/L (ref 0–22)
WBC # BLD: 9.5 K/CU MM (ref 4–10.5)

## 2024-01-25 PROCEDURE — 3023F SPIROM DOC REV: CPT | Performed by: NURSE PRACTITIONER

## 2024-01-25 PROCEDURE — G8484 FLU IMMUNIZE NO ADMIN: HCPCS | Performed by: NURSE PRACTITIONER

## 2024-01-25 PROCEDURE — 99284 EMERGENCY DEPT VISIT MOD MDM: CPT

## 2024-01-25 PROCEDURE — 3017F COLORECTAL CA SCREEN DOC REV: CPT | Performed by: NURSE PRACTITIONER

## 2024-01-25 PROCEDURE — 84484 ASSAY OF TROPONIN QUANT: CPT

## 2024-01-25 PROCEDURE — 3075F SYST BP GE 130 - 139MM HG: CPT | Performed by: NURSE PRACTITIONER

## 2024-01-25 PROCEDURE — G8417 CALC BMI ABV UP PARAM F/U: HCPCS | Performed by: NURSE PRACTITIONER

## 2024-01-25 PROCEDURE — 6370000000 HC RX 637 (ALT 250 FOR IP): Performed by: EMERGENCY MEDICINE

## 2024-01-25 PROCEDURE — 85025 COMPLETE CBC W/AUTO DIFF WBC: CPT

## 2024-01-25 PROCEDURE — 1036F TOBACCO NON-USER: CPT | Performed by: NURSE PRACTITIONER

## 2024-01-25 PROCEDURE — G8427 DOCREV CUR MEDS BY ELIG CLIN: HCPCS | Performed by: NURSE PRACTITIONER

## 2024-01-25 PROCEDURE — 2700000000 HC OXYGEN THERAPY PER DAY

## 2024-01-25 PROCEDURE — 71045 X-RAY EXAM CHEST 1 VIEW: CPT

## 2024-01-25 PROCEDURE — 94640 AIRWAY INHALATION TREATMENT: CPT

## 2024-01-25 PROCEDURE — 99215 OFFICE O/P EST HI 40 MIN: CPT | Performed by: NURSE PRACTITIONER

## 2024-01-25 PROCEDURE — 94664 DEMO&/EVAL PT USE INHALER: CPT

## 2024-01-25 PROCEDURE — 3078F DIAST BP <80 MM HG: CPT | Performed by: NURSE PRACTITIONER

## 2024-01-25 PROCEDURE — 80053 COMPREHEN METABOLIC PANEL: CPT

## 2024-01-25 PROCEDURE — 83880 ASSAY OF NATRIURETIC PEPTIDE: CPT

## 2024-01-25 RX ORDER — IPRATROPIUM BROMIDE AND ALBUTEROL SULFATE 2.5; .5 MG/3ML; MG/3ML
1 SOLUTION RESPIRATORY (INHALATION) ONCE
Status: COMPLETED | OUTPATIENT
Start: 2024-01-25 | End: 2024-01-25

## 2024-01-25 RX ORDER — FLUTICASONE FUROATE, UMECLIDINIUM BROMIDE AND VILANTEROL TRIFENATATE 100; 62.5; 25 UG/1; UG/1; UG/1
1 POWDER RESPIRATORY (INHALATION) DAILY
Qty: 1 EACH | Refills: 5 | Status: SHIPPED | OUTPATIENT
Start: 2024-01-25

## 2024-01-25 RX ADMIN — IPRATROPIUM BROMIDE AND ALBUTEROL SULFATE 1 DOSE: .5; 2.5 SOLUTION RESPIRATORY (INHALATION) at 13:00

## 2024-01-25 RX ADMIN — IPRATROPIUM BROMIDE AND ALBUTEROL SULFATE 1 DOSE: .5; 2.5 SOLUTION RESPIRATORY (INHALATION) at 13:01

## 2024-01-25 NOTE — PROGRESS NOTES
Breathing treatment was stopped early due to patient stating it made him more short of breathe. He is on 5L NC with an Spo2 of 91%.

## 2024-01-25 NOTE — ED PROVIDER NOTES
3.3 (L) 3.4 - 5.0 GM/DL    Total Bilirubin 0.6 0.0 - 1.0 MG/DL    Alkaline Phosphatase 123 40 - 128 IU/L     (H) 10 - 40 U/L    AST 36 15 - 37 IU/L   Troponin Now and Q1Hr   Result Value Ref Range    Troponin, High Sensitivity 59 (HH) 0 - 22 ng/L   Troponin Now and Q1Hr   Result Value Ref Range    Troponin, High Sensitivity 58 (HH) 0 - 22 ng/L   Brain Natriuretic Peptide   Result Value Ref Range    Pro-BNP 1,482 (H) <300 PG/ML      Radiographs (if obtained):  Radiologist's Report Reviewed:  No results found.    EKG (if obtained): (All EKG's are interpreted by myself in the absence of a cardiologist)      MDM:  This patient has a advanced stage of COPD continuously on 6 L of oxygen at home, today developed severe shortness of breath with hypoxia with oxygen saturation dropping into the low 70s.  On arrival he still having increased work of breathing.  Patient received DuoNeb x 2.  He feels his shortness of breath had improved.  Patient also stated that he believes his oxygen saturated at home is not working.  His wife called the company that replaces it but he does not look like they were able to replace it today.  Patient's lab workup did not reveal acute findings.  Chest x-ray shows some right lower lung effusion, minimal there is some linear density on the right border of the heart.  However clinical picture is not consistent with pneumonia.  Therefore antibiotics not given at this time.  Patient is now admitted by the hospital service for further care recommendations.     The decision was made to admit the patient as stated above patient now states that his wife called him back and is oxygen tank is ready at home and he wants to go home.  He is now discharged back home.      Critical care time of 33 minutes was spent on this patient excluding any separately billable procedures time        Clinical Impression:  1. COPD exacerbation (HCC)      Disposition referral (if applicable):  No follow-up provider

## 2024-01-25 NOTE — ED TRIAGE NOTES
Pt arrives via EMS from Dr. Office with c/o low SPO2.  Pt wears 6L O2 via NC at baseline, Pt is unsure if his O2 was working at Dr. Office, SPO2 was 75%.  Pt moved to 6L in ambulance, SPO2 94

## 2024-01-25 NOTE — ED NOTES
Pt states his home O2 company called and advised they will have a new concentrator to him yet tonight, Pt wife to call when concentrator arrives, friend at bedside to take him home

## 2024-01-25 NOTE — PROGRESS NOTES
2024     Yrn Elliott (:  1960) is a 63 y.o. male, here for evaluation of the following medical concerns:      Reports brain fog, worsened dyspnea from baseline, new cough with clear sputum production, tremors. Started Saturday 5 days ago.     Prostate ablation last Friday   Feeling bad since then  Worsening   Short of breath worse than chronic baseline. Cough with phlegm.   02 at home --6L continuous baseline. He doesn't think its giving him the full amount of 02  He wore his Portable home 02 today to the office. 02 is 75    Reports he cannot stop shaking.   States he is \"mariza confused, foggy off\"     Denies being intubated or sedated for surgery last week. Reports they did spinal on him and he was fully awake for surg. Only new med is cipro.                       Review of Systems    Prior to Visit Medications    Medication Sig Taking? Authorizing Provider   fluticasone-umeclidin-vilant (TRELEGY ELLIPTA) 100-62.5-25 MCG/ACT AEPB inhaler Inhale 1 puff into the lungs daily Yes Bella Bustamante APRN - CNP   albuterol sulfate HFA (PROVENTIL;VENTOLIN;PROAIR) 108 (90 Base) MCG/ACT inhaler INHALE 2 PUFFS BY MOUTH EVERY 4 HOURS AS NEEDED FOR WHEEZE OR FOR SHORTNESS OF BREATH Yes Gregorio Cagle MD   metoprolol tartrate (LOPRESSOR) 25 MG tablet Take 1 tablet by mouth 2 times daily Yes Provider, MD Imelda   empagliflozin (JARDIANCE) 10 MG tablet Take 1 tablet by mouth daily Yes Staci Barillas APRN - CNP   traZODone (DESYREL) 50 MG tablet TAKE 1-2 TABLETS BY MOUTH NIGHTLY AS NEEDED FOR INSOMNIA Yes Janessa Salazar MD   atorvastatin (LIPITOR) 40 MG tablet Take 1 tablet by mouth nightly Yes Nisreen Brennan APRN - CNP   furosemide (LASIX) 20 MG tablet Take 1 tablet by mouth 2 times daily Yes Nisreen Brennan APRN - CNP   nystatin (MYCOSTATIN) 319903 UNIT/ML suspension Take 5 mLs by mouth 4 times daily Swish and retain in mouth as long as possible-use 2 days after symptoms resolve Yes

## 2024-01-25 NOTE — TELEPHONE ENCOUNTER
Wife of Tsering patient called this morning to inquire why his trilegy has not been refilled.  She requested last week to be refilled but Dr. Cagle is out of the office.  Could you please refill

## 2024-01-25 NOTE — DISCHARGE INSTRUCTIONS
Continue to use your oxygen 6 L continuously  If having increasing or persistent shortness of breath return to ER

## 2024-01-28 DIAGNOSIS — B37.0 ORAL THRUSH: ICD-10-CM

## 2024-01-29 ENCOUNTER — OFFICE VISIT (OUTPATIENT)
Dept: CARDIOLOGY CLINIC | Age: 64
End: 2024-01-29
Payer: COMMERCIAL

## 2024-01-29 VITALS
OXYGEN SATURATION: 92 % | WEIGHT: 225 LBS | BODY MASS INDEX: 31.5 KG/M2 | DIASTOLIC BLOOD PRESSURE: 66 MMHG | HEART RATE: 60 BPM | HEIGHT: 71 IN | SYSTOLIC BLOOD PRESSURE: 114 MMHG

## 2024-01-29 DIAGNOSIS — I38 VHD (VALVULAR HEART DISEASE): ICD-10-CM

## 2024-01-29 DIAGNOSIS — I50.42 CHRONIC COMBINED SYSTOLIC AND DIASTOLIC CONGESTIVE HEART FAILURE (HCC): Primary | ICD-10-CM

## 2024-01-29 DIAGNOSIS — I48.0 PAF (PAROXYSMAL ATRIAL FIBRILLATION) (HCC): ICD-10-CM

## 2024-01-29 DIAGNOSIS — I77.810 DILATED AORTIC ROOT (HCC): ICD-10-CM

## 2024-01-29 PROBLEM — I50.43 ACUTE ON CHRONIC COMBINED SYSTOLIC AND DIASTOLIC CONGESTIVE HEART FAILURE (HCC): Status: ACTIVE | Noted: 2024-01-29

## 2024-01-29 PROCEDURE — 1036F TOBACCO NON-USER: CPT | Performed by: NURSE PRACTITIONER

## 2024-01-29 PROCEDURE — 3074F SYST BP LT 130 MM HG: CPT | Performed by: NURSE PRACTITIONER

## 2024-01-29 PROCEDURE — 3017F COLORECTAL CA SCREEN DOC REV: CPT | Performed by: NURSE PRACTITIONER

## 2024-01-29 PROCEDURE — G8427 DOCREV CUR MEDS BY ELIG CLIN: HCPCS | Performed by: NURSE PRACTITIONER

## 2024-01-29 PROCEDURE — 99214 OFFICE O/P EST MOD 30 MIN: CPT | Performed by: NURSE PRACTITIONER

## 2024-01-29 PROCEDURE — 3078F DIAST BP <80 MM HG: CPT | Performed by: NURSE PRACTITIONER

## 2024-01-29 PROCEDURE — G8417 CALC BMI ABV UP PARAM F/U: HCPCS | Performed by: NURSE PRACTITIONER

## 2024-01-29 PROCEDURE — G8484 FLU IMMUNIZE NO ADMIN: HCPCS | Performed by: NURSE PRACTITIONER

## 2024-01-29 RX ORDER — POTASSIUM CHLORIDE 750 MG/1
10 TABLET, EXTENDED RELEASE ORAL DAILY
Qty: 30 TABLET | Refills: 5 | Status: SHIPPED | OUTPATIENT
Start: 2024-01-29

## 2024-01-29 ASSESSMENT — ENCOUNTER SYMPTOMS
SHORTNESS OF BREATH: 1
ORTHOPNEA: 0

## 2024-01-29 NOTE — TELEPHONE ENCOUNTER
Last appointment: 10/24/2023   Next Appointment: 5/7/2024     Allergies:  No Known Allergies      Recent Vitals:  Wt Readings from Last 3 Encounters:   01/25/24 99.8 kg (220 lb)   01/25/24 100.3 kg (221 lb 3.2 oz)   11/08/23 105.8 kg (233 lb 3.2 oz)     Ht Readings from Last 3 Encounters:   01/25/24 1.803 m (5' 11\")   11/08/23 1.803 m (5' 11\")   10/20/23 1.803 m (5' 11\")     BP Readings from Last 3 Encounters:   01/25/24 122/73   01/25/24 133/73   11/08/23 124/80     BMI Readings from Last 3 Encounters:   01/25/24 30.68 kg/m²   01/25/24 30.85 kg/m²   11/08/23 32.52 kg/m²       Recent Labs__________________________________________________________________________    Renal:   Creatinine   Date Value Ref Range Status   01/25/2024 0.6 (L) 0.9 - 1.3 MG/DL Final     BUN   Date Value Ref Range Status   01/25/2024 12 6 - 23 MG/DL Final     Sodium   Date Value Ref Range Status   01/25/2024 139 135 - 145 MMOL/L Final     Potassium   Date Value Ref Range Status   01/25/2024 4.4 3.5 - 5.1 MMOL/L Final     Chloride   Date Value Ref Range Status   01/25/2024 94 (L) 99 - 110 mMol/L Final     CO2   Date Value Ref Range Status   01/25/2024 42 (H) 21 - 32 MMOL/L Final       BMP:    Sodium   Date Value Ref Range Status   01/25/2024 139 135 - 145 MMOL/L Final     Potassium   Date Value Ref Range Status   01/25/2024 4.4 3.5 - 5.1 MMOL/L Final     Chloride   Date Value Ref Range Status   01/25/2024 94 (L) 99 - 110 mMol/L Final     CO2   Date Value Ref Range Status   01/25/2024 42 (H) 21 - 32 MMOL/L Final     BUN   Date Value Ref Range Status   01/25/2024 12 6 - 23 MG/DL Final     Creatinine   Date Value Ref Range Status   01/25/2024 0.6 (L) 0.9 - 1.3 MG/DL Final     Glucose   Date Value Ref Range Status   01/25/2024 98 70 - 99 MG/DL Final     Calcium   Date Value Ref Range Status   01/25/2024 8.7 8.3 - 10.6 MG/DL Final     Est, Glom Filt Rate   Date Value Ref Range Status   01/25/2024 >60 >60 mL/min/1.73m2 Final     Comment:

## 2024-02-02 ENCOUNTER — TELEPHONE (OUTPATIENT)
Dept: PULMONOLOGY | Age: 64
End: 2024-02-02

## 2024-03-29 ENCOUNTER — TELEPHONE (OUTPATIENT)
Dept: CARDIOLOGY CLINIC | Age: 64
End: 2024-03-29

## 2024-04-02 NOTE — TELEPHONE ENCOUNTER
Spoke with , advised patient making visits. Last hospital visit was pulmonary which I couldn't address. She will continue to be available for case management for this patient

## 2024-04-10 RX ORDER — FUROSEMIDE 20 MG/1
20 TABLET ORAL 2 TIMES DAILY
Qty: 60 TABLET | Refills: 5 | Status: SHIPPED | OUTPATIENT
Start: 2024-04-10

## 2024-04-29 ENCOUNTER — OFFICE VISIT (OUTPATIENT)
Dept: CARDIOLOGY CLINIC | Age: 64
End: 2024-04-29
Payer: COMMERCIAL

## 2024-04-29 VITALS
SYSTOLIC BLOOD PRESSURE: 128 MMHG | OXYGEN SATURATION: 87 % | BODY MASS INDEX: 33.77 KG/M2 | HEART RATE: 55 BPM | HEIGHT: 71 IN | DIASTOLIC BLOOD PRESSURE: 82 MMHG | WEIGHT: 241.2 LBS

## 2024-04-29 DIAGNOSIS — I50.43 ACUTE ON CHRONIC COMBINED SYSTOLIC AND DIASTOLIC CONGESTIVE HEART FAILURE (HCC): ICD-10-CM

## 2024-04-29 DIAGNOSIS — Z95.2 S/P AVR: Primary | ICD-10-CM

## 2024-04-29 PROCEDURE — 99214 OFFICE O/P EST MOD 30 MIN: CPT | Performed by: INTERNAL MEDICINE

## 2024-04-29 PROCEDURE — 3079F DIAST BP 80-89 MM HG: CPT | Performed by: INTERNAL MEDICINE

## 2024-04-29 PROCEDURE — G8417 CALC BMI ABV UP PARAM F/U: HCPCS | Performed by: INTERNAL MEDICINE

## 2024-04-29 PROCEDURE — 93000 ELECTROCARDIOGRAM COMPLETE: CPT | Performed by: INTERNAL MEDICINE

## 2024-04-29 PROCEDURE — 3074F SYST BP LT 130 MM HG: CPT | Performed by: INTERNAL MEDICINE

## 2024-04-29 PROCEDURE — 1036F TOBACCO NON-USER: CPT | Performed by: INTERNAL MEDICINE

## 2024-04-29 PROCEDURE — G8427 DOCREV CUR MEDS BY ELIG CLIN: HCPCS | Performed by: INTERNAL MEDICINE

## 2024-04-29 PROCEDURE — 3017F COLORECTAL CA SCREEN DOC REV: CPT | Performed by: INTERNAL MEDICINE

## 2024-04-29 NOTE — PROGRESS NOTES
Yrn  is a  Established patient  ,63 y.o.   male here for evaluation of the following chief complaint(s):    Here for follow-up       SUBJECTIVE/OBJECTIVE:  HPI : h/o Aortic valve replacement with a 29 mm Inspiris CE pericardial valve #[id number] using cardiopulmonary bypass, mild hypothermia, cold cardioplegia, Transesophageal echocardiogram on 6/30/23.      Htn, hyperlipidimea, COPD, afib  CHERRI and DCCV. On O2 now here for follow-up      Vitals:    04/29/24 1512   BP: 128/82   Site: Left Upper Arm   Position: Sitting   Cuff Size: Medium Adult   Pulse: 55   SpO2: (!) 87%   Weight: 109.4 kg (241 lb 3.2 oz)   Height: 1.803 m (5' 11\")               /82 (Site: Left Upper Arm, Position: Sitting, Cuff Size: Medium Adult)   Pulse 55   Ht 1.803 m (5' 11\")   Wt 109.4 kg (241 lb 3.2 oz)   SpO2 (!) 87%   BMI 33.64 kg/m²       10/24/2023    10:53 AM   Patient-Reported Vitals   Patient-Reported Weight 227 lbs   Patient-Reported Height 5' 11\"   Patient-Reported Systolic 124 mmHg   Patient-Reported Diastolic 82 mmHg   Patient-Reported Pulse 67     Wt Readings from Last 3 Encounters:   04/29/24 109.4 kg (241 lb 3.2 oz)   01/29/24 102.1 kg (225 lb)   01/25/24 99.8 kg (220 lb)     Body mass index is 33.64 kg/m².    Physical Exam     Neck: JVD      Lungs : clear    Cardio : Si and S2 audilble      Ext: edema      All pertinent data reviewed      Meds : reviewed         Tests ordered        ASSESSMENT/PLAN:    -  Hypertension: Patients blood pressure is normal. Patient is advised about low sodium diet. Present medical regimen will not be changed.    On metoprolol compliant we will continue    -Had HFrEF  on  meds  reecho      -Valvular heart disease  Patient had surgery done as follows    Procedure: Aortic valve replacement with a 29 mm Inspiris CE pericardial valve #[id number] using cardiopulmonary bypass, mild hypothermia, cold cardioplegia, Transesophageal echocardiogram on 6/30/23.     We will need cardiac rehab      -

## 2024-04-29 NOTE — PATIENT INSTRUCTIONS
We are committed to providing you the best care possible.    If you receive a survey after visiting one of our offices, please take time to share your experience concerning your physician office visit.  These surveys are confidential and no health information about you is shared.    We are eager to improve for you and we are counting on your feedback to help make that happen.      **It is YOUR responsibilty to bring medication bottles and/or updated medication list to EACH APPOINTMENT. This will allow us to better serve you and all your healthcare needs**    Thank you for allowing us to care for you today!   We want to ensure we can follow your treatment plan and we strive to give you the best outcomes and experience possible.   If you ever have a life threatening emergency and call 911 - for an ambulance (EMS)   Our providers can only care for you at:   [facility] or [facility].   Even if you have someone take you or you drive yourself we can only care for you in a [facility] facility. Our providers are not setup at the other healthcare locations!     Please be informed that if you contact our office outside of normal business hours the physician on call cannot help with any scheduling or rescheduling issues, procedure instruction questions or any type of medication issue.    We advise you for any urgent/emergency that you go to the nearest emergency room!    PLEASE CALL OUR OFFICE DURING NORMAL BUSINESS HOURS    Monday - Friday   8 am to 5 pm    [place]: [phone]    [place]: [phone]    [place]:  [phone]

## 2024-05-01 ENCOUNTER — HOSPITAL ENCOUNTER (OUTPATIENT)
Dept: CARDIAC REHAB | Age: 64
Setting detail: THERAPIES SERIES
Discharge: HOME | End: 2024-05-01
Payer: COMMERCIAL

## 2024-05-01 ENCOUNTER — TELEPHONE (OUTPATIENT)
Dept: CARDIOLOGY CLINIC | Age: 64
End: 2024-05-01

## 2024-05-01 PROCEDURE — 93798 PHYS/QHP OP CAR RHAB W/ECG: CPT

## 2024-05-01 NOTE — TELEPHONE ENCOUNTER
Scheduled patient with Nisreen Brennan same day as Dr. Marin for results on 5/6/2024. Patient advised and voices understanding.    Left Ventricle: Normal left ventricular systolic function with a visually estimated EF of 50 - 55%. Left ventricle size is normal. Moderately increased wall thickness. Normal wall motion. Grade I diastolic dysfunction with normal LAP.    Aortic Valve: Loaiza Inspiris Reselia inserted 6/2023 bovine bioprosthetic valve that is well-seated with a size of 29 mm. AV mean gradient is 11 mmHg. AT98ms.    Mitral Valve: Annular calcification of the mitral valve. Mild regurgitation with multiple jets.    Tricuspid Valve: Moderate regurgitation. Severe Pulmonary Hypertension with a RVSP of 77 mmHg.    Pulmonic Valve: Moderate regurgitation.    Left Atrium: Left atrium is moderately dilated.    Aorta: Normal sized ascending aorta and abdominal aorta. Mildly dilated aortic root. Ao root diameter is 4.2 cm.    Pericardium: No pericardial effusion.    IVC/SVC: IVC diameter is greater than 21 mm and decreases less than 50% during inspiration; therefore the estimated right atrial pressure is elevated (~15 mmHg). IVC size is normal.    Image quality is adequate.    OV TO DISCUSS PUL HTN

## 2024-05-06 ENCOUNTER — OFFICE VISIT (OUTPATIENT)
Dept: CARDIOLOGY CLINIC | Age: 64
End: 2024-05-06
Payer: COMMERCIAL

## 2024-05-06 ENCOUNTER — HOSPITAL ENCOUNTER (OUTPATIENT)
Dept: CARDIAC REHAB | Age: 64
Setting detail: THERAPIES SERIES
Discharge: HOME | End: 2024-05-06
Payer: COMMERCIAL

## 2024-05-06 ENCOUNTER — TELEPHONE (OUTPATIENT)
Dept: CARDIOLOGY CLINIC | Age: 64
End: 2024-05-06

## 2024-05-06 VITALS
WEIGHT: 238.2 LBS | OXYGEN SATURATION: 88 % | HEIGHT: 71 IN | SYSTOLIC BLOOD PRESSURE: 146 MMHG | HEART RATE: 55 BPM | BODY MASS INDEX: 33.35 KG/M2 | DIASTOLIC BLOOD PRESSURE: 82 MMHG

## 2024-05-06 DIAGNOSIS — I38 VHD (VALVULAR HEART DISEASE): Primary | ICD-10-CM

## 2024-05-06 PROCEDURE — 93798 PHYS/QHP OP CAR RHAB W/ECG: CPT

## 2024-05-06 PROCEDURE — G8417 CALC BMI ABV UP PARAM F/U: HCPCS | Performed by: NURSE PRACTITIONER

## 2024-05-06 PROCEDURE — 3077F SYST BP >= 140 MM HG: CPT | Performed by: NURSE PRACTITIONER

## 2024-05-06 PROCEDURE — 1036F TOBACCO NON-USER: CPT | Performed by: NURSE PRACTITIONER

## 2024-05-06 PROCEDURE — 3078F DIAST BP <80 MM HG: CPT | Performed by: NURSE PRACTITIONER

## 2024-05-06 PROCEDURE — 3017F COLORECTAL CA SCREEN DOC REV: CPT | Performed by: NURSE PRACTITIONER

## 2024-05-06 PROCEDURE — 99213 OFFICE O/P EST LOW 20 MIN: CPT | Performed by: NURSE PRACTITIONER

## 2024-05-06 PROCEDURE — G8427 DOCREV CUR MEDS BY ELIG CLIN: HCPCS | Performed by: NURSE PRACTITIONER

## 2024-05-06 ASSESSMENT — ENCOUNTER SYMPTOMS
SHORTNESS OF BREATH: 1
ORTHOPNEA: 0

## 2024-05-06 NOTE — PROGRESS NOTES
5/6/2024  Primary cardiologist: Dr. Marin    CC:   Yrn  is an established 63 y.o.  male here for a 3 month follow up on vhd/echocardiogram      SUBJECTIVE/OBJECTIVE:  HPI  Yrn is a 63 y.o. male with a history of PVCs, severe COPD, home oxygen, hyperlipidemia, valvular heart disease s/p AVR (06/2023), atrial fib s/p DCC x 2, pulmonary hypertension and HFrEF.     In June 2023 Yrn underwent an aortic valve replacement utilizing a# 29 mm Loaiza Inspiris Resilia tissue aortic valve.  Postoperatively did have atrial fibrillation placed on amiodarone and required cardioversion in July.    5/6/2024 today's visit  Yrn is here today to follow-up on recent echocardiogram to assess his aortic valve.  He recently underwent aortic valve replaced with a number 29 mm Inspra's CE pericardial valve.   Has chronic shortness of breath using supplemental oxygen at 6 L via nasal cannula.  Resting saturation today was 88%.  Recently started cardiac rehab.      Review of Systems   Constitutional: Negative for diaphoresis and malaise/fatigue.   Cardiovascular:  Negative for chest pain, claudication, dyspnea on exertion, irregular heartbeat, leg swelling, near-syncope, orthopnea, palpitations and paroxysmal nocturnal dyspnea.   Respiratory:  Positive for shortness of breath.    Neurological:  Negative for dizziness and light-headedness.       Vitals:    05/06/24 1523 05/06/24 1527   BP: (!) 152/72 (!) 146/82   Site: Left Upper Arm Left Upper Arm   Position: Sitting Sitting   Cuff Size: Medium Adult Medium Adult   Pulse: 55    SpO2: (!) 88%    Weight: 108 kg (238 lb 3.2 oz)    Height: 1.803 m (5' 10.98\")      Wt Readings from Last 3 Encounters:   05/06/24 108 kg (238 lb 3.2 oz)   04/30/24 109.3 kg (241 lb)   04/29/24 109.4 kg (241 lb 3.2 oz)      Body mass index is 33.24 kg/m².     Physical Exam  Vitals reviewed.   Neck:      Vascular: No carotid bruit.   Cardiovascular:      Rate and Rhythm: Normal rate and regular rhythm.

## 2024-05-06 NOTE — TELEPHONE ENCOUNTER
Test Ordered: Echo /  Insurance: [employer]  /  Authorization Status: Approved:  Auth# [id number], Exp 6/4/242

## 2024-05-06 NOTE — PATIENT INSTRUCTIONS
Please be informed that if you contact our office outside of normal business hours the physician on call cannot help with any scheduling or rescheduling issues, procedure instruction questions or any type of medication issue.    We advise you for any urgent/emergency that you go to the nearest emergency room!    PLEASE CALL OUR OFFICE DURING NORMAL BUSINESS HOURS    Monday - Friday   8 am to 5 pm    [place]: [phone]    [place]: [phone]    [place]:  [phone]    **It is YOUR responsibilty to bring medication bottles and/or updated medication list to EACH APPOINTMENT. This will allow us to better serve you and all your healthcare needs**    Thank you for allowing us to care for you today!   We want to ensure we can follow your treatment plan and we strive to give you the best outcomes and experience possible.   If you ever have a life threatening emergency and call 911 - for an ambulance (EMS)   Our providers can only care for you at:   [facility] or [facility].   Even if you have someone take you or you drive yourself we can only care for you in a [facility] facility. Our providers are not setup at the other healthcare locations!

## 2024-05-07 ENCOUNTER — HOSPITAL ENCOUNTER (OUTPATIENT)
Dept: CARDIAC REHAB | Age: 64
Setting detail: THERAPIES SERIES
Discharge: HOME | End: 2024-05-07
Payer: COMMERCIAL

## 2024-05-07 ENCOUNTER — OFFICE VISIT (OUTPATIENT)
Dept: FAMILY MEDICINE CLINIC | Age: 64
End: 2024-05-07
Payer: COMMERCIAL

## 2024-05-07 VITALS
DIASTOLIC BLOOD PRESSURE: 76 MMHG | OXYGEN SATURATION: 89 % | HEART RATE: 59 BPM | HEIGHT: 71 IN | WEIGHT: 236.6 LBS | BODY MASS INDEX: 33.12 KG/M2 | SYSTOLIC BLOOD PRESSURE: 146 MMHG

## 2024-05-07 DIAGNOSIS — I10 ESSENTIAL HYPERTENSION: ICD-10-CM

## 2024-05-07 DIAGNOSIS — F33.42 RECURRENT MAJOR DEPRESSIVE DISORDER, IN FULL REMISSION (HCC): Primary | ICD-10-CM

## 2024-05-07 DIAGNOSIS — J44.9 COPD, SEVERE (HCC): ICD-10-CM

## 2024-05-07 DIAGNOSIS — Z95.2 AORTIC VALVE REPLACED: ICD-10-CM

## 2024-05-07 PROBLEM — I50.43 ACUTE ON CHRONIC COMBINED SYSTOLIC AND DIASTOLIC CONGESTIVE HEART FAILURE (HCC): Status: RESOLVED | Noted: 2024-01-29 | Resolved: 2024-05-07

## 2024-05-07 PROBLEM — F33.1 MAJOR DEPRESSIVE DISORDER, RECURRENT, MODERATE (HCC): Status: RESOLVED | Noted: 2023-09-19 | Resolved: 2024-05-07

## 2024-05-07 PROBLEM — R01.1 MURMUR, HEART: Status: RESOLVED | Noted: 2020-12-09 | Resolved: 2024-05-07

## 2024-05-07 PROCEDURE — G8417 CALC BMI ABV UP PARAM F/U: HCPCS | Performed by: FAMILY MEDICINE

## 2024-05-07 PROCEDURE — G8427 DOCREV CUR MEDS BY ELIG CLIN: HCPCS | Performed by: FAMILY MEDICINE

## 2024-05-07 PROCEDURE — 3078F DIAST BP <80 MM HG: CPT | Performed by: FAMILY MEDICINE

## 2024-05-07 PROCEDURE — 3017F COLORECTAL CA SCREEN DOC REV: CPT | Performed by: FAMILY MEDICINE

## 2024-05-07 PROCEDURE — 3077F SYST BP >= 140 MM HG: CPT | Performed by: FAMILY MEDICINE

## 2024-05-07 PROCEDURE — 1036F TOBACCO NON-USER: CPT | Performed by: FAMILY MEDICINE

## 2024-05-07 PROCEDURE — 3023F SPIROM DOC REV: CPT | Performed by: FAMILY MEDICINE

## 2024-05-07 PROCEDURE — 93798 PHYS/QHP OP CAR RHAB W/ECG: CPT

## 2024-05-07 PROCEDURE — 99213 OFFICE O/P EST LOW 20 MIN: CPT | Performed by: FAMILY MEDICINE

## 2024-05-07 SDOH — ECONOMIC STABILITY: FOOD INSECURITY: WITHIN THE PAST 12 MONTHS, YOU WORRIED THAT YOUR FOOD WOULD RUN OUT BEFORE YOU GOT MONEY TO BUY MORE.: PATIENT DECLINED

## 2024-05-07 SDOH — ECONOMIC STABILITY: HOUSING INSECURITY
IN THE LAST 12 MONTHS, WAS THERE A TIME WHEN YOU DID NOT HAVE A STEADY PLACE TO SLEEP OR SLEPT IN A SHELTER (INCLUDING NOW)?: PATIENT DECLINED

## 2024-05-07 SDOH — ECONOMIC STABILITY: INCOME INSECURITY: HOW HARD IS IT FOR YOU TO PAY FOR THE VERY BASICS LIKE FOOD, HOUSING, MEDICAL CARE, AND HEATING?: PATIENT DECLINED

## 2024-05-07 SDOH — ECONOMIC STABILITY: FOOD INSECURITY: WITHIN THE PAST 12 MONTHS, THE FOOD YOU BOUGHT JUST DIDN'T LAST AND YOU DIDN'T HAVE MONEY TO GET MORE.: PATIENT DECLINED

## 2024-05-07 ASSESSMENT — PATIENT HEALTH QUESTIONNAIRE - PHQ9
2. FEELING DOWN, DEPRESSED OR HOPELESS: NOT AT ALL
9. THOUGHTS THAT YOU WOULD BE BETTER OFF DEAD, OR OF HURTING YOURSELF: NOT AT ALL
7. TROUBLE CONCENTRATING ON THINGS, SUCH AS READING THE NEWSPAPER OR WATCHING TELEVISION: NOT AT ALL
SUM OF ALL RESPONSES TO PHQ QUESTIONS 1-9: 0
6. FEELING BAD ABOUT YOURSELF - OR THAT YOU ARE A FAILURE OR HAVE LET YOURSELF OR YOUR FAMILY DOWN: NOT AT ALL
SUM OF ALL RESPONSES TO PHQ QUESTIONS 1-9: 0
SUM OF ALL RESPONSES TO PHQ9 QUESTIONS 1 & 2: 0
1. LITTLE INTEREST OR PLEASURE IN DOING THINGS: NOT AT ALL
SUM OF ALL RESPONSES TO PHQ QUESTIONS 1-9: 0
5. POOR APPETITE OR OVEREATING: NOT AT ALL
8. MOVING OR SPEAKING SO SLOWLY THAT OTHER PEOPLE COULD HAVE NOTICED. OR THE OPPOSITE, BEING SO FIGETY OR RESTLESS THAT YOU HAVE BEEN MOVING AROUND A LOT MORE THAN USUAL: NOT AT ALL
3. TROUBLE FALLING OR STAYING ASLEEP: NOT AT ALL
SUM OF ALL RESPONSES TO PHQ QUESTIONS 1-9: 0

## 2024-05-07 NOTE — ASSESSMENT & PLAN NOTE
Overall doing well on 5-6L continuous O2 and rare use of albuterol once weekly. On Trellegy daily. Going to cardiac rehab, which is also helping with his pulmonary rehab and feels he can do more, including recent yard

## 2024-05-07 NOTE — ASSESSMENT & PLAN NOTE
Elevated today but patient reporting home blood pressures in the 120/70. Will defer to cardiac team for med changes and patient will notify if home blood pressures more than 140/90

## 2024-05-07 NOTE — PROGRESS NOTES
Plan:     1. Recurrent major depressive disorder, in full remission (HCC)  Assessment & Plan:  Patient doing well overall without medication. Rare use of trazodone. Getting good support from family and friends    2. Essential hypertension  Assessment & Plan:  Elevated today but patient reporting home blood pressures in the 120/70. Will defer to cardiac team for med changes and patient will notify if home blood pressures more than 140/90  Orders:  -     Comprehensive Metabolic Panel; Future  -     CBC; Future  3. COPD, severe (HCC)  Assessment & Plan:  Overall doing well on 5-6L continuous O2 and rare use of albuterol once weekly. On Trellegy daily. Going to cardiac rehab, which is also helping with his pulmonary rehab and feels he can do more, including recent yard    4. Aortic valve replaced  Doing well with cardiac rehab and closely following with cardiology.       All patient questions answered.  Pt voiced understanding.     Return in about 6 months (around 11/7/2024) for Medicare Wellness.  -----------------------------------------------------------------------------------------------            Chief Complaint   Patient presents with    6 Month Follow-Up   S/p AV replacement and doing better. Tolerating cardiac/pulmonary rehab 3 times per week. Able to mow the lawn with riding mower and doing more things around the house. Depression doing fine overall and has some days of feeling tired of having his medical conditions but feels better now that he is able to tolerate more activity since surgery .  Denies any suicidial or homicidal ideation or hallucinations.  Good support from wife, family and friends. Using trazodone only a few times per month for insomnia.    Albuterol use only once weekly and using Trellegy daily. No increase sputum production, fevers, or chills.  Reports home blood pressures as usually 120-130/70-80. They are monitoring his blood pressure in cardiac rehabe.   On Aspirin and elliquis. Gets

## 2024-05-07 NOTE — PATIENT INSTRUCTIONS
Get pneumonia vaccine booster-   PCV 20     Then start Shingles vaccines series     Then get RSV,  COVID, Flu vaccine  this fall

## 2024-05-07 NOTE — ASSESSMENT & PLAN NOTE
Patient doing well overall without medication. Rare use of trazodone. Getting good support from family and friends

## 2024-05-08 RX ORDER — METOPROLOL TARTRATE 50 MG/1
50 TABLET, FILM COATED ORAL 2 TIMES DAILY
Qty: 60 TABLET | Refills: 5 | OUTPATIENT
Start: 2024-05-08

## 2024-05-08 RX ORDER — ATORVASTATIN CALCIUM 40 MG/1
40 TABLET, FILM COATED ORAL NIGHTLY
Qty: 30 TABLET | Refills: 5 | OUTPATIENT
Start: 2024-05-08

## 2024-05-08 RX ORDER — ATORVASTATIN CALCIUM 40 MG/1
40 TABLET, FILM COATED ORAL NIGHTLY
Qty: 30 TABLET | Refills: 5 | Status: SHIPPED | OUTPATIENT
Start: 2024-05-08

## 2024-05-09 ENCOUNTER — HOSPITAL ENCOUNTER (OUTPATIENT)
Dept: CARDIAC REHAB | Age: 64
Setting detail: THERAPIES SERIES
Discharge: HOME | End: 2024-05-09
Payer: COMMERCIAL

## 2024-05-09 PROCEDURE — 93798 PHYS/QHP OP CAR RHAB W/ECG: CPT

## 2024-05-10 DIAGNOSIS — I27.20 MODERATE TO SEVERE PULMONARY HYPERTENSION (HCC): ICD-10-CM

## 2024-05-10 DIAGNOSIS — I27.20 PULMONARY HTN (HCC): ICD-10-CM

## 2024-05-10 DIAGNOSIS — Z01.810 PRE-OPERATIVE CARDIOVASCULAR EXAMINATION: Primary | ICD-10-CM

## 2024-05-13 ENCOUNTER — TELEPHONE (OUTPATIENT)
Dept: CARDIOLOGY CLINIC | Age: 64
End: 2024-05-13

## 2024-05-13 ENCOUNTER — HOSPITAL ENCOUNTER (OUTPATIENT)
Dept: CARDIAC REHAB | Age: 64
Setting detail: THERAPIES SERIES
Discharge: HOME | End: 2024-05-13
Payer: COMMERCIAL

## 2024-05-13 PROCEDURE — 93798 PHYS/QHP OP CAR RHAB W/ECG: CPT

## 2024-05-13 NOTE — TELEPHONE ENCOUNTER
Placed call to [employer] at ph# [phone], automated line would not recognize policy number.     Attempted to check Availity but website is having connection issues; will check back.

## 2024-05-13 NOTE — TELEPHONE ENCOUNTER
Test Ordered: Right Heart Cath  /  Insurance: [employer]  /  Authorization Status: Pending  /  Will Call [employer]

## 2024-05-13 NOTE — TELEPHONE ENCOUNTER
[facility]     Dr. Danica Marin     RIGHT HEART CATHETERIZATION        Patient Name: Yrn Elliott   : 1960  MRN# [id number]    Date of Procedure: 24 Time: 10am Arrival Time: 8am    The catheterization and angiogram are usually outpatient procedures, however if stenting is needed you may need to stay overnight. You will need to arrive at the hospital two hours before the procedure.  You will go to registration in the main lobby.  You will need to arrange for someone to drive you home.      HOSPITAL:  [facility]      X   If you have received orders for blood work and or a chest x-ray, please have         them done on assigned date at [facility],           [facility], or [facility].     X Please do not have anything by mouth after midnight prior to or 8 hours before   the procedure.    X You may take your medications with a sip of water in the morning of your               procedure or take them with you to the hospital                    X If you are taking Lasix (furosemide)   please do not take it the morning of your procedure.       X If you take  Eliquis, you should hold it for 48 hours before your procedure.    X If you take Viagra (Sildenafil) or Cialis (Tadalafil) you will need to hold it for 3 days before your procedure.

## 2024-05-13 NOTE — TELEPHONE ENCOUNTER
Patient was educated on C for Dx: PHTN.  Procedure is scheduled for 5/7/24 @ 10am, w/arrival @ 8am, @ [facility]. Pre-admission orders were given to patient for labs & CXR, which are due 5/13/24 @ [facility].       Procedure and risks were explained to patient. Consent forms will be signed.      Patient was notified that procedure could be delayed due to an emergency. Patient voiced understanding.

## 2024-05-14 ENCOUNTER — HOSPITAL ENCOUNTER (OUTPATIENT)
Dept: CARDIAC REHAB | Age: 64
Setting detail: THERAPIES SERIES
Discharge: HOME | End: 2024-05-14
Payer: COMMERCIAL

## 2024-05-14 PROCEDURE — 93798 PHYS/QHP OP CAR RHAB W/ECG: CPT

## 2024-05-14 NOTE — TELEPHONE ENCOUNTER
Placed call to [employer], ph# [phone], spoke with Brain, who confirmed that no prior authorization is required for Right Heart Cath.    Test Ordered:  Right Heart Cath    /  Insurance: [employer]   /  Authorization Status: No Auth Required  /  Via Phone Call

## 2024-05-16 ENCOUNTER — HOSPITAL ENCOUNTER (OUTPATIENT)
Dept: CARDIAC REHAB | Age: 64
Setting detail: THERAPIES SERIES
Discharge: HOME | End: 2024-05-16
Payer: COMMERCIAL

## 2024-05-16 ENCOUNTER — HOSPITAL ENCOUNTER (OUTPATIENT)
Age: 64
Discharge: HOME | End: 2024-05-16
Payer: COMMERCIAL

## 2024-05-16 DIAGNOSIS — Z01.810 PRE-OPERATIVE CARDIOVASCULAR EXAMINATION: ICD-10-CM

## 2024-05-16 LAB
ABO/RH: NORMAL
ANION GAP SERPL CALCULATED.3IONS-SCNC: 7 MMOL/L (ref 7–16)
ANTIBODY SCREEN: NEGATIVE
BUN SERPL-MCNC: 18 MG/DL (ref 6–23)
CALCIUM SERPL-MCNC: 9 MG/DL (ref 8.3–10.6)
CHLORIDE BLD-SCNC: 99 MMOL/L (ref 99–110)
CO2: 36 MMOL/L (ref 21–32)
COMMENT: NORMAL
CREAT SERPL-MCNC: 0.7 MG/DL (ref 0.9–1.3)
GFR, ESTIMATED: >90 ML/MIN/1.73M2
GLUCOSE SERPL-MCNC: 85 MG/DL (ref 70–99)
HCT VFR BLD CALC: 49.3 % (ref 42–52)
HEMOGLOBIN: 13.8 GM/DL (ref 13.5–18)
MCH RBC QN AUTO: 23.4 PG (ref 27–31)
MCHC RBC AUTO-ENTMCNC: 28 % (ref 32–36)
MCV RBC AUTO: 83.6 FL (ref 78–100)
PDW BLD-RTO: 15.8 % (ref 11.7–14.9)
PLATELET # BLD: 190 K/CU MM (ref 140–440)
PMV BLD AUTO: 10.3 FL (ref 7.5–11.1)
POTASSIUM SERPL-SCNC: 5.2 MMOL/L (ref 3.5–5.1)
RBC # BLD: 5.9 M/CU MM (ref 4.6–6.2)
SODIUM BLD-SCNC: 142 MMOL/L (ref 135–145)
WBC # BLD: 9.1 K/CU MM (ref 4–10.5)

## 2024-05-16 PROCEDURE — 85027 COMPLETE CBC AUTOMATED: CPT

## 2024-05-16 PROCEDURE — 86850 RBC ANTIBODY SCREEN: CPT

## 2024-05-16 PROCEDURE — 80048 BASIC METABOLIC PNL TOTAL CA: CPT

## 2024-05-16 PROCEDURE — 86900 BLOOD TYPING SEROLOGIC ABO: CPT

## 2024-05-16 PROCEDURE — 93798 PHYS/QHP OP CAR RHAB W/ECG: CPT

## 2024-05-16 PROCEDURE — 86901 BLOOD TYPING SEROLOGIC RH(D): CPT

## 2024-05-16 PROCEDURE — 36415 COLL VENOUS BLD VENIPUNCTURE: CPT

## 2024-05-16 PROCEDURE — G0422 INTENS CARDIAC REHAB W/EXERC: HCPCS

## 2024-05-16 NOTE — H&P
Normal pulses.      Heart sounds: Murmur heard.   Pulmonary:      Breath sounds: No wheezing or rales.   Musculoskeletal:      Cervical back: No tenderness.      Right lower leg: No edema.      Left lower leg: No edema.   Skin:     General: Skin is warm and dry.      Capillary Refill: Capillary refill takes less than 2 seconds.   Neurological:      Mental Status: He is alert and oriented to person, place, and time.                     Current Facility-Administered Medications          Current Outpatient Medications   Medication Sig Dispense Refill    furosemide (LASIX) 20 MG tablet Take 1 tablet by mouth 2 times daily 60 tablet 5    apixaban (ELIQUIS) 5 MG TABS tablet Take 1 tablet by mouth 2 times daily 60 tablet 5    nystatin (MYCOSTATIN) 529093 UNIT/ML suspension TAKE 5 MLS BY MOUTH 4 TIMES DAILY SWISH AND RETAIN IN MOUTH AS LONG AS POSSIBLE-USE 2 DAYS AFTER SYMPTOMS RESOLVE 200 mL 1    potassium chloride (KLOR-CON M) 10 MEQ extended release tablet Take 1 tablet by mouth daily 30 tablet 5    fluticasone-umeclidin-vilant (TRELEGY ELLIPTA) 100-62.5-25 MCG/ACT AEPB inhaler Inhale 1 puff into the lungs daily 1 each 5    albuterol sulfate HFA (PROVENTIL;VENTOLIN;PROAIR) 108 (90 Base) MCG/ACT inhaler INHALE 2 PUFFS BY MOUTH EVERY 4 HOURS AS NEEDED FOR WHEEZE OR FOR SHORTNESS OF BREATH 6.7 each 11    metoprolol tartrate (LOPRESSOR) 25 MG tablet Take 1 tablet by mouth 2 times daily        empagliflozin (JARDIANCE) 10 MG tablet Take 1 tablet by mouth daily 30 tablet 5    traZODone (DESYREL) 50 MG tablet TAKE 1-2 TABLETS BY MOUTH NIGHTLY AS NEEDED FOR INSOMNIA 60 tablet 5    atorvastatin (LIPITOR) 40 MG tablet Take 1 tablet by mouth nightly 30 tablet 5    amiodarone (CORDARONE) 200 MG tablet Take 1 tablet by mouth daily 90 tablet 3    Oxygen Concentrator 6 L/min continuous        aspirin 81 MG chewable tablet Take 1 tablet by mouth daily 30 tablet 3    Multiple Vitamins-Minerals (THERAPEUTIC MULTIVITAMIN-MINERALS) tablet

## 2024-05-17 ENCOUNTER — HOSPITAL ENCOUNTER (OUTPATIENT)
Age: 64
Setting detail: OUTPATIENT SURGERY
Discharge: HOME | End: 2024-05-17
Attending: INTERNAL MEDICINE | Admitting: INTERNAL MEDICINE
Payer: COMMERCIAL

## 2024-05-17 VITALS
OXYGEN SATURATION: 89 % | BODY MASS INDEX: 33.04 KG/M2 | HEART RATE: 49 BPM | TEMPERATURE: 96.4 F | SYSTOLIC BLOOD PRESSURE: 170 MMHG | HEIGHT: 71 IN | WEIGHT: 236 LBS | DIASTOLIC BLOOD PRESSURE: 94 MMHG | RESPIRATION RATE: 19 BRPM

## 2024-05-17 DIAGNOSIS — I27.20 MODERATE TO SEVERE PULMONARY HYPERTENSION (HCC): ICD-10-CM

## 2024-05-17 LAB
BASE EXCESS MIXED: 9.6 (ref 0–3)
CARBON MONOXIDE, BLOOD: 8 % (ref 0–5)
CO2 CONTENT: 37.5 MMOL/L (ref 21–32)
COMMENT: ABNORMAL
ECHO BSA: 2.32 M2
HCO3 ARTERIAL: 35.8 MMOL/L (ref 21–28)
METHEMOGLOBIN ARTERIAL: 0.5 %
O2 SATURATION: 90.4 % (ref 94–98)
PCO2 ARTERIAL: 54 MMHG (ref 35–48)
PH BLOOD: 7.43 (ref 7.35–7.45)
PO2 ARTERIAL: 70 MMHG (ref 83–108)

## 2024-05-17 PROCEDURE — 7100000011 HC PHASE II RECOVERY - ADDTL 15 MIN: Performed by: INTERNAL MEDICINE

## 2024-05-17 PROCEDURE — 2580000003 HC RX 258: Performed by: INTERNAL MEDICINE

## 2024-05-17 PROCEDURE — 36600 WITHDRAWAL OF ARTERIAL BLOOD: CPT

## 2024-05-17 PROCEDURE — C1751 CATH, INF, PER/CENT/MIDLINE: HCPCS | Performed by: INTERNAL MEDICINE

## 2024-05-17 PROCEDURE — 7100000010 HC PHASE II RECOVERY - FIRST 15 MIN: Performed by: INTERNAL MEDICINE

## 2024-05-17 PROCEDURE — 2500000003 HC RX 250 WO HCPCS: Performed by: INTERNAL MEDICINE

## 2024-05-17 PROCEDURE — 93451 RIGHT HEART CATH: CPT | Performed by: INTERNAL MEDICINE

## 2024-05-17 PROCEDURE — C1894 INTRO/SHEATH, NON-LASER: HCPCS | Performed by: INTERNAL MEDICINE

## 2024-05-17 PROCEDURE — 2709999900 HC NON-CHARGEABLE SUPPLY: Performed by: INTERNAL MEDICINE

## 2024-05-17 PROCEDURE — 82803 BLOOD GASES ANY COMBINATION: CPT

## 2024-05-17 RX ORDER — HEPARIN SODIUM 200 [USP'U]/100ML
INJECTION, SOLUTION INTRAVENOUS PRN
Status: DISCONTINUED | OUTPATIENT
Start: 2024-05-17 | End: 2024-05-17 | Stop reason: HOSPADM

## 2024-05-17 RX ORDER — SODIUM CHLORIDE 0.9 % (FLUSH) 0.9 %
5-40 SYRINGE (ML) INJECTION EVERY 12 HOURS SCHEDULED
Status: DISCONTINUED | OUTPATIENT
Start: 2024-05-17 | End: 2024-05-17 | Stop reason: HOSPADM

## 2024-05-17 RX ORDER — ACETAMINOPHEN 325 MG/1
650 TABLET ORAL EVERY 4 HOURS PRN
Status: DISCONTINUED | OUTPATIENT
Start: 2024-05-17 | End: 2024-05-17 | Stop reason: HOSPADM

## 2024-05-17 RX ORDER — SODIUM CHLORIDE 9 MG/ML
INJECTION, SOLUTION INTRAVENOUS PRN
Status: DISCONTINUED | OUTPATIENT
Start: 2024-05-17 | End: 2024-05-17 | Stop reason: HOSPADM

## 2024-05-17 RX ORDER — SODIUM CHLORIDE 0.9 % (FLUSH) 0.9 %
5-40 SYRINGE (ML) INJECTION PRN
Status: DISCONTINUED | OUTPATIENT
Start: 2024-05-17 | End: 2024-05-17 | Stop reason: HOSPADM

## 2024-05-17 RX ORDER — SODIUM CHLORIDE 9 MG/ML
INJECTION, SOLUTION INTRAVENOUS CONTINUOUS PRN
Status: COMPLETED | OUTPATIENT
Start: 2024-05-17 | End: 2024-05-17

## 2024-05-17 NOTE — DISCHARGE INSTRUCTIONS
-Keep dressing dry and intact to right arm until this evening    -If bleeding occurs at site, apply moderate pressure for 5 minutes until bleeding stops

## 2024-05-17 NOTE — FLOWSHEET NOTE
Pt to pt  in wheelchair per RN for d/c home in private vehicle with spouse. Right brachial venous puncture site free of bleeding/hematoma at time of d/c

## 2024-05-17 NOTE — FLOWSHEET NOTE
Discharge instructions reviewed with patient. Voices understanding.  Ambulated without difficulty.

## 2024-05-22 ENCOUNTER — OFFICE VISIT (OUTPATIENT)
Dept: PULMONOLOGY | Age: 64
End: 2024-05-22
Payer: COMMERCIAL

## 2024-05-22 VITALS
HEART RATE: 59 BPM | DIASTOLIC BLOOD PRESSURE: 58 MMHG | SYSTOLIC BLOOD PRESSURE: 128 MMHG | WEIGHT: 237 LBS | HEIGHT: 71 IN | OXYGEN SATURATION: 87 % | BODY MASS INDEX: 33.18 KG/M2

## 2024-05-22 DIAGNOSIS — R06.02 SOBOE (SHORTNESS OF BREATH ON EXERTION): ICD-10-CM

## 2024-05-22 DIAGNOSIS — G47.34 SLEEP RELATED HYPOXIA: ICD-10-CM

## 2024-05-22 DIAGNOSIS — E66.9 OBESITY (BMI 30-39.9): ICD-10-CM

## 2024-05-22 DIAGNOSIS — I27.20 PULMONARY HTN (HCC): ICD-10-CM

## 2024-05-22 DIAGNOSIS — G47.33 OSA (OBSTRUCTIVE SLEEP APNEA): Primary | ICD-10-CM

## 2024-05-22 DIAGNOSIS — G47.10 HYPERSOMNIA: ICD-10-CM

## 2024-05-22 DIAGNOSIS — R09.02 HYPOXIA: ICD-10-CM

## 2024-05-22 DIAGNOSIS — J44.9 COPD, SEVERE (HCC): ICD-10-CM

## 2024-05-22 PROCEDURE — 3017F COLORECTAL CA SCREEN DOC REV: CPT | Performed by: INTERNAL MEDICINE

## 2024-05-22 PROCEDURE — 99215 OFFICE O/P EST HI 40 MIN: CPT | Performed by: INTERNAL MEDICINE

## 2024-05-22 PROCEDURE — G8427 DOCREV CUR MEDS BY ELIG CLIN: HCPCS | Performed by: INTERNAL MEDICINE

## 2024-05-22 PROCEDURE — G8417 CALC BMI ABV UP PARAM F/U: HCPCS | Performed by: INTERNAL MEDICINE

## 2024-05-22 PROCEDURE — 3023F SPIROM DOC REV: CPT | Performed by: INTERNAL MEDICINE

## 2024-05-22 PROCEDURE — 1036F TOBACCO NON-USER: CPT | Performed by: INTERNAL MEDICINE

## 2024-05-22 PROCEDURE — 3078F DIAST BP <80 MM HG: CPT | Performed by: INTERNAL MEDICINE

## 2024-05-22 PROCEDURE — 3074F SYST BP LT 130 MM HG: CPT | Performed by: INTERNAL MEDICINE

## 2024-05-22 ASSESSMENT — ENCOUNTER SYMPTOMS
EYE ITCHING: 0
ABDOMINAL DISTENTION: 0
SHORTNESS OF BREATH: 1
COUGH: 0
ABDOMINAL PAIN: 0
BACK PAIN: 0
EYE DISCHARGE: 0

## 2024-05-22 NOTE — ASSESSMENT & PLAN NOTE
Appears to be sec to the severe COPD and Valvular disorder, THIERRY  C/w oxygen  C/w Inhalers  PFT in 1 year  ECHO in 1 year  Get yearly flu vaccine

## 2024-05-22 NOTE — PROGRESS NOTES
Yrn Elliott  1960  Referring Provider: Janessa Salazar, [facility] - General     Subjective:     Chief Complaint   Patient presents with    Follow-up     Follow up, testing results         HPI  Yrn is a 63 y.o. male has come back as a follow up. He has severe COPD from second hand smoking exposure. He is on Trelegy and Albuterol prn. He is on 6 L.min of oxygen. He has no symptoms. He had his flu vaccine. He had no PFT's yet. His Right heart cath done on 05/17/24 showed a Mean PA pressure of 38 mmhg.    His last CT chest done on 03/21/23 showed:    No acute pulmonary findings.     Moderate to large 5.3 x 4.0 cm right diaphragmatic hernia, which contains a  large portion of the colon as well as significant amount of peritoneal fat.  No evidence of obstruction.     Near complete volume loss within the right lower lobe secondary to  compressive atelectasis from the right-sided diaphragmatic hernia.     Cholelithiasis without kamilla evidence of acute cholecystitis.    He had a Diaphragmatic hernia surgery done in Sept'23.        Current Outpatient Medications   Medication Sig Dispense Refill    atorvastatin (LIPITOR) 40 MG tablet Take 1 tablet by mouth nightly 30 tablet 5    furosemide (LASIX) 20 MG tablet Take 1 tablet by mouth 2 times daily 60 tablet 5    apixaban (ELIQUIS) 5 MG TABS tablet Take 1 tablet by mouth 2 times daily 60 tablet 5    nystatin (MYCOSTATIN) 537820 UNIT/ML suspension TAKE 5 MLS BY MOUTH 4 TIMES DAILY SWISH AND RETAIN IN MOUTH AS LONG AS POSSIBLE-USE 2 DAYS AFTER SYMPTOMS RESOLVE 200 mL 1    potassium chloride (KLOR-CON M) 10 MEQ extended release tablet Take 1 tablet by mouth daily 30 tablet 5    albuterol sulfate HFA (PROVENTIL;VENTOLIN;PROAIR) 108 (90 Base) MCG/ACT inhaler INHALE 2 PUFFS BY MOUTH EVERY 4 HOURS AS NEEDED FOR WHEEZE OR FOR SHORTNESS OF BREATH 6.7 each 11    metoprolol tartrate (LOPRESSOR) 25 MG tablet Take 1 tablet by mouth 2 times daily      empagliflozin

## 2024-05-28 ENCOUNTER — HOSPITAL ENCOUNTER (OUTPATIENT)
Dept: CARDIAC REHAB | Age: 64
Setting detail: THERAPIES SERIES
Discharge: HOME | End: 2024-05-28
Payer: COMMERCIAL

## 2024-05-28 PROCEDURE — 93798 PHYS/QHP OP CAR RHAB W/ECG: CPT

## 2024-05-30 ENCOUNTER — HOSPITAL ENCOUNTER (OUTPATIENT)
Dept: CARDIAC REHAB | Age: 64
Setting detail: THERAPIES SERIES
Discharge: HOME | End: 2024-05-30
Payer: COMMERCIAL

## 2024-05-30 PROCEDURE — 93798 PHYS/QHP OP CAR RHAB W/ECG: CPT

## 2024-06-03 ENCOUNTER — HOSPITAL ENCOUNTER (OUTPATIENT)
Dept: CARDIAC REHAB | Age: 64
Setting detail: THERAPIES SERIES
Discharge: HOME | End: 2024-06-03
Payer: COMMERCIAL

## 2024-06-03 PROCEDURE — 93798 PHYS/QHP OP CAR RHAB W/ECG: CPT

## 2024-06-03 RX ORDER — METOPROLOL TARTRATE 50 MG/1
50 TABLET, FILM COATED ORAL 2 TIMES DAILY
Qty: 60 TABLET | Refills: 5 | OUTPATIENT
Start: 2024-06-03

## 2024-06-04 ENCOUNTER — HOSPITAL ENCOUNTER (OUTPATIENT)
Dept: CARDIAC REHAB | Age: 64
Setting detail: THERAPIES SERIES
Discharge: HOME | End: 2024-06-04
Payer: COMMERCIAL

## 2024-06-04 PROCEDURE — 93798 PHYS/QHP OP CAR RHAB W/ECG: CPT

## 2024-06-06 ENCOUNTER — HOSPITAL ENCOUNTER (OUTPATIENT)
Dept: CARDIAC REHAB | Age: 64
Setting detail: THERAPIES SERIES
Discharge: HOME | End: 2024-06-06
Payer: COMMERCIAL

## 2024-06-06 PROCEDURE — 93798 PHYS/QHP OP CAR RHAB W/ECG: CPT

## 2024-06-10 ENCOUNTER — HOSPITAL ENCOUNTER (OUTPATIENT)
Dept: CARDIAC REHAB | Age: 64
Setting detail: THERAPIES SERIES
Discharge: HOME | End: 2024-06-10
Payer: COMMERCIAL

## 2024-06-10 PROCEDURE — 93798 PHYS/QHP OP CAR RHAB W/ECG: CPT

## 2024-06-11 ENCOUNTER — HOSPITAL ENCOUNTER (OUTPATIENT)
Dept: CARDIAC REHAB | Age: 64
Setting detail: THERAPIES SERIES
Discharge: HOME | End: 2024-06-11
Payer: COMMERCIAL

## 2024-06-11 PROCEDURE — 93798 PHYS/QHP OP CAR RHAB W/ECG: CPT

## 2024-06-13 ENCOUNTER — HOSPITAL ENCOUNTER (OUTPATIENT)
Dept: CARDIAC REHAB | Age: 64
Setting detail: THERAPIES SERIES
Discharge: HOME | End: 2024-06-13
Payer: COMMERCIAL

## 2024-06-13 PROCEDURE — 93798 PHYS/QHP OP CAR RHAB W/ECG: CPT

## 2024-06-20 ENCOUNTER — HOSPITAL ENCOUNTER (OUTPATIENT)
Dept: CARDIAC REHAB | Age: 64
Setting detail: THERAPIES SERIES
Discharge: HOME | End: 2024-06-20
Payer: COMMERCIAL

## 2024-06-20 PROCEDURE — 93798 PHYS/QHP OP CAR RHAB W/ECG: CPT

## 2024-06-23 DIAGNOSIS — T78.40XD ALLERGY, SUBSEQUENT ENCOUNTER: ICD-10-CM

## 2024-06-24 ENCOUNTER — HOSPITAL ENCOUNTER (OUTPATIENT)
Dept: CARDIAC REHAB | Age: 64
Setting detail: THERAPIES SERIES
Discharge: HOME | End: 2024-06-24
Payer: COMMERCIAL

## 2024-06-24 DIAGNOSIS — J44.9 COPD, SEVERE (HCC): Primary | ICD-10-CM

## 2024-06-24 DIAGNOSIS — G47.09 OTHER INSOMNIA: ICD-10-CM

## 2024-06-24 PROCEDURE — 93798 PHYS/QHP OP CAR RHAB W/ECG: CPT

## 2024-06-24 RX ORDER — TRAZODONE HYDROCHLORIDE 50 MG/1
TABLET ORAL
Qty: 180 TABLET | Refills: 1 | Status: SHIPPED | OUTPATIENT
Start: 2024-06-24

## 2024-06-24 RX ORDER — FLUTICASONE FUROATE, UMECLIDINIUM BROMIDE AND VILANTEROL TRIFENATATE 100; 62.5; 25 UG/1; UG/1; UG/1
1 POWDER RESPIRATORY (INHALATION) DAILY
Qty: 1 EACH | Refills: 5 | Status: SHIPPED | OUTPATIENT
Start: 2024-06-24

## 2024-06-24 RX ORDER — POTASSIUM CHLORIDE 750 MG/1
10 TABLET, EXTENDED RELEASE ORAL DAILY
Qty: 30 TABLET | Refills: 5 | Status: SHIPPED | OUTPATIENT
Start: 2024-06-24

## 2024-06-24 RX ORDER — LORATADINE 10 MG/1
TABLET ORAL
Qty: 90 TABLET | Refills: 3 | Status: SHIPPED | OUTPATIENT
Start: 2024-06-24

## 2024-06-24 RX ORDER — POTASSIUM CHLORIDE 750 MG/1
10 TABLET, EXTENDED RELEASE ORAL DAILY
Qty: 30 TABLET | Refills: 5 | OUTPATIENT
Start: 2024-06-24

## 2024-06-24 RX ORDER — METOPROLOL TARTRATE 50 MG/1
50 TABLET, FILM COATED ORAL 2 TIMES DAILY
Qty: 60 TABLET | Refills: 5 | OUTPATIENT
Start: 2024-06-24

## 2024-06-24 NOTE — TELEPHONE ENCOUNTER
200-240 mg/dL  High Risk       >240 mg/dL       Triglycerides   Date Value Ref Range Status   03/10/2021 62 <150 MG/DL Final     HDL   Date Value Ref Range Status   03/10/2021 39 (L) >40 MG/DL Final     VLDL Cholesterol Calculated   Date Value Ref Range Status   06/09/2020 17 Not Established mg/dL Final       A1C:    Hemoglobin A1C   Date Value Ref Range Status   06/15/2023 5.4 4.2 - 6.3 % Final       TSH:    TSH, High Sensitivity   Date Value Ref Range Status   06/15/2023 2.230 0.270 - 4.20 uIu/ml Final     Comment:             Patients with high levels of Biotin intake (ie >5mg/day) may have falsely decreased TSHS   levels.  Samples collected within 8 hours of Biotin intake may require additional information for   diagnosis.         UA:  Color, UA   Date Value Ref Range Status   06/15/2023 YELLOW YELLOW Final     Clarity, UA   Date Value Ref Range Status   06/15/2023 CLEAR CLEAR Final     Bilirubin Urine   Date Value Ref Range Status   06/15/2023 NEGATIVE NEGATIVE MG/DL Final     Ketones, Urine   Date Value Ref Range Status   06/15/2023 NEGATIVE NEGATIVE MG/DL Final     Blood, Urine   Date Value Ref Range Status   06/15/2023 NEGATIVE NEGATIVE Final     Protein, UA   Date Value Ref Range Status   06/15/2023 NEGATIVE NEGATIVE MG/DL Final     Urobilinogen, Urine   Date Value Ref Range Status   06/15/2023 0.2 0.2 - 1.0 MG/DL Final     Nitrite Urine, Quantitative   Date Value Ref Range Status   06/15/2023 NEGATIVE NEGATIVE Final     Leukocyte Esterase, Urine   Date Value Ref Range Status   06/15/2023 NEGATIVE NEGATIVE Final       Patient Care Team:  Janessa Salazar MD as PCP - General  MarieJanessa MD as PCP - Empaneled Provider  Danica Marni MD as Consulting Physician (Cardiology)  Hanna Pastor APRN - CNP as Nurse Practitioner (Nurse Practitioner)  Roni Covington MD as Surgeon (Urology)     Requested Pharmacy____________________________________________________________________

## 2024-06-25 ENCOUNTER — HOSPITAL ENCOUNTER (OUTPATIENT)
Dept: CARDIAC REHAB | Age: 64
Setting detail: THERAPIES SERIES
Discharge: HOME | End: 2024-06-25
Payer: COMMERCIAL

## 2024-06-25 PROCEDURE — 93798 PHYS/QHP OP CAR RHAB W/ECG: CPT

## 2024-06-27 ENCOUNTER — HOSPITAL ENCOUNTER (OUTPATIENT)
Dept: CARDIAC REHAB | Age: 64
Setting detail: THERAPIES SERIES
Discharge: HOME | End: 2024-06-27
Payer: COMMERCIAL

## 2024-06-27 PROCEDURE — 93798 PHYS/QHP OP CAR RHAB W/ECG: CPT

## 2024-07-01 ENCOUNTER — HOSPITAL ENCOUNTER (OUTPATIENT)
Dept: CARDIAC REHAB | Age: 64
Setting detail: THERAPIES SERIES
Discharge: HOME | End: 2024-07-01
Payer: COMMERCIAL

## 2024-07-01 PROCEDURE — 93798 PHYS/QHP OP CAR RHAB W/ECG: CPT

## 2024-07-08 ENCOUNTER — HOSPITAL ENCOUNTER (OUTPATIENT)
Dept: CARDIAC REHAB | Age: 64
Setting detail: THERAPIES SERIES
Discharge: HOME | End: 2024-07-08
Payer: COMMERCIAL

## 2024-07-08 PROCEDURE — 93798 PHYS/QHP OP CAR RHAB W/ECG: CPT

## 2024-07-09 ENCOUNTER — HOSPITAL ENCOUNTER (OUTPATIENT)
Dept: CARDIAC REHAB | Age: 64
Setting detail: THERAPIES SERIES
Discharge: HOME | End: 2024-07-09
Payer: COMMERCIAL

## 2024-07-09 PROCEDURE — 93798 PHYS/QHP OP CAR RHAB W/ECG: CPT

## 2024-07-11 ENCOUNTER — HOSPITAL ENCOUNTER (OUTPATIENT)
Dept: CARDIAC REHAB | Age: 64
Setting detail: THERAPIES SERIES
Discharge: HOME | End: 2024-07-11
Payer: COMMERCIAL

## 2024-07-11 PROCEDURE — 93798 PHYS/QHP OP CAR RHAB W/ECG: CPT

## 2024-07-15 ENCOUNTER — HOSPITAL ENCOUNTER (OUTPATIENT)
Dept: CARDIAC REHAB | Age: 64
Setting detail: THERAPIES SERIES
Discharge: HOME | End: 2024-07-15
Payer: COMMERCIAL

## 2024-07-15 PROCEDURE — 93798 PHYS/QHP OP CAR RHAB W/ECG: CPT

## 2024-07-16 ENCOUNTER — HOSPITAL ENCOUNTER (OUTPATIENT)
Dept: CARDIAC REHAB | Age: 64
Setting detail: THERAPIES SERIES
Discharge: HOME | End: 2024-07-16
Payer: COMMERCIAL

## 2024-07-16 PROCEDURE — 93798 PHYS/QHP OP CAR RHAB W/ECG: CPT

## 2024-07-19 ENCOUNTER — HOSPITAL ENCOUNTER (OUTPATIENT)
Dept: SLEEP CENTER | Age: 64
Discharge: HOME | End: 2024-07-19
Payer: COMMERCIAL

## 2024-07-19 DIAGNOSIS — G47.33 OSA (OBSTRUCTIVE SLEEP APNEA): ICD-10-CM

## 2024-07-19 PROCEDURE — 95810 POLYSOM 6/> YRS 4/> PARAM: CPT

## 2024-07-22 ENCOUNTER — HOSPITAL ENCOUNTER (OUTPATIENT)
Dept: CARDIAC REHAB | Age: 64
Setting detail: THERAPIES SERIES
Discharge: HOME | End: 2024-07-22
Payer: COMMERCIAL

## 2024-07-22 PROCEDURE — 94625 PHY/QHP OP PULM RHB W/O MNTR: CPT

## 2024-07-22 PROCEDURE — 93798 PHYS/QHP OP CAR RHAB W/ECG: CPT

## 2024-07-23 ENCOUNTER — HOSPITAL ENCOUNTER (OUTPATIENT)
Dept: CARDIAC REHAB | Age: 64
Setting detail: THERAPIES SERIES
Discharge: HOME | End: 2024-07-23
Payer: COMMERCIAL

## 2024-07-23 PROCEDURE — 93798 PHYS/QHP OP CAR RHAB W/ECG: CPT

## 2024-07-25 ENCOUNTER — HOSPITAL ENCOUNTER (OUTPATIENT)
Dept: CARDIAC REHAB | Age: 64
Setting detail: THERAPIES SERIES
Discharge: HOME | End: 2024-07-25
Payer: COMMERCIAL

## 2024-07-25 PROCEDURE — 93798 PHYS/QHP OP CAR RHAB W/ECG: CPT

## 2024-07-29 ENCOUNTER — HOSPITAL ENCOUNTER (OUTPATIENT)
Dept: CARDIAC REHAB | Age: 64
Setting detail: THERAPIES SERIES
Discharge: HOME | End: 2024-07-29
Payer: COMMERCIAL

## 2024-07-29 PROCEDURE — 93798 PHYS/QHP OP CAR RHAB W/ECG: CPT

## 2024-07-30 ENCOUNTER — HOSPITAL ENCOUNTER (OUTPATIENT)
Dept: CARDIAC REHAB | Age: 64
Setting detail: THERAPIES SERIES
Discharge: HOME | End: 2024-07-30
Payer: COMMERCIAL

## 2024-07-30 PROCEDURE — 93798 PHYS/QHP OP CAR RHAB W/ECG: CPT

## 2024-08-01 ASSESSMENT — ENCOUNTER SYMPTOMS
ORTHOPNEA: 0
SHORTNESS OF BREATH: 1

## 2024-08-01 NOTE — PROGRESS NOTES
times daily 60 tablet 3    Handicap Placard MISC by Does not apply route EXP 6/19/2028 1 each 0    nystatin (MYCOSTATIN) 885208 UNIT/ML suspension TAKE 5 MLS BY MOUTH 4 TIMES DAILY SWISH AND RETAIN IN MOUTH AS LONG AS POSSIBLE-USE 2 DAYS AFTER SYMPTOMS RESOLVE 200 mL 1     No current facility-administered medications for this visit.          All pertinent data reviewed and discussed with patient       ASSESSMENT/PLAN:     Valvular heart disease  He is s/p SAVR  Echo shows stable valve.  Will continue with ongoing surveillance.    Severe pulmonary  Right heart cath   Systolic (mmHg) Diastolic (mmHg) Mean (mmHg) EDP (mmHg)   PA 60    25    38       RV 49    4     12      RA   11       PCW   25          18         Blood Oximetry Baseline     O2 Sat (%)   PA 38       70.6      AO 92      RV 12      PCW 18      RA 72.1        Follow-up with Dr. Cagle    PAF  EKG today shows sinus bradycardia rate 49 with intermittent episodes of dizziness.  He is on amiodarone 200 mg daily along with metoprolol.  Will stop his amiodarone and place a 30-day event monitor to assess atrial fibs off of amiodarone.  If A-fib noted on 30-day monitor will refer back to EP for further direction   Continue Eliquis for stroke prophylaxis      Tests ordered: 30-day event monitor  Follow-up  3 months      Signed:  DONA Ordoñez CNP, 8/2/2024, 8:42 AM    An electronic signature was used to authenticate this note.    Please note this report has been partially produced using speech recognition software and may contain errors related to that system including errors in grammar, punctuation, and spelling, as well as words and phrases that may be inappropriate. If there are any questions or concerns please feel free to contact the dictating provider for clarification.

## 2024-08-02 ENCOUNTER — OFFICE VISIT (OUTPATIENT)
Dept: CARDIOLOGY CLINIC | Age: 64
End: 2024-08-02
Payer: COMMERCIAL

## 2024-08-02 VITALS
SYSTOLIC BLOOD PRESSURE: 122 MMHG | HEIGHT: 71 IN | BODY MASS INDEX: 34.52 KG/M2 | DIASTOLIC BLOOD PRESSURE: 78 MMHG | HEART RATE: 49 BPM | WEIGHT: 246.6 LBS

## 2024-08-02 DIAGNOSIS — I38 VHD (VALVULAR HEART DISEASE): Primary | ICD-10-CM

## 2024-08-02 DIAGNOSIS — I48.0 PAF (PAROXYSMAL ATRIAL FIBRILLATION) (HCC): ICD-10-CM

## 2024-08-02 PROCEDURE — 93000 ELECTROCARDIOGRAM COMPLETE: CPT | Performed by: NURSE PRACTITIONER

## 2024-08-02 PROCEDURE — 3017F COLORECTAL CA SCREEN DOC REV: CPT | Performed by: NURSE PRACTITIONER

## 2024-08-02 PROCEDURE — G8417 CALC BMI ABV UP PARAM F/U: HCPCS | Performed by: NURSE PRACTITIONER

## 2024-08-02 PROCEDURE — 99214 OFFICE O/P EST MOD 30 MIN: CPT | Performed by: NURSE PRACTITIONER

## 2024-08-02 PROCEDURE — G8427 DOCREV CUR MEDS BY ELIG CLIN: HCPCS | Performed by: NURSE PRACTITIONER

## 2024-08-02 PROCEDURE — 3074F SYST BP LT 130 MM HG: CPT | Performed by: NURSE PRACTITIONER

## 2024-08-02 PROCEDURE — 1036F TOBACCO NON-USER: CPT | Performed by: NURSE PRACTITIONER

## 2024-08-02 PROCEDURE — 3078F DIAST BP <80 MM HG: CPT | Performed by: NURSE PRACTITIONER

## 2024-08-02 NOTE — PATIENT INSTRUCTIONS
We are committed to providing you the best care possible.    If you receive a survey after visiting one of our offices, please take time to share your experience concerning your physician office visit.  These surveys are confidential and no health information about you is shared.    We are eager to improve for you and we are counting on your feedback to help make that happen.      **It is YOUR responsibilty to bring medication bottles and/or updated medication list to EACH APPOINTMENT. This will allow us to better serve you and all your healthcare needs**    Thank you for allowing us to care for you today!   We want to ensure we can follow your treatment plan and we strive to give you the best outcomes and experience possible.   If you ever have a life threatening emergency and call 911 - for an ambulance (EMS)   Our providers can only care for you at:   [facility] or [facility].   Even if you have someone take you or you drive yourself we can only care for you in a [facility] facility. Our providers are not setup at the other healthcare locations!     Please be informed that if you contact our office outside of normal business hours the physician on call cannot help with any scheduling or rescheduling issues, procedure instruction questions or any type of medication issue.    We advise you for any urgent/emergency that you go to the nearest emergency room!    PLEASE CALL OUR OFFICE DURING NORMAL BUSINESS HOURS    Monday - Friday   8 am to 5 pm    [place]: [phone]    [place]: [phone]    [place]:  [phone]

## 2024-08-05 ENCOUNTER — HOSPITAL ENCOUNTER (OUTPATIENT)
Dept: CARDIAC REHAB | Age: 64
Setting detail: THERAPIES SERIES
Discharge: HOME | End: 2024-08-05
Payer: COMMERCIAL

## 2024-08-05 PROCEDURE — 93798 PHYS/QHP OP CAR RHAB W/ECG: CPT

## 2024-08-08 ENCOUNTER — HOSPITAL ENCOUNTER (OUTPATIENT)
Dept: CARDIAC REHAB | Age: 64
Setting detail: THERAPIES SERIES
Discharge: HOME | End: 2024-08-08
Payer: COMMERCIAL

## 2024-08-08 PROCEDURE — 93798 PHYS/QHP OP CAR RHAB W/ECG: CPT

## 2024-08-12 ENCOUNTER — HOSPITAL ENCOUNTER (OUTPATIENT)
Dept: CARDIAC REHAB | Age: 64
Setting detail: THERAPIES SERIES
Discharge: HOME | End: 2024-08-12
Payer: COMMERCIAL

## 2024-08-12 PROCEDURE — 93798 PHYS/QHP OP CAR RHAB W/ECG: CPT

## 2024-08-12 RX ORDER — AMIODARONE HYDROCHLORIDE 200 MG/1
100 TABLET ORAL DAILY
Qty: 15 TABLET | Refills: 5 | Status: SHIPPED | OUTPATIENT
Start: 2024-08-12

## 2024-08-13 ENCOUNTER — HOSPITAL ENCOUNTER (OUTPATIENT)
Dept: CARDIAC REHAB | Age: 64
Setting detail: THERAPIES SERIES
Discharge: HOME | End: 2024-08-13
Payer: COMMERCIAL

## 2024-08-13 PROCEDURE — 93798 PHYS/QHP OP CAR RHAB W/ECG: CPT

## 2024-08-15 ENCOUNTER — HOSPITAL ENCOUNTER (OUTPATIENT)
Dept: CARDIAC REHAB | Age: 64
Setting detail: THERAPIES SERIES
Discharge: HOME | End: 2024-08-15
Payer: COMMERCIAL

## 2024-08-15 PROCEDURE — 93798 PHYS/QHP OP CAR RHAB W/ECG: CPT

## 2024-08-20 ENCOUNTER — HOSPITAL ENCOUNTER (OUTPATIENT)
Dept: CARDIAC REHAB | Age: 64
Setting detail: THERAPIES SERIES
Discharge: HOME | End: 2024-08-20
Payer: COMMERCIAL

## 2024-08-20 PROCEDURE — 93798 PHYS/QHP OP CAR RHAB W/ECG: CPT

## 2024-09-10 ENCOUNTER — HOSPITAL ENCOUNTER (OUTPATIENT)
Dept: PULMONOLOGY | Age: 64
Discharge: HOME | End: 2024-09-10
Attending: INTERNAL MEDICINE
Payer: COMMERCIAL

## 2024-09-10 DIAGNOSIS — R06.02 SOBOE (SHORTNESS OF BREATH ON EXERTION): ICD-10-CM

## 2024-09-10 LAB
DISTANCE WALKED: 1200 FT
DLCO %PRED: 51 %
DLCO PRED: NORMAL
DLCO/VA %PRED: 68 %
DLCO/VA PRED: NORMAL
DLCO/VA: NORMAL
DLCO: NORMAL
EXPIRATORY TIME-POST: NORMAL
EXPIRATORY TIME: NORMAL
FEF 25-75 %CHNG: NORMAL
FEF 25-75 POST %PRED: 43 %
FEF 25-75% %PRED-PRE: 16 L/SEC
FEF 25-75% PRED: NORMAL
FEF 25-75-POST: NORMAL
FEF 25-75-PRE: NORMAL
FEV1 %PRED-POST: 53 %
FEV1 %PRED-PRE: 41 %
FEV1 PRED: NORMAL
FEV1-POST: NORMAL
FEV1-PRE: NORMAL
FEV1/FVC %PRED-POST: 73 %
FEV1/FVC %PRED-PRE: 66 %
FEV1/FVC PRED: NORMAL
FEV1/FVC-POST: NORMAL
FEV1/FVC-PRE: NORMAL
FVC %PRED-POST: 73 L
FVC %PRED-PRE: 62 %
FVC PRED: NORMAL
FVC-POST: NORMAL
FVC-PRE: NORMAL
GAW %PRED: NORMAL
GAW PRED: NORMAL
GAW: NORMAL
IC PRE %PRED: 74 %
IC PRED: NORMAL
IC: NORMAL
MEP: NORMAL
MIP: NORMAL
MVV %PRED-PRE: NORMAL
MVV PRED: NORMAL
MVV-PRE: NORMAL
PEF %PRED-POST: NORMAL
PEF %PRED-PRE: NORMAL
PEF PRED: NORMAL
PEF%CHNG: NORMAL
PEF-POST: NORMAL
PEF-PRE: NORMAL
RAW %PRED: NORMAL
RAW PRED: NORMAL
RAW: NORMAL
RV PRE %PRED: 151 %
RV PRED: NORMAL
RV: NORMAL
SPO2: 90 %
SVC %PRED: 72 %
SVC PRED: NORMAL
SVC: NORMAL
TLC PRE %PRED: 93 %
TLC PRED: NORMAL
TLC: NORMAL
VA %PRED: 76 %
VA PRED: NORMAL
VA: NORMAL
VTG %PRED: NORMAL
VTG PRED: NORMAL
VTG: NORMAL

## 2024-09-10 PROCEDURE — 31628 BRONCHOSCOPY/LUNG BX EACH: CPT

## 2024-09-10 PROCEDURE — 94618 PULMONARY STRESS TESTING: CPT

## 2024-09-10 PROCEDURE — 94729 DIFFUSING CAPACITY: CPT

## 2024-09-10 PROCEDURE — 94727 GAS DIL/WSHOT DETER LNG VOL: CPT

## 2024-09-10 ASSESSMENT — PULMONARY FUNCTION TESTS
FEV1_PERCENT_PREDICTED_POST: 53
FEV1/FVC_PERCENT_PREDICTED_POST: 73
FEV1/FVC_PERCENT_PREDICTED_PRE: 66
FVC_PERCENT_PREDICTED_POST: 73
FVC_PERCENT_PREDICTED_PRE: 62
FEV1_PERCENT_PREDICTED_PRE: 41

## 2024-10-08 ENCOUNTER — TELEPHONE (OUTPATIENT)
Dept: CARDIOLOGY CLINIC | Age: 64
End: 2024-10-08

## 2024-10-14 ENCOUNTER — OFFICE VISIT (OUTPATIENT)
Dept: PULMONOLOGY | Age: 64
End: 2024-10-14
Payer: COMMERCIAL

## 2024-10-14 VITALS
HEIGHT: 71 IN | BODY MASS INDEX: 33.18 KG/M2 | SYSTOLIC BLOOD PRESSURE: 128 MMHG | OXYGEN SATURATION: 90 % | DIASTOLIC BLOOD PRESSURE: 72 MMHG | HEART RATE: 57 BPM | WEIGHT: 237 LBS

## 2024-10-14 DIAGNOSIS — I27.20 PULMONARY HYPERTENSION (HCC): ICD-10-CM

## 2024-10-14 DIAGNOSIS — J44.9 COPD, SEVERE (HCC): Primary | ICD-10-CM

## 2024-10-14 DIAGNOSIS — R09.02 HYPOXIA: ICD-10-CM

## 2024-10-14 DIAGNOSIS — G47.34 SLEEP RELATED HYPOXIA: ICD-10-CM

## 2024-10-14 DIAGNOSIS — E66.9 OBESITY (BMI 30-39.9): ICD-10-CM

## 2024-10-14 DIAGNOSIS — G47.33 OSA (OBSTRUCTIVE SLEEP APNEA): ICD-10-CM

## 2024-10-14 DIAGNOSIS — I27.20 PULMONARY HTN (HCC): ICD-10-CM

## 2024-10-14 DIAGNOSIS — G47.10 HYPERSOMNIA: ICD-10-CM

## 2024-10-14 PROCEDURE — 99215 OFFICE O/P EST HI 40 MIN: CPT | Performed by: INTERNAL MEDICINE

## 2024-10-14 PROCEDURE — 3017F COLORECTAL CA SCREEN DOC REV: CPT | Performed by: INTERNAL MEDICINE

## 2024-10-14 PROCEDURE — 3078F DIAST BP <80 MM HG: CPT | Performed by: INTERNAL MEDICINE

## 2024-10-14 PROCEDURE — G8484 FLU IMMUNIZE NO ADMIN: HCPCS | Performed by: INTERNAL MEDICINE

## 2024-10-14 PROCEDURE — 1124F ACP DISCUSS-NO DSCNMKR DOCD: CPT | Performed by: INTERNAL MEDICINE

## 2024-10-14 PROCEDURE — G8427 DOCREV CUR MEDS BY ELIG CLIN: HCPCS | Performed by: INTERNAL MEDICINE

## 2024-10-14 PROCEDURE — 3023F SPIROM DOC REV: CPT | Performed by: INTERNAL MEDICINE

## 2024-10-14 PROCEDURE — 1036F TOBACCO NON-USER: CPT | Performed by: INTERNAL MEDICINE

## 2024-10-14 PROCEDURE — 3074F SYST BP LT 130 MM HG: CPT | Performed by: INTERNAL MEDICINE

## 2024-10-14 PROCEDURE — G8417 CALC BMI ABV UP PARAM F/U: HCPCS | Performed by: INTERNAL MEDICINE

## 2024-10-14 ASSESSMENT — ENCOUNTER SYMPTOMS
ABDOMINAL DISTENTION: 0
ABDOMINAL PAIN: 0
EYE DISCHARGE: 0
SHORTNESS OF BREATH: 0
EYE ITCHING: 0
COUGH: 0
BACK PAIN: 0

## 2024-10-14 NOTE — PROGRESS NOTES
Yrn Elliott  1960  Referring Provider: Janessa Salazar, [facility] - General     Subjective:     Chief Complaint   Patient presents with    Follow-up     Denies any issues or concerns at this time.       VALENTIN Pool is a 64 y.o. male has come back as a follow up. He has severe COPD from second hand smoking exposure. He is on Trelegy and Albuterol prn. He is on 6 L.min of oxygen. He has no symptoms. He had his flu vaccine. His Mean PA pressure is 38 mmhg    He had the right diaphragmatic repair in Sept'23.    His PFT done on 09/11/24 showed that he has severe decrease in FEV1 of 1.36 litres (41%) unchanged from 1.36 litres, ratio 51 and DLCO of 51% down from 65%.    His 6 MWT showed good walking distance with desat to 85% on RA requiring 6 L,min of oxygen at rest on RA and on ambulation.       He had a PSG done on 07/19/24 showed AHI of 8.3 and desat to 81%. He has no loss of weight. He is less sleepy during the day time.     Current Outpatient Medications   Medication Sig Dispense Refill    fluticasone-umeclidin-vilant (TRELEGY ELLIPTA) 100-62.5-25 MCG/ACT AEPB inhaler TAKE 1 PUFF BY MOUTH EVERY DAY 1 each 11    loratadine (CLARITIN) 10 MG tablet TAKE 1 TABLET BY MOUTH EVERY DAY 90 tablet 3    traZODone (DESYREL) 50 MG tablet TAKE 1-2 TABLETS BY MOUTH NIGHTLY AS NEEDED FOR INSOMNIA. 180 tablet 1    potassium chloride (KLOR-CON M) 10 MEQ extended release tablet Take 1 tablet by mouth daily 30 tablet 5    metoprolol tartrate (LOPRESSOR) 25 MG tablet Take 1 tablet by mouth 2 times daily 60 tablet 5    fluticasone-umeclidin-vilant (TRELEGY ELLIPTA) 100-62.5-25 MCG/ACT AEPB inhaler Inhale 1 puff into the lungs daily 1 each 5    empagliflozin (JARDIANCE) 10 MG tablet Take 1 tablet by mouth daily 30 tablet 5    atorvastatin (LIPITOR) 40 MG tablet Take 1 tablet by mouth nightly 30 tablet 5    furosemide (LASIX) 20 MG tablet Take 1 tablet by mouth 2 times daily 60 tablet 5    apixaban (ELIQUIS) 5 MG TABS tablet

## 2024-10-14 NOTE — ASSESSMENT & PLAN NOTE
Appears to be sec to the severe COPD and Valvular disorder, THIERRY, Diastolic dysfunction  C/w oxygen  C/w Inhalers  PFT in 1 year  ECHO in 1 year  Get yearly flu vaccine

## 2024-10-15 NOTE — TELEPHONE ENCOUNTER
Said called last week no word, took last one last cathy so needs asap.  Please call pt to advise when he can get this.

## 2024-11-06 RX ORDER — ATORVASTATIN CALCIUM 40 MG/1
40 TABLET, FILM COATED ORAL NIGHTLY
Qty: 90 TABLET | Refills: 1 | Status: SHIPPED | OUTPATIENT
Start: 2024-11-06

## 2024-11-07 ENCOUNTER — OFFICE VISIT (OUTPATIENT)
Dept: CARDIOLOGY CLINIC | Age: 64
End: 2024-11-07

## 2024-11-07 VITALS
BODY MASS INDEX: 33.52 KG/M2 | WEIGHT: 239.4 LBS | DIASTOLIC BLOOD PRESSURE: 88 MMHG | HEIGHT: 71 IN | HEART RATE: 53 BPM | SYSTOLIC BLOOD PRESSURE: 138 MMHG

## 2024-11-07 DIAGNOSIS — I77.810 DILATED AORTIC ROOT (HCC): ICD-10-CM

## 2024-11-07 DIAGNOSIS — I38 VHD (VALVULAR HEART DISEASE): Primary | ICD-10-CM

## 2024-11-07 DIAGNOSIS — I50.42 CHRONIC COMBINED SYSTOLIC AND DIASTOLIC CONGESTIVE HEART FAILURE (HCC): ICD-10-CM

## 2024-11-07 DIAGNOSIS — I10 ESSENTIAL HYPERTENSION: ICD-10-CM

## 2024-11-07 DIAGNOSIS — I48.0 PAF (PAROXYSMAL ATRIAL FIBRILLATION) (HCC): ICD-10-CM

## 2024-11-07 ASSESSMENT — ENCOUNTER SYMPTOMS
ORTHOPNEA: 0
SHORTNESS OF BREATH: 1

## 2024-11-07 NOTE — PROGRESS NOTES
current facility-administered medications for this visit.          All pertinent data reviewed and discussed with patient       ASSESSMENT/PLAN:    Valvualr heart disease s/p SAVR  Doing well.  Echo shows stable aortic valve.  He is also noted to have mild mitral regurgitation with multiple jets.l recommend just ongoing surveillance    Echo 04/2024  Aortic Valve: Loaiza Inspiris Reselia inserted 6/2023 bovine bioprosthetic valve that is well-seated with a size of 29 mm. AV mean gradient is 11 mmHg. AT98ms.    Mitral Valve: Annular calcification of the mitral valve. Mild regurgitation with multiple jets.    Combined systolic/diastolic heart failure with improved EF  NYHA class II.  Appears to be euvolemic  Following a low-sodium diet which will be continued   HF  / CV medical therapy:   ACEI / ARB / ARNI: Noted hyperkalemia  Beta Blocker: lopressor   MRA: no-had hyperkalemia  SGLT2 Inh: jardiance   GLP-1 agonist:   Diuretic:lasix  Vasodilator:   Device:     PAF  Today's EKG shows sinus bradycardia with first-degree AV block.  He denies palpitations.  Amiodarone was stopped due to significant COPD  30-day event monitor showed predominant rhythm to be sinus rhythm with bradycardia with.  Infrequent APCs and PVCs.  With first-degree AV block noted.  Will continue Eliquis 5 mg twice daily for stroke prophylaxis    Dilated aortic root   continue surveillance    Hypertension  Well-controlled  Continue metoprolol    Tests ordered: None  Follow-up 6-month    Signed:  DONA Ordoñez CNP, 11/7/2024, 10:46 AM    An electronic signature was used to authenticate this note.    Please note this report has been partially produced using speech recognition software and may contain errors related to that system including errors in grammar, punctuation, and spelling, as well as words and phrases that may be inappropriate. If there are any questions or concerns please feel free to contact the dictating provider for

## 2024-11-22 RX ORDER — FUROSEMIDE 20 MG/1
20 TABLET ORAL 2 TIMES DAILY
Qty: 60 TABLET | Refills: 5 | Status: SHIPPED | OUTPATIENT
Start: 2024-11-22

## 2024-12-09 RX ORDER — METOPROLOL TARTRATE 25 MG/1
25 TABLET, FILM COATED ORAL 2 TIMES DAILY
Qty: 60 TABLET | Refills: 5 | Status: SHIPPED | OUTPATIENT
Start: 2024-12-09

## 2024-12-13 ENCOUNTER — HOSPITAL ENCOUNTER (OUTPATIENT)
Dept: SLEEP CENTER | Age: 64
Discharge: HOME | End: 2024-12-13
Payer: COMMERCIAL

## 2024-12-13 DIAGNOSIS — G47.33 OSA (OBSTRUCTIVE SLEEP APNEA): ICD-10-CM

## 2024-12-13 PROCEDURE — 95811 POLYSOM 6/>YRS CPAP 4/> PARM: CPT

## 2024-12-13 ASSESSMENT — SLEEP AND FATIGUE QUESTIONNAIRES
ESS TOTAL SCORE: 6
HOW LIKELY ARE YOU TO NOD OFF OR FALL ASLEEP IN A CAR, WHILE STOPPED FOR A FEW MINUTES IN TRAFFIC: WOULD NEVER DOZE
HOW LIKELY ARE YOU TO NOD OFF OR FALL ASLEEP WHILE SITTING AND TALKING TO SOMEONE: WOULD NEVER DOZE
HOW LIKELY ARE YOU TO NOD OFF OR FALL ASLEEP WHEN YOU ARE A PASSENGER IN A CAR FOR AN HOUR WITHOUT A BREAK: SLIGHT CHANCE OF DOZING
HOW LIKELY ARE YOU TO NOD OFF OR FALL ASLEEP WHILE WATCHING TV: SLIGHT CHANCE OF DOZING
HOW LIKELY ARE YOU TO NOD OFF OR FALL ASLEEP WHILE SITTING INACTIVE IN A PUBLIC PLACE: WOULD NEVER DOZE
HOW LIKELY ARE YOU TO NOD OFF OR FALL ASLEEP WHILE LYING DOWN TO REST IN THE AFTERNOON WHEN CIRCUMSTANCES PERMIT: MODERATE CHANCE OF DOZING
HOW LIKELY ARE YOU TO NOD OFF OR FALL ASLEEP WHILE SITTING QUIETLY AFTER LUNCH WITHOUT ALCOHOL: SLIGHT CHANCE OF DOZING
HOW LIKELY ARE YOU TO NOD OFF OR FALL ASLEEP WHILE SITTING AND READING: SLIGHT CHANCE OF DOZING

## 2025-01-08 DIAGNOSIS — G47.09 OTHER INSOMNIA: ICD-10-CM

## 2025-01-08 RX ORDER — TRAZODONE HYDROCHLORIDE 50 MG/1
TABLET ORAL
Qty: 180 TABLET | Refills: 1 | OUTPATIENT
Start: 2025-01-08

## 2025-01-08 RX ORDER — POTASSIUM CHLORIDE 750 MG/1
10 TABLET, EXTENDED RELEASE ORAL DAILY
Qty: 30 TABLET | Refills: 5 | Status: SHIPPED | OUTPATIENT
Start: 2025-01-08

## 2025-01-09 RX ORDER — ALBUTEROL SULFATE 90 UG/1
INHALANT RESPIRATORY (INHALATION)
Qty: 6.7 EACH | Refills: 11 | Status: SHIPPED | OUTPATIENT
Start: 2025-01-09

## 2025-01-15 SDOH — ECONOMIC STABILITY: INCOME INSECURITY: IN THE LAST 12 MONTHS, WAS THERE A TIME WHEN YOU WERE NOT ABLE TO PAY THE MORTGAGE OR RENT ON TIME?: NO

## 2025-01-15 SDOH — ECONOMIC STABILITY: FOOD INSECURITY: WITHIN THE PAST 12 MONTHS, THE FOOD YOU BOUGHT JUST DIDN'T LAST AND YOU DIDN'T HAVE MONEY TO GET MORE.: NEVER TRUE

## 2025-01-15 SDOH — ECONOMIC STABILITY: FOOD INSECURITY: WITHIN THE PAST 12 MONTHS, YOU WORRIED THAT YOUR FOOD WOULD RUN OUT BEFORE YOU GOT MONEY TO BUY MORE.: NEVER TRUE

## 2025-01-15 SDOH — ECONOMIC STABILITY: TRANSPORTATION INSECURITY
IN THE PAST 12 MONTHS, HAS THE LACK OF TRANSPORTATION KEPT YOU FROM MEDICAL APPOINTMENTS OR FROM GETTING MEDICATIONS?: NO

## 2025-01-15 SDOH — ECONOMIC STABILITY: TRANSPORTATION INSECURITY
IN THE PAST 12 MONTHS, HAS LACK OF TRANSPORTATION KEPT YOU FROM MEETINGS, WORK, OR FROM GETTING THINGS NEEDED FOR DAILY LIVING?: NO

## 2025-01-15 SDOH — HEALTH STABILITY: PHYSICAL HEALTH: ON AVERAGE, HOW MANY MINUTES DO YOU ENGAGE IN EXERCISE AT THIS LEVEL?: 60 MIN

## 2025-01-15 SDOH — HEALTH STABILITY: PHYSICAL HEALTH: ON AVERAGE, HOW MANY DAYS PER WEEK DO YOU ENGAGE IN MODERATE TO STRENUOUS EXERCISE (LIKE A BRISK WALK)?: 5 DAYS

## 2025-01-15 ASSESSMENT — LIFESTYLE VARIABLES
HOW OFTEN DO YOU HAVE A DRINK CONTAINING ALCOHOL: 2
HAS A RELATIVE, FRIEND, DOCTOR, OR ANOTHER HEALTH PROFESSIONAL EXPRESSED CONCERN ABOUT YOUR DRINKING OR SUGGESTED YOU CUT DOWN: NO
HAVE YOU OR SOMEONE ELSE BEEN INJURED AS A RESULT OF YOUR DRINKING: NO
HOW OFTEN DO YOU HAVE SIX OR MORE DRINKS ON ONE OCCASION: 1
HOW MANY STANDARD DRINKS CONTAINING ALCOHOL DO YOU HAVE ON A TYPICAL DAY: 2
HOW OFTEN DURING THE LAST YEAR HAVE YOU BEEN UNABLE TO REMEMBER WHAT HAPPENED THE NIGHT BEFORE BECAUSE YOU HAD BEEN DRINKING: NEVER
HOW OFTEN DURING THE LAST YEAR HAVE YOU HAD A FEELING OF GUILT OR REMORSE AFTER DRINKING: NEVER
HOW OFTEN DURING THE LAST YEAR HAVE YOU FOUND THAT YOU WERE NOT ABLE TO STOP DRINKING ONCE YOU HAD STARTED: NEVER
HOW MANY STANDARD DRINKS CONTAINING ALCOHOL DO YOU HAVE ON A TYPICAL DAY: 3 OR 4
HOW OFTEN DURING THE LAST YEAR HAVE YOU FAILED TO DO WHAT WAS NORMALLY EXPECTED FROM YOU BECAUSE OF DRINKING: NEVER
HOW OFTEN DURING THE LAST YEAR HAVE YOU FOUND THAT YOU WERE NOT ABLE TO STOP DRINKING ONCE YOU HAD STARTED: NEVER
HOW OFTEN DURING THE LAST YEAR HAVE YOU BEEN UNABLE TO REMEMBER WHAT HAPPENED THE NIGHT BEFORE BECAUSE YOU HAD BEEN DRINKING: NEVER
HOW OFTEN DURING THE LAST YEAR HAVE YOU NEEDED AN ALCOHOLIC DRINK FIRST THING IN THE MORNING TO GET YOURSELF GOING AFTER A NIGHT OF HEAVY DRINKING: NEVER
HOW OFTEN DURING THE LAST YEAR HAVE YOU NEEDED AN ALCOHOLIC DRINK FIRST THING IN THE MORNING TO GET YOURSELF GOING AFTER A NIGHT OF HEAVY DRINKING: NEVER
HAVE YOU OR SOMEONE ELSE BEEN INJURED AS A RESULT OF YOUR DRINKING: NO
HOW OFTEN DO YOU HAVE A DRINK CONTAINING ALCOHOL: MONTHLY OR LESS
HOW OFTEN DURING THE LAST YEAR HAVE YOU FAILED TO DO WHAT WAS NORMALLY EXPECTED FROM YOU BECAUSE OF DRINKING: NEVER
HAS A RELATIVE, FRIEND, DOCTOR, OR ANOTHER HEALTH PROFESSIONAL EXPRESSED CONCERN ABOUT YOUR DRINKING OR SUGGESTED YOU CUT DOWN: NO
HOW OFTEN DURING THE LAST YEAR HAVE YOU HAD A FEELING OF GUILT OR REMORSE AFTER DRINKING: NEVER

## 2025-01-15 ASSESSMENT — PATIENT HEALTH QUESTIONNAIRE - PHQ9
SUM OF ALL RESPONSES TO PHQ9 QUESTIONS 1 & 2: 0
SUM OF ALL RESPONSES TO PHQ QUESTIONS 1-9: 0
1. LITTLE INTEREST OR PLEASURE IN DOING THINGS: NOT AT ALL
2. FEELING DOWN, DEPRESSED OR HOPELESS: NOT AT ALL

## 2025-01-16 ENCOUNTER — OFFICE VISIT (OUTPATIENT)
Dept: FAMILY MEDICINE CLINIC | Age: 65
End: 2025-01-16

## 2025-01-16 VITALS
BODY MASS INDEX: 33.01 KG/M2 | HEIGHT: 71 IN | DIASTOLIC BLOOD PRESSURE: 76 MMHG | WEIGHT: 235.8 LBS | SYSTOLIC BLOOD PRESSURE: 132 MMHG | HEART RATE: 58 BPM | OXYGEN SATURATION: 89 %

## 2025-01-16 DIAGNOSIS — R06.02 SOBOE (SHORTNESS OF BREATH ON EXERTION): ICD-10-CM

## 2025-01-16 DIAGNOSIS — F33.42 RECURRENT MAJOR DEPRESSIVE DISORDER, IN FULL REMISSION (HCC): ICD-10-CM

## 2025-01-16 DIAGNOSIS — I50.42 CHRONIC COMBINED SYSTOLIC AND DIASTOLIC CONGESTIVE HEART FAILURE (HCC): ICD-10-CM

## 2025-01-16 DIAGNOSIS — E78.00 PURE HYPERCHOLESTEROLEMIA: ICD-10-CM

## 2025-01-16 DIAGNOSIS — G47.33 OSA (OBSTRUCTIVE SLEEP APNEA): ICD-10-CM

## 2025-01-16 DIAGNOSIS — Z00.00 INITIAL MEDICARE ANNUAL WELLNESS VISIT: Primary | ICD-10-CM

## 2025-01-16 DIAGNOSIS — J30.2 SEASONAL AND PERENNIAL ALLERGIC RHINITIS: ICD-10-CM

## 2025-01-16 DIAGNOSIS — I10 ESSENTIAL HYPERTENSION: ICD-10-CM

## 2025-01-16 DIAGNOSIS — J44.9 COPD, SEVERE (HCC): ICD-10-CM

## 2025-01-16 DIAGNOSIS — E66.9 OBESITY (BMI 30-39.9): ICD-10-CM

## 2025-01-16 DIAGNOSIS — J30.89 SEASONAL AND PERENNIAL ALLERGIC RHINITIS: ICD-10-CM

## 2025-01-16 DIAGNOSIS — I48.0 PAF (PAROXYSMAL ATRIAL FIBRILLATION) (HCC): ICD-10-CM

## 2025-01-16 DIAGNOSIS — J96.11 CHRONIC RESPIRATORY FAILURE WITH HYPOXIA: ICD-10-CM

## 2025-01-16 DIAGNOSIS — Z99.81 ON HOME OXYGEN THERAPY: ICD-10-CM

## 2025-01-16 DIAGNOSIS — I27.20 PULMONARY HTN (HCC): ICD-10-CM

## 2025-01-16 DIAGNOSIS — I38 VHD (VALVULAR HEART DISEASE): ICD-10-CM

## 2025-01-16 PROBLEM — R35.1 NOCTURIA: Status: RESOLVED | Noted: 2020-12-09 | Resolved: 2025-01-16

## 2025-01-16 PROBLEM — D12.3 BENIGN NEOPLASM OF TRANSVERSE COLON: Status: RESOLVED | Noted: 2022-09-07 | Resolved: 2025-01-16

## 2025-01-16 PROBLEM — D12.7 BENIGN NEOPLASM OF RECTOSIGMOID JUNCTION: Status: RESOLVED | Noted: 2022-09-07 | Resolved: 2025-01-16

## 2025-01-16 PROBLEM — D12.5 BENIGN NEOPLASM OF SIGMOID COLON: Status: RESOLVED | Noted: 2022-09-07 | Resolved: 2025-01-16

## 2025-01-16 PROBLEM — Z80.0 FAMILY HISTORY OF COLON CANCER: Status: RESOLVED | Noted: 2022-09-07 | Resolved: 2025-01-16

## 2025-01-16 RX ORDER — FAMOTIDINE 20 MG/1
20 TABLET, FILM COATED ORAL 2 TIMES DAILY
Qty: 60 TABLET | Refills: 3 | Status: SHIPPED | OUTPATIENT
Start: 2025-01-16

## 2025-01-16 ASSESSMENT — PATIENT HEALTH QUESTIONNAIRE - PHQ9
7. TROUBLE CONCENTRATING ON THINGS, SUCH AS READING THE NEWSPAPER OR WATCHING TELEVISION: NOT AT ALL
1. LITTLE INTEREST OR PLEASURE IN DOING THINGS: NOT AT ALL
8. MOVING OR SPEAKING SO SLOWLY THAT OTHER PEOPLE COULD HAVE NOTICED. OR THE OPPOSITE, BEING SO FIGETY OR RESTLESS THAT YOU HAVE BEEN MOVING AROUND A LOT MORE THAN USUAL: NOT AT ALL
9. THOUGHTS THAT YOU WOULD BE BETTER OFF DEAD, OR OF HURTING YOURSELF: NOT AT ALL
4. FEELING TIRED OR HAVING LITTLE ENERGY: NOT AT ALL
5. POOR APPETITE OR OVEREATING: NOT AT ALL
SUM OF ALL RESPONSES TO PHQ QUESTIONS 1-9: 0
2. FEELING DOWN, DEPRESSED OR HOPELESS: NOT AT ALL
SUM OF ALL RESPONSES TO PHQ QUESTIONS 1-9: 0
SUM OF ALL RESPONSES TO PHQ9 QUESTIONS 1 & 2: 0
3. TROUBLE FALLING OR STAYING ASLEEP: NOT AT ALL
6. FEELING BAD ABOUT YOURSELF - OR THAT YOU ARE A FAILURE OR HAVE LET YOURSELF OR YOUR FAMILY DOWN: NOT AT ALL
10. IF YOU CHECKED OFF ANY PROBLEMS, HOW DIFFICULT HAVE THESE PROBLEMS MADE IT FOR YOU TO DO YOUR WORK, TAKE CARE OF THINGS AT HOME, OR GET ALONG WITH OTHER PEOPLE: NOT DIFFICULT AT ALL
SUM OF ALL RESPONSES TO PHQ QUESTIONS 1-9: 0
SUM OF ALL RESPONSES TO PHQ QUESTIONS 1-9: 0

## 2025-01-16 NOTE — PROGRESS NOTES
Yes Nisreen Brennan APRN - CNP   atorvastatin (LIPITOR) 40 MG tablet Take 1 tablet by mouth nightly Yes Nisreen Brennan APRN - CNP   apixaban (ELIQUIS) 5 MG TABS tablet Take 1 tablet by mouth 2 times daily Yes Nisreen Brennan APRN - CNP   fluticasone-umeclidin-vilant (TRELEGY ELLIPTA) 100-62.5-25 MCG/ACT AEPB inhaler TAKE 1 PUFF BY MOUTH EVERY DAY Yes Bella Bustamante APRN - CNP   loratadine (CLARITIN) 10 MG tablet TAKE 1 TABLET BY MOUTH EVERY DAY Yes Janessa Salazar MD   traZODone (DESYREL) 50 MG tablet TAKE 1-2 TABLETS BY MOUTH NIGHTLY AS NEEDED FOR INSOMNIA. Yes Janessa Salazar MD   Oxygen Concentrator 6 L/min continuous Yes ProviderImelda MD   aspirin 81 MG chewable tablet Take 1 tablet by mouth daily Yes Victor Manuel Austin PA   Handicap Placard MISC by Does not apply route EXP 6/19/2028 Yes Gregorio Cagle MD   Multiple Vitamins-Minerals (THERAPEUTIC MULTIVITAMIN-MINERALS) tablet Take 1 tablet by mouth daily (with breakfast)  Victor Manuel Austin PA       CareTeam (Including outside providers/suppliers regularly involved in providing care):   Patient Care Team:  Janessa Salazar MD as PCP - General  Janessa Salazar MD as PCP - Empaneled Provider  Danica Marin MD as Consulting Physician (Cardiology)  Hanna Pastor APRN - CNP as Nurse Practitioner (Nurse Practitioner)  Roni Covington MD as Surgeon (Urology)     Recommendations for Preventive Services Due: see orders and patient instructions/AVS.  Recommended screening schedule for the next 5-10 years is provided to the patient in written form: see Patient Instructions/AVS.     Reviewed and updated this visit:  Tobacco  Allergies  Meds  Problems  Med Hx  Surg Hx  Soc Hx  Fam Hx              The patient (or guardian, if applicable) and other individuals in attendance with the patient were advised that Artificial Intelligence will be utilized during this visit to record and process the

## 2025-05-05 RX ORDER — ATORVASTATIN CALCIUM 40 MG/1
40 TABLET, FILM COATED ORAL NIGHTLY
Qty: 30 TABLET | Refills: 5 | Status: SHIPPED | OUTPATIENT
Start: 2025-05-05

## 2025-05-08 RX ORDER — FAMOTIDINE 20 MG/1
20 TABLET, FILM COATED ORAL 2 TIMES DAILY
Qty: 60 TABLET | Refills: 11 | Status: SHIPPED | OUTPATIENT
Start: 2025-05-08

## 2025-05-12 RX ORDER — FUROSEMIDE 20 MG/1
20 TABLET ORAL 2 TIMES DAILY
Qty: 60 TABLET | Refills: 5 | Status: SHIPPED | OUTPATIENT
Start: 2025-05-12

## 2025-05-13 ENCOUNTER — OFFICE VISIT (OUTPATIENT)
Dept: CARDIOLOGY CLINIC | Age: 65
End: 2025-05-13
Payer: COMMERCIAL

## 2025-05-13 VITALS
BODY MASS INDEX: 31.08 KG/M2 | WEIGHT: 222 LBS | SYSTOLIC BLOOD PRESSURE: 110 MMHG | DIASTOLIC BLOOD PRESSURE: 64 MMHG | HEIGHT: 71 IN | OXYGEN SATURATION: 91 % | HEART RATE: 52 BPM

## 2025-05-13 DIAGNOSIS — I50.42 CHRONIC COMBINED SYSTOLIC AND DIASTOLIC CONGESTIVE HEART FAILURE (HCC): ICD-10-CM

## 2025-05-13 DIAGNOSIS — I38 VHD (VALVULAR HEART DISEASE): Primary | ICD-10-CM

## 2025-05-13 DIAGNOSIS — I48.0 PAF (PAROXYSMAL ATRIAL FIBRILLATION) (HCC): ICD-10-CM

## 2025-05-13 PROCEDURE — 3078F DIAST BP <80 MM HG: CPT | Performed by: NURSE PRACTITIONER

## 2025-05-13 PROCEDURE — 3074F SYST BP LT 130 MM HG: CPT | Performed by: NURSE PRACTITIONER

## 2025-05-13 PROCEDURE — 1124F ACP DISCUSS-NO DSCNMKR DOCD: CPT | Performed by: NURSE PRACTITIONER

## 2025-05-13 PROCEDURE — 3017F COLORECTAL CA SCREEN DOC REV: CPT | Performed by: NURSE PRACTITIONER

## 2025-05-13 PROCEDURE — G8427 DOCREV CUR MEDS BY ELIG CLIN: HCPCS | Performed by: NURSE PRACTITIONER

## 2025-05-13 PROCEDURE — 1036F TOBACCO NON-USER: CPT | Performed by: NURSE PRACTITIONER

## 2025-05-13 PROCEDURE — G8417 CALC BMI ABV UP PARAM F/U: HCPCS | Performed by: NURSE PRACTITIONER

## 2025-05-13 PROCEDURE — 99214 OFFICE O/P EST MOD 30 MIN: CPT | Performed by: NURSE PRACTITIONER

## 2025-05-13 ASSESSMENT — ENCOUNTER SYMPTOMS
SHORTNESS OF BREATH: 1
ORTHOPNEA: 0

## 2025-05-13 NOTE — PROGRESS NOTES
5/13/2025  Primary cardiologist: Dr. Marin    CC:   Yrn  is an established 64 y.o.  male here for a follow up on vhd      SUBJECTIVE/OBJECTIVE:  Yrn is a 64 y.o. male with a history of valvular heart disease s/p AVR (2023), PVC's, atrial fibrillation s/p DCC, PHTN, HFrEF, THIERRY and severe COPD,  In June 2023 Yrn underwent an aortic valve replacement utilizing a# 29 mm Loaiza Inspiris Resilia tissue aortic valve.     HPI:  Yrn states on Friday he had a low heart rate of 33.  States he used his [employer] mobile to check his heart rate and noticed he was in sinus rhythm with PVC however heart rate down to the 33.  He then got his pulse oximetry which showed his heart rate up into the 40s.  At that time SaO2 94%.  States he did feel a palpitation and a little fatigued.  He has had no other episodes.      Review of Systems   Constitutional: Positive for malaise/fatigue. Negative for diaphoresis.   Cardiovascular:  Negative for chest pain, claudication, dyspnea on exertion, irregular heartbeat, leg swelling, near-syncope, orthopnea, palpitations and paroxysmal nocturnal dyspnea.   Respiratory:  Positive for shortness of breath.    Neurological:  Negative for dizziness and light-headedness.       Vitals:    05/13/25 1009   BP: 110/64   BP Site: Left Upper Arm   Patient Position: Sitting   BP Cuff Size: Medium Adult   Pulse: 52   SpO2: 91%   Weight: 100.7 kg (222 lb)   Height: 1.803 m (5' 11\")       Wt Readings from Last 3 Encounters:   05/13/25 100.7 kg (222 lb)   01/16/25 107 kg (235 lb 12.8 oz)   11/07/24 108.6 kg (239 lb 6.4 oz)      Body mass index is 30.96 kg/m².     Physical Exam  Vitals reviewed.   Eyes:      Pupils: Pupils are equal, round, and reactive to light.   Neck:      Vascular: No carotid bruit.   Cardiovascular:      Rate and Rhythm: Regular rhythm. Bradycardia present.      Pulses: Normal pulses.      Heart sounds: Murmur heard.      Systolic murmur is present with a grade of 3/6.   Pulmonary:

## 2025-05-22 ENCOUNTER — COMMUNITY OUTREACH (OUTPATIENT)
Dept: FAMILY MEDICINE CLINIC | Age: 65
End: 2025-05-22

## 2025-05-28 ENCOUNTER — RESULTS FOLLOW-UP (OUTPATIENT)
Dept: FAMILY MEDICINE CLINIC | Age: 65
End: 2025-05-28

## 2025-05-28 ENCOUNTER — HOSPITAL ENCOUNTER (OUTPATIENT)
Dept: GENERAL RADIOLOGY | Age: 65
Discharge: HOME | End: 2025-05-28
Payer: COMMERCIAL

## 2025-05-28 ENCOUNTER — OFFICE VISIT (OUTPATIENT)
Dept: FAMILY MEDICINE CLINIC | Age: 65
End: 2025-05-28
Payer: COMMERCIAL

## 2025-05-28 VITALS
WEIGHT: 220 LBS | HEART RATE: 53 BPM | SYSTOLIC BLOOD PRESSURE: 140 MMHG | BODY MASS INDEX: 30.68 KG/M2 | OXYGEN SATURATION: 99 % | DIASTOLIC BLOOD PRESSURE: 78 MMHG

## 2025-05-28 DIAGNOSIS — R22.2 MASS OF CHEST: ICD-10-CM

## 2025-05-28 DIAGNOSIS — R22.2 MASS OF CHEST: Primary | ICD-10-CM

## 2025-05-28 PROCEDURE — 3078F DIAST BP <80 MM HG: CPT | Performed by: NURSE PRACTITIONER

## 2025-05-28 PROCEDURE — 71046 X-RAY EXAM CHEST 2 VIEWS: CPT

## 2025-05-28 PROCEDURE — 3017F COLORECTAL CA SCREEN DOC REV: CPT | Performed by: NURSE PRACTITIONER

## 2025-05-28 PROCEDURE — 1124F ACP DISCUSS-NO DSCNMKR DOCD: CPT | Performed by: NURSE PRACTITIONER

## 2025-05-28 PROCEDURE — 3077F SYST BP >= 140 MM HG: CPT | Performed by: NURSE PRACTITIONER

## 2025-05-28 PROCEDURE — 1036F TOBACCO NON-USER: CPT | Performed by: NURSE PRACTITIONER

## 2025-05-28 PROCEDURE — G8417 CALC BMI ABV UP PARAM F/U: HCPCS | Performed by: NURSE PRACTITIONER

## 2025-05-28 PROCEDURE — G8427 DOCREV CUR MEDS BY ELIG CLIN: HCPCS | Performed by: NURSE PRACTITIONER

## 2025-05-28 PROCEDURE — 99213 OFFICE O/P EST LOW 20 MIN: CPT | Performed by: NURSE PRACTITIONER

## 2025-05-28 NOTE — PROGRESS NOTES
2025     Yrn Elliott (:  1960) is a 64 y.o. male, here for evaluation of the following medical concerns:    History of Present Illness  The patient presents for evaluation of chest tenderness.    Chest wall mass    - He describes a sensation of internal pressure and recalls waking up on  morning with the discomfort, despite not engaging in any strenuous activities but was leaning on a tailgate Friday.   - The pain is exacerbated by lying on the affected area and bending over.    Heart Rate and Medication  - He has an upcoming echocardiogram scheduled for next week.  - His metoprolol dosage was recently reduced to 25 mg.  - Two weeks ago, his heart rate was recorded in the 30s during a cardiology appointment, prompting a reduction in his metoprolol dosage.  - Currently, his heart rate is in the 50s, as per his own monitoring.    Supplemental information: He underwent aortic valve replacement surgery in 2023.    PAST SURGICAL HISTORY:  Aortic valve replacement surgery in 2023.       Lump on chest appeared          Prior to Visit Medications    Medication Sig Taking? Authorizing Provider   furosemide (LASIX) 20 MG tablet Take 1 tablet by mouth 2 times daily Yes Nisreen Brennan APRN - CNP   famotidine (PEPCID) 20 MG tablet TAKE 1 TABLET BY MOUTH TWICE A DAY Yes Janessa Salazar MD   atorvastatin (LIPITOR) 40 MG tablet Take 1 tablet by mouth nightly Yes Nisreen Brennan APRN - CNP   apixaban (ELIQUIS) 5 MG TABS tablet Take 1 tablet by mouth 2 times daily Yes Danica Marin MD   albuterol sulfate HFA (PROVENTIL;VENTOLIN;PROAIR) 108 (90 Base) MCG/ACT inhaler INHALE 2 PUFFS BY MOUTH EVERY 4 HOURS AS NEEDED FOR WHEEZE OR FOR SHORTNESS OF BREATH Yes Gregorio Cagle MD   potassium chloride (KLOR-CON M10) 10 MEQ extended release tablet Take 1 tablet by mouth daily Yes Nisreen Brennan APRN - CNP   empagliflozin (JARDIANCE) 10 MG tablet Take 1 tablet by mouth daily Yes Mika

## 2025-06-02 RX ORDER — METOPROLOL TARTRATE 25 MG/1
12.5 TABLET, FILM COATED ORAL 2 TIMES DAILY
Qty: 30 TABLET | Refills: 5 | Status: SHIPPED | OUTPATIENT
Start: 2025-06-02

## 2025-06-03 ENCOUNTER — HOSPITAL ENCOUNTER (OUTPATIENT)
Dept: CT IMAGING | Age: 65
Discharge: HOME | End: 2025-06-03
Payer: COMMERCIAL

## 2025-06-03 DIAGNOSIS — R22.2 MASS OF CHEST: ICD-10-CM

## 2025-06-03 PROCEDURE — 71250 CT THORAX DX C-: CPT

## 2025-06-05 ENCOUNTER — RESULTS FOLLOW-UP (OUTPATIENT)
Dept: CARDIOLOGY CLINIC | Age: 65
End: 2025-06-05

## 2025-06-30 DIAGNOSIS — I50.42 CHRONIC COMBINED SYSTOLIC AND DIASTOLIC CONGESTIVE HEART FAILURE (HCC): Primary | ICD-10-CM

## 2025-06-30 RX ORDER — POTASSIUM CHLORIDE 750 MG/1
10 TABLET, EXTENDED RELEASE ORAL DAILY
Qty: 30 TABLET | Refills: 5 | OUTPATIENT
Start: 2025-06-30

## 2025-07-29 RX ORDER — FLUTICASONE FUROATE, UMECLIDINIUM BROMIDE AND VILANTEROL TRIFENATATE 100; 62.5; 25 UG/1; UG/1; UG/1
1 POWDER RESPIRATORY (INHALATION) DAILY
Qty: 60 EACH | Refills: 11 | OUTPATIENT
Start: 2025-07-29

## (undated) DEVICE — SUTURE PROL SZ 6-0 L24IN NONABSORBABLE BLU L13MM C-1 3/8 8726H

## (undated) DEVICE — SUTURE VCRL 2-0 L36IN ABSRB UD CTX L48MM 1/2 CIR TAPERPOINT J979H

## (undated) DEVICE — PLEDGET VASC W3/16XL3/8IN THK1/16IN PTFE SFT

## (undated) DEVICE — COVER,TABLE,44X90,STERILE: Brand: MEDLINE

## (undated) DEVICE — SYRINGE MED 10ML LUERLOCK TIP W/O SFTY DISP

## (undated) DEVICE — GOWN,SIRUS,POLYRNF,BRTHSLV,XLN/XL,20/CS: Brand: MEDLINE

## (undated) DEVICE — INTRODUCER SHTH THN WALLED 5 FRX10 CM 22 GA ANGLED RAIN SHTH

## (undated) DEVICE — CANNULA PERF AD 17FR 31.8CM LENGTH FEM ART VENT BIO MEDICUS

## (undated) DEVICE — INTRODUCER SHTH 6FR L11CM 0.038IN STD SIDEPRT EXTN 3 W

## (undated) DEVICE — SUTURE PROL SZ 3-0 L36IN NONABSORBABLE BLU L26MM SH 1/2 CIR 8522H

## (undated) DEVICE — BLANKET THER AD W24XL60IN FAB COVERING SUP SFT ULT THN LTWT

## (undated) DEVICE — TOWEL,OR,DSP,ST,BLUE,STD,6/PK,12PK/CS: Brand: MEDLINE

## (undated) DEVICE — FOGARTY - HYDRAGRIP SURGICAL - CLAMP INSERTS: Brand: FOGARTY SOFTJAW

## (undated) DEVICE — GLOVE ORANGE PI 7 1/2   MSG9075

## (undated) DEVICE — PINNACLE R/O II INTRODUCER SHEATH WITH RADIOPAQUE MARKER: Brand: PINNACLE

## (undated) DEVICE — SUTURE ETHBND EXCEL SZ 1 L30IN NONABSORBABLE GRN L36MM CT-1 X425H

## (undated) DEVICE — AGENT HEMOSTATIC SURGIFLOW MATRIX KIT W/THROMBIN

## (undated) DEVICE — SHEET,DRAPE,53X77,STERILE: Brand: MEDLINE

## (undated) DEVICE — ELECTRODE ES AD CRDLSS PT RET REM POLYHESIVE

## (undated) DEVICE — COSEAL SURGICAL SEALANT (COSEAL) IS COMPOSED OF TWO SYNTHETIC POLYETHYLENE GLYCOLS (PEGS), A DILUTE HYDROGEN CHLORIDE SOLUTION AND A SODIUM PHOSPHATE/SODIUM CARBONATE SOLUTION. THESE COMPONENTS COME IN A KIT THAT INCLUDES AN APPLICATOR(S). AT THE TIME OF ADMINISTRATION, THE MIXED PEGS AND SOLUTIONS FORM A HYDROGEL THAT ADHERES TO TISSUE, SYNTHETIC GRAFT MATERIALS AND COVALENTLY BONDS TO ITSELF: Brand: COSEAL SURGICAL SEALANT

## (undated) DEVICE — GAUZE,SPONGE,4"X4",16PLY,XRAY,STRL,LF: Brand: MEDLINE

## (undated) DEVICE — SUTURE NRLN SZ 1 L18IN NONABSORBABLE BLK L36MM CT-1 1/2 CIR C520D

## (undated) DEVICE — SUMP PERICARD AD 20FR WT TIP VERSATILE DSGN MULT PRT VENT

## (undated) DEVICE — DRAPE HUSH SLUSH 2.0 112CM X 168CM

## (undated) DEVICE — KIT CVC AD 7FR L20CM POLYUR BLU FLEXTIP ANTIMIC MULTILUMEN

## (undated) DEVICE — CANNULA 96600 125 BIO MEDICUS 25FR EA

## (undated) DEVICE — SEAL

## (undated) DEVICE — CONTAINER, SPECIMEN, STRL PATH, 3OZ: Brand: MEDLINE

## (undated) DEVICE — DRESSING TRNSPAR W2XL2.75IN FLM SHT SEMIPERMEABLE WIND

## (undated) DEVICE — SUTURE VCRL + SZ 1-0 L36IN ABSRB UD CTX 1/2 CIR TAPR PNT VCP977H

## (undated) DEVICE — ELECTRODE, BLADE, 4', SIL-INS SS: Brand: MEDLINE

## (undated) DEVICE — SUTURE STARTAFIX 3-0 PS-1 30CM

## (undated) DEVICE — SUTURE ABSRB L30CM 2-0 VLT SPRL PDS + STRATAFIX SXPP1B410

## (undated) DEVICE — DEVICE RESUS AD TB L40IN SELF INFL MASK TEXT BG DBL SWVL EL

## (undated) DEVICE — COLUMN DRAPE

## (undated) DEVICE — SUTURE STRATAFIX SYMMETRIC SZ 1 L18IN ABSRB VLT CT1 L36CM SXPP1A404

## (undated) DEVICE — OPEN HRT PK

## (undated) DEVICE — SUTURE NONABSORBABLE MONOFILAMENT 4-0 RB-1 36 IN BLU PROLENE 8557H

## (undated) DEVICE — CANNULA PERF 9FR L1225IN FLNG STD TIP FLOW GRD INTRO MIAR

## (undated) DEVICE — CANNULA SUCT TIP L8MM DIA24FR INTCARD RIG SUC 0.25IN CONN

## (undated) DEVICE — Device

## (undated) DEVICE — ADHESIVE SKIN CLSR 0.7ML TOP DERMBND ADV

## (undated) DEVICE — CANNULA PERF 20FR L25CM CONN 3 8IN ART HI FLOW NVENT CRV TIP

## (undated) DEVICE — SET ADMIN L105IN 10GTT 3 NDL FREE CK VLV 2 PC M LUERLOCK

## (undated) DEVICE — COVER US PRB W12XL244CM SURGICAL INTRAOPERATIVE PLAS TAPR L

## (undated) DEVICE — SUTURE S STL SZ 5 L18IN NONABSORBABLE SIL L48MM CCS 1/4 CIR M657G

## (undated) DEVICE — BLADE SAW STRNM 10X35X0.6MM

## (undated) DEVICE — 3M™ IOBAN™ 2 ANTIMICROBIAL INCISE DRAPE 6650EZ: Brand: IOBAN™ 2

## (undated) DEVICE — FORCEPS BX 240CM JAW 3.2MM L CAP NDL MIC MESH TTH M00513372

## (undated) DEVICE — RADIFOCUS GLIDEWIRE: Brand: GLIDEWIRE

## (undated) DEVICE — SUTURE PROL SZ 4-0 L36IN NONABSORBABLE BLU L26MM SH 1/2 CIR 8521H

## (undated) DEVICE — 3M™ STERI-DRAPE™ INSTRUMENT POUCH 1018: Brand: STERI-DRAPE™

## (undated) DEVICE — SUTURE S STL SZ 5 L18IN NONABSORBABLE SIL V-40 L48MM 1/2 M650G

## (undated) DEVICE — ELECTRODE BLDE L6.5IN CAUT EXT DISP

## (undated) DEVICE — SYSTEM SKIN CLSR 22CM DERMBND PRINEO

## (undated) DEVICE — SET ADMIN PRIMING 67ML L105IN NVENT 180UM FLTR 3 RLER CLMP

## (undated) DEVICE — GLOVE SURG SZ 6 THK91MIL LTX FREE SYN POLYISOPRENE ANTI

## (undated) DEVICE — CATHETER IV 14GA L1.75IN OD2.146MM ID1.740MM ORNG VIALON

## (undated) DEVICE — BAG TRNSF AUTOLGS SUCT AND ANTICOAG LN AUTOLOG

## (undated) DEVICE — STOPCOCK IV HI PRSS 1050PSI NDLLSS INJ 3 W LUER SWVL NUT

## (undated) DEVICE — KIT INTRO 9FR L4IN POLYUR PERC SHTH RADPQ W INTEGR HEMSTAS

## (undated) DEVICE — CONTAINER,SPECIMEN,OR STERILE,4OZ: Brand: MEDLINE

## (undated) DEVICE — SUTURE NONABSORBABLE MONOFILAMENT 6-0 C-1 4X18 IN PROLENE M8718

## (undated) DEVICE — CANNULA SUCT OD20FR SIL L HRT VENT 17L

## (undated) DEVICE — DISPOSABLE GRASPER: Brand: EPIX LAPAROSCOPIC GRASPER

## (undated) DEVICE — SWAN-GANZ TRUE SIZE THERMODULTION CATHETER, 5F: Brand: SWAN-GANZ TRUE SIZE

## (undated) DEVICE — 1LYRTR 16FR10ML 100%SILI SNAP: Brand: MEDLINE INDUSTRIES, INC.

## (undated) DEVICE — AGENT HEMSTAT 3GM OXIDIZED REGENERATED CELOS ABSRB FOR CONT (ORDER MULTIPLES OF 5EA)

## (undated) DEVICE — WOUND RETRACTOR AND PROTECTOR: Brand: ALEXIS O WOUND PROTECTOR-RETRACTOR

## (undated) DEVICE — SWAN-GANZ CCOMBO V THERMODILUTION CATHETER: Brand: SWAN-GANZ CCOMBO V

## (undated) DEVICE — BLADE ES L2.75IN ELASTOMERIC COAT DURABLE BEND UPTO 90DEG

## (undated) DEVICE — KIT INFUS PMP 270ML 4ML/HR 2ML/SITE SOAK CATH L2.5IN

## (undated) DEVICE — DRAPE,SPLIT,CVMAX ,CLEAR: Brand: MEDLINE

## (undated) DEVICE — BAG AUTOTRNS 600ML BLD SGL CHMBR 3 PRT PLAS DEHP PVC

## (undated) DEVICE — TOTAL TRAY, 16FR 10ML SIL FOLEY, URN: Brand: MEDLINE

## (undated) DEVICE — SUTURE ETHBND EXCEL SZ 2-0 L30IN PLEDG 7X3X1.5MM PXX41

## (undated) DEVICE — SUTURE PERMAHAND SZ 0 L30IN NONABSORBABLE BLK SILK UNIDIR SA86G

## (undated) DEVICE — SUMP INTCARD SUCT AD 20FR PERICARD MAYO STYL FLX VERSATILE

## (undated) DEVICE — APPLICATOR MEDICATED 26 CC SOLUTION HI LT ORNG CHLORAPREP

## (undated) DEVICE — RETROGRADE CARDIOPLEGIA CATHETER: Brand: EDWARDS LIFESCIENCES RETROGRADE CARDIOPLEGIA CATHETER

## (undated) DEVICE — GLOVE SURG SZ 6 CRM LTX FREE POLYISOPRENE POLYMER BEAD ANTI

## (undated) DEVICE — SUTURE NONABSORBABLE MONOFILAMENT 5-0 C-1 1X24 IN PROLENE 8725H

## (undated) DEVICE — BLADE SAW W10XL54MM FOR PRI REPEAT STRNOTMY

## (undated) DEVICE — GOWN,ECLIPSE,POLYRNF,BRTHSLV,L,30/CS: Brand: MEDLINE

## (undated) DEVICE — APPLICATOR MEDICATED 26 CC TINT HI-LITE ORNG STRL CHLORAPREP

## (undated) DEVICE — TIP COVER ACCESSORY

## (undated) DEVICE — SUTURE ETHBND EXCEL SZ 2-0 L36IN NONABSORBABLE GRN L26MM SH X523H

## (undated) DEVICE — CANNULA PERF L15IN DIA29FR VEN 3 STG THN WALL DSGN W  VENT

## (undated) DEVICE — GLOVE SURG SZ 65 CRM LTX FREE POLYISOPRENE POLYMER BEAD ANTI

## (undated) DEVICE — SET ADMIN 25ML L117IN PMP MOD CK VLV RLER CLMP 2 SMRTSITE

## (undated) DEVICE — DRAPE TOWEL: Brand: CONVERTORS

## (undated) DEVICE — SUTURE MCRYL SZ 4-0 L18IN ABSRB UD L19MM PS-2 3/8 CIR PRIM Y496G

## (undated) DEVICE — WAX SURG 2.5GM HEMSTAT BNE BEESWAX PARAFFIN ISO PALMITATE

## (undated) DEVICE — PERCLOSE™ PROSTYLE™ SUTURE-MEDIATED CLOSURE AND REPAIR SYSTEM: Brand: PERCLOSE™ PROSTYLE™

## (undated) DEVICE — TUBING, SUCTION, 9/32" X 10', STRAIGHT: Brand: MEDLINE

## (undated) DEVICE — DRAIN SURG SGL COLL PT TB FOR ATS BG OASIS

## (undated) DEVICE — SET TBNG DISP TIP FOR AHTO

## (undated) DEVICE — TROCAR: Brand: KII FIOS FIRST ENTRY

## (undated) DEVICE — CUSTOM PK 10A99R2 RED VENT

## (undated) DEVICE — GLOVE SURG SZ 7 CRM LTX FREE POLYISOPRENE POLYMER BEAD ANTI

## (undated) DEVICE — SUTURE NONABSORBABLE MONOFILAMENT 6-0 C-1 1X30 IN PROLENE 8706H

## (undated) DEVICE — GLOVE SURG SZ 8 L12IN THK75MIL DK GRN LTX FREE

## (undated) DEVICE — SUTURE MCRYL SZ 3-0 L27IN ABSRB UD L24MM PS-1 3/8 CIR PRIM Y936H

## (undated) DEVICE — SUTURE PROL 3-0 L36IN NONABSORBABLE BLU L26MM SH 1/2 CIR P8522

## (undated) DEVICE — 3M™ IOBAN™ 2 ANTIMICROBIAL INCISE DRAPE 6640EZ: Brand: IOBAN™ 2

## (undated) DEVICE — SUTURE BOOT ASSORTED COLORS XRAY DETECTABLE

## (undated) DEVICE — WIRE GUID DIAG PTFE COAT FIX COR STD XIBLE J TIP 7MM

## (undated) DEVICE — SUTURE ETHIB EXCL X BR GRN V-7 DA 2-0 30 PX977 PX977H

## (undated) DEVICE — GLOVE SURG SZ 65 L12IN FNGR THK79MIL GRN LTX FREE

## (undated) DEVICE — KIT INFUS PMP 100ML 2M/HR SOAK CATH L2.5IN N NARC ON-Q

## (undated) DEVICE — SUTURE STRATAFIX SZ 3-0 L20CM ABSRB VLT NDL SH-1 L22MM 1/2 SXPP1B453

## (undated) DEVICE — COVER,MAYO STAND,STERILE: Brand: MEDLINE

## (undated) DEVICE — CATHETER URETH 12FR L16IN RED RUB RND HLLW TIP W/ TWO EYE

## (undated) DEVICE — CANNULA PERF 17FR L10IN SIL COR ART OSTIAL SFT BLB SHP TIP

## (undated) DEVICE — SUTURE VCRL SZ 2 L27IN ABSRB UD L65MM TP-1 1/2 CIR J849G

## (undated) DEVICE — NEEDLE,20GX1.5",BD,YALE,DISP,RG: Brand: MEDLINE

## (undated) DEVICE — CATHETERIZATION KIT 7 FRX20 CM 3L

## (undated) DEVICE — SUTURE PROL SZ 5-0 L36IN NONABSORBABLE BLU L13MM C-1 3/8 8720H

## (undated) DEVICE — SENSOR OXMTR SM AD DISP FOR INVOS SYS

## (undated) DEVICE — BIOFLO PICC WITH ENDEXO AND PASV VALVE TECHNOLOGY - 4F X 55CM CATHETER MST-70 KIT: Brand: BIOFLO

## (undated) DEVICE — SUTURE ETHBND EXCEL SZ 0 L36IN NONABSORBABLE GRN SH L26MM X524H

## (undated) DEVICE — SOLUTION IV 1000ML 0.9% SOD CHL FOR IRRIG PLAS CONT

## (undated) DEVICE — SUTURE VCRL SZ 1 L27IN ABSRB VLT L26MM CT-2 1/2 CIR J335H

## (undated) DEVICE — SUTURE SURGLON SZ 1 L30IN NONABSORBABLE BLK NO NDL NYL PRE 8886191971

## (undated) DEVICE — SUPPLIED AS A PAIR FOR USE IN JAWS OF RESUABLE CLAMPS.: Brand: INTRACK® INSERTS

## (undated) DEVICE — PRESSURE TUBING: Brand: TRUWAVE

## (undated) DEVICE — SUTURE PERMA-HAND SZ 2-0 L30IN NONABSORBABLE BLK L26MM SH K833H

## (undated) DEVICE — SOLUTION IV IRRIG WATER 1000ML POUR BRL 2F7114

## (undated) DEVICE — GLOVE SURG SZ 6 L12IN FNGR THK79MIL GRN LTX FREE

## (undated) DEVICE — APPLICATOR MEDICATED 10.5 CC SOLUTION HI LT ORNG CHLORAPREP

## (undated) DEVICE — SUTURE VCRL SZ 4-0 L27IN ABSRB UD L19MM FS-2 3/8 CIR REV J422H

## (undated) DEVICE — SNARE VASC L240CM LOOP W10MM SHTH DIA2.4MM RND STIFF CLD

## (undated) DEVICE — ENDOSCOPY KIT: Brand: MEDLINE INDUSTRIES, INC.

## (undated) DEVICE — LIQUIBAND RAPID ADHESIVE 36/CS 0.8ML: Brand: MEDLINE

## (undated) DEVICE — TOWEL,OR,DSP,ST,WHITE,DLX,XR,4/PK,20PK/C: Brand: MEDLINE

## (undated) DEVICE — CATHETER CV KT 9 FRX11.5 CM DL FOR 7.5-8 FR INTRO NDL MAC

## (undated) DEVICE — MINI STICK MAX COAXIAL MICROINTRODUCER KIT: Brand: ANGIODYNAMICS

## (undated) DEVICE — DRAIN SURG L3/8-1/2IN DIA3/16IN SIL CARD CONN 1:1 BLAK

## (undated) DEVICE — DRAPE,UTILITY,XL,4/PK,STERILE: Brand: MEDLINE

## (undated) DEVICE — PINNACLE INTRODUCER SHEATH: Brand: PINNACLE

## (undated) DEVICE — TOWEL OR BLUEE 16X26IN ST 8 PACK ORB08 16X26ORTWL

## (undated) DEVICE — COUNTER NDL 30 COUNT FOAM STRP SGL MAG

## (undated) DEVICE — CLIP SM RED INTERN HMOCLP TITAN LIGATING

## (undated) DEVICE — TAPE MED W1/8XL30IN WHT POLY

## (undated) DEVICE — CONNECTOR DRNGE W3/8-0.5XH3/16XL3/16IN 2:1 SIL CARD STR

## (undated) DEVICE — SUTURE 1 STRATAFIX SYMMETRIC PDS + 30CM CT-1 SXPP1A435

## (undated) DEVICE — ZINACTIVE USE 2539609 APPLICATOR MEDICATED 10.5 CC SOLUTION HI LT ORNG CHLORAPREP

## (undated) DEVICE — DECANTER FLD 9IN ST BG FOR ASEP TRNSF OF FLD

## (undated) DEVICE — SENSOR PLSE OXMTR AD CBL L3FT ADH TRANSMISSIVE

## (undated) DEVICE — MARKER SURG SKIN UTIL REGULAR/FINE 2 TIP W/ RUL AND 9 LBL

## (undated) DEVICE — ANGIOGRAPHY KIT CUST MANIFOLD

## (undated) DEVICE — MEDI-TRACE CADENCE ADULT, DEFIBRILLATION ELECTRODE -RTS  (10 PR/PK) - ZOLL: Brand: MEDI-TRACE CADENCE

## (undated) DEVICE — AGENT HEMSTAT W4XL8IN OXIDIZED REGENERATED CELOS ABSRB

## (undated) DEVICE — COR-KNOT MINI® COMBO KITBASE PACKAGE TYPE - KITEACH STERILE PACKAGE KIT CONTAINS (2) SINGLE PATIENT USE COR-KNOT MINI® DEVICES AND (12) COR-KNOT® QUICK LOADS®.: Brand: COR-KNOT MINI®

## (undated) DEVICE — TBG INSUFFLATION W/PUSH ON CON: Brand: MEDLINE INDUSTRIES, INC.

## (undated) DEVICE — DRAIN,WOUND,ROUND,24FR,5/16",FULL-FLUTED: Brand: MEDLINE

## (undated) DEVICE — SUTURE ETHBND EXCEL SZ 2-0 L30IN NONABSORBABLE GRN WHT SH-2 PXX82

## (undated) DEVICE — ADAPTER IV TBNG CLR POLYCARB DBL M LUERLOCK

## (undated) DEVICE — MPS® DELIVERY SET W/ARREST AGENT AND ADDITIVE CASSETTES, HEAT EXCHANGER & 10 FT. DELIVERY TUBING: Brand: MPS

## (undated) DEVICE — CANNULA PERF L5.5IN DIA9FR AORT ROOT AG STD TIP W/ VENT LN

## (undated) DEVICE — ADAPTER PERF L7.5IN INLET LEG 3IN Y TYP VENT CLR CODE CLMP

## (undated) DEVICE — ARM DRAPE

## (undated) DEVICE — SUTURE PERMA-HAND 1 L30IN NONABSORBABLE BLK SILK BRAID W/O SA87G

## (undated) DEVICE — GUIDEWIRE VASC L180CM DIA0.035IN TIP L7CM PTFE S STL STR

## (undated) DEVICE — CANNULA VENT 16FR MAL INTRO SIL CONN 0.25IN NVENT BULL TIP

## (undated) DEVICE — GLOVE SURG SZ 65 THK91MIL LTX FREE SYN POLYISOPRENE

## (undated) DEVICE — GLOVE ORANGE PI 8   MSG9080

## (undated) DEVICE — LINER,SEMI-RIGID,3000CC,50EA/CS: Brand: MEDLINE

## (undated) DEVICE — GOWN,ECLIPSE,POLYRNF,BRTHSLV,XL,30/CS: Brand: MEDLINE

## (undated) DEVICE — SYRINGE 20ML LL S/C 50